# Patient Record
Sex: FEMALE | Race: WHITE | URBAN - METROPOLITAN AREA
[De-identification: names, ages, dates, MRNs, and addresses within clinical notes are randomized per-mention and may not be internally consistent; named-entity substitution may affect disease eponyms.]

---

## 2018-06-26 ENCOUNTER — EMERGENCY (EMERGENCY)
Facility: HOSPITAL | Age: 58
LOS: 0 days | Discharge: HOME | End: 2018-06-27
Attending: EMERGENCY MEDICINE | Admitting: EMERGENCY MEDICINE

## 2018-06-26 VITALS
DIASTOLIC BLOOD PRESSURE: 84 MMHG | OXYGEN SATURATION: 98 % | HEART RATE: 124 BPM | RESPIRATION RATE: 18 BRPM | SYSTOLIC BLOOD PRESSURE: 124 MMHG | TEMPERATURE: 97 F

## 2018-06-26 VITALS — HEART RATE: 133 BPM

## 2018-06-26 DIAGNOSIS — Z88.8 ALLERGY STATUS TO OTHER DRUGS, MEDICAMENTS AND BIOLOGICAL SUBSTANCES: ICD-10-CM

## 2018-06-26 DIAGNOSIS — R00.2 PALPITATIONS: ICD-10-CM

## 2018-06-26 DIAGNOSIS — I48.91 UNSPECIFIED ATRIAL FIBRILLATION: ICD-10-CM

## 2018-06-26 LAB
BASE EXCESS BLDV CALC-SCNC: -1.1 MMOL/L — SIGNIFICANT CHANGE UP (ref -2–2)
BASOPHILS # BLD AUTO: 0.03 K/UL — SIGNIFICANT CHANGE UP (ref 0–0.2)
BASOPHILS NFR BLD AUTO: 0.3 % — SIGNIFICANT CHANGE UP (ref 0–1)
CA-I SERPL-SCNC: 1.16 MMOL/L — SIGNIFICANT CHANGE UP (ref 1.12–1.3)
CK MB CFR SERPL CALC: 3.2 NG/ML — SIGNIFICANT CHANGE UP (ref 0.6–6.3)
EOSINOPHIL # BLD AUTO: 0.38 K/UL — SIGNIFICANT CHANGE UP (ref 0–0.7)
EOSINOPHIL NFR BLD AUTO: 4.1 % — SIGNIFICANT CHANGE UP (ref 0–8)
GAS PNL BLDV: 139 MMOL/L — SIGNIFICANT CHANGE UP (ref 136–145)
GAS PNL BLDV: SIGNIFICANT CHANGE UP
HCO3 BLDV-SCNC: 25 MMOL/L — SIGNIFICANT CHANGE UP (ref 22–29)
HCT VFR BLD CALC: 37 % — SIGNIFICANT CHANGE UP (ref 37–47)
HCT VFR BLDA CALC: 39.3 % — SIGNIFICANT CHANGE UP (ref 34–44)
HGB BLD CALC-MCNC: 12.8 G/DL — LOW (ref 14–18)
HGB BLD-MCNC: 11.4 G/DL — LOW (ref 12–16)
IMM GRANULOCYTES NFR BLD AUTO: 0.3 % — SIGNIFICANT CHANGE UP (ref 0.1–0.3)
LACTATE BLDV-MCNC: 0.7 MMOL/L — SIGNIFICANT CHANGE UP (ref 0.5–1.6)
LYMPHOCYTES # BLD AUTO: 3.86 K/UL — HIGH (ref 1.2–3.4)
LYMPHOCYTES # BLD AUTO: 41.2 % — SIGNIFICANT CHANGE UP (ref 20.5–51.1)
MCHC RBC-ENTMCNC: 24.4 PG — LOW (ref 27–31)
MCHC RBC-ENTMCNC: 30.8 G/DL — LOW (ref 32–37)
MCV RBC AUTO: 79.2 FL — LOW (ref 81–99)
MONOCYTES # BLD AUTO: 0.92 K/UL — HIGH (ref 0.1–0.6)
MONOCYTES NFR BLD AUTO: 9.8 % — HIGH (ref 1.7–9.3)
NEUTROPHILS # BLD AUTO: 4.15 K/UL — SIGNIFICANT CHANGE UP (ref 1.4–6.5)
NEUTROPHILS NFR BLD AUTO: 44.3 % — SIGNIFICANT CHANGE UP (ref 42.2–75.2)
NRBC # BLD: 0 /100 WBCS — SIGNIFICANT CHANGE UP (ref 0–0)
PCO2 BLDV: 49 MMHG — SIGNIFICANT CHANGE UP (ref 41–51)
PH BLDV: 7.33 — SIGNIFICANT CHANGE UP (ref 7.26–7.43)
PLATELET # BLD AUTO: 310 K/UL — SIGNIFICANT CHANGE UP (ref 130–400)
PO2 BLDV: 17 MMHG — LOW (ref 20–40)
POTASSIUM BLDV-SCNC: 3.7 MMOL/L — SIGNIFICANT CHANGE UP (ref 3.3–5.6)
RBC # BLD: 4.67 M/UL — SIGNIFICANT CHANGE UP (ref 4.2–5.4)
RBC # FLD: 16.4 % — HIGH (ref 11.5–14.5)
SAO2 % BLDV: 17 % — SIGNIFICANT CHANGE UP
TROPONIN T SERPL-MCNC: <0.01 NG/ML — SIGNIFICANT CHANGE UP
WBC # BLD: 9.37 K/UL — SIGNIFICANT CHANGE UP (ref 4.8–10.8)
WBC # FLD AUTO: 9.37 K/UL — SIGNIFICANT CHANGE UP (ref 4.8–10.8)

## 2018-06-26 RX ORDER — PROCAINAMIDE HCL 500 MG
1000 TABLET, EXTENDED RELEASE ORAL ONCE
Qty: 0 | Refills: 0 | Status: COMPLETED | OUTPATIENT
Start: 2018-06-26 | End: 2018-06-26

## 2018-06-26 RX ADMIN — Medication 260 MILLIGRAM(S): at 22:20

## 2018-06-26 RX ADMIN — Medication 1 MILLIGRAM(S): at 22:17

## 2018-06-27 LAB
ALBUMIN SERPL ELPH-MCNC: 4.6 G/DL — SIGNIFICANT CHANGE UP (ref 3.5–5.2)
ALP SERPL-CCNC: 132 U/L — HIGH (ref 30–115)
ALT FLD-CCNC: 26 U/L — SIGNIFICANT CHANGE UP (ref 0–41)
ANION GAP SERPL CALC-SCNC: 18 MMOL/L — HIGH (ref 7–14)
AST SERPL-CCNC: 21 U/L — SIGNIFICANT CHANGE UP (ref 0–41)
BILIRUB SERPL-MCNC: 0.3 MG/DL — SIGNIFICANT CHANGE UP (ref 0.2–1.2)
BUN SERPL-MCNC: 17 MG/DL — SIGNIFICANT CHANGE UP (ref 10–20)
CALCIUM SERPL-MCNC: 8.9 MG/DL — SIGNIFICANT CHANGE UP (ref 8.5–10.1)
CHLORIDE SERPL-SCNC: 105 MMOL/L — SIGNIFICANT CHANGE UP (ref 98–110)
CK SERPL-CCNC: 178 U/L — SIGNIFICANT CHANGE UP (ref 0–225)
CO2 SERPL-SCNC: 18 MMOL/L — SIGNIFICANT CHANGE UP (ref 17–32)
CREAT SERPL-MCNC: 0.9 MG/DL — SIGNIFICANT CHANGE UP (ref 0.7–1.5)
GLUCOSE SERPL-MCNC: 111 MG/DL — HIGH (ref 70–99)
MAGNESIUM SERPL-MCNC: 2.1 MG/DL — SIGNIFICANT CHANGE UP (ref 1.8–2.4)
POTASSIUM SERPL-MCNC: 4.1 MMOL/L — SIGNIFICANT CHANGE UP (ref 3.5–5)
POTASSIUM SERPL-SCNC: 4.1 MMOL/L — SIGNIFICANT CHANGE UP (ref 3.5–5)
PROT SERPL-MCNC: 7 G/DL — SIGNIFICANT CHANGE UP (ref 6–8)
SODIUM SERPL-SCNC: 141 MMOL/L — SIGNIFICANT CHANGE UP (ref 135–146)

## 2018-06-27 NOTE — ED PROVIDER NOTE - OBJECTIVE STATEMENT
57 yo F with pmhx of Chemo was on metoprolol and cardizem for it but stopped taking it on her own a few months ago presenting with palpitations since yesterday morning. States palpitations returned around 8pm tonight. No cp, sob, fever, chills, abdominal pain, nausea, vomiting, diarrhea, back pain, urinary symptoms, headache, dizziness, paresthesias, or weakness.

## 2018-06-27 NOTE — ED PROVIDER NOTE - ATTENDING CONTRIBUTION TO CARE
58 F past medical history afib presents with acute onset of afib.  started at 8pm tonight.  No other symptoms.  [place] for [place] aggressive protocol, no conversion with procainamide but was converted electrically with 1 200J synchronized shock.  stable for discharge to home.  follow up with cardiologist.

## 2018-06-27 NOTE — ED PROCEDURE NOTE - NS_POSTPROCCAREGUIDE_ED_ALL_ED
Patient is now fully awake, with vital signs and temperature stable, hydration is adequate, patients Mandeep’s  score is at baseline (or greater than 8), patient and escort has received  discharge education.

## 2018-06-27 NOTE — ED PROVIDER NOTE - PROGRESS NOTE DETAILS
Patient successfully cardioverted feeling much better discussed strict return precautions advised to follow up with her cardiologist Dr. Olsen this week.

## 2018-06-27 NOTE — ED PROVIDER NOTE - NS ED ROS FT
Review of Systems:  	•	CONSTITUTIONAL - no fever, no diaphoresis, no chills  	•	SKIN - no rash  	•	HEMATOLOGIC - no bleeding, no bruising  	•	EYES - no eye pain, no blurry vision  	•	ENT - no congestion  	•	RESPIRATORY - no shortness of breath, no cough  	•	CARDIAC - no chest pain, + palpitations  	•	GI - no abd pain, no nausea, no vomiting  	•	MUSCULOSKELETAL - no joint paint, no swelling, no redness  	•	NEUROLOGIC - no weakness, no headache, no paresthesias, no LOC

## 2018-06-27 NOTE — ED PROVIDER NOTE - PHYSICAL EXAMINATION
VITAL SIGNS: I have reviewed nursing notes and confirm.  CONSTITUTIONAL: Well-developed; well-nourished; in no acute distress.  SKIN: Skin exam is warm and dry, no acute rash.  HEAD: Normocephalic; atraumatic.  EYES: PERRL, EOM intact; conjunctiva and sclera clear.  ENT: No nasal discharge; airway clear.   NECK: Supple; non tender.  CARD: S1, S2 normal; no murmurs, gallops, or rubs. +Irregularly irregular rate and rhythm  RESP: Clear to auscultation bilaterally. No wheezes, rales or rhonchi.  ABD: Normal bowel sounds; soft; non-distended; non-tender.   EXT: Normal ROM. No edema.  LYMPH: No acute cervical adenopathy.  NEURO: Alert, oriented. Grossly unremarkable. No focal deficits.  PSYCH: Cooperative, appropriate.

## 2019-05-06 ENCOUNTER — INPATIENT (INPATIENT)
Facility: HOSPITAL | Age: 59
LOS: 0 days | Discharge: HOME | End: 2019-05-07
Attending: INTERNAL MEDICINE | Admitting: INTERNAL MEDICINE
Payer: COMMERCIAL

## 2019-05-06 VITALS
SYSTOLIC BLOOD PRESSURE: 133 MMHG | HEART RATE: 121 BPM | DIASTOLIC BLOOD PRESSURE: 88 MMHG | OXYGEN SATURATION: 98 % | RESPIRATION RATE: 18 BRPM | TEMPERATURE: 97 F

## 2019-05-06 DIAGNOSIS — Z98.890 OTHER SPECIFIED POSTPROCEDURAL STATES: Chronic | ICD-10-CM

## 2019-05-06 LAB
ALBUMIN SERPL ELPH-MCNC: 4.1 G/DL — SIGNIFICANT CHANGE UP (ref 3.5–5.2)
ALP SERPL-CCNC: 106 U/L — SIGNIFICANT CHANGE UP (ref 30–115)
ALT FLD-CCNC: 37 U/L — SIGNIFICANT CHANGE UP (ref 0–41)
ANION GAP SERPL CALC-SCNC: 14 MMOL/L — SIGNIFICANT CHANGE UP (ref 7–14)
APTT BLD: 36.6 SEC — SIGNIFICANT CHANGE UP (ref 27–39.2)
AST SERPL-CCNC: 36 U/L — SIGNIFICANT CHANGE UP (ref 0–41)
BASOPHILS # BLD AUTO: 0.04 K/UL — SIGNIFICANT CHANGE UP (ref 0–0.2)
BASOPHILS NFR BLD AUTO: 0.5 % — SIGNIFICANT CHANGE UP (ref 0–1)
BILIRUB SERPL-MCNC: <0.2 MG/DL — SIGNIFICANT CHANGE UP (ref 0.2–1.2)
BUN SERPL-MCNC: 22 MG/DL — HIGH (ref 10–20)
CALCIUM SERPL-MCNC: 8.9 MG/DL — SIGNIFICANT CHANGE UP (ref 8.5–10.1)
CHLORIDE SERPL-SCNC: 109 MMOL/L — SIGNIFICANT CHANGE UP (ref 98–110)
CO2 SERPL-SCNC: 20 MMOL/L — SIGNIFICANT CHANGE UP (ref 17–32)
CREAT SERPL-MCNC: 1 MG/DL — SIGNIFICANT CHANGE UP (ref 0.7–1.5)
EOSINOPHIL # BLD AUTO: 0.19 K/UL — SIGNIFICANT CHANGE UP (ref 0–0.7)
EOSINOPHIL NFR BLD AUTO: 2.2 % — SIGNIFICANT CHANGE UP (ref 0–8)
GLUCOSE SERPL-MCNC: 112 MG/DL — HIGH (ref 70–99)
HCT VFR BLD CALC: 36.2 % — LOW (ref 37–47)
HGB BLD-MCNC: 11.1 G/DL — LOW (ref 12–16)
IMM GRANULOCYTES NFR BLD AUTO: 0.2 % — SIGNIFICANT CHANGE UP (ref 0.1–0.3)
INR BLD: 1.04 RATIO — SIGNIFICANT CHANGE UP (ref 0.65–1.3)
LYMPHOCYTES # BLD AUTO: 2.27 K/UL — SIGNIFICANT CHANGE UP (ref 1.2–3.4)
LYMPHOCYTES # BLD AUTO: 25.9 % — SIGNIFICANT CHANGE UP (ref 20.5–51.1)
MCHC RBC-ENTMCNC: 24 PG — LOW (ref 27–31)
MCHC RBC-ENTMCNC: 30.7 G/DL — LOW (ref 32–37)
MCV RBC AUTO: 78.4 FL — LOW (ref 81–99)
MONOCYTES # BLD AUTO: 0.88 K/UL — HIGH (ref 0.1–0.6)
MONOCYTES NFR BLD AUTO: 10 % — HIGH (ref 1.7–9.3)
NEUTROPHILS # BLD AUTO: 5.36 K/UL — SIGNIFICANT CHANGE UP (ref 1.4–6.5)
NEUTROPHILS NFR BLD AUTO: 61.2 % — SIGNIFICANT CHANGE UP (ref 42.2–75.2)
NRBC # BLD: 0 /100 WBCS — SIGNIFICANT CHANGE UP (ref 0–0)
PLATELET # BLD AUTO: 292 K/UL — SIGNIFICANT CHANGE UP (ref 130–400)
POTASSIUM SERPL-MCNC: 4.2 MMOL/L — SIGNIFICANT CHANGE UP (ref 3.5–5)
POTASSIUM SERPL-SCNC: 4.2 MMOL/L — SIGNIFICANT CHANGE UP (ref 3.5–5)
PROT SERPL-MCNC: 6.3 G/DL — SIGNIFICANT CHANGE UP (ref 6–8)
PROTHROM AB SERPL-ACNC: 12 SEC — SIGNIFICANT CHANGE UP (ref 9.95–12.87)
RBC # BLD: 4.62 M/UL — SIGNIFICANT CHANGE UP (ref 4.2–5.4)
RBC # FLD: 15.9 % — HIGH (ref 11.5–14.5)
SODIUM SERPL-SCNC: 143 MMOL/L — SIGNIFICANT CHANGE UP (ref 135–146)
TROPONIN T SERPL-MCNC: <0.01 NG/ML — SIGNIFICANT CHANGE UP
WBC # BLD: 8.76 K/UL — SIGNIFICANT CHANGE UP (ref 4.8–10.8)
WBC # FLD AUTO: 8.76 K/UL — SIGNIFICANT CHANGE UP (ref 4.8–10.8)

## 2019-05-06 PROCEDURE — 71046 X-RAY EXAM CHEST 2 VIEWS: CPT | Mod: 26

## 2019-05-06 PROCEDURE — 93010 ELECTROCARDIOGRAM REPORT: CPT

## 2019-05-06 PROCEDURE — 99285 EMERGENCY DEPT VISIT HI MDM: CPT | Mod: 25

## 2019-05-06 RX ORDER — DILTIAZEM HCL 120 MG
60 CAPSULE, EXT RELEASE 24 HR ORAL ONCE
Qty: 0 | Refills: 0 | Status: COMPLETED | OUTPATIENT
Start: 2019-05-06 | End: 2019-05-06

## 2019-05-06 RX ORDER — ASPIRIN/CALCIUM CARB/MAGNESIUM 324 MG
325 TABLET ORAL ONCE
Qty: 0 | Refills: 0 | Status: COMPLETED | OUTPATIENT
Start: 2019-05-06 | End: 2019-05-06

## 2019-05-06 RX ORDER — SODIUM CHLORIDE 9 MG/ML
3 INJECTION INTRAMUSCULAR; INTRAVENOUS; SUBCUTANEOUS EVERY 8 HOURS
Qty: 0 | Refills: 0 | Status: DISCONTINUED | OUTPATIENT
Start: 2019-05-06 | End: 2019-05-07

## 2019-05-06 RX ADMIN — Medication 325 MILLIGRAM(S): at 23:19

## 2019-05-06 RX ADMIN — SODIUM CHLORIDE 3 MILLILITER(S): 9 INJECTION INTRAMUSCULAR; INTRAVENOUS; SUBCUTANEOUS at 23:37

## 2019-05-06 NOTE — ED ADULT NURSE NOTE - NSIMPLEMENTINTERV_GEN_ALL_ED
Implemented All Universal Safety Interventions:  [place] to call system. Call bell, personal items and telephone within reach. Instruct patient to call for assistance. Room bathroom lighting operational. Non-slip footwear when patient is off stretcher. Physically safe environment: no spills, clutter or unnecessary equipment. Stretcher in lowest position, wheels locked, appropriate side rails in place.

## 2019-05-06 NOTE — ED ADULT NURSE NOTE - OBJECTIVE STATEMENT
pt presents to ED c/o palpitations on and off since Sunday, increased anxiety. Pt denies CP/SOB, Denies fevers or chills. pt has has hx of paroxsymal afib.

## 2019-05-06 NOTE — ED PROVIDER NOTE - PROGRESS NOTE DETAILS
patient not a candidate for OBS spoke to Dr Aquino (covering for Ernie Dhaliwal). Admit to Tele. Consult patients cardiologists

## 2019-05-06 NOTE — ED PROVIDER NOTE - CARE PLAN
Principal Discharge DX:	Atrial fibrillation, unspecified type  Secondary Diagnosis:	Abnormal laboratory test
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
## 2019-05-06 NOTE — ED PROVIDER NOTE - PHYSICAL EXAMINATION
VITAL SIGNS: I have reviewed nursing notes and confirm.  CONSTITUTIONAL: Well-developed; well-nourished; in no acute distress.  SKIN: Skin exam is warm and dry, no acute rash.  HEAD: Normocephalic; atraumatic.  EYES: PERRL, EOM intact; conjunctiva and sclera clear.  ENT: No nasal discharge; airway clear.   NECK: Supple; non tender.  CARD: S1, S2 normal; no murmurs, gallops, or rubs. Irregular rate and rhythm.  RESP: No wheezes, rales or rhonchi.  ABD: Normal bowel sounds; soft; non-distended; non-tender; no hepatosplenomegaly.  EXT: Normal ROM. No clubbing, cyanosis or edema.  LYMPH: No acute cervical adenopathy.  NEURO: Alert, oriented. Grossly unremarkable. No focal deficits.  PSYCH: Cooperative, appropriate.

## 2019-05-06 NOTE — ED PROVIDER NOTE - CLINICAL SUMMARY MEDICAL DECISION MAKING FREE TEXT BOX
58 yo female with remote history of Afib felt palpitations on and off since Sunday. Now in afib with RVR. Pt denies any other complaints. Labs, ekg, xray reviewed and pt also with elevated BNP (cxr does not show effusion or increased vascular markings). Admitted to [facility] for cardio workup, anticoagulation and discussion about cardioversion with cardiac team.

## 2019-05-07 ENCOUNTER — TRANSCRIPTION ENCOUNTER (OUTPATIENT)
Age: 59
End: 2019-05-07

## 2019-05-07 VITALS — DIASTOLIC BLOOD PRESSURE: 64 MMHG | SYSTOLIC BLOOD PRESSURE: 89 MMHG | HEART RATE: 83 BPM

## 2019-05-07 LAB
ANION GAP SERPL CALC-SCNC: 13 MMOL/L — SIGNIFICANT CHANGE UP (ref 7–14)
BUN SERPL-MCNC: 17 MG/DL — SIGNIFICANT CHANGE UP (ref 10–20)
CALCIUM SERPL-MCNC: 8.6 MG/DL — SIGNIFICANT CHANGE UP (ref 8.5–10.1)
CHLORIDE SERPL-SCNC: 110 MMOL/L — SIGNIFICANT CHANGE UP (ref 98–110)
CHOLEST SERPL-MCNC: 178 MG/DL — SIGNIFICANT CHANGE UP (ref 100–200)
CO2 SERPL-SCNC: 20 MMOL/L — SIGNIFICANT CHANGE UP (ref 17–32)
CREAT SERPL-MCNC: 0.8 MG/DL — SIGNIFICANT CHANGE UP (ref 0.7–1.5)
ESTIMATED AVERAGE GLUCOSE: 128 MG/DL — HIGH (ref 68–114)
GLUCOSE SERPL-MCNC: 95 MG/DL — SIGNIFICANT CHANGE UP (ref 70–99)
HBA1C BLD-MCNC: 6.1 % — HIGH (ref 4–5.6)
HCT VFR BLD CALC: 34.8 % — LOW (ref 37–47)
HDLC SERPL-MCNC: 43 MG/DL — LOW
HGB BLD-MCNC: 10.7 G/DL — LOW (ref 12–16)
LIPID PNL WITH DIRECT LDL SERPL: 129 MG/DL — SIGNIFICANT CHANGE UP (ref 4–129)
MCHC RBC-ENTMCNC: 23.8 PG — LOW (ref 27–31)
MCHC RBC-ENTMCNC: 30.7 G/DL — LOW (ref 32–37)
MCV RBC AUTO: 77.5 FL — LOW (ref 81–99)
NRBC # BLD: 0 /100 WBCS — SIGNIFICANT CHANGE UP (ref 0–0)
NT-PROBNP SERPL-SCNC: 2777 PG/ML — HIGH (ref 0–300)
PLATELET # BLD AUTO: 273 K/UL — SIGNIFICANT CHANGE UP (ref 130–400)
POTASSIUM SERPL-MCNC: 4.3 MMOL/L — SIGNIFICANT CHANGE UP (ref 3.5–5)
POTASSIUM SERPL-SCNC: 4.3 MMOL/L — SIGNIFICANT CHANGE UP (ref 3.5–5)
RBC # BLD: 4.49 M/UL — SIGNIFICANT CHANGE UP (ref 4.2–5.4)
RBC # FLD: 15.8 % — HIGH (ref 11.5–14.5)
SODIUM SERPL-SCNC: 143 MMOL/L — SIGNIFICANT CHANGE UP (ref 135–146)
TOTAL CHOLESTEROL/HDL RATIO MEASUREMENT: 4.1 RATIO — SIGNIFICANT CHANGE UP (ref 4–5.5)
TRIGL SERPL-MCNC: 77 MG/DL — SIGNIFICANT CHANGE UP (ref 10–149)
TROPONIN T SERPL-MCNC: <0.01 NG/ML — SIGNIFICANT CHANGE UP
TSH SERPL-MCNC: 3.79 UIU/ML — SIGNIFICANT CHANGE UP (ref 0.27–4.2)
WBC # BLD: 8.29 K/UL — SIGNIFICANT CHANGE UP (ref 4.8–10.8)
WBC # FLD AUTO: 8.29 K/UL — SIGNIFICANT CHANGE UP (ref 4.8–10.8)

## 2019-05-07 PROCEDURE — 93010 ELECTROCARDIOGRAM REPORT: CPT

## 2019-05-07 PROCEDURE — 93306 TTE W/DOPPLER COMPLETE: CPT | Mod: 26

## 2019-05-07 RX ORDER — DILTIAZEM HCL 120 MG
1 CAPSULE, EXT RELEASE 24 HR ORAL
Qty: 90 | Refills: 0
Start: 2019-05-07 | End: 2019-06-05

## 2019-05-07 RX ORDER — APIXABAN 2.5 MG/1
5 TABLET, FILM COATED ORAL EVERY 12 HOURS
Qty: 0 | Refills: 0 | Status: DISCONTINUED | OUTPATIENT
Start: 2019-05-07 | End: 2019-05-07

## 2019-05-07 RX ORDER — DILTIAZEM HCL 120 MG
60 CAPSULE, EXT RELEASE 24 HR ORAL EVERY 8 HOURS
Qty: 0 | Refills: 0 | Status: DISCONTINUED | OUTPATIENT
Start: 2019-05-07 | End: 2019-05-07

## 2019-05-07 RX ORDER — DILTIAZEM HCL 120 MG
60 CAPSULE, EXT RELEASE 24 HR ORAL ONCE
Qty: 0 | Refills: 0 | Status: COMPLETED | OUTPATIENT
Start: 2019-05-07 | End: 2019-05-07

## 2019-05-07 RX ORDER — APIXABAN 2.5 MG/1
1 TABLET, FILM COATED ORAL
Qty: 60 | Refills: 0
Start: 2019-05-07 | End: 2019-06-05

## 2019-05-07 RX ORDER — PANTOPRAZOLE SODIUM 20 MG/1
40 TABLET, DELAYED RELEASE ORAL
Qty: 0 | Refills: 0 | Status: DISCONTINUED | OUTPATIENT
Start: 2019-05-07 | End: 2019-05-07

## 2019-05-07 RX ORDER — DILTIAZEM HCL 120 MG
1 CAPSULE, EXT RELEASE 24 HR ORAL
Qty: 30 | Refills: 0
Start: 2019-05-07 | End: 2019-06-05

## 2019-05-07 RX ORDER — CHLORHEXIDINE GLUCONATE 213 G/1000ML
1 SOLUTION TOPICAL
Qty: 0 | Refills: 0 | Status: DISCONTINUED | OUTPATIENT
Start: 2019-05-07 | End: 2019-05-07

## 2019-05-07 RX ORDER — ENOXAPARIN SODIUM 100 MG/ML
68 INJECTION SUBCUTANEOUS ONCE
Qty: 0 | Refills: 0 | Status: COMPLETED | OUTPATIENT
Start: 2019-05-07 | End: 2019-05-07

## 2019-05-07 RX ADMIN — PANTOPRAZOLE SODIUM 40 MILLIGRAM(S): 20 TABLET, DELAYED RELEASE ORAL at 05:55

## 2019-05-07 RX ADMIN — APIXABAN 5 MILLIGRAM(S): 2.5 TABLET, FILM COATED ORAL at 05:55

## 2019-05-07 RX ADMIN — SODIUM CHLORIDE 3 MILLILITER(S): 9 INJECTION INTRAMUSCULAR; INTRAVENOUS; SUBCUTANEOUS at 12:43

## 2019-05-07 RX ADMIN — ENOXAPARIN SODIUM 68 MILLIGRAM(S): 100 INJECTION SUBCUTANEOUS at 00:41

## 2019-05-07 RX ADMIN — Medication 60 MILLIGRAM(S): at 11:54

## 2019-05-07 RX ADMIN — SODIUM CHLORIDE 3 MILLILITER(S): 9 INJECTION INTRAMUSCULAR; INTRAVENOUS; SUBCUTANEOUS at 05:27

## 2019-05-07 RX ADMIN — Medication 60 MILLIGRAM(S): at 00:41

## 2019-05-07 RX ADMIN — Medication 0.5 MILLIGRAM(S): at 00:59

## 2019-05-07 NOTE — DISCHARGE NOTE NURSING/CASE MANAGEMENT/SOCIAL WORK - NSDCDPATPORTLINK_GEN_ALL_CORE
You can access the [facility], offered by [facility], by registering with the following website: [email or web address]

## 2019-05-07 NOTE — DISCHARGE NOTE PROVIDER - HOSPITAL COURSE
60 yo female patient with PMHx of paroxysmal AF s/p cardioversion around 1 year ago, not maintained on any medication, presenting because of acute onset palpitations over the last 2 days.    Patient says that the palpitations initially started at night, woke her up from sleep. They were not associated with chest pain or dyspnea. She then started feeling them intermittently during the day, for couple of minutes. Patient denies chest pain on exertion or dyspnea on exertion. She has no orthopnea, paroxysmal nocturnal dyspnea, or lower extremities edema. 60 yo female patient with PMHx of paroxysmal AF s/p cardioversion around 1 year ago, not maintained on any medication, presenting because of acute onset palpitations over the last 2 days.    Patient says that the palpitations initially started at night, woke her up from sleep. They were not associated with chest pain or dyspnea. She then started feeling them intermittently during the day, for couple of minutes. In ED found to be in rapid a fib, controlled with 1 IVP cardizem. Subsequently started on cardizem short-acting with good rate control. Eliquis started per cardiology in case of cardioversion in future. Discharged to follow up with cardiology outpatient. 58 yo female patient with PMHx of paroxysmal AF s/p cardioversion around 1 year ago, not maintained on any medication, presenting because of acute onset palpitations over the last 2 days.    Patient says that the palpitations initially started at night, woke her up from sleep. They were not associated with chest pain or dyspnea. She then started feeling them intermittently during the day, for couple of minutes. In ED found to be in rapid a fib, controlled with 1 IVP cardizem. Subsequently started on cardizem short-acting with good rate control. Eliquis started per cardiology in case of cardioversion in future. TTE showing aortic root dilation but otherwise unremarkable; no valvular disease. Discharged to follow up with cardiology outpatient.
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
## 2019-05-07 NOTE — H&P ADULT - ATTENDING COMMENTS
60 yo female patient with PMHx of paroxysmal AF, presenting because of new onset palpitations     Pt seen and examined- very comfortable    chart reviewed- agree with above    tele    cardio eval    echo    plan as per cardiology

## 2019-05-07 NOTE — H&P ADULT - ASSESSMENT
60 yo female patient with PMHx of paroxysmal AF, presenting because of new onset palpitations over the last 2 days.    1) Paroxysmal Afib  Initially with rapid ventricular rate, currently controlled after cardizem in ED  [id number] = 1  Patient may not require long-term anticoagulation for AF, but will anticoagulate acutely in case cardioversion is planned (will need 3 weeks of AC)  Will check TSH, lipid panel, Hba1c  Check 2d echo (assess LV function and valvular heart disease)  Consult cardiology, consider MINNA with cardioversion in AM  Patient would benefit from chronic rhythm control - to be discussed with cardiology    2) Microcytic Anemia  Likely iron deficiency (gynecological losses vs GI)  Will need anemia work-up especially now with anticoagulation planned  Iron studies to be checked  Will need colonoscopy outpatient    3) Miscellaneous  DVT and GI ppx  DASH diet  Activity as tolerated  Full code  Dispo home

## 2019-05-07 NOTE — H&P ADULT - NSHPLABSRESULTS_GEN_ALL_CORE
11.1   8.76  )-----------( 292      ( 06 May 2019 23:21 )             36.2     143  |  109  |  22<H>  ----------------------------<  112<H>  4.2   |  20  |  1.0    Ca    8.9      06 May 2019 23:21    TPro  6.3  /  Alb  4.1  /  TBili  <0.2  /  DBili  x   /  AST  36  /  ALT  37  /  AlkPhos  106  05-06        PT/INR - ( 06 May 2019 23:21 )   PT: 12.00 sec;   INR: 1.04 ratio       PTT - ( 06 May 2019 23:21 )  PTT:36.6 sec    CARDIAC MARKERS ( 06 May 2019 23:21 )  x     / <0.01 ng/mL / x     / x     / x        ECG: Atrial fibrillation with rapid ventricular rate around 116 bpm    CXR: No cardiopulmonary disease

## 2019-05-07 NOTE — H&P ADULT - HISTORY OF PRESENT ILLNESS
60 yo female patient with PMHx of paroxysmal AF s/p cardioversion around 1 year ago, not maintained on any medication, presenting because of acute onset palpitations over the last 2 days.  Patient says that the palpitations initially started at night, woke her up from sleep. They were not associated with chest pain or dyspnea.  She then started feeling them intermittently during the day, for couple of minutes.  Patient denies chest pain on exertion or dyspnea on exertion. She has no orthopnea, paroxysmal nocturnal dyspnea, or lower extremities edema.

## 2019-05-07 NOTE — DISCHARGE NOTE PROVIDER - CARE PROVIDER_API CALL
Froylan Thomas)  Cardiology; Interventional Cardiology  [address], [address]  [place] [address]  Phone: [phone]  Fax: [phone]  Follow Up Time: 1 week

## 2019-05-07 NOTE — DISCHARGE NOTE PROVIDER - NSDCCPCAREPLAN_GEN_ALL_CORE_FT
PRINCIPAL DISCHARGE DIAGNOSIS  Diagnosis: Atrial fibrillation, unspecified type  Assessment and Plan of Treatment: Continue taking your cardizem and Eliquis as prescribed and follow up with your cardiologist outpatient in 1 week. PRINCIPAL DISCHARGE DIAGNOSIS  Diagnosis: Atrial fibrillation, unspecified type  Assessment and Plan of Treatment: Continue taking your cardizem and Eliquis as prescribed and follow up with your cardiologist outpatient in 1 week. Discuss cardioversion if your heart rhythm remains abnormal. PRINCIPAL DISCHARGE DIAGNOSIS  Diagnosis: Atrial fibrillation, unspecified type  Assessment and Plan of Treatment: Continue taking your cardizem and Eliquis as prescribed and follow up with your cardiologist outpatient in 1 week. Discuss cardioversion if your heart rhythm remains abnormal.      SECONDARY DISCHARGE DIAGNOSES  Diagnosis: Enlargement of aortic root  Assessment and Plan of Treatment: Your echo incidentally demonstrated some dilation of the aortic root. There were no issues with your heart valves. Follow up with your cardiologist regarding repeat imaging to follow this finding.

## 2019-05-07 NOTE — H&P ADULT - NSHPSOCIALHISTORY_GEN_ALL_CORE
Social History:    Substance Use (street drugs): ( x ) never used  (  ) other:  Tobacco Usage:  ( x ) never smoked   (   ) former smoker   (   ) current smoker  (     ) pack year  (        ) last cigarette date  Alcohol Usage: Occasional

## 2019-05-10 DIAGNOSIS — Z79.01 LONG TERM (CURRENT) USE OF ANTICOAGULANTS: ICD-10-CM

## 2019-05-10 DIAGNOSIS — I77.819 AORTIC ECTASIA, UNSPECIFIED SITE: ICD-10-CM

## 2019-05-10 DIAGNOSIS — D50.9 IRON DEFICIENCY ANEMIA, UNSPECIFIED: ICD-10-CM

## 2019-05-10 DIAGNOSIS — I48.91 UNSPECIFIED ATRIAL FIBRILLATION: ICD-10-CM

## 2019-05-10 DIAGNOSIS — F41.9 ANXIETY DISORDER, UNSPECIFIED: ICD-10-CM

## 2019-05-10 DIAGNOSIS — R00.2 PALPITATIONS: ICD-10-CM

## 2019-05-10 DIAGNOSIS — R79.9 ABNORMAL FINDING OF BLOOD CHEMISTRY, UNSPECIFIED: ICD-10-CM

## 2019-05-10 DIAGNOSIS — R00.0 TACHYCARDIA, UNSPECIFIED: ICD-10-CM

## 2019-09-29 ENCOUNTER — EMERGENCY (EMERGENCY)
Facility: HOSPITAL | Age: 59
LOS: 0 days | Discharge: HOME | End: 2019-09-29
Admitting: EMERGENCY MEDICINE
Payer: COMMERCIAL

## 2019-09-29 VITALS
OXYGEN SATURATION: 99 % | TEMPERATURE: 97 F | SYSTOLIC BLOOD PRESSURE: 147 MMHG | RESPIRATION RATE: 16 BRPM | HEART RATE: 72 BPM | DIASTOLIC BLOOD PRESSURE: 74 MMHG

## 2019-09-29 DIAGNOSIS — M54.9 DORSALGIA, UNSPECIFIED: ICD-10-CM

## 2019-09-29 DIAGNOSIS — Z88.8 ALLERGY STATUS TO OTHER DRUGS, MEDICAMENTS AND BIOLOGICAL SUBSTANCES STATUS: ICD-10-CM

## 2019-09-29 DIAGNOSIS — M79.605 PAIN IN LEFT LEG: ICD-10-CM

## 2019-09-29 DIAGNOSIS — M54.32 SCIATICA, LEFT SIDE: ICD-10-CM

## 2019-09-29 DIAGNOSIS — Z98.890 OTHER SPECIFIED POSTPROCEDURAL STATES: Chronic | ICD-10-CM

## 2019-09-29 PROBLEM — F41.9 ANXIETY DISORDER, UNSPECIFIED: Chronic | Status: ACTIVE | Noted: 2019-05-06

## 2019-09-29 PROCEDURE — 99283 EMERGENCY DEPT VISIT LOW MDM: CPT

## 2019-09-29 RX ORDER — DEXAMETHASONE 0.5 MG/5ML
10 ELIXIR ORAL ONCE
Refills: 0 | Status: COMPLETED | OUTPATIENT
Start: 2019-09-29 | End: 2019-09-29

## 2019-09-29 RX ORDER — IBUPROFEN 200 MG
600 TABLET ORAL ONCE
Refills: 0 | Status: COMPLETED | OUTPATIENT
Start: 2019-09-29 | End: 2019-09-29

## 2019-09-29 RX ORDER — METHOCARBAMOL 500 MG/1
2 TABLET, FILM COATED ORAL
Qty: 30 | Refills: 0
Start: 2019-09-29 | End: 2019-10-03

## 2019-09-29 RX ADMIN — Medication 10 MILLIGRAM(S): at 10:50

## 2019-09-29 RX ADMIN — Medication 600 MILLIGRAM(S): at 10:50

## 2019-09-29 NOTE — ED ADULT NURSE NOTE - OBJECTIVE STATEMENT
pt comes in for lower back that goes down her left leg. pt ambulated from triage to room with steady gait.

## 2019-09-29 NOTE — ED PROVIDER NOTE - OBJECTIVE STATEMENT
58 yo female with h/o A Fib (states she had one episode only - no longer on any medication) presents to the ED c/o left sided lower back pain/buttock pain with radiation down her left leg x 1.5 weeks. Pain described as constant, sharp/burning pain. Pain after sitting or standing for extended periods of time and worse with movement. Patient admits to some improvement with motrin, but then pain returns. Denies any specific trauma or injury but admits to frequently lifting heavy objects. Denies fever, chills, chest pain, sob, abd pain, N/V, urinary sxs. no UE/LE weakness or paresthesias, no saddle anesthesia, no bowel or bladder incontinence/retention, no prior back surgery. Patient states shes had similar back pain in past but never lasted this long.

## 2019-09-29 NOTE — ED PROVIDER NOTE - NS ED ROS FT
Constitutional: no fever, chills   Cardiovascular: no chest pain, no sob,   Respiratory: no cough, no shortness of breath  Gastrointestinal: no nausea, vomiting or diarrhea. no abdominal pain. no melena or BRBPR. no prior abdominal surgeries.   : no urinary sxs, no flank pain.  Musculoskeletal: see HPI.   Integumentary: no rash or skin changes. no edema  Neurological: no headache, no dizziness, no visual changes, no UE/LE weakness or paresthesias. no change in mental status.

## 2019-09-29 NOTE — ED PROVIDER NOTE - CLINICAL SUMMARY MEDICAL DECISION MAKING FREE TEXT BOX
Patient here for left sided back/buttock pain with radiation down left leg. no fall or trauma. no saddle anesthesia. no bowel or bladder incontinence/retention. neuro exam unremarkable. Symptoms consistent with sciatica. Recommend nsaids, muscle relaxant, follow up with rehab/neurosx. Patient understands return precautions.

## 2019-09-29 NOTE — ED PROVIDER NOTE - PHYSICAL EXAMINATION
GENERAL:  well appearing, non-toxic female in no acute distress  SKIN: skin warm, pink and dry. MMM. no rash or skin color changes to back. cap refill < 2 sec   PULM: CTAB. Normal respiratory effort. No respiratory distress. No wheezes, stridor, rales or rhonchi. No retractions  CV: RRR, no M/R/G.   ABD: Soft, non-tender, non-distended. no CVA tenderness.   MSK: + TTP left buttock and left sciatic notch. no midline vertebral tenderness. + left sided straight leg raise. No edema, erythema, cyanosis. radial pulses equal and intact bilaterally.   NEURO: A+Ox3, no sensory/motor deficits. LE sensation equal and intact bilaterally. LE strength 5/5 bilaterally. normal gait.   PSYCH: appropriate behavior, cooperative.

## 2019-09-29 NOTE — ED PROVIDER NOTE - PATIENT PORTAL LINK FT
You can access the FollowMyHealth Patient Portal offered by [facility] by registering at the following website: [email or web address]. By joining [facility]’s FollowMyHealth portal, you will also be able to view your health information using other applications (apps) compatible with our system.

## 2019-12-20 NOTE — ED ADULT TRIAGE NOTE - ARRIVAL FROM
YAG CAP OS: (Consent signed and scanned into attachments) 1 gtt Prolensa applied. The purpose and nature of the procedure possible alternative methods of treatment the risks involved and the possibility of complications were discussed with patient. The Patient wishes to proceed and the consent was signed. The laser was then performed under topical anesthesia with no complications. Post op instructions were given to patient as well as a follow-up appointment. Patient was advised to call our office if any questions or concerns. Return for an appointment in 1-4 weeks po/yag with Dr. Mimi Antony. Home

## 2020-04-26 ENCOUNTER — MESSAGE (OUTPATIENT)
Age: 60
End: 2020-04-26

## 2020-05-03 ENCOUNTER — APPOINTMENT (OUTPATIENT)
Dept: DISASTER EMERGENCY | Facility: HOSPITAL | Age: 60
End: 2020-05-03

## 2020-05-04 LAB
SARS-COV-2 IGG SERPL IA-ACNC: 6.4 INDEX
SARS-COV-2 IGG SERPL QL IA: POSITIVE

## 2020-10-09 ENCOUNTER — EMERGENCY (EMERGENCY)
Facility: HOSPITAL | Age: 60
LOS: 0 days | Discharge: HOME | End: 2020-10-09
Attending: EMERGENCY MEDICINE | Admitting: EMERGENCY MEDICINE
Payer: OTHER MISCELLANEOUS

## 2020-10-09 VITALS
TEMPERATURE: 98 F | HEIGHT: 64 IN | HEART RATE: 84 BPM | OXYGEN SATURATION: 98 % | DIASTOLIC BLOOD PRESSURE: 89 MMHG | WEIGHT: 149.03 LBS | SYSTOLIC BLOOD PRESSURE: 176 MMHG | RESPIRATION RATE: 16 BRPM
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
## 2020-10-09 DIAGNOSIS — S09.90XA UNSPECIFIED INJURY OF HEAD, INITIAL ENCOUNTER: ICD-10-CM

## 2020-10-09 DIAGNOSIS — Z98.890 OTHER SPECIFIED POSTPROCEDURAL STATES: Chronic | ICD-10-CM
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
## 2020-10-09 DIAGNOSIS — Z88.8 ALLERGY STATUS TO OTHER DRUGS, MEDICAMENTS AND BIOLOGICAL SUBSTANCES: ICD-10-CM

## 2020-10-09 DIAGNOSIS — M79.642 PAIN IN LEFT HAND: ICD-10-CM

## 2020-10-09 DIAGNOSIS — Z98.890 OTHER SPECIFIED POSTPROCEDURAL STATES: ICD-10-CM
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
## 2020-10-09 DIAGNOSIS — S60.222A CONTUSION OF LEFT HAND, INITIAL ENCOUNTER: ICD-10-CM

## 2020-10-09 PROCEDURE — 99283 EMERGENCY DEPT VISIT LOW MDM: CPT

## 2020-10-09 PROCEDURE — 73120 X-RAY EXAM OF HAND: CPT | Mod: 26,LT

## 2020-10-09 RX ORDER — IBUPROFEN 200 MG
400 TABLET ORAL ONCE
Refills: 0 | Status: COMPLETED | OUTPATIENT
Start: 2020-10-09 | End: 2020-10-09

## 2020-10-09 RX ADMIN — Medication 400 MILLIGRAM(S): at 16:12

## 2020-10-09 NOTE — ED ADULT NURSE NOTE - NSIMPLEMENTINTERV_GEN_ALL_ED
Implemented All Universal Safety Interventions:  [place] to call system. Call bell, personal items and telephone within reach. Instruct patient to call for assistance. Room bathroom lighting operational. Non-slip footwear when patient is off stretcher. Physically safe environment: no spills, clutter or unnecessary equipment. Stretcher in lowest position, wheels locked, appropriate side rails in place.
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
## 2020-10-09 NOTE — ED PROVIDER NOTE - ENMT, MLM
Airway patent, Nasal mucosa clear. Mouth with normal mucosa. Throat has no vesicles, no oropharyngeal exudates and uvula is midline. slight redness right ear, TM normal , no swelling,
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
## 2020-10-09 NOTE — ED PROVIDER NOTE - CARE PROVIDER_API CALL
Primo Laboy  ORTHOPAEDIC SURGERY  [address] [place] [address]  Phone: [phone]  Fax: [phone]  Follow Up Time: 1-3 Days

## 2020-10-09 NOTE — ED PROVIDER NOTE - CLINICAL SUMMARY MEDICAL DECISION MAKING FREE TEXT BOX
x ray reviewed and results d/w patient. Rec RICE. Motrin or Tylenol  as needed for pain. Head injury instructions discussed.  Pt instructed to return if any worsening symptoms or concerns.  They verbalize understanding.

## 2020-10-09 NOTE — ED PROVIDER NOTE - PATIENT PORTAL LINK FT
You can access the FollowMyHealth Patient Portal offered by [facility] by registering at the following website: [email or web address]. By joining [facility]s FollowMyHealth portal, you will also be able to view your health information using other applications (apps) compatible with our system.

## 2020-11-02 NOTE — DISCHARGE NOTE NURSING/CASE MANAGEMENT/SOCIAL WORK - NURSING SECTION COMPLETE
Detail Level: Zone Samples Given: CeraVe SA\\nAmlactin rapid relief Plan: She will sign records release and we will get her recent labs Patient/Caregiver provided printed discharge information.

## 2021-03-25 ENCOUNTER — OUTPATIENT (OUTPATIENT)
Dept: OUTPATIENT SERVICES | Facility: HOSPITAL | Age: 61
LOS: 1 days | Discharge: HOME | End: 2021-03-25

## 2021-03-25 DIAGNOSIS — Z98.890 OTHER SPECIFIED POSTPROCEDURAL STATES: Chronic | ICD-10-CM

## 2021-03-25 DIAGNOSIS — Z00.8 ENCOUNTER FOR OTHER GENERAL EXAMINATION: ICD-10-CM

## 2021-03-31 ENCOUNTER — OUTPATIENT (OUTPATIENT)
Dept: OUTPATIENT SERVICES | Facility: HOSPITAL | Age: 61
LOS: 1 days | Discharge: HOME | End: 2021-03-31

## 2021-03-31 DIAGNOSIS — Z98.890 OTHER SPECIFIED POSTPROCEDURAL STATES: Chronic | ICD-10-CM

## 2021-04-12 DIAGNOSIS — Z00.8 ENCOUNTER FOR OTHER GENERAL EXAMINATION: ICD-10-CM

## 2021-04-18 NOTE — ED ADULT NURSE NOTE - TEMPLATE LIST FOR HEAD TO TOE ASSESSMENT
Chart reviewed, patient examined. Pertinent results reviewed.  Case discussed with HO; specialist f/u reviewed  HD#3  SUBJECTIVE:    Patient is a 89 y/o Female who presented after being found unresponsive on floor.  Had Pan- scans in the ED.      Currently admitted to medicine with the primary diagnosis of GI bleed and rhabdomyolysis, and possible perforated viscous;      Patient was seen and examined at bedside. This morning she is resting comfortable in bed, TYRESE improving, & she is < confused  s/p 2 units PRBCs. She  is more awake and conversing. RN reports that she gags on any PO intake; SLP eval is pending.    PAST MEDICAL & SURGICAL HISTORY  PAST MEDICAL & SURGICAL HISTORY:  Atrial fibrillation    GERD (gastroesophageal reflux disease)    HTN (hypertension)  Dyslipidemia on statin    No significant past surgical history      SOCIAL HISTORY:  Lives alone  NO smoking   No etoh    ALLERGIES:  No Known Allergies    MEDICATIONS:  MEDICATIONS  (STANDING):  cefTRIAXone   IVPB 2000 milliGRAM(s) IV Intermittent every 24 hours  chlorhexidine 4% Liquid 1 Application(s) Topical <User Schedule>  metoprolol tartrate Injectable 5 milliGRAM(s) IV Push every 6 hours  metroNIDAZOLE  IVPB 500 milliGRAM(s) IV Intermittent every 8 hours  pantoprazole Infusion 8 mG/Hr (10 mL/Hr) IV Continuous <Continuous>  sodium chloride 0.45%. 1000 milliLiter(s) (75 mL/Hr) IV Continuous <Continuous>    MEDICATIONS  (PRN):  ALPRAZolam 0.25 milliGRAM(s) Oral daily PRN anxiety    VITALS:   Vital Signs Last 24 Hrs  T(C): 36.3 (2021 07:05), Max: 36.6 (2021 19:00)  T(F): 97.3 (2021 07:05), Max: 97.9 (2021 19:00)  HR: 90 (2021 07:00) (83 - 110)  BP: 144/116 (2021 07:00) (93/54 - 188/102)  BP(mean): 125 (2021 07:00) (68 - 146)  RR: 25 (2021 07:05) (12 - 30)  SpO2: 95% (2021 07:00) (92% - 98%)  LABS:                               9.0    11.00 )-----------( 162      ( 2021 05:31 )             28.3                    7.5    11.95 )-----------( 174      ( 2021 05:32 )             23.2                       8.0    16.87 )-----------( 227      ( 2021 11:17 )             24.9     04-18    149<H>  |  113<H>  |  49<H>  ----------------------------<  89  3.5   |  26  |  1.9<H>    Ca    7.9<L>      2021 05:31  Mg     2.2     04-18    TPro  4.8<L>  /  Alb  2.8<L>  /  TBili  0.7  /  DBili  x   /  AST  212<H>  /  ALT  62<H>  /  AlkPhos  72  04-18      04-17    149<H>  |  111<H>  |  63<HH>  ----------------------------<  148<H>  3.3<L>   |  26  |  2.3<H>    Ca    7.4<L>      2021 05:32  Mg     2.5     04-17    TPro  4.5<L>  /  Alb  2.6<L>  /  TBili  0.6  /  DBili  x   /  AST  156<H>  /  ALT  48<H>  /  AlkPhos  62  04-17      04-16    142  |  112<H>  |  73<HH>  ----------------------------<  79  4.3   |  15<L>  |  2.4<H>    Ca    7.9<L>      2021 05:36  Mg     2.7     04-15    TPro  5.2<L>  /  Alb  3.0<L>  /  TBili  1.7<H>  /  DBili  x   /  AST  151<H>  /  ALT  46<H>  /  AlkPhos  67  04-15    PT/INR - ( 15 Apr 2021 12:53 )   PT: 29.50 sec;   INR: 2.57 ratio         PTT - ( 15 Apr 2021 12:53 )  PTT:27.2 sec  Urinalysis Basic - ( 2021 11:00 )    Color: Yellow / Appearance: Clear / S.020 / pH: x  Gluc: x / Ketone: 15  / Bili: Small / Urobili: 1.0 mg/dL   Blood: x / Protein: >=300 mg/dL / Nitrite: Negative   Leuk Esterase: Negative / RBC: 3-5 /HPF / WBC 1-2 /HPF   Sq Epi: x / Non Sq Epi: Occasional /HPF / Bacteria: Moderate        Troponin T, Serum: 0.06 ng/mL *HH* (21 @ 11:17)  Creatine Kinase, Serum: 7402 U/L *H* (21 @ 05:36)  Lactate, Blood: 1.3 mmol/L (21 @ 05:36)  Troponin T, Serum: 0.07 ng/mL *HH* (21 @ 05:36)  Lactate, Blood: 3.1 mmol/L *H* (04-15-21 @ 22:02)  Creatine Kinase, Serum: 7127 U/L *H* (04-15-21 @ 22:02)  Troponin T, Serum: 0.09 ng/mL *HH* (04-15-21 @ 12:53)  Creatine Kinase, Serum: 6376 U/L *H* (04-15-21 @ 12:53)      CARDIAC MARKERS ( 2021 11:17 )  x     / 0.06 ng/mL / x     / x     / x      CARDIAC MARKERS ( 2021 05:36 )  x     / 0.07 ng/mL / 7402 U/L / x     / x      CARDIAC MARKERS ( 15 Apr 2021 22:02 )  x     / x     / 7127 U/L / x     / x      CARDIAC MARKERS ( 15 Apr 2021 12:53 )  x     / 0.09 ng/mL / 6376 U/L / x     / x          RADIOLOGY:  < from: Xray Chest 1 View- PORTABLE-Urgent (Xray Chest 1 View- PORTABLE-Urgent .) (04.15.21 @ 19:27) >    Impression:    No radiographic evidence of acute cardiopulmonary disease.        < end of copied text >  < from: Xray Femur 2 Views, Right (04.15.21 @ 17:24) >    Findings/  impression:    No acute displaced fracture or dislocation.    Degenerative change.    Vascular calcifications.        < end of copied text >  < from: CT Chest No Cont (04.15.21 @ 14:43) >    IMPRESSION:    Free intraperitoneal air in the upper abdomen suggestive of bowel perforation of unclear origin.    Age indeterminate compression deformity of L1 as well as age-indeterminate cortical irregularity to the coccyx. Findings are consistent with fracture of indeterminate age. Clinical correlation for point tenderness is recommended.    Distended gallbladder    Indeterminate fat-containing lesion within the right hip measuring 10 cm          < end of copied text >  < from: CT Cervical Spine No Cont (04.15.21 @ 13:51) >    IMPRESSION:    CTHEAD:  No evidence of acute intracranial pathology. No evidence of calvarial fracture, intracranial hemorrhage mass effect or midline shift.    Chronic appearing cortical infarct involving the right posterior parietal region    CT CERVICAL SPINE:  Noacute fracture or subluxation.          < end of copied text >  < from: CT Head No Cont (04.15.21 @ 13:51) >    IMPRESSION:    CTHEAD:  No evidence of acute intracranial pathology. No evidence of calvarial fracture, intracranial hemorrhage mass effect or midline shift.    Chronic appearing cortical infarct involving the right posterior parietal region    CT CERVICAL SPINE:  Noacute fracture or subluxation.        < end of copied text >      PHYSICAL EXAM:  GENERAL: NARD; awake/ conversing; confused; Ox2-3 ( where- boy  camp, then chooses hospital from a group; year , then )  HEAD: Atraumatic  NECK: Supple  CHEST/LUNG: Clear  bilaterally;  no R/W/Rh  HEART: IRR IRR; No murmurs, rubs, or gallops  ABDOMEN: BS+; Soft, Non-tender, Non-distended  EXTREMITIES:  2+ Peripheral Pulses, No clubbing, cyanosis, or edema; + ECS  PSYCH:  calm now  NEUROLOGY: non-focal; mild-mod gen weakness;  moves all ext  SKIN: No rashes or lesions     General

## 2023-01-24 PROBLEM — Z00.00 ENCOUNTER FOR PREVENTIVE HEALTH EXAMINATION: Status: ACTIVE | Noted: 2023-01-24

## 2023-01-27 ENCOUNTER — APPOINTMENT (OUTPATIENT)
Dept: PLASTIC SURGERY | Facility: CLINIC | Age: 63
End: 2023-01-27
Payer: COMMERCIAL

## 2023-01-27 VITALS — HEIGHT: 64 IN | WEIGHT: 145 LBS | BODY MASS INDEX: 24.75 KG/M2

## 2023-01-27 DIAGNOSIS — Z86.79 PERSONAL HISTORY OF OTHER DISEASES OF THE CIRCULATORY SYSTEM: ICD-10-CM

## 2023-01-27 DIAGNOSIS — Z87.891 PERSONAL HISTORY OF NICOTINE DEPENDENCE: ICD-10-CM

## 2023-01-27 PROCEDURE — 99203 OFFICE O/P NEW LOW 30 MIN: CPT

## 2023-01-27 RX ORDER — MULTIVITAMIN
TABLET ORAL
Refills: 0 | Status: ACTIVE | COMMUNITY

## 2023-01-30 PROBLEM — Z86.79 HISTORY OF ATRIAL FIBRILLATION: Status: RESOLVED | Noted: 2023-01-30 | Resolved: 2023-01-30
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
## 2023-01-30 NOTE — PHYSICAL EXAM
[de-identified] : well-appearing [de-identified] : No nasal skeletal deformity/asymmetry, nasal septal cartilage without deviation, no perforations. Well healed scars on columella, bilateral alar bases. No alar collapse on deep inspiration b/l. + Yolanda maneuver on both sides, narrow internal triangle

## 2023-01-30 NOTE — ASSESSMENT
[FreeTextEntry1] : 63F with pmh afib (resolved on its own, no longer on medications) presents for difficulty breathing s/p reconstructive rhino and multiple revisions. \par \par - Recommend ENT-Dr. Platt for revision rhinoplasty\par - All questions were answered \par - Followup PRN

## 2023-02-26 ENCOUNTER — INPATIENT (INPATIENT)
Facility: HOSPITAL | Age: 63
LOS: 8 days | Discharge: HOME | DRG: 310 | End: 2023-03-07
Attending: INTERNAL MEDICINE | Admitting: INTERNAL MEDICINE
Payer: COMMERCIAL

## 2023-02-26 VITALS
HEIGHT: 64 IN | SYSTOLIC BLOOD PRESSURE: 125 MMHG | WEIGHT: 149.03 LBS | RESPIRATION RATE: 16 BRPM | TEMPERATURE: 98 F | HEART RATE: 107 BPM | DIASTOLIC BLOOD PRESSURE: 77 MMHG | OXYGEN SATURATION: 98 %

## 2023-02-26 DIAGNOSIS — Z98.890 OTHER SPECIFIED POSTPROCEDURAL STATES: Chronic | ICD-10-CM

## 2023-02-26 DIAGNOSIS — I48.0 PAROXYSMAL ATRIAL FIBRILLATION: ICD-10-CM

## 2023-02-26 LAB
ALBUMIN SERPL ELPH-MCNC: 4 G/DL — SIGNIFICANT CHANGE UP (ref 3.5–5.2)
ALP SERPL-CCNC: 135 U/L — HIGH (ref 30–115)
ALT FLD-CCNC: 35 U/L — SIGNIFICANT CHANGE UP (ref 0–41)
ANION GAP SERPL CALC-SCNC: 11 MMOL/L — SIGNIFICANT CHANGE UP (ref 7–14)
AST SERPL-CCNC: 23 U/L — SIGNIFICANT CHANGE UP (ref 0–41)
BASOPHILS # BLD AUTO: 0.04 K/UL — SIGNIFICANT CHANGE UP (ref 0–0.2)
BASOPHILS NFR BLD AUTO: 0.5 % — SIGNIFICANT CHANGE UP (ref 0–1)
BILIRUB SERPL-MCNC: 0.3 MG/DL — SIGNIFICANT CHANGE UP (ref 0.2–1.2)
BUN SERPL-MCNC: 15 MG/DL — SIGNIFICANT CHANGE UP (ref 10–20)
CALCIUM SERPL-MCNC: 8.9 MG/DL — SIGNIFICANT CHANGE UP (ref 8.4–10.5)
CHLORIDE SERPL-SCNC: 108 MMOL/L — SIGNIFICANT CHANGE UP (ref 98–110)
CO2 SERPL-SCNC: 20 MMOL/L — SIGNIFICANT CHANGE UP (ref 17–32)
CREAT SERPL-MCNC: 0.8 MG/DL — SIGNIFICANT CHANGE UP (ref 0.7–1.5)
EGFR: 83 ML/MIN/1.73M2 — SIGNIFICANT CHANGE UP
EOSINOPHIL # BLD AUTO: 0.17 K/UL — SIGNIFICANT CHANGE UP (ref 0–0.7)
EOSINOPHIL NFR BLD AUTO: 2.1 % — SIGNIFICANT CHANGE UP (ref 0–8)
GLUCOSE SERPL-MCNC: 107 MG/DL — HIGH (ref 70–99)
HCT VFR BLD CALC: 35.2 % — LOW (ref 37–47)
HGB BLD-MCNC: 10.9 G/DL — LOW (ref 12–16)
IMM GRANULOCYTES NFR BLD AUTO: 0.1 % — SIGNIFICANT CHANGE UP (ref 0.1–0.3)
LIDOCAIN IGE QN: 30 U/L — SIGNIFICANT CHANGE UP (ref 7–60)
LYMPHOCYTES # BLD AUTO: 2.48 K/UL — SIGNIFICANT CHANGE UP (ref 1.2–3.4)
LYMPHOCYTES # BLD AUTO: 31.3 % — SIGNIFICANT CHANGE UP (ref 20.5–51.1)
MAGNESIUM SERPL-MCNC: 1.7 MG/DL — LOW (ref 1.8–2.4)
MCHC RBC-ENTMCNC: 23.9 PG — LOW (ref 27–31)
MCHC RBC-ENTMCNC: 31 G/DL — LOW (ref 32–37)
MCV RBC AUTO: 77.2 FL — LOW (ref 81–99)
MONOCYTES # BLD AUTO: 0.78 K/UL — HIGH (ref 0.1–0.6)
MONOCYTES NFR BLD AUTO: 9.8 % — HIGH (ref 1.7–9.3)
NEUTROPHILS # BLD AUTO: 4.45 K/UL — SIGNIFICANT CHANGE UP (ref 1.4–6.5)
NEUTROPHILS NFR BLD AUTO: 56.2 % — SIGNIFICANT CHANGE UP (ref 42.2–75.2)
NRBC # BLD: 0 /100 WBCS — SIGNIFICANT CHANGE UP (ref 0–0)
NT-PROBNP SERPL-SCNC: 2288 PG/ML — HIGH (ref 0–300)
PLATELET # BLD AUTO: 349 K/UL — SIGNIFICANT CHANGE UP (ref 130–400)
POTASSIUM SERPL-MCNC: 4.7 MMOL/L — SIGNIFICANT CHANGE UP (ref 3.5–5)
POTASSIUM SERPL-SCNC: 4.7 MMOL/L — SIGNIFICANT CHANGE UP (ref 3.5–5)
PROT SERPL-MCNC: 6.5 G/DL — SIGNIFICANT CHANGE UP (ref 6–8)
RBC # BLD: 4.56 M/UL — SIGNIFICANT CHANGE UP (ref 4.2–5.4)
RBC # FLD: 16.2 % — HIGH (ref 11.5–14.5)
SODIUM SERPL-SCNC: 139 MMOL/L — SIGNIFICANT CHANGE UP (ref 135–146)
TROPONIN T SERPL-MCNC: <0.01 NG/ML — SIGNIFICANT CHANGE UP
WBC # BLD: 7.93 K/UL — SIGNIFICANT CHANGE UP (ref 4.8–10.8)
WBC # FLD AUTO: 7.93 K/UL — SIGNIFICANT CHANGE UP (ref 4.8–10.8)

## 2023-02-26 PROCEDURE — 86803 HEPATITIS C AB TEST: CPT

## 2023-02-26 PROCEDURE — 87635 SARS-COV-2 COVID-19 AMP PRB: CPT

## 2023-02-26 PROCEDURE — 84443 ASSAY THYROID STIM HORMONE: CPT

## 2023-02-26 PROCEDURE — 93325 DOPPLER ECHO COLOR FLOW MAPG: CPT

## 2023-02-26 PROCEDURE — 83735 ASSAY OF MAGNESIUM: CPT

## 2023-02-26 PROCEDURE — 36415 COLL VENOUS BLD VENIPUNCTURE: CPT

## 2023-02-26 PROCEDURE — 80053 COMPREHEN METABOLIC PANEL: CPT

## 2023-02-26 PROCEDURE — 84100 ASSAY OF PHOSPHORUS: CPT

## 2023-02-26 PROCEDURE — 99285 EMERGENCY DEPT VISIT HI MDM: CPT

## 2023-02-26 PROCEDURE — 83036 HEMOGLOBIN GLYCOSYLATED A1C: CPT

## 2023-02-26 PROCEDURE — 71045 X-RAY EXAM CHEST 1 VIEW: CPT | Mod: 26

## 2023-02-26 PROCEDURE — 84439 ASSAY OF FREE THYROXINE: CPT

## 2023-02-26 PROCEDURE — 93010 ELECTROCARDIOGRAM REPORT: CPT | Mod: 77

## 2023-02-26 PROCEDURE — 93005 ELECTROCARDIOGRAM TRACING: CPT

## 2023-02-26 PROCEDURE — 80061 LIPID PANEL: CPT

## 2023-02-26 PROCEDURE — 93320 DOPPLER ECHO COMPLETE: CPT

## 2023-02-26 PROCEDURE — 84484 ASSAY OF TROPONIN QUANT: CPT

## 2023-02-26 PROCEDURE — 85025 COMPLETE CBC W/AUTO DIFF WBC: CPT

## 2023-02-26 PROCEDURE — 93312 ECHO TRANSESOPHAGEAL: CPT

## 2023-02-26 PROCEDURE — 93306 TTE W/DOPPLER COMPLETE: CPT

## 2023-02-26 PROCEDURE — 92960 CARDIOVERSION ELECTRIC EXT: CPT

## 2023-02-26 PROCEDURE — 99222 1ST HOSP IP/OBS MODERATE 55: CPT | Mod: GC

## 2023-02-26 RX ORDER — ENOXAPARIN SODIUM 100 MG/ML
65 INJECTION SUBCUTANEOUS EVERY 12 HOURS
Refills: 0 | Status: DISCONTINUED | OUTPATIENT
Start: 2023-02-26 | End: 2023-03-05

## 2023-02-26 RX ORDER — DILTIAZEM HCL 120 MG
20 CAPSULE, EXT RELEASE 24 HR ORAL ONCE
Refills: 0 | Status: COMPLETED | OUTPATIENT
Start: 2023-02-26 | End: 2023-02-26

## 2023-02-26 RX ORDER — DILTIAZEM HCL 120 MG
10 CAPSULE, EXT RELEASE 24 HR ORAL
Qty: 125 | Refills: 0 | Status: DISCONTINUED | OUTPATIENT
Start: 2023-02-26 | End: 2023-02-27

## 2023-02-26 RX ORDER — VITAMIN E 100 UNIT
1 CAPSULE ORAL
Qty: 0 | Refills: 0 | DISCHARGE

## 2023-02-26 RX ORDER — MAGNESIUM SULFATE 500 MG/ML
2 VIAL (ML) INJECTION ONCE
Refills: 0 | Status: COMPLETED | OUTPATIENT
Start: 2023-02-26 | End: 2023-02-26

## 2023-02-26 RX ORDER — ENOXAPARIN SODIUM 100 MG/ML
65 INJECTION SUBCUTANEOUS EVERY 24 HOURS
Refills: 0 | Status: DISCONTINUED | OUTPATIENT
Start: 2023-02-26 | End: 2023-02-26

## 2023-02-26 RX ORDER — DILTIAZEM HCL 120 MG
15 CAPSULE, EXT RELEASE 24 HR ORAL ONCE
Refills: 0 | Status: COMPLETED | OUTPATIENT
Start: 2023-02-26 | End: 2023-02-26

## 2023-02-26 RX ADMIN — Medication 0.5 MILLIGRAM(S): at 22:23

## 2023-02-26 RX ADMIN — Medication 25 GRAM(S): at 20:21

## 2023-02-26 RX ADMIN — Medication 10 MG/HR: at 17:09

## 2023-02-26 RX ADMIN — Medication 15 MILLIGRAM(S): at 15:49

## 2023-02-26 RX ADMIN — Medication 20 MILLIGRAM(S): at 15:55

## 2023-02-26 NOTE — ED PROVIDER NOTE - CLINICAL SUMMARY MEDICAL DECISION MAKING FREE TEXT BOX
63 y.o. F with PMH of afib on cardizem with palpitations for 2 days pt denies any CP SOB no urinary symptoms fever chills. Pt felt better but the palpitations returned. Well appearing, NAD, non toxic. NCAT PERRLA EOMI neck supple non tender normal wob cta bl irregularly irregular abdomen s nt nd no rebound no guarding WWPx4 neuro non focal.    labs cardiac enzymes ekg cardizem -> drip

## 2023-02-26 NOTE — ED ADULT NURSE NOTE - NSSEPSISCRITERIAMET_ED_A_ED
PT here for acute visit with APN. Pt has been vomiting for a week at least 3-4 times daily.  Patient is not able to eat or drink per her daughter Moiz Terrazas Abnormal VS & WBC

## 2023-02-26 NOTE — H&P ADULT - HISTORY OF PRESENT ILLNESS
Patient is a 63 year old female with PMH of Atrial Fibrillation (non-compliant with Cardizem) presenting for palpitations. Patient endorses a sudden onset of intermittent chest palpitations x3 days. Patient states she took her cardizem medication the past two days with temporary relief but her symptoms returned this morning. Patient states she has not been taking her cardizem or prescribed blood thinner (unknown medication) since she has not had symptoms the past year. Patient has not seen her cardiologist or had any additional cardiac work-up since her last atrial fibrillation episode one year ago. Patient denies any shortness of breath, nausea, vomiting, fevers, abdominal pain, or additional complaints.      ED Course  Vital Signs  T(F): 98.3   HR: 107-160  BP: 125/77  RR: 16   SpO2: 98% on room air     Trop negative x1  BNP 2288  EKG - afib RVR          Patient is a 63 year old female with PMH of Atrial Fibrillation (non-compliant with Cardizem) presenting for palpitations. Patient endorses a sudden onset of intermittent chest palpitations x3 days. Patient states she took her cardizem medication the past two days with temporary relief but her symptoms returned this morning.  Before that, she had not taken a Cardizem for over a year. Not currently on blood thinners.  Patient has not seen her cardiologist or had any additional cardiac work-up since her last atrial fibrillation episode one year ago. Patient denies any shortness of breath, nausea, vomiting, fevers, abdominal pain, or additional complaints.      ED Course  Vital Signs  T(F): 98.3   HR: 107-160  BP: 125/77  RR: 16   SpO2: 98% on room air     Trop negative x1  BNP 2288  EKG - afib RVR

## 2023-02-26 NOTE — ED PROVIDER NOTE - PHYSICAL EXAMINATION
VITAL SIGNS: I have reviewed nursing notes and confirm.  CONSTITUTIONAL: Well-appearing, non-toxic, in NAD  SKIN: Warm dry, normal skin turgor  HEAD: NCAT  EYES: No conjunctival injection, scleral anicteric  ENT: Moist mucous membranes, normal pharynx with no erythema or exudates  NECK: Supple; full ROM. Nontender. No cervical LAD  CARD: irregular and tachy  RESP: Clear to ausculation bilaterally.  No rales, rhonchi, or wheezing.  ABD: Soft, non-distended, non-tender, no rebound or guarding. No CVA tenderness  EXT: Full ROM, no bony tenderness, no pedal edema, no calf tenderness  NEURO: Normal motor, normal sensory. CN II-XII grossly intact. Cerebellar testing normal. Normal gait.  PSYCH: Cooperative, appropriate.

## 2023-02-26 NOTE — ED PROVIDER NOTE - OBJECTIVE STATEMENT
Patient is a 63 year old female with PMH of Atrial Fibrillation (non-compliant with Cardizem) presenting for palpitations. Patient endorses a sudden onset of intermittent chest palpitations x3 days. Patient states she took her cardizem medication the past two days with temporary relief but her symptoms returned this morning. Patient states she has not been taking her cardizem or prescribed blood thinner (unknown medication) since she has not had symptoms the past year. Patient has not seen her cardiologist or had any additional cardiac work-up since her last atrial fibrillation episode one year ago. Patient denies any shortness of breath, nausea, vomiting, fevers, abdominal pain, or additional complaints.

## 2023-02-26 NOTE — H&P ADULT - ATTENDING COMMENTS
HPI:  Patient is a 63 year old female with PMH of Atrial Fibrillation (non-compliant with Cardizem) presenting for palpitations. Patient endorses a sudden onset of intermittent chest palpitations x3 days. Patient states she took her cardizem medication the past two days with temporary relief but her symptoms returned this morning.  Before that, she had not taken a Cardizem for over a year. Not currently on blood thinners.  Patient has not seen her cardiologist or had any additional cardiac work-up since her last atrial fibrillation episode one year ago. Patient denies any shortness of breath, nausea, vomiting, fevers, abdominal pain, or additional complaints.      ED Course  Vital Signs  T(F): 98.3   HR: 107-160  BP: 125/77  RR: 16   SpO2: 98% on room air     Trop negative x1  BNP 2288  EKG - afib RVR          (26 Feb 2023 20:13)    REVIEW OF SYSTEMS:  CONSTITUTIONAL: No weakness, fevers or chills  EYES/ENT: No visual changes;  No vertigo or throat pain   NECK: No pain or stiffness  RESPIRATORY: No cough, wheezing, hemoptysis; No shortness of breath  CARDIOVASCULAR: No chest pain (+) palpitations  GASTROINTESTINAL: No abdominal or epigastric pain. No nausea, vomiting, or hematemesis; No diarrhea or constipation. No melena or hematochezia.  GENITOURINARY: No dysuria, frequency or hematuria  NEUROLOGICAL: No numbness or weakness  SKIN: No itching, rashes    Physical Exam:  General: WN/WD NAD  Neurology: A&Ox3, nonfocal, follows commands  Eyes: PERRLA/ EOMI  ENT/Neck: Neck supple, trachea midline, No JVD  Respiratory: CTA B/L, No wheezing, rales, rhonchi  CV: Irregular rate, irregular rhythm, S1S2, no murmurs, rubs or gallops  Abdominal: Soft, NT, ND +BS,   Extremities: No edema, + peripheral pulses  Skin: No Rashes, Hematoma, Ecchymosis  Incisions:   Tubes:    A/p  Chronic P. A. Fib / RVR on presentation   -Admit to tele   -serial CE and EKG   -restart A/C for now (R/B/I discussed w/ patient )  -IV Cardizem for goal HR < 100   -start Cardizem po in AM 30mg Q6 for now   -2D echo   -agree w/ TSH   -Cardiology eval - Dr. Thomas    PATIENT SEEN by ATTENDING 2/26/23 ( note revised 2/27)

## 2023-02-26 NOTE — ED ADULT TRIAGE NOTE - CHIEF COMPLAINT QUOTE
pt c/o intermittent palpitations with shortness of breath since Friday. reports she "used to take Cardizem for A fib, but stopped because she has been fine"

## 2023-02-26 NOTE — H&P ADULT - NSHPPHYSICALEXAM_GEN_ALL_CORE
LOS:     VITALS:   T(C): 36.8 (02-26-23 @ 14:55), Max: 36.8 (02-26-23 @ 14:55)  HR: 107 (02-26-23 @ 14:55) (107 - 107)  BP: 125/77 (02-26-23 @ 14:55) (125/77 - 125/77)  RR: 16 (02-26-23 @ 14:55) (16 - 16)  SpO2: 98% (02-26-23 @ 14:55) (98% - 98%)    GENERAL: NAD, lying in bed comfortably  HEAD:  Atraumatic, Normocephalic  EYES: EOMI, PERRLA, conjunctiva and sclera clear  ENT: Moist mucous membranes  NECK: Supple, No JVD  CHEST/LUNG: Clear to auscultation bilaterally; No rales, rhonchi, wheezing, or rubs. Unlabored respirations  HEART: Regular rate and rhythm; No murmurs, rubs, or gallops  ABDOMEN: BSx4; Soft, nontender, nondistended  EXTREMITIES:  2+ Peripheral Pulses, brisk capillary refill. No clubbing, cyanosis, or edema  NERVOUS SYSTEM:  A&Ox3, no focal deficits   SKIN: No rashes or lesions LOS:     VITALS:   T(C): 36.8 (02-26-23 @ 14:55), Max: 36.8 (02-26-23 @ 14:55)  HR: 107 (02-26-23 @ 14:55) (107 - 107)  BP: 125/77 (02-26-23 @ 14:55) (125/77 - 125/77)  RR: 16 (02-26-23 @ 14:55) (16 - 16)  SpO2: 98% (02-26-23 @ 14:55) (98% - 98%)    GENERAL: NAD, lying in bed comfortably  HEAD:  Atraumatic, Normocephalic  EYES: EOMI, PERRLA, conjunctiva and sclera clear  ENT: Moist mucous membranes  NECK: Supple, No JVD  CHEST/LUNG: Clear to auscultation bilaterally; No rales, rhonchi, wheezing, or rubs. Unlabored respirations  HEART: irregular   ABDOMEN: BSx4; Soft, nontender, nondistended  EXTREMITIES:  2+ Peripheral Pulses, brisk capillary refill. No clubbing, cyanosis, or edema  NERVOUS SYSTEM:  A&Ox3, no focal deficits   SKIN: No rashes or lesions

## 2023-02-26 NOTE — H&P ADULT - ASSESSMENT
#Paroxysmal Afib   #Afib RVR  - rate controlled now on Cardizem gtt   - [id number] score: 1, Therapeutic Lovenox in case cardio plans for cardioversion   -Tele monitoring   - once rate controlled is maintained transition to PO Cardizem   -Trop negative x1   - Echo in 2019- normal systolic and diastolic function  -Repeat echo   -Check thyroid panel   -Monitor lytes and replete as needed. Patient is a 63 year old female with PMH of Atrial Fibrillation (non-compliant with Cardizem) presenting for palpitations. Non-compliant with medications and cardiology follow up. Found to be in AFIB RVR       #Paroxysmal Afib   #Afib RVR  - rate controlled now on Cardizem gtt   - [id number] score: 1, Therapeutic Lovenox for now , follow up cardio for need for long term AC   -Tele monitoring   - once rate controlled is maintained transition to PO Cardizem   -Trop negative x1   - Echo in 2019- normal systolic and diastolic function  -Repeat echo   -Check thyroid panel , lipid profile, a1c   -Monitor lytes and replete as needed  -cardiology brennan      DVT ppx: on thepeutic lovenox  Diet: DASH  Dispo: Tele

## 2023-02-27 ENCOUNTER — TRANSCRIPTION ENCOUNTER (OUTPATIENT)
Age: 63
End: 2023-02-27

## 2023-02-27 LAB
A1C WITH ESTIMATED AVERAGE GLUCOSE RESULT: 5.9 % — HIGH (ref 4–5.6)
ALBUMIN SERPL ELPH-MCNC: 3.8 G/DL — SIGNIFICANT CHANGE UP (ref 3.5–5.2)
ALP SERPL-CCNC: 131 U/L — HIGH (ref 30–115)
ALT FLD-CCNC: 32 U/L — SIGNIFICANT CHANGE UP (ref 0–41)
ANION GAP SERPL CALC-SCNC: 10 MMOL/L — SIGNIFICANT CHANGE UP (ref 7–14)
AST SERPL-CCNC: 21 U/L — SIGNIFICANT CHANGE UP (ref 0–41)
BASOPHILS # BLD AUTO: 0.05 K/UL — SIGNIFICANT CHANGE UP (ref 0–0.2)
BASOPHILS NFR BLD AUTO: 0.7 % — SIGNIFICANT CHANGE UP (ref 0–1)
BILIRUB SERPL-MCNC: 0.4 MG/DL — SIGNIFICANT CHANGE UP (ref 0.2–1.2)
BUN SERPL-MCNC: 10 MG/DL — SIGNIFICANT CHANGE UP (ref 10–20)
CALCIUM SERPL-MCNC: 9 MG/DL — SIGNIFICANT CHANGE UP (ref 8.4–10.5)
CHLORIDE SERPL-SCNC: 107 MMOL/L — SIGNIFICANT CHANGE UP (ref 98–110)
CHOLEST SERPL-MCNC: 209 MG/DL — HIGH
CO2 SERPL-SCNC: 23 MMOL/L — SIGNIFICANT CHANGE UP (ref 17–32)
CREAT SERPL-MCNC: 0.9 MG/DL — SIGNIFICANT CHANGE UP (ref 0.7–1.5)
EGFR: 72 ML/MIN/1.73M2 — SIGNIFICANT CHANGE UP
EOSINOPHIL # BLD AUTO: 0.21 K/UL — SIGNIFICANT CHANGE UP (ref 0–0.7)
EOSINOPHIL NFR BLD AUTO: 3 % — SIGNIFICANT CHANGE UP (ref 0–8)
ESTIMATED AVERAGE GLUCOSE: 123 MG/DL — HIGH (ref 68–114)
GLUCOSE SERPL-MCNC: 111 MG/DL — HIGH (ref 70–99)
HCT VFR BLD CALC: 35.6 % — LOW (ref 37–47)
HCV AB S/CO SERPL IA: 0.03 COI — SIGNIFICANT CHANGE UP
HCV AB SERPL-IMP: SIGNIFICANT CHANGE UP
HDLC SERPL-MCNC: 37 MG/DL — LOW
HGB BLD-MCNC: 11.1 G/DL — LOW (ref 12–16)
IMM GRANULOCYTES NFR BLD AUTO: 0.3 % — SIGNIFICANT CHANGE UP (ref 0.1–0.3)
LIPID PNL WITH DIRECT LDL SERPL: 152 MG/DL — HIGH
LYMPHOCYTES # BLD AUTO: 2.21 K/UL — SIGNIFICANT CHANGE UP (ref 1.2–3.4)
LYMPHOCYTES # BLD AUTO: 32 % — SIGNIFICANT CHANGE UP (ref 20.5–51.1)
MAGNESIUM SERPL-MCNC: 2.2 MG/DL — SIGNIFICANT CHANGE UP (ref 1.8–2.4)
MCHC RBC-ENTMCNC: 23.9 PG — LOW (ref 27–31)
MCHC RBC-ENTMCNC: 31.2 G/DL — LOW (ref 32–37)
MCV RBC AUTO: 76.6 FL — LOW (ref 81–99)
MONOCYTES # BLD AUTO: 0.65 K/UL — HIGH (ref 0.1–0.6)
MONOCYTES NFR BLD AUTO: 9.4 % — HIGH (ref 1.7–9.3)
NEUTROPHILS # BLD AUTO: 3.76 K/UL — SIGNIFICANT CHANGE UP (ref 1.4–6.5)
NEUTROPHILS NFR BLD AUTO: 54.6 % — SIGNIFICANT CHANGE UP (ref 42.2–75.2)
NON HDL CHOLESTEROL: 172 MG/DL — HIGH
NRBC # BLD: 0 /100 WBCS — SIGNIFICANT CHANGE UP (ref 0–0)
PLATELET # BLD AUTO: 339 K/UL — SIGNIFICANT CHANGE UP (ref 130–400)
POTASSIUM SERPL-MCNC: 4.3 MMOL/L — SIGNIFICANT CHANGE UP (ref 3.5–5)
POTASSIUM SERPL-SCNC: 4.3 MMOL/L — SIGNIFICANT CHANGE UP (ref 3.5–5)
PROT SERPL-MCNC: 6.4 G/DL — SIGNIFICANT CHANGE UP (ref 6–8)
RBC # BLD: 4.65 M/UL — SIGNIFICANT CHANGE UP (ref 4.2–5.4)
RBC # FLD: 16.5 % — HIGH (ref 11.5–14.5)
SARS-COV-2 RNA SPEC QL NAA+PROBE: SIGNIFICANT CHANGE UP
SODIUM SERPL-SCNC: 140 MMOL/L — SIGNIFICANT CHANGE UP (ref 135–146)
T4 FREE SERPL-MCNC: 1.3 NG/DL — SIGNIFICANT CHANGE UP (ref 0.9–1.8)
T4 FREE+ TSH PNL SERPL: 6.19 UIU/ML — HIGH (ref 0.27–4.2)
TRIGL SERPL-MCNC: 100 MG/DL — SIGNIFICANT CHANGE UP
TROPONIN T SERPL-MCNC: <0.01 NG/ML — SIGNIFICANT CHANGE UP
WBC # BLD: 6.9 K/UL — SIGNIFICANT CHANGE UP (ref 4.8–10.8)
WBC # FLD AUTO: 6.9 K/UL — SIGNIFICANT CHANGE UP (ref 4.8–10.8)

## 2023-02-27 PROCEDURE — 99232 SBSQ HOSP IP/OBS MODERATE 35: CPT

## 2023-02-27 RX ORDER — DILTIAZEM HCL 120 MG
30 CAPSULE, EXT RELEASE 24 HR ORAL EVERY 6 HOURS
Refills: 0 | Status: DISCONTINUED | OUTPATIENT
Start: 2023-02-27 | End: 2023-02-28

## 2023-02-27 RX ORDER — DILTIAZEM HCL 120 MG
1 CAPSULE, EXT RELEASE 24 HR ORAL
Qty: 30 | Refills: 0
Start: 2023-02-27 | End: 2023-03-28

## 2023-02-27 RX ORDER — ACETAMINOPHEN 500 MG
650 TABLET ORAL EVERY 6 HOURS
Refills: 0 | Status: DISCONTINUED | OUTPATIENT
Start: 2023-02-27 | End: 2023-03-07

## 2023-02-27 RX ORDER — ASPIRIN/CALCIUM CARB/MAGNESIUM 324 MG
1 TABLET ORAL
Qty: 30 | Refills: 0
Start: 2023-02-27 | End: 2023-03-28

## 2023-02-27 RX ORDER — DIPHENHYDRAMINE HCL 50 MG
12.5 CAPSULE ORAL EVERY 4 HOURS
Refills: 0 | Status: DISCONTINUED | OUTPATIENT
Start: 2023-02-27 | End: 2023-02-28

## 2023-02-27 RX ADMIN — Medication 0.5 MILLIGRAM(S): at 17:04

## 2023-02-27 RX ADMIN — Medication 30 MILLIGRAM(S): at 20:37

## 2023-02-27 RX ADMIN — Medication 650 MILLIGRAM(S): at 01:02

## 2023-02-27 RX ADMIN — Medication 0.5 MILLIGRAM(S): at 20:17

## 2023-02-27 RX ADMIN — ENOXAPARIN SODIUM 65 MILLIGRAM(S): 100 INJECTION SUBCUTANEOUS at 05:30

## 2023-02-27 RX ADMIN — Medication 30 MILLIGRAM(S): at 08:05

## 2023-02-27 RX ADMIN — Medication 30 MILLIGRAM(S): at 13:43

## 2023-02-27 NOTE — DISCHARGE NOTE PROVIDER - PROVIDER TOKENS
PROVIDER:[TOKEN:[[id number]],FOLLOWUP:[1 week],ESTABLISHEDPATIENT:[T]] PROVIDER:[TOKEN:[[id number]],FOLLOWUP:[1 week],ESTABLISHEDPATIENT:[T]]

## 2023-02-27 NOTE — ED ADULT NURSE REASSESSMENT NOTE - NS ED NURSE REASSESS COMMENT FT1
Patient A&OX4. Patient denies any pain or discomfort. Patient receiving IV cardizem drip at this time. No signs of distress noted. Tele monitoring continued.

## 2023-02-27 NOTE — CONSULT NOTE ADULT - ASSESSMENT
1] PAF with [id number] Score 1  - Indications for OAC discussed.  She prefers to continue Aspirin 81 mg tablet daily  - Follow on echo ordered  - Rate control with Diltiazem if LVEF is normal on echo; if LVEF<45%, consider beta-blocker for rate control and stop CCB.    2] Dilatation of Aorta  - Noted on prior echo    Follow up with primary cardiologist, Dr. Froylan Thomas as outpatient.  Discharge planning per primary team    Plan discussed with House Staff  Recommendations discussed with patient and son at bedside this morning    Hernandez Enriquez MD (covering for Dr. Froylan Thomas)  [phone] Office

## 2023-02-27 NOTE — DISCHARGE NOTE PROVIDER - CARE PROVIDERS DIRECT ADDRESSES
,DirectAddress_Unknown ,[email or web address]

## 2023-02-27 NOTE — DISCHARGE NOTE PROVIDER - NSDCCPCAREPLAN_GEN_ALL_CORE_FT
PRINCIPAL DISCHARGE DIAGNOSIS  Diagnosis: Paroxysmal atrial fibrillation  Assessment and Plan of Treatment: (A-fib) is an irregular and often very rapid heart rhythm (arrhythmia) that can lead to blood clots in the heart. A-fib increases the risk of stroke, heart failure and other heart-related complications.  During atrial fibrillation, the heart's upper chambers (the atria) beat chaotically and irregularly — out of sync with the lower chambers (the ventricles) of the heart. For many people, A-fib may have no symptoms. However, A-fib may cause a fast, pounding heartbeat (palpitations), shortness of breath or weakness.  Episodes of atrial fibrillation may come and go, or they may be persistent  Blood clots are a dangerous complication of atrial fibrillation that can lead to stroke.  In atrial fibrillation, the chaotic heart rhythm can cause blood to collect in the heart's upper chambers (atria) and form clots. If a blood clot in the left upper chamber (left atrium) breaks free from the heart area, it can travel to the brain and cause a stroke.  The risk of stroke from atrial fibrillation increases as you grow older. Other health conditions also may increase your risk of a stroke due to A-fib, including:  High blood pressure  Diabetes  Heart failure  Some valvular heart disease  Blood thinners are commonly prescribed to prevent blood clots and strokes in people with atrial fibrillation.       PRINCIPAL DISCHARGE DIAGNOSIS  Diagnosis: Paroxysmal atrial fibrillation  Assessment and Plan of Treatment: **Please take your medications as described in the discharge packet  **Follow up with Dr. Thomas for further management     (A-fib) is an irregular and often very rapid heart rhythm (arrhythmia) that can lead to blood clots in the heart. A-fib increases the risk of stroke, heart failure and other heart-related complications. During atrial fibrillation, the heart's upper chambers (the atria) beat chaotically and irregularly — out of sync with the lower chambers (the ventricles) of the heart. For many people, A-fib may have no symptoms. However, A-fib may cause a fast, pounding heartbeat (palpitations), shortness of breath or weakness. Episodes of atrial fibrillation may come and go, or they may be persistent Blood clots are a dangerous complication of atrial fibrillation that can lead to stroke. In atrial fibrillation, the chaotic heart rhythm can cause blood to collect in the heart's upper chambers (atria) and form clots. If a blood clot in the left upper chamber (left atrium) breaks free from the heart area, it can travel to the brain and cause a stroke. Thus it is essential to take Eliquis as directed as this will help prevent any clots from forming.     Please contact a healthcare provider to return to the hospital if you re-experience palpitations with a racing heart beat as this could be a recurrence of the atrial fibrillation. Because Eliquis is an anticoagulant, you may notice that while on the medication, it may take longer for cuts to stop bleeding. Please again contact a healthcare provider or return to the hospital if you experience bleeding that does not stop or any signs of bleeding in your gastrointestinal tract such as vomiting blood, visible blood in the stool, or very dark brown/black stools.

## 2023-02-27 NOTE — DISCHARGE NOTE PROVIDER - NSDCFUSCHEDAPPT_GEN_ALL_CORE_FT
Josephine Platt  [facility]  [address]  Scheduled Appointment: 03/15/2023

## 2023-02-27 NOTE — DISCHARGE NOTE PROVIDER - HOSPITAL COURSE
Patient is a 63 year old female with PMH of Atrial Fibrillation (non-compliant with Cardizem) presenting for palpitations. Patient endorses a sudden onset of intermittent chest palpitations x3 days. Patient states she took her cardizem medication the past two days with temporary relief but her symptoms returned this morning.  Before that, she had not taken a Cardizem for over a year. Not currently on blood thinners.  Patient has not seen her cardiologist or had any additional cardiac work-up since her last atrial fibrillation episode one year ago. Patient denies any shortness of breath, nausea, vomiting, fevers, abdominal pain, or additional complaints.  ED Course  Vital Signs  T(F): 98.3   HR: 107-160  BP: 125/77  RR: 16   SpO2: 98% on room air   Trop negative x1  BNP 2288  EKG - afib RVR   Pt admitted for Afib with the following course   Afib with RVR   - Telemetry            - ECG: Afib with RVR                      - CXR:  WNL           - TSH:pending   - [id number] score: 1, Therapeutic Lovenox for now (in case cardioversion is warranted)  , follow up cardio for need for long term AC   - s/p cardizem drip, on PO cardizem  30mg q 6   - Echo in 2019- normal systolic and diastolic function  - Pending rpt echo   - Cardio consult:pending        Patient is a 63 year old female with PMH of Atrial Fibrillation (non-compliant with Cardizem) presenting for palpitations. Patient endorses a sudden onset of intermittent chest palpitations x3 days. Patient states she took her cardizem medication the past two days with temporary relief but her symptoms returned this morning.  Before that, she had not taken a Cardizem for over a year. Not currently on blood thinners.  Patient has not seen her cardiologist or had any additional cardiac work-up since her last atrial fibrillation episode one year ago. Patient denies any shortness of breath, nausea, vomiting, fevers, abdominal pain, or additional complaints.    1. Afib with RVR resolved   - Telemetry            - ECG: Afib with RVR                      - CXR:  WNL           - TSH:pending   - [id number] score: 1. DC on aspirin daily. Check with cardio if anticoaog is indicated in her case as outpatient (if low risk of bleeding, anticoag might be preferred even with score of 1)   - s/p cardizem drip, on PO cardizem  30mg q 6 . DC on cardizem 120mg po daily   - Echo in 2019- normal systolic and diastolic function       Patient is a 63 year old female with PMH of Atrial Fibrillation (non-compliant with Cardizem) presenting for palpitations. Patient endorses a sudden onset of intermittent chest palpitations x3 days. Patient states she took her cardizem medication the past two days with temporary relief but her symptoms returned this morning.  Before that, she had not taken a Cardizem for over a year. Not currently on blood thinners.  Patient has not seen her cardiologist or had any additional cardiac work-up since her last atrial fibrillation episode one year ago. Patient denies any shortness of breath, nausea, vomiting, fevers, abdominal pain, or additional complaints.    1. Afib with RVR resolved   - Telemetry            - ECG: Afib with RVR                      - CXR:  WNL           - TSH:wnl  - [id number] score: 1. DC on aspirin daily. Check with cardio if anticoaog is indicated in her case as outpatient (if low risk of bleeding, anticoag might be preferred even with score of 1)   - s/p cardizem drip, on PO cardizem  30mg q 6 . DC on cardizem 120mg po daily   - Echo in 2019- normal systolic and diastolic function    patient is medically ready for discharge     Patient is a 63 year old female with PMH of Atrial Fibrillation (non-compliant with Cardizem) presenting for palpitations. Patient endorses a sudden onset of intermittent chest palpitations x3 days. Patient states she took her cardizem medication the past two days with temporary relief but her symptoms returned this morning.  Before that, she had not taken a Cardizem for over a year. Not currently on blood thinners.  Patient has not seen her cardiologist or had any additional cardiac work-up since her last atrial fibrillation episode one year ago. Patient denies any shortness of breath, nausea, vomiting, fevers, abdominal pain, or additional complaints.    1. Afib with RVR resolved   - Telemetry            - ECG: Afib with RVR                      - CXR:  WNL            - [id number] score: 1. DC on aspirin daily. Check with cardio if anticoaog is indicated in her case as outpatient (if low risk of bleeding, anticoag might be preferred even with score of 1)   - s/p cardizem drip, on PO cardizem  30mg q 6 . DC on cardizem 120mg po daily   - Echo in 2019- normal systolic and diastolic function    2. Possible hypothyroidism  - TSH slightly elevated. follow up with PMD     patient is medically ready for discharge     Patient is a 63 year old female with PMH of Atrial Fibrillation (non-compliant with Cardizem) presenting for palpitations. Patient endorses a sudden onset of intermittent chest palpitations x3 days. Patient states she took her cardizem medication the past two days with temporary relief but her symptoms returned this morning.  Before that, she had not taken a Cardizem for over a year. Not currently on blood thinners.  Patient has not seen her cardiologist or had any additional cardiac work-up since her last atrial fibrillation episode one year ago. Patient denies any shortness of breath, nausea, vomiting, fevers, abdominal pain, or additional complaints.    # Afib with RVR   - Telemetry              - ECG: Afib with RVR                        - CXR: WNL             - TSH: High normal   - [id number] score: 1  - Echo in 2019- normal systolic and diastolic function  - Per Cardio: Discontinue diltiazem, c/w lopressor 25mg BID, start amiodarone 200mg BID, start Eliquis 5mg BID  - S/p MINNA w/ cardioversion. No BRAN thrombus. Now in NSR.    # Dilatation of Aorta  - Noted on prior echo    #DVT ppx: Eliquis  #GI ppx: none  #Activity: AAT  #Diet. DASH    Patient is medically stable and ready for discharge     Patient is a 63 year old female with PMH of Atrial Fibrillation (non-compliant with Cardizem) presenting for palpitations. Patient endorses a sudden onset of intermittent chest palpitations x3 days. Patient states she took her cardizem medication the past two days with temporary relief but her symptoms returned this morning.  Before that, she had not taken a Cardizem for over a year. Not currently on blood thinners.  Patient has not seen her cardiologist or had any additional cardiac work-up since her last atrial fibrillation episode one year ago. Patient denies any shortness of breath, nausea, vomiting, fevers, abdominal pain, or additional complaints.    Paroxysmal Atrial Fibrillation with RVR  - Telemetry              - ECG: Afib with RVR                        - CXR: WNL             - TSH: High normal   - [id number] score: 1  - Echo in 2019- normal systolic and diastolic function  - Per Cardio: Discontinue diltiazem, c/w lopressor 25mg BID, start amiodarone 200mg BID, start Eliquis 5mg BID  - S/p MINNA w/ cardioversion. No BRAN thrombus. Now in NSR.    # Dilatation of Aorta  - Noted on prior echo    #DVT ppx: Eliquis  #GI ppx: none  #Activity: AAT  #Diet. DASH    Patient is medically stable and ready for discharge

## 2023-02-27 NOTE — DISCHARGE NOTE PROVIDER - CARE PROVIDER_API CALL
Afua Holguin)  [address] [place] [address]  Phone: [phone]  Fax: [phone]  Established Patient  Follow Up Time: 1 week   Froylan Thomas)  Cardiology; Interventional Cardiology  [address] [facility], [address]  [place] [address]  Phone: [phone]  Fax: [phone]  Established Patient  Follow Up Time: 1 week

## 2023-02-27 NOTE — PROGRESS NOTE ADULT - SUBJECTIVE AND OBJECTIVE BOX
STEPHANE CHAN  63y  [facility] ED Hold 0[address] A      Patient is a 63y old  Female who presents with a chief complaint of Afib RVR (26 Feb 2023 20:13)      INTERVAL HPI/OVERNIGHT EVENTS:        REVIEW OF SYSTEMS:        FAMILY HISTORY:    T(C): 36.6 (02-27-23 @ 07:33), Max: 36.8 (02-26-23 @ 14:55)  HR: 75 (02-27-23 @ 07:33) (71 - 107)  BP: 90/55 (02-27-23 @ 07:33) (90/55 - 125/77)  RR: 18 (02-27-23 @ 07:33) (16 - 18)  SpO2: 99% (02-27-23 @ 07:33) (98% - 99%)  Wt(kg): --Vital Signs Last 24 Hrs  T(C): 36.6 (27 Feb 2023 07:33), Max: 36.8 (26 Feb 2023 14:55)  T(F): 97.8 (27 Feb 2023 07:33), Max: 98.3 (26 Feb 2023 14:55)  HR: 75 (27 Feb 2023 07:33) (71 - 107)  BP: 90/55 (27 Feb 2023 07:33) (90/55 - 125/77)  BP(mean): --  RR: 18 (27 Feb 2023 07:33) (16 - 18)  SpO2: 99% (27 Feb 2023 07:33) (98% - 99%)    Parameters below as of 27 Feb 2023 07:33  Patient On (Oxygen Delivery Method): room air        PHYSICAL EXAM:  GENERAL: NAD, well-groomed, well-developed  HEAD:  Atraumatic, Normocephalic  EYES: EOMI, PERRLA, conjunctiva and sclera clear  ENMT: No tonsillar erythema, exudates, or enlargement; Moist mucous membranes, Good dentition, No lesions  NECK: Supple, No JVD, Normal thyroid  NERVOUS SYSTEM:  Alert & Oriented X3, Good concentration; Motor Strength 5/5 B/L upper and lower extremities; DTRs 2+ intact and symmetric  PULM: Clear to auscultation bilaterally  CARDIAC: Regular rate and rhythm; No murmurs, rubs, or gallops  GI: Soft, Nontender, Nondistended; Bowel sounds present  EXTREMITIES:  2+ Peripheral Pulses, No clubbing, cyanosis, or edema  LYMPH: No lymphadenopathy noted  SKIN: No rashes or lesions    Consultant(s) Notes Reviewed:  [x ] YES  [ ] NO  Care Discussed with Consultants/Other Providers [ x] YES  [ ] NO    LABS:                            11.1   6.90  )-----------( 339      ( 27 Feb 2023 06:56 )             35.6   02-27    140  |  107  |  10  ----------------------------<  111<H>  4.3   |  23  |  0.9    Ca    9.0      27 Feb 2023 06:56  Mg     2.2     02-27    TPro  6.4  /  Alb  3.8  /  TBili  0.4  /  DBili  x   /  AST  21  /  ALT  32  /  AlkPhos  131<H>  02-27            acetaminophen     Tablet .. 650 milliGRAM(s) Oral every 6 hours PRN  diltiazem    Tablet 30 milliGRAM(s) Oral every 6 hours  diltiazem Infusion 10 mG/Hr IV Continuous <Continuous>  enoxaparin Injectable 65 milliGRAM(s) SubCutaneous every 12 hours  LORazepam     Tablet 0.5 milliGRAM(s) Oral at bedtime PRN      Patient is a 63 year old female with PMH of Atrial Fibrillation (non-compliant with Cardizem) presenting for palpitations. Non-compliant with medications and cardiology follow up. Found to be in AFIB RVR       1. Afib with RVR   - Telemetry            - ECG: Afib with RVR                      - CXR:             - TSH:pending   - [id number] score: 1, Therapeutic Lovenox for now , follow up cardio for need for long term AC   - Cont Cardizem drip   - STart cardizem po and titrate until cardizem drip if off    - Echo in 2019- normal systolic and diastolic function  - Cardio consult:pending         DVT ppx: on thepeutic lovenox  Diet: DASH  Dispo: Tele             Case Discussed with House Staff   39  minutes spent on total encounter; more than 50% of the visit was spent counseling and/or coordinating care by the attending physician.   Spectra [phone]     STEPHANE CHAN  63y  [facility] ED Hold 041 A      Patient is a 63y old  Female who presents with a chief complaint of Afib RVR (26 Feb 2023 20:13)      INTERVAL HPI/OVERNIGHT EVENTS:  Patient feels well. She has no complains. no overnight events  had similar episode 18 months ago where she was prescribed cardizem but was not taking it             FAMILY HISTORY:    T(C): 36.6 (02-27-23 @ 07:33), Max: 36.8 (02-26-23 @ 14:55)  HR: 75 (02-27-23 @ 07:33) (71 - 107)  BP: 90/55 (02-27-23 @ 07:33) (90/55 - 125/77)  RR: 18 (02-27-23 @ 07:33) (16 - 18)  SpO2: 99% (02-27-23 @ 07:33) (98% - 99%)  Wt(kg): --Vital Signs Last 24 Hrs  T(C): 36.6 (27 Feb 2023 07:33), Max: 36.8 (26 Feb 2023 14:55)  T(F): 97.8 (27 Feb 2023 07:33), Max: 98.3 (26 Feb 2023 14:55)  HR: 75 (27 Feb 2023 07:33) (71 - 107)  BP: 90/55 (27 Feb 2023 07:33) (90/55 - 125/77)  BP(mean): --  RR: 18 (27 Feb 2023 07:33) (16 - 18)  SpO2: 99% (27 Feb 2023 07:33) (98% - 99%)    Parameters below as of 27 Feb 2023 07:33  Patient On (Oxygen Delivery Method): room air        PHYSICAL EXAM:  GENERAL: NAD, well-groomed, well-developed  NERVOUS SYSTEM:  Alert & Oriented X3  PULM: Clear to auscultation bilaterally  CARDIAC: Regular rate and rhythm;  GI: Soft, Nontender, Nondistended;  EXTREMITIES:  2+ Peripheral Pulse    Consultant(s) Notes Reviewed:  [x ] YES  [ ] NO  Care Discussed with Consultants/Other Providers [ x] YES  [ ] NO    LABS:                            11.1   6.90  )-----------( 339      ( 27 Feb 2023 06:56 )             35.6   02-27    140  |  107  |  10  ----------------------------<  111<H>  4.3   |  23  |  0.9    Ca    9.0      27 Feb 2023 06:56  Mg     2.2     02-27    TPro  6.4  /  Alb  3.8  /  TBili  0.4  /  DBili  x   /  AST  21  /  ALT  32  /  AlkPhos  131<H>  02-27            acetaminophen     Tablet .. 650 milliGRAM(s) Oral every 6 hours PRN  diltiazem    Tablet 30 milliGRAM(s) Oral every 6 hours  diltiazem Infusion 10 mG/Hr IV Continuous <Continuous>  enoxaparin Injectable 65 milliGRAM(s) SubCutaneous every 12 hours  LORazepam     Tablet 0.5 milliGRAM(s) Oral at bedtime PRN      Patient is a 63 year old female with PMH of Atrial Fibrillation (non-compliant with Cardizem) presenting for palpitations. Non-compliant with medications and cardiology follow up. Found to be in AFIB RVR       1. Afib with RVR   - Telemetry            - ECG: Afib with RVR                      - CXR:  WNL           - TSH:pending   - [id number] score: 1, Therapeutic Lovenox for now (in case cardioversion is warranted)  , follow up cardio for need for long term AC   - Cont Cardizem drip   - STart cardizem po and titrate until cardizem drip if off    - Echo in 2019- normal systolic and diastolic function  - Cardio consult:pending         DVT ppx: on thepeutic lovenox  Diet: DASH  Dispo: Tele

## 2023-02-27 NOTE — CONSULT NOTE ADULT - SUBJECTIVE AND OBJECTIVE BOX
This is a late entry  Patient was seen and examined by me earlier this morning in Main ER Room 17B.  Son in the room at bedside.    Patient is a 63y old  Female who presents with a chief complaint of Afib RVR (27 Feb 2023 15:00)      REASON FOR CONSULT     HPI:  Patient is a 63 year old female with PMH of Atrial Fibrillation (non-compliant with Cardizem) presenting for palpitations. Patient endorses a sudden onset of intermittent chest palpitations x3 days. Patient states she took her cardizem medication the past two days with temporary relief but her symptoms returned this morning.  Before that, she had not taken a Cardizem for over a year. Not currently on blood thinners.  Patient has not seen her cardiologist or had any additional cardiac work-up since her last atrial fibrillation episode one year ago. Patient denies any shortness of breath, nausea, vomiting, fevers, abdominal pain, or additional complaints.      ED Course  Vital Signs  T(F): 98.3   HR: 107-160  BP: 125/77  RR: 16   SpO2: 98% on room air     Trop negative x1  BNP 2288  EKG - afib RVR          (26 Feb 2023 20:13)      PAST MEDICAL & SURGICAL HISTORY:  Anxiety      Afib      H/O neck surgery              SOCIAL HISTORY:     FAMILY HISTORY:    Toradol (Rash)      MEDICATIONS  (STANDING):  diltiazem    Tablet 30 milliGRAM(s) Oral every 6 hours  enoxaparin Injectable 65 milliGRAM(s) SubCutaneous every 12 hours    MEDICATIONS  (PRN):  acetaminophen     Tablet .. 650 milliGRAM(s) Oral every 6 hours PRN Temp greater or equal to 38C (100.4F), Mild Pain (1 - 3)  LORazepam     Tablet 0.5 milliGRAM(s) Oral at bedtime PRN Anxiety      Vital Signs Last 24 Hrs  T(C): 36.6 (27 Feb 2023 07:33), Max: 36.8 (27 Feb 2023 00:35)  T(F): 97.8 (27 Feb 2023 07:33), Max: 98.2 (27 Feb 2023 00:35)  HR: 90 (27 Feb 2023 13:55) (71 - 90)  BP: 125/79 (27 Feb 2023 13:55) (90/55 - 125/79)  BP(mean): --  RR: 18 (27 Feb 2023 07:33) (18 - 18)  SpO2: 99% (27 Feb 2023 07:33) (99% - 99%)    Parameters below as of 27 Feb 2023 07:33  Patient On (Oxygen Delivery Method): room air     I&O's Detail            LABS:                        11.1   6.90  )-----------( 339      ( 27 Feb 2023 06:56 )             35.6     02-27    140  |  107  |  10  ----------------------------<  111<H>  4.3   |  23  |  0.9    Ca    9.0      27 Feb 2023 06:56  Mg     2.2     02-27    TPro  6.4  /  Alb  3.8  /  TBili  0.4  /  DBili  x   /  AST  21  /  ALT  32  /  AlkPhos  131<H>  02-27    CARDIAC MARKERS ( 26 Feb 2023 15:58 )  x     / <0.01 ng/mL / x     / x     / x            I&O's Summary     This is a late entry  Patient was seen and examined by me earlier this morning in Main ER Room 17B.  Son in the room at bedside.    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    She presented at [facility] because of palpitations.  In ER, her ECG showed atrial fib with RVR.  She was last seen in the office in June 2019.      Physical Exam:  Not in apparent distress  Alert , oriented x 3  No JVD; irregular rhythm; nl S1S2  Bilateral breath sounds  Abdomen soft  No edema  Moving all extremities    ROS: As stated in chart  Labs: noted    < from: Transthoracic Echocardiogram (05.07.19 @ 09:46) >  Summary:   1. Dilatation of the ascending aorta.    PHYSICIAN INTERPRETATION:  Left Ventricle: Normal left ventricular size and wall thicknesses, with   normal systolic and diastolic function.  Right Ventricle: Normal right ventricular size and function.  Left Atrium: Normal left atrial size.  Right Atrium: Normal right atrial size.  Pericardium: There is no evidence of pericardial effusion.  Mitral Valve: Structurally normal mitral valve, with normal leaflet   excursion. Trace mitral valve regurgitation is seen.  Tricuspid Valve: The tricuspid valve is normal in structure. Mild   tricuspid regurgitation is visualized.  Aortic Valve: The aortic valve is trileaflet. No evidence of aortic   stenosis. Aortic valve thickening with normal leaflet opening. No   evidence of aortic valve regurgitation is seen.  Aorta: The aortic root is normal in size and structure. There is   dilatation of the ascending aorta.       < end of copied text >      < from: Xray Chest 1 View- PORTABLE-Urgent (02.26.23 @ 16:03) >  Findings:    Support devices: None.    Cardiac/mediastinum/hilum: Unchanged.    Lung parenchyma/Pleura: Within normal limits.    Skeleton/soft tissues: Unchanged.    Impression:    No radiographic evidence of acute cardiopulmonary disease.        --- End of Report ---      < end of copied text >      Patient is a 63y old  Female who presents with a chief complaint of Afib RVR (27 Feb 2023 15:00)      REASON FOR CONSULT     HPI:  Patient is a 63 year old female with PMH of Atrial Fibrillation (non-compliant with Cardizem) presenting for palpitations. Patient endorses a sudden onset of intermittent chest palpitations x3 days. Patient states she took her cardizem medication the past two days with temporary relief but her symptoms returned this morning.  Before that, she had not taken a Cardizem for over a year. Not currently on blood thinners.  Patient has not seen her cardiologist or had any additional cardiac work-up since her last atrial fibrillation episode one year ago. Patient denies any shortness of breath, nausea, vomiting, fevers, abdominal pain, or additional complaints.      ED Course  Vital Signs  T(F): 98.3   HR: 107-160  BP: 125/77  RR: 16   SpO2: 98% on room air     Trop negative x1  BNP 2288  EKG - afib RVR          (26 Feb 2023 20:13)      PAST MEDICAL & SURGICAL HISTORY:  Anxiety  Afib  H/O neck surgery              SOCIAL HISTORY:     FAMILY HISTORY:    Toradol (Rash)      MEDICATIONS  (STANDING):  diltiazem    Tablet 30 milliGRAM(s) Oral every 6 hours  enoxaparin Injectable 65 milliGRAM(s) SubCutaneous every 12 hours    MEDICATIONS  (PRN):  acetaminophen     Tablet .. 650 milliGRAM(s) Oral every 6 hours PRN Temp greater or equal to 38C (100.4F), Mild Pain (1 - 3)  LORazepam     Tablet 0.5 milliGRAM(s) Oral at bedtime PRN Anxiety      Vital Signs Last 24 Hrs  T(C): 36.6 (27 Feb 2023 07:33), Max: 36.8 (27 Feb 2023 00:35)  T(F): 97.8 (27 Feb 2023 07:33), Max: 98.2 (27 Feb 2023 00:35)  HR: 90 (27 Feb 2023 13:55) (71 - 90)  BP: 125/79 (27 Feb 2023 13:55) (90/55 - 125/79)  BP(mean): --  RR: 18 (27 Feb 2023 07:33) (18 - 18)  SpO2: 99% (27 Feb 2023 07:33) (99% - 99%)    Parameters below as of 27 Feb 2023 07:33  Patient On (Oxygen Delivery Method): room air     I&O's Detail        LABS:                        11.1   6.90  )-----------( 339      ( 27 Feb 2023 06:56 )             35.6     02-27    140  |  107  |  10  ----------------------------<  111<H>  4.3   |  23  |  0.9    Ca    9.0      27 Feb 2023 06:56  Mg     2.2     02-27    TPro  6.4  /  Alb  3.8  /  TBili  0.4  /  DBili  x   /  AST  21  /  ALT  32  /  AlkPhos  131<H>  02-27    CARDIAC MARKERS ( 26 Feb 2023 15:58 )  x     / <0.01 ng/mL / x     / x     / x            I&O's Summary

## 2023-02-27 NOTE — DISCHARGE NOTE PROVIDER - ATTENDING DISCHARGE PHYSICAL EXAMINATION:
VITALS:   T(C): 36.8 (02-28-23 @ 01:15), Max: 36.8 (02-28-23 @ 01:15)  HR: 109 (02-28-23 @ 01:15) (90 - 130)  BP: 107/71 (02-28-23 @ 01:15) (107/71 - 144/86)  RR: 18 (02-27-23 @ 20:39) (18 - 18)  SpO2: 99% (02-28-23 @ 01:15) (99% - 99%)    GENERAL: NAD, lying in bed comfortably  HEART: Regular rate and rhythm,  LUNGS: Unlabored respirations.    ABDOMEN: Soft, nontender, nondistended, +BS  EXTREMITIES: 2+ peripheral pulses bilaterally.  NERVOUS SYSTEM:  A&Ox3,       GENERAL: NAD, lying in bed comfortably  HEART: Regular rate and rhythm,  LUNGS: Unlabored respirations.    ABDOMEN: Soft, nontender, nondistended, +BS  EXTREMITIES: 2+ peripheral pulses bilaterally.  NERVOUS SYSTEM:  A&Ox3,

## 2023-02-27 NOTE — DISCHARGE NOTE PROVIDER - NSDCMRMEDTOKEN_GEN_ALL_CORE_FT
Ativan 0.5 mg oral tablet: 1 tab(s) orally once a day (at bedtime), As Needed   aspirin 81 mg oral capsule: 1 cap(s) orally once a day   Ativan 0.5 mg oral tablet: 1 tab(s) orally once a day (at bedtime), As Needed  Cardizem  mg/24 hours oral capsule, extended release: 1 cap(s) orally once a day    amiodarone 200 mg oral tablet: 1 tab(s) orally 2 times a day  apixaban 5 mg oral tablet: 1 tab(s) orally every 12 hours  Ativan 0.5 mg oral tablet: 1 tab(s) orally once a day (at bedtime), As Needed  metoprolol tartrate 25 mg oral tablet: 1 tab(s) orally 2 times a day

## 2023-02-28 LAB
ALBUMIN SERPL ELPH-MCNC: 4 G/DL — SIGNIFICANT CHANGE UP (ref 3.5–5.2)
ALP SERPL-CCNC: 145 U/L — HIGH (ref 30–115)
ALT FLD-CCNC: 29 U/L — SIGNIFICANT CHANGE UP (ref 0–41)
ANION GAP SERPL CALC-SCNC: 10 MMOL/L — SIGNIFICANT CHANGE UP (ref 7–14)
AST SERPL-CCNC: 21 U/L — SIGNIFICANT CHANGE UP (ref 0–41)
BASOPHILS # BLD AUTO: 0.06 K/UL — SIGNIFICANT CHANGE UP (ref 0–0.2)
BASOPHILS NFR BLD AUTO: 0.9 % — SIGNIFICANT CHANGE UP (ref 0–1)
BILIRUB SERPL-MCNC: 0.4 MG/DL — SIGNIFICANT CHANGE UP (ref 0.2–1.2)
BUN SERPL-MCNC: 10 MG/DL — SIGNIFICANT CHANGE UP (ref 10–20)
CALCIUM SERPL-MCNC: 9.8 MG/DL — SIGNIFICANT CHANGE UP (ref 8.4–10.5)
CHLORIDE SERPL-SCNC: 107 MMOL/L — SIGNIFICANT CHANGE UP (ref 98–110)
CO2 SERPL-SCNC: 24 MMOL/L — SIGNIFICANT CHANGE UP (ref 17–32)
CREAT SERPL-MCNC: 0.9 MG/DL — SIGNIFICANT CHANGE UP (ref 0.7–1.5)
EGFR: 72 ML/MIN/1.73M2 — SIGNIFICANT CHANGE UP
EOSINOPHIL # BLD AUTO: 0.22 K/UL — SIGNIFICANT CHANGE UP (ref 0–0.7)
EOSINOPHIL NFR BLD AUTO: 3.4 % — SIGNIFICANT CHANGE UP (ref 0–8)
GLUCOSE SERPL-MCNC: 96 MG/DL — SIGNIFICANT CHANGE UP (ref 70–99)
HCT VFR BLD CALC: 38.8 % — SIGNIFICANT CHANGE UP (ref 37–47)
HGB BLD-MCNC: 12.2 G/DL — SIGNIFICANT CHANGE UP (ref 12–16)
IMM GRANULOCYTES NFR BLD AUTO: 0.5 % — HIGH (ref 0.1–0.3)
LYMPHOCYTES # BLD AUTO: 2.39 K/UL — SIGNIFICANT CHANGE UP (ref 1.2–3.4)
LYMPHOCYTES # BLD AUTO: 36.7 % — SIGNIFICANT CHANGE UP (ref 20.5–51.1)
MAGNESIUM SERPL-MCNC: 2 MG/DL — SIGNIFICANT CHANGE UP (ref 1.8–2.4)
MCHC RBC-ENTMCNC: 24.1 PG — LOW (ref 27–31)
MCHC RBC-ENTMCNC: 31.4 G/DL — LOW (ref 32–37)
MCV RBC AUTO: 76.7 FL — LOW (ref 81–99)
MONOCYTES # BLD AUTO: 0.75 K/UL — HIGH (ref 0.1–0.6)
MONOCYTES NFR BLD AUTO: 11.5 % — HIGH (ref 1.7–9.3)
NEUTROPHILS # BLD AUTO: 3.06 K/UL — SIGNIFICANT CHANGE UP (ref 1.4–6.5)
NEUTROPHILS NFR BLD AUTO: 47 % — SIGNIFICANT CHANGE UP (ref 42.2–75.2)
NRBC # BLD: 0 /100 WBCS — SIGNIFICANT CHANGE UP (ref 0–0)
PHOSPHATE SERPL-MCNC: 5 MG/DL — HIGH (ref 2.1–4.9)
PLATELET # BLD AUTO: 350 K/UL — SIGNIFICANT CHANGE UP (ref 130–400)
POTASSIUM SERPL-MCNC: 4.7 MMOL/L — SIGNIFICANT CHANGE UP (ref 3.5–5)
POTASSIUM SERPL-SCNC: 4.7 MMOL/L — SIGNIFICANT CHANGE UP (ref 3.5–5)
PROT SERPL-MCNC: 6.5 G/DL — SIGNIFICANT CHANGE UP (ref 6–8)
RBC # BLD: 5.06 M/UL — SIGNIFICANT CHANGE UP (ref 4.2–5.4)
RBC # FLD: 16.4 % — HIGH (ref 11.5–14.5)
SODIUM SERPL-SCNC: 141 MMOL/L — SIGNIFICANT CHANGE UP (ref 135–146)
WBC # BLD: 6.51 K/UL — SIGNIFICANT CHANGE UP (ref 4.8–10.8)
WBC # FLD AUTO: 6.51 K/UL — SIGNIFICANT CHANGE UP (ref 4.8–10.8)

## 2023-02-28 PROCEDURE — 93306 TTE W/DOPPLER COMPLETE: CPT | Mod: 26

## 2023-02-28 PROCEDURE — 93010 ELECTROCARDIOGRAM REPORT: CPT

## 2023-02-28 PROCEDURE — 99232 SBSQ HOSP IP/OBS MODERATE 35: CPT

## 2023-02-28 RX ORDER — DIPHENHYDRAMINE HCL 50 MG
25 CAPSULE ORAL ONCE
Refills: 0 | Status: COMPLETED | OUTPATIENT
Start: 2023-02-28 | End: 2023-02-28

## 2023-02-28 RX ORDER — DILTIAZEM HCL 120 MG
10 CAPSULE, EXT RELEASE 24 HR ORAL ONCE
Refills: 0 | Status: COMPLETED | OUTPATIENT
Start: 2023-02-28 | End: 2023-02-28

## 2023-02-28 RX ORDER — DILTIAZEM HCL 120 MG
45 CAPSULE, EXT RELEASE 24 HR ORAL EVERY 6 HOURS
Refills: 0 | Status: DISCONTINUED | OUTPATIENT
Start: 2023-02-28 | End: 2023-03-01

## 2023-02-28 RX ORDER — DILTIAZEM HCL 120 MG
5 CAPSULE, EXT RELEASE 24 HR ORAL
Qty: 125 | Refills: 0 | Status: DISCONTINUED | OUTPATIENT
Start: 2023-02-28 | End: 2023-03-01

## 2023-02-28 RX ORDER — DILTIAZEM HCL 120 MG
60 CAPSULE, EXT RELEASE 24 HR ORAL EVERY 6 HOURS
Refills: 0 | Status: DISCONTINUED | OUTPATIENT
Start: 2023-02-28 | End: 2023-02-28

## 2023-02-28 RX ORDER — DILTIAZEM HCL 120 MG
30 CAPSULE, EXT RELEASE 24 HR ORAL ONCE
Refills: 0 | Status: COMPLETED | OUTPATIENT
Start: 2023-02-28 | End: 2023-02-28

## 2023-02-28 RX ADMIN — Medication 45 MILLIGRAM(S): at 17:19

## 2023-02-28 RX ADMIN — Medication 5 MG/HR: at 10:50

## 2023-02-28 RX ADMIN — Medication 30 MILLIGRAM(S): at 10:03

## 2023-02-28 RX ADMIN — Medication 10 MILLIGRAM(S): at 10:50

## 2023-02-28 RX ADMIN — ENOXAPARIN SODIUM 65 MILLIGRAM(S): 100 INJECTION SUBCUTANEOUS at 17:19

## 2023-02-28 RX ADMIN — Medication 30 MILLIGRAM(S): at 01:18

## 2023-02-28 RX ADMIN — Medication 25 MILLIGRAM(S): at 23:11

## 2023-02-28 RX ADMIN — Medication 0.5 MILLIGRAM(S): at 21:23

## 2023-02-28 RX ADMIN — Medication 0.5 MILLIGRAM(S): at 11:58

## 2023-02-28 RX ADMIN — ENOXAPARIN SODIUM 65 MILLIGRAM(S): 100 INJECTION SUBCUTANEOUS at 06:19

## 2023-02-28 RX ADMIN — Medication 30 MILLIGRAM(S): at 08:22

## 2023-02-28 RX ADMIN — Medication 45 MILLIGRAM(S): at 23:12

## 2023-02-28 NOTE — PATIENT PROFILE ADULT - FALL HARM RISK - HARM RISK INTERVENTIONS

## 2023-02-28 NOTE — PROGRESS NOTE ADULT - SUBJECTIVE AND OBJECTIVE BOX
STEPHANE CHAN  63y  [facility] ED Hold 041 A      Patient is a 63y old  Female who presents with a chief complaint of Afib RVR (26 Feb 2023 20:13)      INTERVAL HPI/OVERNIGHT EVENTS:  Patient feels well. felt some palpitations and nurses informed her that her hr is fast again  she denies chest pain, sob. no other complains noted  Son at the bedside updated about the plan             FAMILY HISTORY:    T(C): 36.6 (02-27-23 @ 07:33), Max: 36.8 (02-26-23 @ 14:55)  HR: 75 (02-27-23 @ 07:33) (71 - 107)  BP: 90/55 (02-27-23 @ 07:33) (90/55 - 125/77)  RR: 18 (02-27-23 @ 07:33) (16 - 18)  SpO2: 99% (02-27-23 @ 07:33) (98% - 99%)  Wt(kg): --Vital Signs Last 24 Hrs  T(C): 36.6 (27 Feb 2023 07:33), Max: 36.8 (26 Feb 2023 14:55)  T(F): 97.8 (27 Feb 2023 07:33), Max: 98.3 (26 Feb 2023 14:55)  HR: 75 (27 Feb 2023 07:33) (71 - 107)  BP: 90/55 (27 Feb 2023 07:33) (90/55 - 125/77)  BP(mean): --  RR: 18 (27 Feb 2023 07:33) (16 - 18)  SpO2: 99% (27 Feb 2023 07:33) (98% - 99%)    Parameters below as of 27 Feb 2023 07:33  Patient On (Oxygen Delivery Method): room air        PHYSICAL EXAM:  GENERAL: NAD, well-groomed, well-developed  NERVOUS SYSTEM:  Alert & Oriented X3  PULM: Clear to auscultation bilaterally  CARDIAC: Regular rate and rhythm;  GI: Soft, Nontender, Nondistended;  EXTREMITIES:  2+ Peripheral Pulse    Consultant(s) Notes Reviewed:  [x ] YES  [ ] NO  Care Discussed with Consultants/Other Providers [ x] YES  [ ] NO    LABS:                            11.1   6.90  )-----------( 339      ( 27 Feb 2023 06:56 )             35.6   02-27    140  |  107  |  10  ----------------------------<  111<H>  4.3   |  23  |  0.9    Ca    9.0      27 Feb 2023 06:56  Mg     2.2     02-27    TPro  6.4  /  Alb  3.8  /  TBili  0.4  /  DBili  x   /  AST  21  /  ALT  32  /  AlkPhos  131<H>  02-27            acetaminophen     Tablet .. 650 milliGRAM(s) Oral every 6 hours PRN  diltiazem    Tablet 30 milliGRAM(s) Oral every 6 hours  diltiazem Infusion 10 mG/Hr IV Continuous <Continuous>  enoxaparin Injectable 65 milliGRAM(s) SubCutaneous every 12 hours  LORazepam     Tablet 0.5 milliGRAM(s) Oral at bedtime PRN      Patient is a 63 year old female with PMH of Atrial Fibrillation (non-compliant with Cardizem) presenting for palpitations. Non-compliant with medications and cardiology follow up. Found to be in AFIB RVR       1. Afib with RVR persistent  - Telemetry            - ECG: Afib with RVR                      - CXR:  WNL           - TSH:High normal   - [id number] score: 1, Therapeutic Lovenox for now (in case cardioversion is warranted)  , follow up cardio for need for long term AC   - Re-start  Cardizem drip   - Cont  cardizem po and titrate until cardizem drip if off    - Echo in 2019- normal systolic and diastolic function  - Cardio consult appreciated          DVT ppx: on thepeutic lovenox  Diet: DASH  Dispo: Tele

## 2023-02-28 NOTE — PROGRESS NOTE ADULT - SUBJECTIVE AND OBJECTIVE BOX
Patient was seen and examined earlier this morning by me in ED4.  EMR reviewed.    She is comfortable in bed.  She does not have a clear awareness of her Atrial Fib.  Telemetry disclosure shows her heart rate going at 146 per minute at time of my visit.    Vitals:  T(C): 36.7 (02-28-23 @ 08:05), Max: 36.8 (02-26-23 @ 14:55)  HR: 156 (02-28-23 @ 10:41) (71 - 156)  BP: 114/63 (02-28-23 @ 08:05) (90/55 - 144/86)  RR: 18 (02-28-23 @ 08:05) (16 - 18)  SpO2: 96% (02-28-23 @ 08:05) (96% - 99%)    Telemetry: Atrial Fibrillation.    LABS:                        12.2   6.51  )-----------( 350      ( 28 Feb 2023 07:15 )             38.8     02-28    141  |  107  |  10  ----------------------------<  96  4.7   |  24  |  0.9    Ca    9.8      28 Feb 2023 07:15  Phos  5.0     02-28  Mg     2.0     02-28    TPro  6.5  /  Alb  4.0  /  TBili  0.4  /  DBili  x   /  AST  21  /  ALT  29  /  AlkPhos  145<H>  02-28      MEDICATIONS  (STANDING):  diltiazem Infusion 5 mG/Hr (5 mL/Hr) IV Continuous <Continuous>  enoxaparin Injectable 65 milliGRAM(s) SubCutaneous every 12 hours    MEDICATIONS  (PRN):  acetaminophen     Tablet .. 650 milliGRAM(s) Oral every 6 hours PRN Temp greater or equal to 38C (100.4F), Mild Pain (1 - 3)  LORazepam     Tablet 0.5 milliGRAM(s) Oral at bedtime PRN Anxiety      PHYSICAL EXAM:  Constitutional: appears stated age, well developed/nourished, no acute distress  Eyes: EOM's intact.  PERRLA  ENMT: Normocephalic, atraumatic.    Neck: Jugular veins non-distended; no carotid bruits bilaterally.  Respiratory: respiratory pattern unlabored;  lungs clear to auscultation bilaterally.  Cardiovascular: Irregular rhythm.  Tachycardic.   S1 and S2 normal.  No murmur nor rub appreciated.  Abdomen: Soft, non-tender.  Normal bowel sounds.  Extremities: extremities warm; no  edema.  Skin: No gross abnormalities noted.  Musculoskeletal: No gross deformities  Neurological: Alert, oriented x 3.  No focal neurologic deficits noted.    < from: TTE Echo Complete w/o Contrast w/ Doppler (02.28.23 @ 08:48) >  Summary:   1. Ubwd-bz-ywkc variation and tachycardia preclude quantification of the   left ventricular ejection fraction. LV systolic function appears   preservied.   2. Left atrial enlargement.   3. Normal right atrial size.   4. No evidence of mitral valve regurgitation.   5. Patient is in atrial fibrillation with rapid ventricular rate.   6. Compared to the prior TTE dated 5/07/2019, there is left atrial   enlargement on the present study.    PHYSICIAN INTERPRETATION:  Left Ventricle: The left ventricular internal cavity size is normal. Left   ventricular wall thickness is normal. Kixw-mx-bzcu variation and   tachycardia preclude quantification of the left ventricular ejection   fraction. LV systolic function appears preservied.  Right Ventricle: The right ventricular size is normal. Cannot assess   right ventricular systolic function in the setting of rapid ventricular   rate. TV S' 0.1 m/s.  Left Atrium: Left atrial enlargement.  Right Atrium: Normal right atrial size. RA Area is 12.55 cm² cm2.  Pericardium: There is no evidence of pericardial effusion.  Mitral Valve: Structurally normal mitral valve, with normal leaflet   excursion. No evidence of mitral stenosis. No evidence of mitral valve   regurgitation is seen.  Tricuspid Valve: Structurally normal tricuspid valve, with normal leaflet   excursion. The tricuspid valve is normal in structure. Trivial tricuspid   regurgitation is visualized.  Aortic Valve: Normal trileaflet aortic valve with normal opening. No   evidence of aortic stenosis. No evidence of aortic valve regurgitation is   seen.  Pulmonic Valve: The pulmonic valve was not well visualized. No indication   of pulmonic valve regurgitation.  Aorta: The aortic root is normal in size and structure. The ascending   aorta was not well visualized.  Venous: The inferior vena cava was normal sized, with respiratory size   variation less than 50%.  In comparison to the previous echocardiogram(s): Compared to the prior   TTE dated 5/07/2019, there is left atrial enlargement on the present   study.    < end of copied text >

## 2023-03-01 LAB
ALBUMIN SERPL ELPH-MCNC: 3.9 G/DL — SIGNIFICANT CHANGE UP (ref 3.5–5.2)
ALP SERPL-CCNC: 147 U/L — HIGH (ref 30–115)
ALT FLD-CCNC: 29 U/L — SIGNIFICANT CHANGE UP (ref 0–41)
ANION GAP SERPL CALC-SCNC: 12 MMOL/L — SIGNIFICANT CHANGE UP (ref 7–14)
AST SERPL-CCNC: 23 U/L — SIGNIFICANT CHANGE UP (ref 0–41)
BASOPHILS # BLD AUTO: 0.06 K/UL — SIGNIFICANT CHANGE UP (ref 0–0.2)
BASOPHILS NFR BLD AUTO: 0.7 % — SIGNIFICANT CHANGE UP (ref 0–1)
BILIRUB SERPL-MCNC: 0.4 MG/DL — SIGNIFICANT CHANGE UP (ref 0.2–1.2)
BUN SERPL-MCNC: 10 MG/DL — SIGNIFICANT CHANGE UP (ref 10–20)
CALCIUM SERPL-MCNC: 9.7 MG/DL — SIGNIFICANT CHANGE UP (ref 8.4–10.4)
CHLORIDE SERPL-SCNC: 107 MMOL/L — SIGNIFICANT CHANGE UP (ref 98–110)
CO2 SERPL-SCNC: 21 MMOL/L — SIGNIFICANT CHANGE UP (ref 17–32)
CREAT SERPL-MCNC: 1 MG/DL — SIGNIFICANT CHANGE UP (ref 0.7–1.5)
EGFR: 63 ML/MIN/1.73M2 — SIGNIFICANT CHANGE UP
EOSINOPHIL # BLD AUTO: 0.14 K/UL — SIGNIFICANT CHANGE UP (ref 0–0.7)
EOSINOPHIL NFR BLD AUTO: 1.6 % — SIGNIFICANT CHANGE UP (ref 0–8)
GLUCOSE SERPL-MCNC: 100 MG/DL — HIGH (ref 70–99)
HCT VFR BLD CALC: 37.9 % — SIGNIFICANT CHANGE UP (ref 37–47)
HGB BLD-MCNC: 12 G/DL — SIGNIFICANT CHANGE UP (ref 12–16)
IMM GRANULOCYTES NFR BLD AUTO: 0.2 % — SIGNIFICANT CHANGE UP (ref 0.1–0.3)
LYMPHOCYTES # BLD AUTO: 2.37 K/UL — SIGNIFICANT CHANGE UP (ref 1.2–3.4)
LYMPHOCYTES # BLD AUTO: 27.4 % — SIGNIFICANT CHANGE UP (ref 20.5–51.1)
MAGNESIUM SERPL-MCNC: 1.8 MG/DL — SIGNIFICANT CHANGE UP (ref 1.8–2.4)
MCHC RBC-ENTMCNC: 24 PG — LOW (ref 27–31)
MCHC RBC-ENTMCNC: 31.7 G/DL — LOW (ref 32–37)
MCV RBC AUTO: 75.6 FL — LOW (ref 81–99)
MONOCYTES # BLD AUTO: 0.9 K/UL — HIGH (ref 0.1–0.6)
MONOCYTES NFR BLD AUTO: 10.4 % — HIGH (ref 1.7–9.3)
NEUTROPHILS # BLD AUTO: 5.17 K/UL — SIGNIFICANT CHANGE UP (ref 1.4–6.5)
NEUTROPHILS NFR BLD AUTO: 59.7 % — SIGNIFICANT CHANGE UP (ref 42.2–75.2)
NRBC # BLD: 0 /100 WBCS — SIGNIFICANT CHANGE UP (ref 0–0)
PHOSPHATE SERPL-MCNC: 5.1 MG/DL — HIGH (ref 2.1–4.9)
PLATELET # BLD AUTO: 344 K/UL — SIGNIFICANT CHANGE UP (ref 130–400)
POTASSIUM SERPL-MCNC: 4.2 MMOL/L — SIGNIFICANT CHANGE UP (ref 3.5–5)
POTASSIUM SERPL-SCNC: 4.2 MMOL/L — SIGNIFICANT CHANGE UP (ref 3.5–5)
PROT SERPL-MCNC: 6.7 G/DL — SIGNIFICANT CHANGE UP (ref 6–8)
RBC # BLD: 5.01 M/UL — SIGNIFICANT CHANGE UP (ref 4.2–5.4)
RBC # FLD: 16.3 % — HIGH (ref 11.5–14.5)
SODIUM SERPL-SCNC: 140 MMOL/L — SIGNIFICANT CHANGE UP (ref 135–146)
WBC # BLD: 8.66 K/UL — SIGNIFICANT CHANGE UP (ref 4.8–10.8)
WBC # FLD AUTO: 8.66 K/UL — SIGNIFICANT CHANGE UP (ref 4.8–10.8)

## 2023-03-01 PROCEDURE — 99231 SBSQ HOSP IP/OBS SF/LOW 25: CPT

## 2023-03-01 RX ORDER — DILTIAZEM HCL 120 MG
60 CAPSULE, EXT RELEASE 24 HR ORAL EVERY 8 HOURS
Refills: 0 | Status: DISCONTINUED | OUTPATIENT
Start: 2023-03-01 | End: 2023-03-01

## 2023-03-01 RX ORDER — DILTIAZEM HCL 120 MG
60 CAPSULE, EXT RELEASE 24 HR ORAL EVERY 6 HOURS
Refills: 0 | Status: DISCONTINUED | OUTPATIENT
Start: 2023-03-01 | End: 2023-03-05

## 2023-03-01 RX ORDER — DILTIAZEM HCL 120 MG
15 CAPSULE, EXT RELEASE 24 HR ORAL ONCE
Refills: 0 | Status: DISCONTINUED | OUTPATIENT
Start: 2023-03-01 | End: 2023-03-01

## 2023-03-01 RX ORDER — DILTIAZEM HCL 120 MG
10 CAPSULE, EXT RELEASE 24 HR ORAL ONCE
Refills: 0 | Status: COMPLETED | OUTPATIENT
Start: 2023-03-01 | End: 2023-03-01

## 2023-03-01 RX ADMIN — Medication 60 MILLIGRAM(S): at 13:02

## 2023-03-01 RX ADMIN — Medication 0.5 MILLIGRAM(S): at 12:30

## 2023-03-01 RX ADMIN — Medication 45 MILLIGRAM(S): at 05:43

## 2023-03-01 RX ADMIN — Medication 10 MILLIGRAM(S): at 20:10

## 2023-03-01 RX ADMIN — Medication 1 MILLIGRAM(S): at 20:10

## 2023-03-01 RX ADMIN — Medication 60 MILLIGRAM(S): at 18:57

## 2023-03-01 RX ADMIN — ENOXAPARIN SODIUM 65 MILLIGRAM(S): 100 INJECTION SUBCUTANEOUS at 05:43

## 2023-03-01 NOTE — PROGRESS NOTE ADULT - ATTENDING COMMENTS
Patient seen at bedside. Changes made within note as well. Discussed with medical team that includes resident(s), medical student(s), nursing staff, case management.    Patient is a 63 year old female with PMH of Atrial Fibrillation (non-compliant with Cardizem) presenting for palpitations. Admitted for Afib with RVR. Currently HR controlled.     1. Afib with RVR persistent  - Telemetry            - ECG: Afib with RVR                      - CXR:  WNL           - TSH:High normal   - [id number] score: 1, Therapeutic Lovenox for now (in case cardioversion is warranted)   - off cardizem drip. per cardio-> cardizem 60mg q8H (unable to start metoprolol due to poor TTE study). after patient spoke with cardiologist, will d/c on aspirin. at this point patient does not want to be on anticoagulation but she is ok with aspirin   - Echo in 2019- normal systolic and diastolic function    #DVT ppx: lovenox 65mg q8H  #GI ppx: none  #Activity: AAT  #Diet. DASH  #Dispo: acute  #follow up. : better HR control after switching to PO cardizem. possible discharge in AM if cleared by cardiology and hr stable. Patient seen at bedside. Changes made within note as well. Discussed with medical team that includes resident(s), medical student(s), nursing staff, case management.    Patient is a 63 year old female with PMH of Atrial Fibrillation (non-compliant with Cardizem) presenting for palpitations. Admitted for Afib with RVR. Currently HR controlled.     1. Afib with RVR persistent  - Telemetry            - ECG: Afib with RVR                      - CXR:  WNL           - TSH:High normal   - [id number] score: 1, Therapeutic Lovenox for now (in case cardioversion is warranted)   - off cardizem drip. per cardio-> cardizem 60mg q8H (unable to start metoprolol due to poor TTE study). after patient spoke with cardiologist, will d/c on aspirin. at this point patient does not want to be on anticoagulation but she is ok with aspirin   - Echo in 2019- normal systolic and diastolic function    #DVT ppx: lovenox 65mg q8H  #GI ppx: none  #Activity: AAT  #Diet. DASH  #Dispo: acute  #follow up. : better HR control after switching to PO cardizem. possible discharge in AM if cleared by cardiology and hr stable. meds adjusted today. patient does not want anticoagulation. she just want to be on aspirin. outpatient cardiology follow up. monitor for now on tele.

## 2023-03-01 NOTE — PROGRESS NOTE ADULT - SUBJECTIVE AND OBJECTIVE BOX
Patient was seen and examined earlier this morning on 3C.  EMR reviewed    She is ambulating in room with chest pain or shortness of breath.  No new complaints.      Vitals:  T(C): 36.1 (03-01-23 @ 05:14), Max: 36.8 (02-28-23 @ 01:15)  HR: 86 (03-01-23 @ 09:00) (78 - 156)  BP: 107/64 (03-01-23 @ 09:00) (105/66 - 144/86)  RR: 18 (03-01-23 @ 09:00) (16 - 18)  SpO2: 99% (02-28-23 @ 20:00) (96% - 99%)    Telemetry: Atrial Fib    LABS:                        12.0   8.66  )-----------( 344      ( 01 Mar 2023 06:49 )             37.9     03-01    140  |  107  |  10  ----------------------------<  100<H>  4.2   |  21  |  1.0    Ca    9.7      01 Mar 2023 06:49  Phos  5.1     03-01  Mg     1.8     03-01    TPro  6.7  /  Alb  3.9  /  TBili  0.4  /  DBili  x   /  AST  23  /  ALT  29  /  AlkPhos  147<H>  03-01      MEDICATIONS  (STANDING):  diltiazem    Tablet 60 milliGRAM(s) Oral every 8 hours  enoxaparin Injectable 65 milliGRAM(s) SubCutaneous every 12 hours    MEDICATIONS  (PRN):  acetaminophen     Tablet .. 650 milliGRAM(s) Oral every 6 hours PRN Temp greater or equal to 38C (100.4F), Mild Pain (1 - 3)  LORazepam     Tablet 0.5 milliGRAM(s) Oral daily PRN Anxiety      PHYSICAL EXAM:  Constitutional: appears stated age, well developed/nourished, no acute distress  Eyes: EOM's intact.  PERRLA  ENMT: Normocephalic, atraumatic.    Neck: Jugular veins non-distended; no carotid bruits bilaterally.  Respiratory:  lungs clear to auscultation bilaterally.  Cardiovascular: Irregular rhythm.  S1 and S2 normal.  No murmur nor rub appreciated.  Abdomen: Soft, non-tender.  Normal bowel sounds.  Extremities: extremities warm; no edema.  Skin: No gross abnormalities noted.  Musculoskeletal: No gross deformities  Neurological: Alert, oriented x 3.  No focal neurologic deficits noted.

## 2023-03-01 NOTE — PROGRESS NOTE ADULT - SUBJECTIVE AND OBJECTIVE BOX
STEPHANE CHAN 63y Female  MRN#: [id number]   CODE STATUS: full    Hospital Day: 3d    Pt is currently admitted with the primary diagnosis of atrial fibrillation with RVR    SUBJECTIVE  Hospital Course    Overnight events: Acute events noted. Started on cardizem gtt due to atrial fibrillation with RVR. Dose @5 mg/hr most recently. No other events on telemetry.     Subjective complaints: No subjective complaints. Resting comfortably.                                             ----------------------------------------------------------  OBJECTIVE  PAST MEDICAL & SURGICAL HISTORY  Anxiety    Afib    H/O neck surgery                                              -----------------------------------------------------------  ALLERGIES:  Toradol (Rash)                                            ------------------------------------------------------------    HOME MEDICATIONS  Home Medications:  Ativan 0.5 mg oral tablet: 1 tab(s) orally once a day (at bedtime), As Needed (26 Feb 2023 20:47)                           MEDICATIONS:  STANDING MEDICATIONS  diltiazem    Tablet 60 milliGRAM(s) Oral every 8 hours  enoxaparin Injectable 65 milliGRAM(s) SubCutaneous every 12 hours    PRN MEDICATIONS  acetaminophen     Tablet .. 650 milliGRAM(s) Oral every 6 hours PRN  LORazepam     Tablet 0.5 milliGRAM(s) Oral daily PRN                                            ------------------------------------------------------------  VITAL SIGNS: Last 24 Hours  T(C): 36.1 (01 Mar 2023 05:14), Max: 36.8 (28 Feb 2023 15:33)  T(F): 96.9 (01 Mar 2023 05:14), Max: 98.2 (28 Feb 2023 15:33)  HR: 86 (01 Mar 2023 09:00) (83 - 156)  BP: 107/64 (01 Mar 2023 09:00) (105/66 - 115/58)  BP(mean): --  RR: 18 (01 Mar 2023 09:00) (16 - 18)  SpO2: 99% (28 Feb 2023 20:00) (97% - 99%)      02-28-23 @ 07:01  -  03-01-23 @ 07:00  --------------------------------------------------------  IN: 5 mL / OUT: 0 mL / NET: 5 mL    03-01-23 @ 07:01  -  03-01-23 @ 10:14  --------------------------------------------------------  IN: 15 mL / OUT: 0 mL / NET: 15 mL                                             --------------------------------------------------------------  LABS:                        12.0   8.66  )-----------( 344      ( 01 Mar 2023 06:49 )             37.9     03-01    140  |  107  |  10  ----------------------------<  100<H>  4.2   |  21  |  1.0    Ca    9.7      01 Mar 2023 06:49  Phos  5.1     03-01  Mg     1.8     03-01    TPro  6.7  /  Alb  3.9  /  TBili  0.4  /  DBili  x   /  AST  23  /  ALT  29  /  AlkPhos  147<H>  03-01                  CARDIAC MARKERS ( 27 Feb 2023 16:24 )  x     / <0.01 ng/mL / x     / x     / x                                                  -------------------------------------------------------------  RADIOLOGY:                                            --------------------------------------------------------------    PHYSICAL EXAM:  General: Well appearing, not in acute distress  HEENT: No cervical LAD, or JVD  LUNGS: CTAB, no rales, on RA  HEART: RRR, no murmur  ABDOMEN: NBS, soft, nontender to palpation  EXT: no peripheral pitting edema b/l  NEURO: AAOx3  SKIN: No skin rash, open wounds                                           --------------------------------------------------------------

## 2023-03-02 LAB
ALBUMIN SERPL ELPH-MCNC: 4 G/DL — SIGNIFICANT CHANGE UP (ref 3.5–5.2)
ALP SERPL-CCNC: 148 U/L — HIGH (ref 30–115)
ALT FLD-CCNC: 30 U/L — SIGNIFICANT CHANGE UP (ref 0–41)
ANION GAP SERPL CALC-SCNC: 13 MMOL/L — SIGNIFICANT CHANGE UP (ref 7–14)
AST SERPL-CCNC: 23 U/L — SIGNIFICANT CHANGE UP (ref 0–41)
BASOPHILS # BLD AUTO: 0.05 K/UL — SIGNIFICANT CHANGE UP (ref 0–0.2)
BASOPHILS NFR BLD AUTO: 0.6 % — SIGNIFICANT CHANGE UP (ref 0–1)
BILIRUB SERPL-MCNC: 0.4 MG/DL — SIGNIFICANT CHANGE UP (ref 0.2–1.2)
BUN SERPL-MCNC: 13 MG/DL — SIGNIFICANT CHANGE UP (ref 10–20)
CALCIUM SERPL-MCNC: 9.6 MG/DL — SIGNIFICANT CHANGE UP (ref 8.4–10.4)
CHLORIDE SERPL-SCNC: 105 MMOL/L — SIGNIFICANT CHANGE UP (ref 98–110)
CO2 SERPL-SCNC: 22 MMOL/L — SIGNIFICANT CHANGE UP (ref 17–32)
CREAT SERPL-MCNC: 1 MG/DL — SIGNIFICANT CHANGE UP (ref 0.7–1.5)
EGFR: 63 ML/MIN/1.73M2 — SIGNIFICANT CHANGE UP
EOSINOPHIL # BLD AUTO: 0.14 K/UL — SIGNIFICANT CHANGE UP (ref 0–0.7)
EOSINOPHIL NFR BLD AUTO: 1.6 % — SIGNIFICANT CHANGE UP (ref 0–8)
GLUCOSE SERPL-MCNC: 87 MG/DL — SIGNIFICANT CHANGE UP (ref 70–99)
HCT VFR BLD CALC: 37.9 % — SIGNIFICANT CHANGE UP (ref 37–47)
HGB BLD-MCNC: 12.1 G/DL — SIGNIFICANT CHANGE UP (ref 12–16)
IMM GRANULOCYTES NFR BLD AUTO: 0.2 % — SIGNIFICANT CHANGE UP (ref 0.1–0.3)
LYMPHOCYTES # BLD AUTO: 3.45 K/UL — HIGH (ref 1.2–3.4)
LYMPHOCYTES # BLD AUTO: 38.5 % — SIGNIFICANT CHANGE UP (ref 20.5–51.1)
MAGNESIUM SERPL-MCNC: 2 MG/DL — SIGNIFICANT CHANGE UP (ref 1.8–2.4)
MCHC RBC-ENTMCNC: 24.4 PG — LOW (ref 27–31)
MCHC RBC-ENTMCNC: 31.9 G/DL — LOW (ref 32–37)
MCV RBC AUTO: 76.6 FL — LOW (ref 81–99)
MONOCYTES # BLD AUTO: 0.9 K/UL — HIGH (ref 0.1–0.6)
MONOCYTES NFR BLD AUTO: 10 % — HIGH (ref 1.7–9.3)
NEUTROPHILS # BLD AUTO: 4.41 K/UL — SIGNIFICANT CHANGE UP (ref 1.4–6.5)
NEUTROPHILS NFR BLD AUTO: 49.1 % — SIGNIFICANT CHANGE UP (ref 42.2–75.2)
NRBC # BLD: 0 /100 WBCS — SIGNIFICANT CHANGE UP (ref 0–0)
PHOSPHATE SERPL-MCNC: 5.1 MG/DL — HIGH (ref 2.1–4.9)
PLATELET # BLD AUTO: 350 K/UL — SIGNIFICANT CHANGE UP (ref 130–400)
POTASSIUM SERPL-MCNC: 4.7 MMOL/L — SIGNIFICANT CHANGE UP (ref 3.5–5)
POTASSIUM SERPL-SCNC: 4.7 MMOL/L — SIGNIFICANT CHANGE UP (ref 3.5–5)
PROT SERPL-MCNC: 6.7 G/DL — SIGNIFICANT CHANGE UP (ref 6–8)
RBC # BLD: 4.95 M/UL — SIGNIFICANT CHANGE UP (ref 4.2–5.4)
RBC # FLD: 16.4 % — HIGH (ref 11.5–14.5)
SODIUM SERPL-SCNC: 140 MMOL/L — SIGNIFICANT CHANGE UP (ref 135–146)
WBC # BLD: 8.97 K/UL — SIGNIFICANT CHANGE UP (ref 4.8–10.8)
WBC # FLD AUTO: 8.97 K/UL — SIGNIFICANT CHANGE UP (ref 4.8–10.8)

## 2023-03-02 PROCEDURE — 99232 SBSQ HOSP IP/OBS MODERATE 35: CPT

## 2023-03-02 RX ORDER — DILTIAZEM HCL 120 MG
10 CAPSULE, EXT RELEASE 24 HR ORAL ONCE
Refills: 0 | Status: COMPLETED | OUTPATIENT
Start: 2023-03-02 | End: 2023-03-02

## 2023-03-02 RX ORDER — METOPROLOL TARTRATE 50 MG
25 TABLET ORAL
Refills: 0 | Status: DISCONTINUED | OUTPATIENT
Start: 2023-03-02 | End: 2023-03-02

## 2023-03-02 RX ORDER — METOPROLOL TARTRATE 50 MG
25 TABLET ORAL THREE TIMES A DAY
Refills: 0 | Status: DISCONTINUED | OUTPATIENT
Start: 2023-03-02 | End: 2023-03-07

## 2023-03-02 RX ADMIN — ENOXAPARIN SODIUM 65 MILLIGRAM(S): 100 INJECTION SUBCUTANEOUS at 05:45

## 2023-03-02 RX ADMIN — Medication 10 MILLIGRAM(S): at 12:29

## 2023-03-02 RX ADMIN — Medication 60 MILLIGRAM(S): at 05:45

## 2023-03-02 RX ADMIN — ENOXAPARIN SODIUM 65 MILLIGRAM(S): 100 INJECTION SUBCUTANEOUS at 17:31

## 2023-03-02 RX ADMIN — Medication 1 MILLIGRAM(S): at 16:21

## 2023-03-02 RX ADMIN — Medication 10 MILLIGRAM(S): at 17:07

## 2023-03-02 RX ADMIN — Medication 60 MILLIGRAM(S): at 00:06

## 2023-03-02 RX ADMIN — Medication 0.5 MILLIGRAM(S): at 12:28

## 2023-03-02 RX ADMIN — Medication 25 MILLIGRAM(S): at 21:50

## 2023-03-02 RX ADMIN — Medication 25 MILLIGRAM(S): at 17:07

## 2023-03-02 NOTE — PROGRESS NOTE ADULT - SUBJECTIVE AND OBJECTIVE BOX
Vitals:  T(C): 36.6 (03-02-23 @ 20:04), Max: 36.9 (03-01-23 @ 20:16)  HR: 77 (03-02-23 @ 20:04) (77 - 163)  BP: 106/68 (03-02-23 @ 20:04) (106/68 - 130/96)  RR: 18 (03-02-23 @ 20:04) (18 - 18)  SpO2: 97% (03-02-23 @ 12:50) (97% - 98%)  ECG:    LABS:                        12.1   8.97  )-----------( 350      ( 02 Mar 2023 05:53 )             37.9     03-02    140  |  105  |  13  ----------------------------<  87  4.7   |  22  |  1.0    Ca    9.6      02 Mar 2023 05:53  Phos  5.1     03-02  Mg     2.0     03-02    TPro  6.7  /  Alb  4.0  /  TBili  0.4  /  DBili  x   /  AST  23  /  ALT  30  /  AlkPhos  148<H>  03-02      MEDICATIONS  (STANDING):  diltiazem    Tablet 60 milliGRAM(s) Oral every 6 hours  enoxaparin Injectable 65 milliGRAM(s) SubCutaneous every 12 hours  metoprolol tartrate 25 milliGRAM(s) Oral three times a day    MEDICATIONS  (PRN):  acetaminophen     Tablet .. 650 milliGRAM(s) Oral every 6 hours PRN Temp greater or equal to 38C (100.4F), Mild Pain (1 - 3)  LORazepam     Tablet 0.5 milliGRAM(s) Oral daily PRN Anxiety      PHYSICAL EXAM:               Patient was seen and examined earlier this evening on 3C.  Daughter is at bedside.  EMR reviewed.    Patient continues to have episodes of Atrial Fib with RVR.  2 Dose of po Diltiazem were held after IV diltiazem was administered.      Vitals:  T(C): 36.6 (03-02-23 @ 20:04), Max: 36.9 (03-01-23 @ 20:16)  HR: 77 (03-02-23 @ 20:04) (77 - 163)  BP: 106/68 (03-02-23 @ 20:04) (106/68 - 130/96)  RR: 18 (03-02-23 @ 20:04) (18 - 18)  SpO2: 97% (03-02-23 @ 12:50) (97% - 98%)    Telemetry: Atrial Fib.  Episodes of RVR.    LABS:                        12.1   8.97  )-----------( 350      ( 02 Mar 2023 05:53 )             37.9     03-02    140  |  105  |  13  ----------------------------<  87  4.7   |  22  |  1.0    Ca    9.6      02 Mar 2023 05:53  Phos  5.1     03-02  Mg     2.0     03-02    TPro  6.7  /  Alb  4.0  /  TBili  0.4  /  DBili  x   /  AST  23  /  ALT  30  /  AlkPhos  148<H>  03-02      MEDICATIONS  (STANDING):  diltiazem    Tablet 60 milliGRAM(s) Oral every 6 hours  enoxaparin Injectable 65 milliGRAM(s) SubCutaneous every 12 hours  metoprolol tartrate 25 milliGRAM(s) Oral three times a day    MEDICATIONS  (PRN):  acetaminophen     Tablet .. 650 milliGRAM(s) Oral every 6 hours PRN Temp greater or equal to 38C (100.4F), Mild Pain (1 - 3)  LORazepam     Tablet 0.5 milliGRAM(s) Oral daily PRN Anxiety      PHYSICAL EXAM:  Not in distress  Alert, oriented x 3  No JVD: irregular rhythm; nl S1S2  Bilateral breath sounds  Abdomen soft  No edema  Moving all extremities

## 2023-03-02 NOTE — PROGRESS NOTE ADULT - SUBJECTIVE AND OBJECTIVE BOX
STEPHANE CHAN 63y Female  MRN#: [id number]   CODE STATUS: full    Hospital Day: 4d    Pt is currently admitted with the primary diagnosis of afib with RVR    SUBJECTIVE  Hospital Course    Overnight events: No acute events overnight. Yesterday, plan was to do 60mg cardizem TID. Patient's HR increased to >120s. patient was anxious. gave 1x extra 60mg PO cardizem. did not improve HR. Gave 1x 10mg IVP cardizem and 1x 1mg IV ativan. HR improved overnight. Brief episode of tachycardia to 150s overnight but resolved without medical therapy. Remained in the 70s-100s.     Subjective complaints: No symptomatic complaints     Present Today:   - Diaz:  No [  ], Yes [   ] : Indication:     - Type of IV Access:       .. CVC/Piccline:  No [  ], Yes [   ] : Indication:       .. Midline: No [  ], Yes [   ] : Indication:                                             ----------------------------------------------------------  OBJECTIVE  PAST MEDICAL & SURGICAL HISTORY  Anxiety    Afib    H/O neck surgery                                              -----------------------------------------------------------  ALLERGIES:  Toradol (Rash)                                            ------------------------------------------------------------    HOME MEDICATIONS  Home Medications:  Ativan 0.5 mg oral tablet: 1 tab(s) orally once a day (at bedtime), As Needed (26 Feb 2023 20:47)                           MEDICATIONS:  STANDING MEDICATIONS  diltiazem    Tablet 60 milliGRAM(s) Oral every 6 hours  enoxaparin Injectable 65 milliGRAM(s) SubCutaneous every 12 hours    PRN MEDICATIONS  acetaminophen     Tablet .. 650 milliGRAM(s) Oral every 6 hours PRN  LORazepam     Tablet 0.5 milliGRAM(s) Oral daily PRN                                            ------------------------------------------------------------  VITAL SIGNS: Last 24 Hours  T(C): 36.4 (02 Mar 2023 05:36), Max: 36.9 (01 Mar 2023 20:16)  T(F): 97.5 (02 Mar 2023 05:36), Max: 98.5 (01 Mar 2023 20:16)  HR: 93 (02 Mar 2023 05:36) (86 - 155)  BP: 110/76 (02 Mar 2023 05:36) (107/64 - 130/96)  BP(mean): --  RR: 18 (02 Mar 2023 05:36) (18 - 18)  SpO2: 97% (01 Mar 2023 20:16) (97% - 98%)      03-01-23 @ 07:01  -  03-02-23 @ 07:00  --------------------------------------------------------  IN: 795 mL / OUT: 0 mL / NET: 795 mL                                             --------------------------------------------------------------  LABS:                        12.1   8.97  )-----------( 350      ( 02 Mar 2023 05:53 )             37.9     03-02    140  |  105  |  13  ----------------------------<  87  4.7   |  22  |  1.0    Ca    9.6      02 Mar 2023 05:53  Phos  5.1     03-02  Mg     2.0     03-02    TPro  6.7  /  Alb  4.0  /  TBili  0.4  /  DBili  x   /  AST  23  /  ALT  30  /  AlkPhos  148<H>  03-02                                                              -------------------------------------------------------------  RADIOLOGY:                                            --------------------------------------------------------------    PHYSICAL EXAM:  General: Well appearing, not in acute distress  HEENT: No cervical LAD, or JVD  LUNGS: CTAB, no rales, on RA  HEART: regular rate, irregular rhythm, no murmur  ABDOMEN: NBS, soft, nontender to palpation  EXT: no peripheral pitting edema b/l  NEURO: AAOx3  SKIN: No skin rash, open wounds                                           --------------------------------------------------------------

## 2023-03-02 NOTE — PROGRESS NOTE ADULT - SUBJECTIVE AND OBJECTIVE BOX
STEPHANE CHAN  63y  Female      Patient is a 63y old  Female who presents with a chief complaint of Afib RVR (02 Mar 2023 07:44)      INTERVAL HPI/OVERNIGHT EVENTS:  HR was not well controlled last night.   Vital Signs Last 24 Hrs  T(C): 35.6 (02 Mar 2023 12:50), Max: 36.9 (01 Mar 2023 20:16)  T(F): 96.1 (02 Mar 2023 12:50), Max: 98.5 (01 Mar 2023 20:16)  HR: 163 (02 Mar 2023 16:58) (93 - 163)  BP: 125/71 (02 Mar 2023 16:58) (110/76 - 130/96)  BP(mean): --  RR: 18 (02 Mar 2023 12:50) (18 - 18)  SpO2: 97% (02 Mar 2023 12:50) (97% - 97%)    Parameters below as of 02 Mar 2023 12:50  Patient On (Oxygen Delivery Method): room air          03-01-23 @ 07:01  -  03-02-23 @ 07:00  --------------------------------------------------------  IN: 795 mL / OUT: 0 mL / NET: 795 mL    03-02-23 @ 07:01  -  03-02-23 @ 19:32  --------------------------------------------------------  IN: 570 mL / OUT: 0 mL / NET: 570 mL            Consultant(s) Notes Reviewed:  [x ] YES  [ ] NO          MEDICATIONS  (STANDING):  diltiazem    Tablet 60 milliGRAM(s) Oral every 6 hours  enoxaparin Injectable 65 milliGRAM(s) SubCutaneous every 12 hours  metoprolol tartrate 25 milliGRAM(s) Oral three times a day    MEDICATIONS  (PRN):  acetaminophen     Tablet .. 650 milliGRAM(s) Oral every 6 hours PRN Temp greater or equal to 38C (100.4F), Mild Pain (1 - 3)  LORazepam     Tablet 0.5 milliGRAM(s) Oral daily PRN Anxiety      LABS                          12.1   8.97  )-----------( 350      ( 02 Mar 2023 05:53 )             37.9     03-02    140  |  105  |  13  ----------------------------<  87  4.7   |  22  |  1.0    Ca    9.6      02 Mar 2023 05:53  Phos  5.1     03-02  Mg     2.0     03-02    TPro  6.7  /  Alb  4.0  /  TBili  0.4  /  DBili  x   /  AST  23  /  ALT  30  /  AlkPhos  148<H>  03-02          Lactate Trend        CAPILLARY BLOOD GLUCOSE            RADIOLOGY & ADDITIONAL TESTS:    Imaging Personally Reviewed:  [ ] YES  [ ] NO    HEALTH ISSUES - PROBLEM Dx:          PHYSICAL EXAM:  GENERAL: NAD, well-developed.  HEAD:  Atraumatic, Normocephalic.  EYES: EOMI, PERRLA, conjunctiva and sclera clear.  NECK: Supple, No JVD.  CHEST/LUNG: Clear to auscultation bilaterally; No wheeze.  HEART: Irregular rate and rhythm; S1 S2.   ABDOMEN: Soft, Nontender, Nondistended; Bowel sounds present.  EXTREMITIES:  2+ Peripheral Pulses, No clubbing, cyanosis, or edema.  PSYCH: AAOx3.  NEUROLOGY: non-focal.  SKIN: No rashes or lesions.

## 2023-03-03 LAB
ALBUMIN SERPL ELPH-MCNC: 4.2 G/DL — SIGNIFICANT CHANGE UP (ref 3.5–5.2)
ALP SERPL-CCNC: 150 U/L — HIGH (ref 30–115)
ALT FLD-CCNC: 37 U/L — SIGNIFICANT CHANGE UP (ref 0–41)
ANION GAP SERPL CALC-SCNC: 10 MMOL/L — SIGNIFICANT CHANGE UP (ref 7–14)
AST SERPL-CCNC: 31 U/L — SIGNIFICANT CHANGE UP (ref 0–41)
BASOPHILS # BLD AUTO: 0.06 K/UL — SIGNIFICANT CHANGE UP (ref 0–0.2)
BASOPHILS NFR BLD AUTO: 1 % — SIGNIFICANT CHANGE UP (ref 0–1)
BILIRUB SERPL-MCNC: 0.4 MG/DL — SIGNIFICANT CHANGE UP (ref 0.2–1.2)
BUN SERPL-MCNC: 12 MG/DL — SIGNIFICANT CHANGE UP (ref 10–20)
CALCIUM SERPL-MCNC: 9.2 MG/DL — SIGNIFICANT CHANGE UP (ref 8.4–10.5)
CHLORIDE SERPL-SCNC: 104 MMOL/L — SIGNIFICANT CHANGE UP (ref 98–110)
CO2 SERPL-SCNC: 23 MMOL/L — SIGNIFICANT CHANGE UP (ref 17–32)
CREAT SERPL-MCNC: 1 MG/DL — SIGNIFICANT CHANGE UP (ref 0.7–1.5)
EGFR: 63 ML/MIN/1.73M2 — SIGNIFICANT CHANGE UP
EOSINOPHIL # BLD AUTO: 0.09 K/UL — SIGNIFICANT CHANGE UP (ref 0–0.7)
EOSINOPHIL NFR BLD AUTO: 1.5 % — SIGNIFICANT CHANGE UP (ref 0–8)
GLUCOSE SERPL-MCNC: 99 MG/DL — SIGNIFICANT CHANGE UP (ref 70–99)
HCT VFR BLD CALC: 39.4 % — SIGNIFICANT CHANGE UP (ref 37–47)
HGB BLD-MCNC: 12.3 G/DL — SIGNIFICANT CHANGE UP (ref 12–16)
IMM GRANULOCYTES NFR BLD AUTO: 0.3 % — SIGNIFICANT CHANGE UP (ref 0.1–0.3)
LYMPHOCYTES # BLD AUTO: 2.29 K/UL — SIGNIFICANT CHANGE UP (ref 1.2–3.4)
LYMPHOCYTES # BLD AUTO: 37.5 % — SIGNIFICANT CHANGE UP (ref 20.5–51.1)
MAGNESIUM SERPL-MCNC: 1.9 MG/DL — SIGNIFICANT CHANGE UP (ref 1.8–2.4)
MCHC RBC-ENTMCNC: 23.9 PG — LOW (ref 27–31)
MCHC RBC-ENTMCNC: 31.2 G/DL — LOW (ref 32–37)
MCV RBC AUTO: 76.7 FL — LOW (ref 81–99)
MONOCYTES # BLD AUTO: 0.55 K/UL — SIGNIFICANT CHANGE UP (ref 0.1–0.6)
MONOCYTES NFR BLD AUTO: 9 % — SIGNIFICANT CHANGE UP (ref 1.7–9.3)
NEUTROPHILS # BLD AUTO: 3.1 K/UL — SIGNIFICANT CHANGE UP (ref 1.4–6.5)
NEUTROPHILS NFR BLD AUTO: 50.7 % — SIGNIFICANT CHANGE UP (ref 42.2–75.2)
NRBC # BLD: 0 /100 WBCS — SIGNIFICANT CHANGE UP (ref 0–0)
PHOSPHATE SERPL-MCNC: 4.5 MG/DL — SIGNIFICANT CHANGE UP (ref 2.1–4.9)
PLATELET # BLD AUTO: 335 K/UL — SIGNIFICANT CHANGE UP (ref 130–400)
POTASSIUM SERPL-MCNC: 4.4 MMOL/L — SIGNIFICANT CHANGE UP (ref 3.5–5)
POTASSIUM SERPL-SCNC: 4.4 MMOL/L — SIGNIFICANT CHANGE UP (ref 3.5–5)
PROT SERPL-MCNC: 6.8 G/DL — SIGNIFICANT CHANGE UP (ref 6–8)
RBC # BLD: 5.14 M/UL — SIGNIFICANT CHANGE UP (ref 4.2–5.4)
RBC # FLD: 16.6 % — HIGH (ref 11.5–14.5)
SODIUM SERPL-SCNC: 137 MMOL/L — SIGNIFICANT CHANGE UP (ref 135–146)
WBC # BLD: 6.11 K/UL — SIGNIFICANT CHANGE UP (ref 4.8–10.8)
WBC # FLD AUTO: 6.11 K/UL — SIGNIFICANT CHANGE UP (ref 4.8–10.8)

## 2023-03-03 PROCEDURE — 99232 SBSQ HOSP IP/OBS MODERATE 35: CPT

## 2023-03-03 RX ADMIN — Medication 25 MILLIGRAM(S): at 14:49

## 2023-03-03 RX ADMIN — Medication 60 MILLIGRAM(S): at 00:02

## 2023-03-03 RX ADMIN — ENOXAPARIN SODIUM 65 MILLIGRAM(S): 100 INJECTION SUBCUTANEOUS at 17:25

## 2023-03-03 RX ADMIN — Medication 0.5 MILLIGRAM(S): at 16:06

## 2023-03-03 RX ADMIN — Medication 60 MILLIGRAM(S): at 05:51

## 2023-03-03 RX ADMIN — Medication 60 MILLIGRAM(S): at 12:14

## 2023-03-03 RX ADMIN — Medication 25 MILLIGRAM(S): at 21:13

## 2023-03-03 RX ADMIN — Medication 60 MILLIGRAM(S): at 17:25

## 2023-03-03 RX ADMIN — ENOXAPARIN SODIUM 65 MILLIGRAM(S): 100 INJECTION SUBCUTANEOUS at 05:51

## 2023-03-03 RX ADMIN — Medication 25 MILLIGRAM(S): at 05:51

## 2023-03-03 NOTE — PROGRESS NOTE ADULT - ATTENDING COMMENTS
63 year old female with PMH of Atrial Fibrillation (non-compliant with Cardizem) presenting for palpitations. Admitted for Afib with RVR. Currently HR controlled.     A/P:   Chronic Atrial Fibrillation with Rapid Ventricular Response:   s/p Cardizem drip   Echo showed preserved LVEF.   Continue Cardizem 60mg q 6hrs, added Metoprolol 25mg TID 3/2.  3/2 afternoon HR 130s; overnight 110s. c/w tele. If HR remains stable, d/c by 3/4.   On therapeutic Lovenox while here, but upon d/c patient refusing OAC. understands risks. Only willing to take ASA.       Possible discharge in 24 hrs if HR is well controlled.

## 2023-03-03 NOTE — PROGRESS NOTE ADULT - SUBJECTIVE AND OBJECTIVE BOX
SUBJECTIVE:    Patient is a 63y old Female who presents with a chief complaint of Afib RVR (02 Mar 2023 20:09)    Currently admitted to medicine with the primary diagnosis of:    Today is hospital day 5d.     Overnight Events:     heart rate 130s overnight, will need to be monitored one more day.    PAST MEDICAL & SURGICAL HISTORY  Anxiety    Afib    H/O neck surgery    ALLERGIES:  Toradol (Rash)    MEDICATIONS:  STANDING MEDICATIONS  diltiazem    Tablet 60 milliGRAM(s) Oral every 6 hours  enoxaparin Injectable 65 milliGRAM(s) SubCutaneous every 12 hours  metoprolol tartrate 25 milliGRAM(s) Oral three times a day    PRN MEDICATIONS  acetaminophen     Tablet .. 650 milliGRAM(s) Oral every 6 hours PRN  LORazepam     Tablet 0.5 milliGRAM(s) Oral daily PRN    VITALS:   ICU Vital Signs Last 24 Hrs  T(C): 36.2 (03 Mar 2023 12:25), Max: 36.6 (02 Mar 2023 20:04)  T(F): 97.2 (03 Mar 2023 12:25), Max: 97.9 (02 Mar 2023 20:04)  HR: 110 (03 Mar 2023 12:25) (77 - 163)  BP: 99/67 (03 Mar 2023 12:25) (99/67 - 125/71)  BP(mean): --  ABP: --  ABP(mean): --  RR: 18 (03 Mar 2023 12:25) (18 - 18)  SpO2: --        LABS:                        12.3   6.11  )-----------( 335      ( 03 Mar 2023 07:56 )             39.4     03-03    137  |  104  |  12  ----------------------------<  99  4.4   |  23  |  1.0    Ca    9.2      03 Mar 2023 07:56  Phos  4.5     03-03  Mg     1.9     03-03    TPro  6.8  /  Alb  4.2  /  TBili  0.4  /  DBili  x   /  AST  31  /  ALT  37  /  AlkPhos  150<H>  03-03                    RADIOLOGY:  < from: Xray Chest 1 View- PORTABLE-Urgent (02.26.23 @ 16:03) >  No radiographic evidence of acute cardiopulmonary disease.    < end of copied text >    PHYSICAL EXAM:  GEN: laying in bed, son at bedside  LUNGS: clear  HEART: s1 s2  ABD: nontender  EXT: no lower extremity edema  NEURO: ao3

## 2023-03-04 LAB
ALBUMIN SERPL ELPH-MCNC: 4.1 G/DL — SIGNIFICANT CHANGE UP (ref 3.5–5.2)
ALP SERPL-CCNC: 143 U/L — HIGH (ref 30–115)
ALT FLD-CCNC: 31 U/L — SIGNIFICANT CHANGE UP (ref 0–41)
ANION GAP SERPL CALC-SCNC: 11 MMOL/L — SIGNIFICANT CHANGE UP (ref 7–14)
AST SERPL-CCNC: 22 U/L — SIGNIFICANT CHANGE UP (ref 0–41)
BASOPHILS # BLD AUTO: 0.06 K/UL — SIGNIFICANT CHANGE UP (ref 0–0.2)
BASOPHILS NFR BLD AUTO: 1 % — SIGNIFICANT CHANGE UP (ref 0–1)
BILIRUB SERPL-MCNC: 0.3 MG/DL — SIGNIFICANT CHANGE UP (ref 0.2–1.2)
BUN SERPL-MCNC: 11 MG/DL — SIGNIFICANT CHANGE UP (ref 10–20)
CALCIUM SERPL-MCNC: 9.4 MG/DL — SIGNIFICANT CHANGE UP (ref 8.4–10.5)
CHLORIDE SERPL-SCNC: 106 MMOL/L — SIGNIFICANT CHANGE UP (ref 98–110)
CO2 SERPL-SCNC: 23 MMOL/L — SIGNIFICANT CHANGE UP (ref 17–32)
CREAT SERPL-MCNC: 0.9 MG/DL — SIGNIFICANT CHANGE UP (ref 0.7–1.5)
EGFR: 72 ML/MIN/1.73M2 — SIGNIFICANT CHANGE UP
EOSINOPHIL # BLD AUTO: 0.13 K/UL — SIGNIFICANT CHANGE UP (ref 0–0.7)
EOSINOPHIL NFR BLD AUTO: 2.2 % — SIGNIFICANT CHANGE UP (ref 0–8)
GLUCOSE SERPL-MCNC: 92 MG/DL — SIGNIFICANT CHANGE UP (ref 70–99)
HCT VFR BLD CALC: 36.3 % — LOW (ref 37–47)
HGB BLD-MCNC: 11.6 G/DL — LOW (ref 12–16)
IMM GRANULOCYTES NFR BLD AUTO: 0.2 % — SIGNIFICANT CHANGE UP (ref 0.1–0.3)
LYMPHOCYTES # BLD AUTO: 2.65 K/UL — SIGNIFICANT CHANGE UP (ref 1.2–3.4)
LYMPHOCYTES # BLD AUTO: 45.1 % — SIGNIFICANT CHANGE UP (ref 20.5–51.1)
MAGNESIUM SERPL-MCNC: 2 MG/DL — SIGNIFICANT CHANGE UP (ref 1.8–2.4)
MCHC RBC-ENTMCNC: 24.3 PG — LOW (ref 27–31)
MCHC RBC-ENTMCNC: 32 G/DL — SIGNIFICANT CHANGE UP (ref 32–37)
MCV RBC AUTO: 76.1 FL — LOW (ref 81–99)
MONOCYTES # BLD AUTO: 0.54 K/UL — SIGNIFICANT CHANGE UP (ref 0.1–0.6)
MONOCYTES NFR BLD AUTO: 9.2 % — SIGNIFICANT CHANGE UP (ref 1.7–9.3)
NEUTROPHILS # BLD AUTO: 2.49 K/UL — SIGNIFICANT CHANGE UP (ref 1.4–6.5)
NEUTROPHILS NFR BLD AUTO: 42.3 % — SIGNIFICANT CHANGE UP (ref 42.2–75.2)
NRBC # BLD: 0 /100 WBCS — SIGNIFICANT CHANGE UP (ref 0–0)
PHOSPHATE SERPL-MCNC: 4.7 MG/DL — SIGNIFICANT CHANGE UP (ref 2.1–4.9)
PLATELET # BLD AUTO: 308 K/UL — SIGNIFICANT CHANGE UP (ref 130–400)
POTASSIUM SERPL-MCNC: 4.3 MMOL/L — SIGNIFICANT CHANGE UP (ref 3.5–5)
POTASSIUM SERPL-SCNC: 4.3 MMOL/L — SIGNIFICANT CHANGE UP (ref 3.5–5)
PROT SERPL-MCNC: 6.4 G/DL — SIGNIFICANT CHANGE UP (ref 6–8)
RBC # BLD: 4.77 M/UL — SIGNIFICANT CHANGE UP (ref 4.2–5.4)
RBC # FLD: 16.5 % — HIGH (ref 11.5–14.5)
SODIUM SERPL-SCNC: 140 MMOL/L — SIGNIFICANT CHANGE UP (ref 135–146)
WBC # BLD: 5.88 K/UL — SIGNIFICANT CHANGE UP (ref 4.8–10.8)
WBC # FLD AUTO: 5.88 K/UL — SIGNIFICANT CHANGE UP (ref 4.8–10.8)

## 2023-03-04 PROCEDURE — 99232 SBSQ HOSP IP/OBS MODERATE 35: CPT

## 2023-03-04 RX ADMIN — Medication 650 MILLIGRAM(S): at 02:06

## 2023-03-04 RX ADMIN — ENOXAPARIN SODIUM 65 MILLIGRAM(S): 100 INJECTION SUBCUTANEOUS at 06:27

## 2023-03-04 RX ADMIN — ENOXAPARIN SODIUM 65 MILLIGRAM(S): 100 INJECTION SUBCUTANEOUS at 17:55

## 2023-03-04 RX ADMIN — Medication 650 MILLIGRAM(S): at 01:33

## 2023-03-04 RX ADMIN — Medication 0.5 MILLIGRAM(S): at 21:44

## 2023-03-04 RX ADMIN — Medication 25 MILLIGRAM(S): at 14:05

## 2023-03-04 RX ADMIN — Medication 60 MILLIGRAM(S): at 17:55

## 2023-03-04 RX ADMIN — Medication 25 MILLIGRAM(S): at 21:10

## 2023-03-04 RX ADMIN — Medication 60 MILLIGRAM(S): at 00:11

## 2023-03-04 RX ADMIN — Medication 25 MILLIGRAM(S): at 09:10

## 2023-03-04 RX ADMIN — Medication 60 MILLIGRAM(S): at 11:31

## 2023-03-04 NOTE — PROGRESS NOTE ADULT - SUBJECTIVE AND OBJECTIVE BOX
ROCIOSTEPHANE  63y, Female  Allergy: Toradol (Rash)      OVERNIGHT EVENTS:    Pt continues to be in Afib with RVR in the 120's range  Denies CP/ SOB or sensation of palpitations  Anxious to go home    PAST MEDICAL & SURGICAL HISTORY:  Anxiety  Afib  H/O neck surgery    T(C): 36.2 (03-04-23 @ 05:27), Max: 36.7 (03-03-23 @ 20:19)  HR: 89 (03-04-23 @ 11:32) (72 - 110)  BP: 98/67 (03-04-23 @ 11:32) (85/54 - 104/72)  RR: 18 (03-04-23 @ 05:27) (18 - 18)  SpO2: --    CONSTITUTIONAL: not in distress  EYES:  No conjunctival or scleral injection, non-icteric  ENMT: Oral mucosa with moist membranes.   RESP: No respiratory distress, no use of accessory muscles; CTA b/l, no WRR  CV: Irreg S1S2,  no JVD; no peripheral edema  GI: Soft, NT, ND, no rebound, no guarding; no palpable masses;  NEURO: Non-focal     VITALS:  T(F): 97.2 (03-04-23 @ 05:27), Max: 98.1 (03-03-23 @ 20:19)  HR: 89 (03-04-23 @ 11:32)  BP: 98/67 (03-04-23 @ 11:32) (85/54 - 104/72)  RR: 18 (03-04-23 @ 05:27)    TESTS & MEASUREMENTS:      03-02-23 @ 07:01  -  03-03-23 @ 07:00  --------------------------------------------------------  IN: 790 mL / OUT: 300 mL / NET: 490 mL    03-03-23 @ 07:01  -  03-04-23 @ 07:00  --------------------------------------------------------  IN: 450 mL / OUT: 0 mL / NET: 450 mL                            11.6   5.88  )-----------( 308      ( 04 Mar 2023 06:35 )             36.3       03-04    140  |  106  |  11  ----------------------------<  92  4.3   |  23  |  0.9    Ca    9.4      04 Mar 2023 06:35  Phos  4.7     03-04  Mg     2.0     03-04    TPro  6.4  /  Alb  4.1  /  TBili  0.3  /  DBili  x   /  AST  22  /  ALT  31  /  AlkPhos  143<H>  03-04    LIVER FUNCTIONS - ( 04 Mar 2023 06:35 )  Alb: 4.1 g/dL / Pro: 6.4 g/dL / ALK PHOS: 143 U/L / ALT: 31 U/L / AST: 22 U/L / GGT: x        RADIOLOGY & ADDITIONAL TESTS:    MEDICATIONS:  MEDICATIONS  (STANDING):  diltiazem    Tablet 60 milliGRAM(s) Oral every 6 hours  enoxaparin Injectable 65 milliGRAM(s) SubCutaneous every 12 hours  metoprolol tartrate 25 milliGRAM(s) Oral three times a day    MEDICATIONS  (PRN):  acetaminophen     Tablet .. 650 milliGRAM(s) Oral every 6 hours PRN Temp greater or equal to 38C (100.4F), Mild Pain (1 - 3)  LORazepam     Tablet 0.5 milliGRAM(s) Oral daily PRN Anxiety

## 2023-03-05 LAB
ALBUMIN SERPL ELPH-MCNC: 4 G/DL — SIGNIFICANT CHANGE UP (ref 3.5–5.2)
ALP SERPL-CCNC: 139 U/L — HIGH (ref 30–115)
ALT FLD-CCNC: 33 U/L — SIGNIFICANT CHANGE UP (ref 0–41)
ANION GAP SERPL CALC-SCNC: 10 MMOL/L — SIGNIFICANT CHANGE UP (ref 7–14)
AST SERPL-CCNC: 25 U/L — SIGNIFICANT CHANGE UP (ref 0–41)
BASOPHILS # BLD AUTO: 0.05 K/UL — SIGNIFICANT CHANGE UP (ref 0–0.2)
BASOPHILS NFR BLD AUTO: 0.8 % — SIGNIFICANT CHANGE UP (ref 0–1)
BILIRUB SERPL-MCNC: 0.4 MG/DL — SIGNIFICANT CHANGE UP (ref 0.2–1.2)
BUN SERPL-MCNC: 13 MG/DL — SIGNIFICANT CHANGE UP (ref 10–20)
CALCIUM SERPL-MCNC: 9.5 MG/DL — SIGNIFICANT CHANGE UP (ref 8.4–10.5)
CHLORIDE SERPL-SCNC: 107 MMOL/L — SIGNIFICANT CHANGE UP (ref 98–110)
CO2 SERPL-SCNC: 22 MMOL/L — SIGNIFICANT CHANGE UP (ref 17–32)
CREAT SERPL-MCNC: 0.9 MG/DL — SIGNIFICANT CHANGE UP (ref 0.7–1.5)
EGFR: 72 ML/MIN/1.73M2 — SIGNIFICANT CHANGE UP
EOSINOPHIL # BLD AUTO: 0.12 K/UL — SIGNIFICANT CHANGE UP (ref 0–0.7)
EOSINOPHIL NFR BLD AUTO: 2 % — SIGNIFICANT CHANGE UP (ref 0–8)
GLUCOSE SERPL-MCNC: 92 MG/DL — SIGNIFICANT CHANGE UP (ref 70–99)
HCT VFR BLD CALC: 36.7 % — LOW (ref 37–47)
HGB BLD-MCNC: 11.5 G/DL — LOW (ref 12–16)
IMM GRANULOCYTES NFR BLD AUTO: 0.2 % — SIGNIFICANT CHANGE UP (ref 0.1–0.3)
LYMPHOCYTES # BLD AUTO: 2.73 K/UL — SIGNIFICANT CHANGE UP (ref 1.2–3.4)
LYMPHOCYTES # BLD AUTO: 46.3 % — SIGNIFICANT CHANGE UP (ref 20.5–51.1)
MAGNESIUM SERPL-MCNC: 2 MG/DL — SIGNIFICANT CHANGE UP (ref 1.8–2.4)
MCHC RBC-ENTMCNC: 24.2 PG — LOW (ref 27–31)
MCHC RBC-ENTMCNC: 31.3 G/DL — LOW (ref 32–37)
MCV RBC AUTO: 77.1 FL — LOW (ref 81–99)
MONOCYTES # BLD AUTO: 0.56 K/UL — SIGNIFICANT CHANGE UP (ref 0.1–0.6)
MONOCYTES NFR BLD AUTO: 9.5 % — HIGH (ref 1.7–9.3)
NEUTROPHILS # BLD AUTO: 2.42 K/UL — SIGNIFICANT CHANGE UP (ref 1.4–6.5)
NEUTROPHILS NFR BLD AUTO: 41.2 % — LOW (ref 42.2–75.2)
NRBC # BLD: 0 /100 WBCS — SIGNIFICANT CHANGE UP (ref 0–0)
PHOSPHATE SERPL-MCNC: 4.7 MG/DL — SIGNIFICANT CHANGE UP (ref 2.1–4.9)
PLATELET # BLD AUTO: 301 K/UL — SIGNIFICANT CHANGE UP (ref 130–400)
POTASSIUM SERPL-MCNC: 4.5 MMOL/L — SIGNIFICANT CHANGE UP (ref 3.5–5)
POTASSIUM SERPL-SCNC: 4.5 MMOL/L — SIGNIFICANT CHANGE UP (ref 3.5–5)
PROT SERPL-MCNC: 6.4 G/DL — SIGNIFICANT CHANGE UP (ref 6–8)
RBC # BLD: 4.76 M/UL — SIGNIFICANT CHANGE UP (ref 4.2–5.4)
RBC # FLD: 16.7 % — HIGH (ref 11.5–14.5)
SARS-COV-2 RNA SPEC QL NAA+PROBE: SIGNIFICANT CHANGE UP
SODIUM SERPL-SCNC: 139 MMOL/L — SIGNIFICANT CHANGE UP (ref 135–146)
WBC # BLD: 5.89 K/UL — SIGNIFICANT CHANGE UP (ref 4.8–10.8)
WBC # FLD AUTO: 5.89 K/UL — SIGNIFICANT CHANGE UP (ref 4.8–10.8)

## 2023-03-05 PROCEDURE — 99232 SBSQ HOSP IP/OBS MODERATE 35: CPT

## 2023-03-05 RX ORDER — AMIODARONE HYDROCHLORIDE 400 MG/1
200 TABLET ORAL
Refills: 0 | Status: DISCONTINUED | OUTPATIENT
Start: 2023-03-05 | End: 2023-03-07

## 2023-03-05 RX ORDER — APIXABAN 2.5 MG/1
5 TABLET, FILM COATED ORAL EVERY 12 HOURS
Refills: 0 | Status: DISCONTINUED | OUTPATIENT
Start: 2023-03-05 | End: 2023-03-07

## 2023-03-05 RX ADMIN — Medication 60 MILLIGRAM(S): at 09:09

## 2023-03-05 RX ADMIN — Medication 60 MILLIGRAM(S): at 00:14

## 2023-03-05 RX ADMIN — Medication 25 MILLIGRAM(S): at 13:06

## 2023-03-05 RX ADMIN — Medication 0.5 MILLIGRAM(S): at 22:50

## 2023-03-05 RX ADMIN — Medication 25 MILLIGRAM(S): at 21:46

## 2023-03-05 RX ADMIN — AMIODARONE HYDROCHLORIDE 200 MILLIGRAM(S): 400 TABLET ORAL at 17:02

## 2023-03-05 RX ADMIN — ENOXAPARIN SODIUM 65 MILLIGRAM(S): 100 INJECTION SUBCUTANEOUS at 05:50

## 2023-03-05 RX ADMIN — Medication 60 MILLIGRAM(S): at 13:06

## 2023-03-05 RX ADMIN — Medication 25 MILLIGRAM(S): at 09:09

## 2023-03-05 RX ADMIN — APIXABAN 5 MILLIGRAM(S): 2.5 TABLET, FILM COATED ORAL at 17:03

## 2023-03-05 NOTE — PROGRESS NOTE ADULT - SUBJECTIVE AND OBJECTIVE BOX
Vitals:  T(C): 36.9 (03-05-23 @ 13:16), Max: 36.9 (03-05-23 @ 13:16)  HR: 99 (03-05-23 @ 13:16) (61 - 122)  BP: 104/63 (03-05-23 @ 13:16) (82/57 - 115/76)  RR: 18 (03-05-23 @ 13:16) (16 - 18)  SpO2: 97% (03-05-23 @ 05:42) (97% - 97%)  ECG:    LABS:                        11.5   5.89  )-----------( 301      ( 05 Mar 2023 07:16 )             36.7     03-05    139  |  107  |  13  ----------------------------<  92  4.5   |  22  |  0.9    Ca    9.5      05 Mar 2023 07:16  Phos  4.7     03-05  Mg     2.0     03-05    TPro  6.4  /  Alb  4.0  /  TBili  0.4  /  DBili  x   /  AST  25  /  ALT  33  /  AlkPhos  139<H>  03-05      MEDICATIONS  (STANDING):  diltiazem    Tablet 60 milliGRAM(s) Oral every 6 hours  enoxaparin Injectable 65 milliGRAM(s) SubCutaneous every 12 hours  metoprolol tartrate 25 milliGRAM(s) Oral three times a day    MEDICATIONS  (PRN):  acetaminophen     Tablet .. 650 milliGRAM(s) Oral every 6 hours PRN Temp greater or equal to 38C (100.4F), Mild Pain (1 - 3)  LORazepam     Tablet 0.5 milliGRAM(s) Oral daily PRN Anxiety      PHYSICAL EXAM:    Constitutional: appears stated age, well developed/nourished, no acute distress  Eyes: EOM's intact.  PERRLA  ENMT: Normocephalic, atraumatic.  Clear oropharynx.  No ear discharge.  Neck: Jugular veins non-distended; no carotid bruits bilaterally.  Respiratory: respiratory pattern unlabored; no dullness to percussion; lungs clear to auscultation bilaterally.  Cardiovascular: Irregular rhythm.  S1 and S2 normal.  No murmur nor rub appreciated.  Abdomen: Soft, non-tender.  Normal bowel sounds.  Extremities: extremities warm; no cyanosis, clubbing or edema.  Pulses: Intact bilaterally  Skin: No gross abnormalities noted.  Musculoskeletal: No gross deformities  Neurological: Alert, oriented x 3.  No focal neurologic deficits noted.           Patient was seen and examine by me on 3C.  EMR reviewed.    She is not tolerating increased dose of Diltiazem to control heart rate.  Rhythm remains in Atrial Fib with episodes of RVR.  She is now agreeing for MINNA directed DCCV.    Vitals:  T(C): 36.9 (03-05-23 @ 13:16), Max: 36.9 (03-05-23 @ 13:16)  HR: 99 (03-05-23 @ 13:16) (61 - 122)  BP: 104/63 (03-05-23 @ 13:16) (82/57 - 115/76)  RR: 18 (03-05-23 @ 13:16) (16 - 18)  SpO2: 97% (03-05-23 @ 05:42) (97% - 97%)    Telemetry: Atrial Fib    LABS:                        11.5   5.89  )-----------( 301      ( 05 Mar 2023 07:16 )             36.7     03-05    139  |  107  |  13  ----------------------------<  92  4.5   |  22  |  0.9    Ca    9.5      05 Mar 2023 07:16  Phos  4.7     03-05  Mg     2.0     03-05    TPro  6.4  /  Alb  4.0  /  TBili  0.4  /  DBili  x   /  AST  25  /  ALT  33  /  AlkPhos  139<H>  03-05      MEDICATIONS  (STANDING):  diltiazem    Tablet 60 milliGRAM(s) Oral every 6 hours  enoxaparin Injectable 65 milliGRAM(s) SubCutaneous every 12 hours  metoprolol tartrate 25 milliGRAM(s) Oral three times a day    MEDICATIONS  (PRN):  acetaminophen     Tablet .. 650 milliGRAM(s) Oral every 6 hours PRN Temp greater or equal to 38C (100.4F), Mild Pain (1 - 3)  LORazepam     Tablet 0.5 milliGRAM(s) Oral daily PRN Anxiety      PHYSICAL EXAM:  Constitutional: appears stated age, well developed/nourished, no acute distress  Eyes: EOM's intact.  PERRLA  ENMT: Normocephalic, atraumatic.    Neck: Jugular veins non-distended; no carotid bruits bilaterally.  Respiratory:  lungs clear to auscultation bilaterally.  Cardiovascular: Irregular rhythm.  S1 and S2 normal.  No murmur nor rub appreciated.  Abdomen: Soft, non-tender.  Normal bowel sounds.  Extremities: extremities warm; no  edema.  Skin: No gross abnormalities noted.  Musculoskeletal: No gross deformities  Neurological: Alert, oriented x 3.  No focal neurologic deficits noted.

## 2023-03-05 NOTE — PROGRESS NOTE ADULT - SUBJECTIVE AND OBJECTIVE BOX
SUBJECTIVE:    Patient is a 63y old Female who presents with a chief complaint of Afib RVR (05 Mar 2023 01:50)    Currently admitted to medicine with the primary diagnosis of:    Today is hospital day 7d.     Overnight Events:     laying in bed    PAST MEDICAL & SURGICAL HISTORY  Anxiety    Afib    H/O neck surgery        ALLERGIES:  Toradol (Rash)    MEDICATIONS:  STANDING MEDICATIONS  diltiazem    Tablet 60 milliGRAM(s) Oral every 6 hours  enoxaparin Injectable 65 milliGRAM(s) SubCutaneous every 12 hours  metoprolol tartrate 25 milliGRAM(s) Oral three times a day    PRN MEDICATIONS  acetaminophen     Tablet .. 650 milliGRAM(s) Oral every 6 hours PRN  LORazepam     Tablet 0.5 milliGRAM(s) Oral daily PRN    VITALS:   ICU Vital Signs Last 24 Hrs  T(C): 36.8 (04 Mar 2023 20:13), Max: 36.8 (04 Mar 2023 20:13)  T(F): 98.2 (04 Mar 2023 20:13), Max: 98.2 (04 Mar 2023 20:13)  HR: 104 (04 Mar 2023 21:52) (64 - 104)  BP: 115/76 (04 Mar 2023 20:13) (85/54 - 115/76)  BP(mean): --  ABP: --  ABP(mean): --  RR: 18 (04 Mar 2023 20:13) (16 - 18)  SpO2: --        LABS:                        11.6   5.88  )-----------( 308      ( 04 Mar 2023 06:35 )             36.3     03-04    140  |  106  |  11  ----------------------------<  92  4.3   |  23  |  0.9    Ca    9.4      04 Mar 2023 06:35  Phos  4.7     03-04  Mg     2.0     03-04    TPro  6.4  /  Alb  4.1  /  TBili  0.3  /  DBili  x   /  AST  22  /  ALT  31  /  AlkPhos  143<H>  03-04                    RADIOLOGY:  < from: Xray Chest 1 View- PORTABLE-Urgent (02.26.23 @ 16:03) >  No radiographic evidence of acute cardiopulmonary disease.    < end of copied text >    PHYSICAL EXAM:  GEN: laying in bed  LUNGS: clear  HEART: s1 s2  ABD: nontender  EXT:no lower extremity edema   NEURO: sleeping

## 2023-03-05 NOTE — PROGRESS NOTE ADULT - ATTENDING COMMENTS
I have examined the patient independently from the medical resident but have reviewed the assessment and plan.    Pt seen this AM with her son by bedside.  Pt is anxiously wanting to go home.  She remains in atrial fibrillation with VR up to 130's but at times, dropped to 100  or even 90's.  Clinically without c/o palpitations.  No cp or sob reported.      CONSTITUTIONAL: not in distress but anxious.  EYES:  No conjunctival or scleral injection, non-icteric  ENMT: Oral mucosa with moist membranes.   RESP: No respiratory distress, no use of accessory muscles; CTA b/l, no WRR  CV: Irreg S1S2,  no JVD; no peripheral edema  GI: Soft, NT, ND, no rebound, no guarding; no palpable masses;  NEURO: Non-focal     Admitted and being txed for new onset A.fib with RVR.  No prior hx of CAD / HTN or MI.  Persistent atrial fibrillation with episodes of rapid HR up to 130's.  Clinically not in HF and no evidence of volume overloaded state.  Unable to titrate up Cardizem or Metoprolol for HR control d/t low bp.  Pt had refused trial of cardio-version or chemical version as it will require MINNA / AC.  Now pt is consenting for IMNNA and pt's cardiologist notified.  Per cardiologist, will place pt for MINNA as an add-on for tomorrow.  Will keep pt NPO after MN.  Continue cardiac monitoring.  Further rec per cardiac. I have examined the patient independently from the medical resident but have reviewed the assessment and plan.    Pt seen this AM with her son by bedside.  Pt is anxiously wanting to go home.  She remains in atrial fibrillation with VR up to 130's but at times, dropped to 100  or even 90's.  Clinically without c/o palpitations.  No cp or sob reported.      CONSTITUTIONAL: not in distress but anxious.  EYES:  No conjunctival or scleral injection, non-icteric  ENMT: Oral mucosa with moist membranes.   RESP: No respiratory distress, no use of accessory muscles; CTA b/l, no WRR  CV: Irreg S1S2,  no JVD; no peripheral edema  GI: Soft, NT, ND, no rebound, no guarding; no palpable masses;  NEURO: Non-focal     Admitted and being txed for new onset A.fib with RVR.  No prior hx of CAD / HTN or MI.  Persistent atrial fibrillation with episodes of rapid HR up to 130's.  Clinically not in HF and no evidence of volume overloaded state.  Unable to titrate up Cardizem or Metoprolol for HR control d/t low bp.  Pt had refused trial of cardio-version or chemical version as it will require MINNA / AC.  Now pt is consenting for MINNA and pt's cardiologist notified.  Per cardiologist, will place pt for MINNA as an add-on for tomorrow.  Amiodarone 200 mg po BID added by cardiology; will need to f/up TFTs, PFT and eye exam.  Will keep pt NPO after MN for MINNA.  Continue cardiac monitoring.  Further rec per cardiac.

## 2023-03-06 LAB
ALBUMIN SERPL ELPH-MCNC: 4.2 G/DL — SIGNIFICANT CHANGE UP (ref 3.5–5.2)
ALP SERPL-CCNC: 144 U/L — HIGH (ref 30–115)
ALT FLD-CCNC: 38 U/L — SIGNIFICANT CHANGE UP (ref 0–41)
ANION GAP SERPL CALC-SCNC: 11 MMOL/L — SIGNIFICANT CHANGE UP (ref 7–14)
AST SERPL-CCNC: 30 U/L — SIGNIFICANT CHANGE UP (ref 0–41)
BASOPHILS # BLD AUTO: 0.05 K/UL — SIGNIFICANT CHANGE UP (ref 0–0.2)
BASOPHILS NFR BLD AUTO: 0.7 % — SIGNIFICANT CHANGE UP (ref 0–1)
BILIRUB SERPL-MCNC: 0.4 MG/DL — SIGNIFICANT CHANGE UP (ref 0.2–1.2)
BUN SERPL-MCNC: 15 MG/DL — SIGNIFICANT CHANGE UP (ref 10–20)
CALCIUM SERPL-MCNC: 9.5 MG/DL — SIGNIFICANT CHANGE UP (ref 8.4–10.5)
CHLORIDE SERPL-SCNC: 104 MMOL/L — SIGNIFICANT CHANGE UP (ref 98–110)
CO2 SERPL-SCNC: 24 MMOL/L — SIGNIFICANT CHANGE UP (ref 17–32)
CREAT SERPL-MCNC: 1 MG/DL — SIGNIFICANT CHANGE UP (ref 0.7–1.5)
EGFR: 63 ML/MIN/1.73M2 — SIGNIFICANT CHANGE UP
EOSINOPHIL # BLD AUTO: 0.16 K/UL — SIGNIFICANT CHANGE UP (ref 0–0.7)
EOSINOPHIL NFR BLD AUTO: 2.3 % — SIGNIFICANT CHANGE UP (ref 0–8)
GLUCOSE SERPL-MCNC: 96 MG/DL — SIGNIFICANT CHANGE UP (ref 70–99)
HCT VFR BLD CALC: 37.8 % — SIGNIFICANT CHANGE UP (ref 37–47)
HGB BLD-MCNC: 12.1 G/DL — SIGNIFICANT CHANGE UP (ref 12–16)
IMM GRANULOCYTES NFR BLD AUTO: 0.3 % — SIGNIFICANT CHANGE UP (ref 0.1–0.3)
LYMPHOCYTES # BLD AUTO: 2.47 K/UL — SIGNIFICANT CHANGE UP (ref 1.2–3.4)
LYMPHOCYTES # BLD AUTO: 36 % — SIGNIFICANT CHANGE UP (ref 20.5–51.1)
MAGNESIUM SERPL-MCNC: 2.1 MG/DL — SIGNIFICANT CHANGE UP (ref 1.8–2.4)
MCHC RBC-ENTMCNC: 24.3 PG — LOW (ref 27–31)
MCHC RBC-ENTMCNC: 32 G/DL — SIGNIFICANT CHANGE UP (ref 32–37)
MCV RBC AUTO: 76.1 FL — LOW (ref 81–99)
MONOCYTES # BLD AUTO: 0.75 K/UL — HIGH (ref 0.1–0.6)
MONOCYTES NFR BLD AUTO: 10.9 % — HIGH (ref 1.7–9.3)
NEUTROPHILS # BLD AUTO: 3.42 K/UL — SIGNIFICANT CHANGE UP (ref 1.4–6.5)
NEUTROPHILS NFR BLD AUTO: 49.8 % — SIGNIFICANT CHANGE UP (ref 42.2–75.2)
NRBC # BLD: 0 /100 WBCS — SIGNIFICANT CHANGE UP (ref 0–0)
PHOSPHATE SERPL-MCNC: 4.6 MG/DL — SIGNIFICANT CHANGE UP (ref 2.1–4.9)
PLATELET # BLD AUTO: 291 K/UL — SIGNIFICANT CHANGE UP (ref 130–400)
POTASSIUM SERPL-MCNC: 4.5 MMOL/L — SIGNIFICANT CHANGE UP (ref 3.5–5)
POTASSIUM SERPL-SCNC: 4.5 MMOL/L — SIGNIFICANT CHANGE UP (ref 3.5–5)
PROT SERPL-MCNC: 6.7 G/DL — SIGNIFICANT CHANGE UP (ref 6–8)
RBC # BLD: 4.97 M/UL — SIGNIFICANT CHANGE UP (ref 4.2–5.4)
RBC # FLD: 17 % — HIGH (ref 11.5–14.5)
SODIUM SERPL-SCNC: 139 MMOL/L — SIGNIFICANT CHANGE UP (ref 135–146)
WBC # BLD: 6.87 K/UL — SIGNIFICANT CHANGE UP (ref 4.8–10.8)
WBC # FLD AUTO: 6.87 K/UL — SIGNIFICANT CHANGE UP (ref 4.8–10.8)

## 2023-03-06 PROCEDURE — 93325 DOPPLER ECHO COLOR FLOW MAPG: CPT | Mod: 26

## 2023-03-06 PROCEDURE — 99232 SBSQ HOSP IP/OBS MODERATE 35: CPT

## 2023-03-06 PROCEDURE — 93312 ECHO TRANSESOPHAGEAL: CPT | Mod: 26,XU

## 2023-03-06 PROCEDURE — 93320 DOPPLER ECHO COMPLETE: CPT | Mod: 26

## 2023-03-06 PROCEDURE — 92960 CARDIOVERSION ELECTRIC EXT: CPT | Mod: XU

## 2023-03-06 PROCEDURE — 93010 ELECTROCARDIOGRAM REPORT: CPT

## 2023-03-06 RX ADMIN — Medication 0.5 MILLIGRAM(S): at 22:41

## 2023-03-06 RX ADMIN — Medication 25 MILLIGRAM(S): at 13:32

## 2023-03-06 RX ADMIN — APIXABAN 5 MILLIGRAM(S): 2.5 TABLET, FILM COATED ORAL at 05:52

## 2023-03-06 RX ADMIN — Medication 25 MILLIGRAM(S): at 22:41

## 2023-03-06 RX ADMIN — APIXABAN 5 MILLIGRAM(S): 2.5 TABLET, FILM COATED ORAL at 17:55

## 2023-03-06 RX ADMIN — AMIODARONE HYDROCHLORIDE 200 MILLIGRAM(S): 400 TABLET ORAL at 17:55

## 2023-03-06 RX ADMIN — AMIODARONE HYDROCHLORIDE 200 MILLIGRAM(S): 400 TABLET ORAL at 05:52

## 2023-03-06 NOTE — PROGRESS NOTE ADULT - SUBJECTIVE AND OBJECTIVE BOX
STEPHANE CHAN 63y Female  MRN#: [id number]  Hospital Day: 8d    Pt is currently admitted with the primary diagnosis of atrial fibrillation with RVR    SUBJECTIVE  Overnight events: None  Subjective complaints: None    Patient is s/p MINNA cardioversion today.    Present Today:   - Diaz:  No [ x ], Yes [   ] : Indication:     - Type of IV Access:       .. CVC/Piccline:  No [ x ], Yes [   ] : Indication:       .. Midline: No [ x ], Yes [   ] : Indication:                                             ----------------------------------------------------------  OBJECTIVE    VITAL SIGNS: Last 24 Hours  T(C): 36.1 (06 Mar 2023 14:18), Max: 36.6 (05 Mar 2023 20:09)  T(F): 97 (06 Mar 2023 05:11), Max: 97.8 (05 Mar 2023 20:09)  HR: 130 (06 Mar 2023 14:18) (75 - 130)  BP: 106/59 (06 Mar 2023 14:18) (97/70 - 135/84)  BP(mean): 82 (06 Mar 2023 14:18) (82 - 82)  RR: 18 (06 Mar 2023 14:18) (18 - 18)  SpO2: 97% (06 Mar 2023 14:18) (97% - 97%)      03-05-23 @ 07:01  -  03-06-23 @ 07:00  --------------------------------------------------------  IN: 240 mL / OUT: 0 mL / NET: 240 mL        PHYSICAL EXAM:  General: well-appearing in NAD.  HEENT: Normocephalic, nontraumatic. PERRL.  LUNGS: Clear to auscultation b/l. No wheezes, rales, or rhonchi.  HEART: RRR. No murmurs, rubs, or gallops.  ABDOMEN: Soft, nontender, nondistended. + bowel sounds.  EXT: Pulses palpable x 4. No lower extremity edema.  NEURO: AAOx4.  SKIN: Warm, dry.    PAST MEDICAL & SURGICAL HISTORY  Anxiety  Afib  H/O neck surgery                                            -----------------------------------------------------------  ALLERGIES:  Toradol (Rash)                                            ------------------------------------------------------------    HOME MEDICATIONS  Home Medications:  Ativan 0.5 mg oral tablet: 1 tab(s) orally once a day (at bedtime), As Needed (26 Feb 2023 20:47)                           MEDICATIONS:  STANDING MEDICATIONS  aMIOdarone    Tablet 200 milliGRAM(s) Oral two times a day  apixaban 5 milliGRAM(s) Oral every 12 hours  metoprolol tartrate 25 milliGRAM(s) Oral three times a day    PRN MEDICATIONS  acetaminophen     Tablet .. 650 milliGRAM(s) Oral every 6 hours PRN  LORazepam     Tablet 0.5 milliGRAM(s) Oral daily PRN                                            ------------------------------------------------------------    LABS:                        12.1   6.87  )-----------( 291      ( 06 Mar 2023 06:34 )             37.8     03-06    139  |  104  |  15  ----------------------------<  96  4.5   |  24  |  1.0    Ca    9.5      06 Mar 2023 06:34  Phos  4.6     03-06  Mg     2.1     03-06    TPro  6.7  /  Alb  4.2  /  TBili  0.4  /  DBili  x   /  AST  30  /  ALT  38  /  AlkPhos  144<H>  03-06                                            -------------------------------------------------------------  RADIOLOGY:  < from: TTE Echo Complete w/o Contrast w/ Doppler (02.28.23 @ 08:48) >  Summary:   1. Mfzk-kt-stqt variation and tachycardia preclude quantification of the   left ventricular ejection fraction. LV systolic function appears   preservied.   2. Left atrial enlargement.   3. Normal right atrial size.   4. No evidence of mitral valve regurgitation.   5. Patient is in atrial fibrillation with rapid ventricular rate.   6. Compared to the prior TTE dated 5/07/2019, there is left atrial   enlargement on the present study.                                            --------------------------------------------------------------

## 2023-03-06 NOTE — PROGRESS NOTE ADULT - SUBJECTIVE AND OBJECTIVE BOX
KAREEM CHANANETTE  63y  Female      Patient is a 63y old  Female who presents with a chief complaint of Afib RVR (05 Mar 2023 14:54)      INTERVAL HPI/OVERNIGHT EVENTS:  Patient went for cardioversion today.   Vital Signs Last 24 Hrs  T(C): 36.1 (06 Mar 2023 14:18), Max: 36.6 (05 Mar 2023 20:09)  T(F): 97 (06 Mar 2023 05:11), Max: 97.8 (05 Mar 2023 20:09)  HR: 130 (06 Mar 2023 14:18) (75 - 130)  BP: 106/59 (06 Mar 2023 14:18) (97/70 - 135/84)  BP(mean): 82 (06 Mar 2023 14:18) (82 - 82)  RR: 18 (06 Mar 2023 14:18) (18 - 18)  SpO2: 97% (06 Mar 2023 14:18) (97% - 97%)          03-05-23 @ 07:01  -  03-06-23 @ 07:00  --------------------------------------------------------  IN: 240 mL / OUT: 0 mL / NET: 240 mL            Consultant(s) Notes Reviewed:  [x ] YES  [ ] NO          MEDICATIONS  (STANDING):  aMIOdarone    Tablet 200 milliGRAM(s) Oral two times a day  apixaban 5 milliGRAM(s) Oral every 12 hours  metoprolol tartrate 25 milliGRAM(s) Oral three times a day    MEDICATIONS  (PRN):  acetaminophen     Tablet .. 650 milliGRAM(s) Oral every 6 hours PRN Temp greater or equal to 38C (100.4F), Mild Pain (1 - 3)  LORazepam     Tablet 0.5 milliGRAM(s) Oral daily PRN Anxiety      LABS                          12.1   6.87  )-----------( 291      ( 06 Mar 2023 06:34 )             37.8     03-06    139  |  104  |  15  ----------------------------<  96  4.5   |  24  |  1.0    Ca    9.5      06 Mar 2023 06:34  Phos  4.6     03-06  Mg     2.1     03-06    TPro  6.7  /  Alb  4.2  /  TBili  0.4  /  DBili  x   /  AST  30  /  ALT  38  /  AlkPhos  144<H>  03-06          Lactate Trend        CAPILLARY BLOOD GLUCOSE            RADIOLOGY & ADDITIONAL TESTS:    Imaging Personally Reviewed:  [ ] YES  [ ] NO    HEALTH ISSUES - PROBLEM Dx:          PHYSICAL EXAM:  GENERAL: NAD, well-developed.  HEAD:  Atraumatic, Normocephalic.  EYES: EOMI, PERRLA, conjunctiva and sclera clear.  NECK: Supple, No JVD.  CHEST/LUNG: Clear to auscultation bilaterally; No wheeze.  HEART: Irregular rate and rhythm; S1 S2.   ABDOMEN: Soft, Nontender, Nondistended; Bowel sounds present.  EXTREMITIES:  2+ Peripheral Pulses, No clubbing, cyanosis, or edema.  PSYCH: AAOx3.  NEUROLOGY: non-focal.  SKIN: No rashes or lesions.

## 2023-03-06 NOTE — PRE-ANESTHESIA EVALUATION ADULT - NSRADCARDRESULTSFT_GEN_ALL_CORE
TTE 2/28/23    Summary:   1. Evkz-jh-qqsg variation and tachycardia preclude quantification of the   left ventricular ejection fraction. LV systolic function appears   preservied.   2. Left atrial enlargement.   3. Normal right atrial size.   4. No evidence of mitral valve regurgitation.   5. Patient is in atrial fibrillation with rapid ventricular rate.   6. Compared to the prior TTE dated 5/07/2019, there is left atrial   enlargement on the present study.

## 2023-03-06 NOTE — CHART NOTE - NSCHARTNOTEFT_GEN_A_CORE
POST OPERATIVE PROCEDURAL DOCUMENTATION  PRE-OP DIAGNOSIS: Atrial fibrillation    POST-OP DIAGNOSIS: LVEF moderately reduced; no thrombus; successful cardioversion to normal sinus rhythm    PROCEDURE: Transesophageal echocardiogram    Primary Physician: Dr. Beavers  Fellow: Dr. Mathur    ANESTHESIA TYPE  [  ] General Anesthesia  [ x ] Conscious Sedation  [  ] Local/Regional    CONDITION  [  ] Critical  [  ] Serious  [  ] Fair  [X] Good    SPECIMENS REMOVED (IF APPLICABLE): N/A    IMPLANTS (IF APPLICABLE): None    ESTIMATED BLOOD LOSS: None    COMPLICATIONS: None      FINDINGS:    After risks and benefits of procedures were explained, informed consent was obtained and placed in chart. Refer to Anesthesia note for sedation details.  The MINNA probe was passed into the esophagus without difficulty.  Transesophageal images were obtained.  The MINNA probe was removed without difficulty and examined.  There was no evidence for bleeding.  The patient tolerated the procedure well without any immediate MINNA-related complications.      Preliminary Findings:  LA: Enlarged  BRAN: Left atrial appendage was clear of clot and smoke.  LV: LVEF moderately reduced  MV: Trace MR  AV: No evidence of AI, no evidence of AS.   TV: Mild TR.   PV: Trace PI.   IAS: no PFO. No R-> L shunt by color doppler  There was mild, non-mobile atheroma seen in the thoracic aorta.     Patient successfully converted to sinus rhythm with synchronized  200J of direct current cardioversion.    DIAGNOSIS/IMPRESSION:  - LVEF moderately reduced  - No thrombus; successful cardioversion to normal sinus rhythm    PLAN OF CARE:  - Return to inpatient bed  - Continue anticoagulation for stroke prevention  - F/U with cardiology POST OPERATIVE PROCEDURAL DOCUMENTATION    PRE-OP DIAGNOSIS: Atrial fibrillation    POST-OP DIAGNOSIS: LVEF moderately reduced; no BRAN thrombus; successful cardioversion to normal sinus rhythm    PROCEDURE: Transesophageal echocardiogram and DC cardioversion    Primary Physician: Dr. Beavers  Fellow: Dr. Mathur    ANESTHESIA TYPE  [  ] General Anesthesia  [ x ] Conscious Sedation  [  ] Local/Regional    CONDITION  [  ] Critical  [  ] Serious  [  ] Fair  [X] Good    SPECIMENS REMOVED (IF APPLICABLE): N/A    IMPLANTS (IF APPLICABLE): None    ESTIMATED BLOOD LOSS: None    COMPLICATIONS: None      FINDINGS:    After risks and benefits of procedures were explained, informed consent was obtained and placed in chart. Refer to Anesthesia note for sedation details.  The MINNA probe was passed into the esophagus without difficulty.  Transesophageal images were obtained.  The MINNA probe was removed without difficulty and examined.  There was no evidence for bleeding.  The patient tolerated the procedure well without any immediate MINNA-related complications.      Preliminary Findings:  LVEF ~40%  No BRAN thrombus  Mild MR  Mild TR    Patient successfully converted to sinus rhythm with synchronized 200J x 1.    See full report for details.      PLAN OF CARE:  - Return to inpatient bed  - Continue anticoagulation for stroke prevention  - Follow up with Dr. Enriquez/William

## 2023-03-07 ENCOUNTER — TRANSCRIPTION ENCOUNTER (OUTPATIENT)
Age: 63
End: 2023-03-07

## 2023-03-07 VITALS
RESPIRATION RATE: 18 BRPM | TEMPERATURE: 97 F | SYSTOLIC BLOOD PRESSURE: 144 MMHG | DIASTOLIC BLOOD PRESSURE: 65 MMHG | HEART RATE: 83 BPM

## 2023-03-07 PROCEDURE — 99239 HOSP IP/OBS DSCHRG MGMT >30: CPT

## 2023-03-07 RX ORDER — METOPROLOL TARTRATE 50 MG
1 TABLET ORAL
Qty: 60 | Refills: 0
Start: 2023-03-07 | End: 2023-04-05

## 2023-03-07 RX ORDER — METOPROLOL TARTRATE 50 MG
25 TABLET ORAL
Refills: 0 | Status: DISCONTINUED | OUTPATIENT
Start: 2023-03-07 | End: 2023-03-07

## 2023-03-07 RX ORDER — APIXABAN 2.5 MG/1
1 TABLET, FILM COATED ORAL
Qty: 60 | Refills: 0
Start: 2023-03-07 | End: 2024-05-19

## 2023-03-07 RX ORDER — AMIODARONE HYDROCHLORIDE 400 MG/1
1 TABLET ORAL
Qty: 60 | Refills: 0
Start: 2023-03-07 | End: 2023-04-05

## 2023-03-07 RX ORDER — APIXABAN 2.5 MG/1
1 TABLET, FILM COATED ORAL
Qty: 60 | Refills: 0
Start: 2023-03-07 | End: 2023-04-05

## 2023-03-07 RX ADMIN — APIXABAN 5 MILLIGRAM(S): 2.5 TABLET, FILM COATED ORAL at 05:45

## 2023-03-07 NOTE — DISCHARGE NOTE NURSING/CASE MANAGEMENT/SOCIAL WORK - PATIENT PORTAL LINK FT
You can access the FollowMyHealth Patient Portal offered by [facility] by registering at the following website: [email or web address]. By joining [employer]’s FollowMyHealth portal, you will also be able to view your health information using other applications (apps) compatible with our system.

## 2023-03-07 NOTE — PROGRESS NOTE ADULT - PROVIDER SPECIALTY LIST ADULT
Cardiology
Cardiology
Hospitalist
Hospitalist
Cardiology
Hospitalist
Hospitalist
Internal Medicine
Hospitalist
Internal Medicine
Internal Medicine
Cardiology
Internal Medicine

## 2023-03-07 NOTE — PROGRESS NOTE ADULT - REASON FOR ADMISSION
Afib RVR

## 2023-03-07 NOTE — DISCHARGE NOTE NURSING/CASE MANAGEMENT/SOCIAL WORK - NSDCPEFALRISK_GEN_ALL_CORE
For information on Fall & Injury Prevention, visit: [email or web address] OR  [email or web address] OR  https://www.cdc.gov/steadi/patient.html

## 2023-03-07 NOTE — PROGRESS NOTE ADULT - ASSESSMENT
ASSESSMENT & PLAN    Patient is a 63 year old female with PMH of Atrial Fibrillation (non-compliant with Cardizem) presenting for palpitations. Admitted for Afib with RVR. Currently HR controlled.     1. Afib with RVR persistent  - Telemetry            - ECG: Afib with RVR                      - CXR:  WNL           - TSH:High normal   - [id number] score: 1, Therapeutic Lovenox for now (in case cardioversion is warranted)   - off cardizem drip. per cardio-> cardizem 60mg q8H (unable to start metoprolol due to poor TTE study). patient spoke with cardiologist, will d/c on aspirin. no AC  - increase cardizem to 60mg q6H  and titrate until cardizem drip if off    - Echo in 2019- normal systolic and diastolic function    #DVT ppx: lovenox 65mg q8H. on dc, no therapeutic AC after discussion with patient. only ASA  #GI ppx: none  #Activity: AAT  #Diet. DASH  #Dispo: acute  #Pending: better HR control after adjusting cardizem dose
ASSESSMENT & PLAN    Patient is a 63 year old female with PMH of Atrial Fibrillation (non-compliant with Cardizem) presenting for palpitations. Admitted for Afib with RVR. Currently HR controlled.     1. Afib with RVR persistent  - Telemetry            - ECG: Afib with RVR                      - CXR:  WNL           - TSH:High normal   - [id number] score: 1, Therapeutic Lovenox for now (in case cardioversion is warranted)   - off cardizem drip. per cardio-> cardizem 60mg q8H (unable to start metoprolol due to poor TTE study). after patient spoke with cardiologist, will d/c on aspirin.   and titrate until cardizem drip if off    - Echo in 2019- normal systolic and diastolic function    #DVT ppx: lovenox 65mg q8H  #GI ppx: none  #Activity: AAT  #Diet. DASH  #Dispo: acute  #Pending: better HR control after switchigng to PO cardizem. 
Atrial fibrillation with episodes of rapid HR up to 130's.  Clinically not in HF and no evidence of volume overloaded state.  Unable to titrate up Cardizem or Metoprolol for HR control d/t low bp.  Pt had refused trial of cardio-version or chemical version as it will require MINNA / AC.  Medical resident to contact cardiologist for f/up.  ?Consideration for digoxin / amiodarone  Continue cardiac monitoring.
Patient is a 63 year old female with PMH of Atrial Fibrillation (non-compliant with Cardizem) presenting for palpitations. Admitted for Afib with RVR. Currently HR controlled.     # Afib with RVR   - Telemetry              - ECG: Afib with RVR                        - CXR: WNL             - TSH: High normal   - [id number] score: 1, Therapeutic Lovenox for now (in case cardioversion is warranted)   - Echo in 2019- normal systolic and diastolic function  - Per Cardio: Discontinue diltiazem, c/w lopressor 25mg BID, start amiodarone 200mg BID, start Eliquis 5mg BID  - S/p MINNA w/ cardioversion. No BRAN thrombus. Now in NSR.    # Dilatation of Aorta  - Noted on prior echo    #DVT ppx: Eliquis  #GI ppx: none  #Activity: AAT  #Diet. DASH  #Dispo: likely d/c tomorrow
Patient is a 63 year old female with PMH of Atrial Fibrillation (non-compliant with Cardizem) presenting for palpitations. Admitted for Afib with RVR. Currently HR controlled.     1. Afib with RVR persistent  - Telemetry            - ECG: Afib with RVR                      - CXR:  WNL           - TSH:High normal   - [id number] score: 1, Therapeutic Lovenox for now (in case cardioversion is warranted)   - off cardizem drip. per cardio-> cardizem 60mg q8H > increased cardizem to 60mg q6H per cardio (unable to start metoprolol due to poor TTE study). patient spoke with cardiologist, will d/c on aspirin. no AC   - Echo in 2019- normal systolic and diastolic function    # Dilatation of Aorta  - Noted on prior echo    #DVT ppx: lovenox 65mg q12H. on dc, no therapeutic AC after discussion with patient. only ASA  #GI ppx: none  #Activity: AAT  #Diet. DASH  #Dispo: acute  #Pending: better HR control after adjusting cardizem dose  
1] PAF with [id number] Score 1  - Patient has no clear awareness of her Atrial Fibrillation.  - Indications for OAC discussed.  She prefers to continue Aspirin 81 mg tablet daily  - Findings on echo noted.  - Start po Diltiazem 60 mg po q 8 Hrs; give first dose, then discontinue IV diltiazem in 1 hr.      2] Dilatation of Aorta  - Noted on prior echo    Plan discussed with House Staff during morning rounds  Discharge planning per primary team    Hernanedz Enriquez MD (covering for Dr. Froylan Thomas)  [phone] Office
Patient is a 63 year old female with PMH of Atrial Fibrillation (non-compliant with Cardizem) presenting for palpitations. Admitted for Afib with RVR. Currently HR controlled.     # Afib with RVR   - Telemetry              - ECG: Afib with RVR                        - CXR: WNL             - TSH: High normal   - [id number] score: 1, Therapeutic Lovenox for now (in case cardioversion is warranted)   - Echo in 2019- normal systolic and diastolic function  - Per Cardio: Discontinue diltiazem, c/w lopressor 25mg BID, start amiodarone 200mg BID, start Eliquis 5mg BID  - S/p MINNA w/ cardioversion. No BRAN thrombus. Now in NSR.    # Dilatation of Aorta  - Noted on prior echo    #DVT ppx: Eliquis  #GI ppx: none  #Activity: AAT  #Diet. DASH  #Dispo: d/c today  
Patient is a 63 year old female with PMH of Atrial Fibrillation (non-compliant with Cardizem) presenting for palpitations. Admitted for Afib with RVR. Currently HR controlled.     1. Afib with RVR persistent  - Telemetry            - ECG: Afib with RVR                      - CXR:  WNL           - TSH:High normal   - [id number] score: 1, Therapeutic Lovenox for now (in case cardioversion is warranted)   - off cardizem drip. per cardio-> cardizem 60mg q8H > increased cardizem to 60mg q6H per cardio (unable to start metoprolol due to poor TTE study). patient spoke with cardiologist, will d/c on aspirin. no AC   - Echo in 2019- normal systolic and diastolic function    # Dilatation of Aorta  - Noted on prior echo    #DVT ppx: lovenox 65mg q12H. on dc, no therapeutic AC after discussion with patient. only ASA  #GI ppx: none  #Activity: AAT  #Diet. DASH  #Dispo: acute  #Pending: better HR control after adjusting cardizem dose  
63 year old female with PMH of Atrial Fibrillation (non-compliant with Cardizem) presenting for palpitations. Admitted for Afib with RVR. Currently HR controlled.     A/P:   Chronic Atrial Fibrillation with Rapid Ventricular Response:   s/p Cardizem drip   Echo showed preserved LVEF.   Continue Cardizem 60mg q 6hrs, add Metoprolol 25mg BID.   Continue Lovenox.       Possible discharge in 24 hrs if HR is well controlled. 
1] PAF with [id number] Score 1  - Patient has no clear awareness of her Atrial Fibrillation.  She is not tolerating increased dose of Diltiazem.  - Indications for OAC discussed.    - Patient is now agreeing to have MINNA directed DCCV.  Will add to MINNA schedule for Monday, March 6, 2023  - Discontinue all po Diltiazem.  Continue Metoprolol Tartrate 25 mg tablet twice daily.  - Start Amiodarone 200 mg po tablet twice daily.  - Discontinue Lovenox and start Eliquis 5 mg tablet twice daily.  First dose of Eliquis tonight.    2] Dilatation of Aorta  - Noted on prior echo    Send for Covid PCR test today.    NPO except po medications after midnight.  For MINNA directed DCCV tomorrow Monday, March 6, 2023.    Plan discussed with House Staff and Staff Nurse on Floor    Hernandez Enriquez MD (covering for Dr. Froylan Thomas)  [phone] Office
1] PAF with [id number] Score 1  - Patient has no clear awareness of her Atrial Fibrillation.  - Indications for OAC discussed.  She prefers to continue Aspirin upon discharge.  She is currently on therapeutic dosing of Lovenox  - Findings on echo noted.  - Option of MINNA directed DCCV offered and discussed with the patient.  If this option is taken, patient will need to be on OAC for minimum of 4 weeks.  She declines this option since she prefers not to be on OAC.  Chemical cardioversion will not also be an option if she is not on OAC upon discharge.  I advised her that diltiazem is being utilized for rate control of her Atrial Fibrillation.  Diltiazem is an AV node blocking agent and not an anti-arrhythmic.  - I spoke with RN staff not to hold po diltiazem when IV push diltiazem is given for immediate effect on controlling rate of Atrial Fib.    - Dose of Diltiazem increased to 60 mg po q 6 hrs.    2] Dilatation of Aorta  - Noted on prior echo    Discharge planning per primary team    Hernandez Enriquez MD (covering for Dr. Froylan Thomas)  [phone] Office
1] PAF with [id number] Score 1  - Patient has no clear awareness of her Atrial Fibrillation.  - Indications for OAC discussed.  She prefers to continue Aspirin 81 mg tablet daily  - Findings on echo noted.  - Increase Diltiazem to 60 mg tablet q 8 for rate control of atrial fibrillation.    2] Dilatation of Aorta  - Noted on prior echo    Follow up with primary cardiologist, Dr. Froylan Thomas as outpatient.  Discharge planning per primary team    Hernandez Enriquez MD (covering for Dr. Froylan Thomas)  [phone] Office
63 year old female with PMH of Atrial Fibrillation (non-compliant with Cardizem) presenting for palpitations. Admitted for Afib with RVR. Currently HR controlled.     A/P:   Chronic Atrial Fibrillation with Rapid Ventricular Response:   s/p Cardizem drip and Cardizem PO  Echo showed preserved LVEF. TSH 3.79  Patient went for MINNA and cardioversion.   Continue Metoprolol 25mg BID.   Start on Eliquis 5mg BID.       Possible discharge in 24 hrs if HR is well controlled.

## 2023-03-07 NOTE — PROGRESS NOTE ADULT - SUBJECTIVE AND OBJECTIVE BOX
STEPHANE CHAN 63y Female  MRN#: [id number]  Hospital Day: 9d    Pt is currently admitted with the primary diagnosis of atrial fibrillation with RVR    SUBJECTIVE  Overnight events: None  Subjective complaints: None, feels much better after cardioversion. Denies fevers, chills, shortness of breath, chest pain, palpitations, n/v/d.    no events on tele    Present Today:   - Diaz:  No [ x ], Yes [   ] : Indication:     - Type of IV Access:       .. CVC/Piccline:  No [ x ], Yes [   ] : Indication:       .. Midline: No [ x ], Yes [   ] : Indication:                                             ----------------------------------------------------------  OBJECTIVE    VITAL SIGNS: Last 24 Hours  T(C): 36.3 (07 Mar 2023 05:40), Max: 36.7 (06 Mar 2023 22:39)  T(F): 97.3 (07 Mar 2023 05:40), Max: 98 (06 Mar 2023 22:39)  HR: 57 (07 Mar 2023 05:40) (57 - 130)  BP: 96/59 (07 Mar 2023 05:40) (96/59 - 119/74)  BP(mean): 82 (06 Mar 2023 14:18) (82 - 82)  RR: 18 (07 Mar 2023 05:40) (18 - 18)  SpO2: 100% (06 Mar 2023 22:39) (97% - 100%)        PHYSICAL EXAM:  General: well-appearing in NAD.  HEENT: Normocephalic, nontraumatic. PERRL.  LUNGS: Clear to auscultation b/l. No wheezes, rales, or rhonchi.  HEART: RRR. No murmurs, rubs, or gallops.  ABDOMEN: Soft, nontender, nondistended. + bowel sounds.  EXT: Pulses palpable x 4. No lower extremity edema.  NEURO: AAOx4.  SKIN: Warm, dry.    PAST MEDICAL & SURGICAL HISTORY  Anxiety  Afib  H/O neck surgery                                            -----------------------------------------------------------  ALLERGIES:  Toradol (Rash)                                            ------------------------------------------------------------    HOME MEDICATIONS  Home Medications:  Ativan 0.5 mg oral tablet: 1 tab(s) orally once a day (at bedtime), As Needed (26 Feb 2023 20:47)                           MEDICATIONS:  STANDING MEDICATIONS  aMIOdarone    Tablet 200 milliGRAM(s) Oral two times a day  apixaban 5 milliGRAM(s) Oral every 12 hours  metoprolol tartrate 25 milliGRAM(s) Oral two times a day    PRN MEDICATIONS  acetaminophen     Tablet .. 650 milliGRAM(s) Oral every 6 hours PRN  LORazepam     Tablet 0.5 milliGRAM(s) Oral daily PRN                                            ------------------------------------------------------------    LABS:                        12.1   6.87  )-----------( 291      ( 06 Mar 2023 06:34 )             37.8     03-06    139  |  104  |  15  ----------------------------<  96  4.5   |  24  |  1.0    Ca    9.5      06 Mar 2023 06:34  Phos  4.6     03-06  Mg     2.1     03-06    TPro  6.7  /  Alb  4.2  /  TBili  0.4  /  DBili  x   /  AST  30  /  ALT  38  /  AlkPhos  144<H>  03-06                                            -------------------------------------------------------------  RADIOLOGY:    < from: Xray Chest 1 View- PORTABLE-Urgent (02.26.23 @ 16:03) >  Impression: No radiographic evidence of acute cardiopulmonary disease.                                            --------------------------------------------------------------

## 2023-03-13 DIAGNOSIS — I77.819 AORTIC ECTASIA, UNSPECIFIED SITE: ICD-10-CM

## 2023-03-13 DIAGNOSIS — R00.2 PALPITATIONS: ICD-10-CM

## 2023-03-13 DIAGNOSIS — E83.42 HYPOMAGNESEMIA: ICD-10-CM

## 2023-03-13 DIAGNOSIS — I48.0 PAROXYSMAL ATRIAL FIBRILLATION: ICD-10-CM

## 2023-03-13 DIAGNOSIS — F41.9 ANXIETY DISORDER, UNSPECIFIED: ICD-10-CM

## 2023-03-13 DIAGNOSIS — Z91.14 PATIENT'S OTHER NONCOMPLIANCE WITH MEDICATION REGIMEN: ICD-10-CM

## 2023-03-15 ENCOUNTER — APPOINTMENT (OUTPATIENT)
Dept: OTOLARYNGOLOGY | Facility: CLINIC | Age: 63
End: 2023-03-15
Payer: COMMERCIAL

## 2023-03-15 VITALS — WEIGHT: 138 LBS | HEIGHT: 64 IN | BODY MASS INDEX: 23.56 KG/M2

## 2023-03-15 DIAGNOSIS — Z98.890 OTHER SPECIFIED POSTPROCEDURAL STATES: ICD-10-CM

## 2023-03-15 DIAGNOSIS — R06.9 UNSPECIFIED ABNORMALITIES OF BREATHING: ICD-10-CM

## 2023-03-15 DIAGNOSIS — J34.89 OTHER SPECIFIED DISORDERS OF NOSE AND NASAL SINUSES: ICD-10-CM

## 2023-03-15 PROCEDURE — 99204 OFFICE O/P NEW MOD 45 MIN: CPT | Mod: 25

## 2023-03-15 PROCEDURE — 31231 NASAL ENDOSCOPY DX: CPT

## 2023-03-15 NOTE — ASSESSMENT
[FreeTextEntry1] : I had a long discussion with the patient regarding her multiple surgeries and building scar tissue over time. I also explained the diagnosis of empty nose syndrome. I recommended against another surgery at this time given the chronicity of her symptoms. \par \par WIll evaluate after CT.\par \par Possible referral to a secondary rhino specialist.

## 2023-03-15 NOTE — HISTORY OF PRESENT ILLNESS
[de-identified] : Patient presents today c/o nasal obstruction  . She has  history of nasal  surgery multiple times, including cartilage graft ,  would like to breathe better . Last  nasal surgery  performed tens year ago . She has noticed when she  pinches bridge of nose is able to breathe better . Occasionally has nasal pain at tip of nose. \par Patient has afib.  not on blood thinners at this time.

## 2023-03-22 ENCOUNTER — RESULT REVIEW (OUTPATIENT)
Age: 63
End: 2023-03-22

## 2023-03-22 ENCOUNTER — OUTPATIENT (OUTPATIENT)
Dept: OUTPATIENT SERVICES | Facility: HOSPITAL | Age: 63
LOS: 1 days | End: 2023-03-22
Payer: COMMERCIAL

## 2023-03-22 DIAGNOSIS — J34.89 OTHER SPECIFIED DISORDERS OF NOSE AND NASAL SINUSES: ICD-10-CM

## 2023-03-22 DIAGNOSIS — Z98.890 OTHER SPECIFIED POSTPROCEDURAL STATES: Chronic | ICD-10-CM

## 2023-03-22 PROCEDURE — 70486 CT MAXILLOFACIAL W/O DYE: CPT

## 2023-03-22 PROCEDURE — 70486 CT MAXILLOFACIAL W/O DYE: CPT | Mod: 26

## 2023-03-23 DIAGNOSIS — J34.89 OTHER SPECIFIED DISORDERS OF NOSE AND NASAL SINUSES: ICD-10-CM

## 2023-07-24 NOTE — ED PROVIDER NOTE - NSFOLLOWUPCLINICS_GEN_ALL_ED_FT
Subjective:       Patient ID: Syl Matos is a 67 y.o. female.    Chief Complaint: Follow-up, Foot Pain, Skin Problem, and Diabetes Mellitus  Patient presents with family for follow-up chronic neuropathic pain feet, uncontrolled type 2 diabetes, foot cramps feet. Patient relates nerve pain in her feet is worse, foot cramping is worse especially at night. Inquires about increasing lyrica.  Does relate muscle relaxer at bedtime is helpful and inquires about a refill.  Relates diabetes has been slightly high, glucose around 150. Did establish care with NP endocrinology in May. Had blood work, does not know A1c  Pain level 5/10        Past Medical History:   Diagnosis Date    Atrial fibrillation     CKD (chronic kidney disease)     COPD (chronic obstructive pulmonary disease)     Diabetes mellitus     Hypercholesteremia     Hypertension      Past Surgical History:   Procedure Laterality Date    FRACTURE SURGERY      pelvis surgery    SHOULDER SURGERY Left     TUBAL LIGATION       Family History   Problem Relation Age of Onset    Heart disease Mother     Breast cancer Mother     Heart disease Father      Social History     Socioeconomic History    Marital status:    Tobacco Use    Smoking status: Never    Smokeless tobacco: Never   Substance and Sexual Activity    Alcohol use: No    Drug use: No    Sexual activity: Not Currently     Partners: Male       Current Outpatient Medications   Medication Sig Dispense Refill    ACCU-CHEK GUIDE TEST STRIPS Strp 3 (three) times daily.      albuterol (PROVENTIL/VENTOLIN HFA) 90 mcg/actuation inhaler Inhale 1-2 puffs into the lungs every 6 (six) hours as needed for Wheezing or Shortness of Breath. Rescue 18 g 2    albuterol-ipratropium (DUO-NEB) 2.5 mg-0.5 mg/3 mL nebulizer solution USE 1 VIAL IN NEBULIZER THREE TIMES DAILY AS NEEDED      amLODIPine (NORVASC) 10 MG tablet 10 mg.      aspirin 81 MG Chew Take 81 mg by mouth once daily.      busPIRone (BUSPAR) 15 MG tablet  Take 15 mg by mouth once daily.       cholecalciferol, vitamin D3, (VITAMIN D3) 50 mcg (2,000 unit) Tab Take 1 tablet (2,000 Units total) by mouth once daily.      EScitalopram oxalate (LEXAPRO) 20 MG tablet Take 20 mg by mouth every morning.      fluconazole (DIFLUCAN) 100 MG tablet Take by mouth.      glimepiride (AMARYL) 4 MG tablet Take 4 mg by mouth.      LANTUS U-100 INSULIN 100 unit/mL injection SMARTSI Unit(s) SUB-Q Daily      metFORMIN (GLUCOPHAGE) 1000 MG tablet Take 1,000 mg by mouth 2 (two) times daily.      metoprolol tartrate (LOPRESSOR) 50 MG tablet 50 mg.      MICROLET LANCET Misc USE TO TEST DAILY      ondansetron (ZOFRAN-ODT) 4 MG TbDL DISSOLVE ONE TABLET BY MOUTH THREE TIMES DAILY AS NEEDED FORNAUSEA AND VOMITING      pravastatin (PRAVACHOL) 20 MG tablet Take 20 mg by mouth once daily.      pregabalin (LYRICA) 75 MG capsule Take 75 mg by mouth 2 (two) times daily.      simvastatin (ZOCOR) 40 MG tablet TAKE 1 TABLET BY MOUTH EVERY MORNING. 90 tablet 2    SITagliptin (JANUVIA) 100 MG Tab Take 100 mg by mouth once daily.      traZODone (DESYREL) 100 MG tablet Take 100 mg by mouth every evening.      carvediloL (COREG) 25 MG tablet Take 1 tablet (25 mg total) by mouth 2 (two) times daily with meals. 60 tablet 11    cyclobenzaprine (FLEXERIL) 10 MG tablet Take 1 tablet (10 mg total) by mouth every evening. 30 tablet 2    insulin lispro 100 unit/mL injection Inject 14 Units into the skin 3 (three) times daily with meals. 12.6 mL 11    ketoconazole (NIZORAL) 2 % cream Apply topically once daily. 39 g 2    lisinopriL (PRINIVIL,ZESTRIL) 20 MG tablet 20 mg.      nitroGLYCERIN (NITROSTAT) 0.4 MG SL tablet Place under the tongue.      omega-3 fatty acids 1,000 mg Cap Take 2 capsules (2,000 mg total) by mouth 2 (two) times daily. Take as directed for triglycerides and to decrease risk of heart disease and stroke.  0    oxybutynin (DITROPAN-XL) 10 MG 24 hr tablet Take 1 tablet (10 mg total) by mouth once  "daily. 30 tablet 12    WIXELA INHUB 250-50 mcg/dose diskus inhaler Inhale into the lungs.       No current facility-administered medications for this visit.     Review of patient's allergies indicates:   Allergen Reactions    Iodine and iodide containing products Hives       Review of Systems   Cardiovascular:  Positive for leg swelling.        Mild lower leg edema    Endocrine:        DM 2 x 25 yrs   Musculoskeletal:  Negative for gait problem.   All other systems reviewed and are negative.    Objective:      Vitals:    07/18/23 1426   BP: 105/62   Pulse: 64   Resp: 16   Weight: 88.9 kg (196 lb)   Height: 5' 2" (1.575 m)     Physical Exam  Vitals and nursing note reviewed.   Constitutional:       General: She is not in acute distress.     Appearance: Normal appearance.   Cardiovascular:      Pulses:           Dorsalis pedis pulses are 2+ on the right side and 2+ on the left side.        Posterior tibial pulses are 1+ on the right side and 1+ on the left side.   Pulmonary:      Effort: Pulmonary effort is normal.   Musculoskeletal:         General: No deformity. Normal range of motion.      Right foot: No deformity.      Left foot: No deformity.   Feet:      Right foot:      Skin integrity: Callus and dry skin present.      Toenail Condition: Right toenails are long.      Left foot:      Skin integrity: Callus (mild, improved hyperkeratotic tissue medial b/l hallux) and dry skin (Increased dry skin especially heels with mild erythema consistent with tinea pedis, no skin breaks) present.      Toenail Condition: Left toenails are long.   Skin:     General: Skin is dry.      Capillary Refill: Capillary refill takes 2 to 3 seconds.      Comments: Feet, heels   Neurological:      General: No focal deficit present.      Mental Status: She is alert.      Comments: Painful paresthesias, neuropathic foot pain   Psychiatric:         Behavior: Behavior normal.         Thought Content: Thought content normal.         care   "   Assessment:       1. Neuropathic pain of both feet    2. Foot cramps    3. Type 2 diabetes mellitus with hyperglycemia, with long-term current use of insulin    4. Type 2 diabetes mellitus with peripheral neuropathy            Plan:           Reviewed neuropathic pain at length, flare-ups of cramping in feet, recommend continuing muscle relaxer at bedtime  Would not recommend increasing lyrica due to possible side effects  Reviewed several other conservative measures for neuropathy performed each night 20-40 minutes and explained why these are beneficial. Must be done every night for 2 weeks to assess effectiveness, then nighty long term for best results  Reviewed need for daily stretching.   Reviewed diabetic education  Advised patient better control of daily glucose/A1c/diabetes can significantly reduce neuropathic pain in feet  Reviewed wide appropriate tennis shoes especially indoors to protect feet    Discussed maintenance of skin and nails and potential complications.    Nails debrided in thickness reduced, no areas of complication at this time  Reviewed need for daily foot checks and instructed patient to contact the office with any area of redness or swelling which has not improved within 3 days  Patient was in understanding and agreement with treatment plan.  I counseled the patient on their conditions, implications and medical management.  Instructed patient/family to contact the office with any changes, questions, concerns, worsening of symptoms.   Total face to face time 30 minutes, exam, assessment, treatment, discussion, additional time for review of chart prior to and following appointment and visit documentation, consultation and coordination of care.   Follow up 3 months    This note was created using [email or web address] voice recognition software that occasionally misinterpreted phrases or words.       [facility]  Neurosurgery  [address], [address]  [place] [address]  Phone: [phone]  Fax:   Follow Up Time: 1-3 Days    [facility] ([facility])  Rehabilitation  [facility] [address] [address], [address] [place] [address]  Phone: [phone]  Fax:   Follow Up Time: 1-3 Days

## 2024-01-25 NOTE — PATIENT PROFILE ADULT - NSASFALLATTEMPTOOB_GEN_A_NUR
"Chief Complaint  ADD and Med Refill    Subjective        Luiz Aponte presents to [facility]  ADD        Patient is having ADD.  Patient is currently on Adderall 20 mg twice a day.  Patient denies any side effects of the medication.  Patient states that he seems to be doing well on the Adderall.  Denies any issue with the medicine.    Objective   Vital Signs:  BP 90/64 (BP Location: Left arm, Patient Position: Sitting, Cuff Size: Adult)   Pulse 71   Resp 12   Ht 170.2 cm (67.01\")   Wt 74.8 kg (165 lb)   SpO2 99%   BMI 25.83 kg/m²   Estimated body mass index is 25.83 kg/m² as calculated from the following:    Height as of this encounter: 170.2 cm (67.01\").    Weight as of this encounter: 74.8 kg (165 lb).             Physical Exam  Vitals and nursing note reviewed.   Constitutional:       Appearance: He is well-developed.   HENT:      Head: Normocephalic and atraumatic.   Musculoskeletal:      Cervical back: Normal range of motion and neck supple.   Neurological:      Mental Status: He is alert and oriented to person, place, and time.   Psychiatric:         Behavior: Behavior normal.        Result Review :                     Assessment and Plan     Diagnoses and all orders for this visit:    1. Attention deficit hyperactivity disorder (ADHD), unspecified ADHD type  -     amphetamine-dextroamphetamine (ADDERALL) 20 MG tablet; Take 1 tablet by mouth 2 (Two) Times a Day.  Dispense: 180 tablet; Refill: 0    Will continue patient on Adderall 20 mg twice daily.         Follow Up     No follow-ups on file.  Patient was given instructions and counseling regarding his condition or for health maintenance advice. Please see specific information pulled into the AVS if appropriate.         " no

## 2024-01-27 ENCOUNTER — INPATIENT (INPATIENT)
Facility: HOSPITAL | Age: 64
LOS: 3 days | Discharge: HOME | DRG: 309 | End: 2024-01-31
Attending: INTERNAL MEDICINE | Admitting: HOSPITALIST
Payer: COMMERCIAL

## 2024-01-27 VITALS
HEART RATE: 94 BPM | WEIGHT: 132.06 LBS | TEMPERATURE: 99 F | RESPIRATION RATE: 18 BRPM | OXYGEN SATURATION: 100 % | SYSTOLIC BLOOD PRESSURE: 109 MMHG | DIASTOLIC BLOOD PRESSURE: 77 MMHG

## 2024-01-27 DIAGNOSIS — Z98.890 OTHER SPECIFIED POSTPROCEDURAL STATES: Chronic | ICD-10-CM

## 2024-01-27 DIAGNOSIS — R00.2 PALPITATIONS: ICD-10-CM

## 2024-01-27 LAB
ALBUMIN SERPL ELPH-MCNC: 4.2 G/DL — SIGNIFICANT CHANGE UP (ref 3.5–5.2)
ALP SERPL-CCNC: 133 U/L — HIGH (ref 30–115)
ALT FLD-CCNC: 35 U/L — SIGNIFICANT CHANGE UP (ref 0–41)
ANION GAP SERPL CALC-SCNC: 11 MMOL/L — SIGNIFICANT CHANGE UP (ref 7–14)
AST SERPL-CCNC: 21 U/L — SIGNIFICANT CHANGE UP (ref 0–41)
BASOPHILS # BLD AUTO: 0.04 K/UL — SIGNIFICANT CHANGE UP (ref 0–0.2)
BASOPHILS NFR BLD AUTO: 0.7 % — SIGNIFICANT CHANGE UP (ref 0–1)
BILIRUB SERPL-MCNC: 0.4 MG/DL — SIGNIFICANT CHANGE UP (ref 0.2–1.2)
BUN SERPL-MCNC: 13 MG/DL — SIGNIFICANT CHANGE UP (ref 10–20)
CALCIUM SERPL-MCNC: 8.9 MG/DL — SIGNIFICANT CHANGE UP (ref 8.4–10.5)
CHLORIDE SERPL-SCNC: 107 MMOL/L — SIGNIFICANT CHANGE UP (ref 98–110)
CO2 SERPL-SCNC: 20 MMOL/L — SIGNIFICANT CHANGE UP (ref 17–32)
CREAT SERPL-MCNC: 0.9 MG/DL — SIGNIFICANT CHANGE UP (ref 0.7–1.5)
EGFR: 71 ML/MIN/1.73M2 — SIGNIFICANT CHANGE UP
EOSINOPHIL # BLD AUTO: 0.16 K/UL — SIGNIFICANT CHANGE UP (ref 0–0.7)
EOSINOPHIL NFR BLD AUTO: 2.7 % — SIGNIFICANT CHANGE UP (ref 0–8)
GLUCOSE SERPL-MCNC: 107 MG/DL — HIGH (ref 70–99)
HCT VFR BLD CALC: 33.9 % — LOW (ref 37–47)
HGB BLD-MCNC: 10.6 G/DL — LOW (ref 12–16)
IMM GRANULOCYTES NFR BLD AUTO: 0.2 % — SIGNIFICANT CHANGE UP (ref 0.1–0.3)
LYMPHOCYTES # BLD AUTO: 1.67 K/UL — SIGNIFICANT CHANGE UP (ref 1.2–3.4)
LYMPHOCYTES # BLD AUTO: 28.1 % — SIGNIFICANT CHANGE UP (ref 20.5–51.1)
MCHC RBC-ENTMCNC: 23.9 PG — LOW (ref 27–31)
MCHC RBC-ENTMCNC: 31.3 G/DL — LOW (ref 32–37)
MCV RBC AUTO: 76.5 FL — LOW (ref 81–99)
MONOCYTES # BLD AUTO: 0.54 K/UL — SIGNIFICANT CHANGE UP (ref 0.1–0.6)
MONOCYTES NFR BLD AUTO: 9.1 % — SIGNIFICANT CHANGE UP (ref 1.7–9.3)
NEUTROPHILS # BLD AUTO: 3.53 K/UL — SIGNIFICANT CHANGE UP (ref 1.4–6.5)
NEUTROPHILS NFR BLD AUTO: 59.2 % — SIGNIFICANT CHANGE UP (ref 42.2–75.2)
NRBC # BLD: 0 /100 WBCS — SIGNIFICANT CHANGE UP (ref 0–0)
PLATELET # BLD AUTO: 329 K/UL — SIGNIFICANT CHANGE UP (ref 130–400)
PMV BLD: 11.2 FL — HIGH (ref 7.4–10.4)
POTASSIUM SERPL-MCNC: 4 MMOL/L — SIGNIFICANT CHANGE UP (ref 3.5–5)
POTASSIUM SERPL-SCNC: 4 MMOL/L — SIGNIFICANT CHANGE UP (ref 3.5–5)
PROT SERPL-MCNC: 6.5 G/DL — SIGNIFICANT CHANGE UP (ref 6–8)
RBC # BLD: 4.43 M/UL — SIGNIFICANT CHANGE UP (ref 4.2–5.4)
RBC # FLD: 17.4 % — HIGH (ref 11.5–14.5)
SODIUM SERPL-SCNC: 138 MMOL/L — SIGNIFICANT CHANGE UP (ref 135–146)
TROPONIN T, HIGH SENSITIVITY RESULT: 9 NG/L — SIGNIFICANT CHANGE UP (ref 6–13)
WBC # BLD: 5.95 K/UL — SIGNIFICANT CHANGE UP (ref 4.8–10.8)
WBC # FLD AUTO: 5.95 K/UL — SIGNIFICANT CHANGE UP (ref 4.8–10.8)

## 2024-01-27 PROCEDURE — 85025 COMPLETE CBC W/AUTO DIFF WBC: CPT

## 2024-01-27 PROCEDURE — 99285 EMERGENCY DEPT VISIT HI MDM: CPT

## 2024-01-27 PROCEDURE — 84443 ASSAY THYROID STIM HORMONE: CPT

## 2024-01-27 PROCEDURE — 80053 COMPREHEN METABOLIC PANEL: CPT

## 2024-01-27 PROCEDURE — 83550 IRON BINDING TEST: CPT

## 2024-01-27 PROCEDURE — 93312 ECHO TRANSESOPHAGEAL: CPT

## 2024-01-27 PROCEDURE — 82728 ASSAY OF FERRITIN: CPT

## 2024-01-27 PROCEDURE — 93320 DOPPLER ECHO COMPLETE: CPT

## 2024-01-27 PROCEDURE — 93325 DOPPLER ECHO COLOR FLOW MAPG: CPT

## 2024-01-27 PROCEDURE — 83540 ASSAY OF IRON: CPT

## 2024-01-27 PROCEDURE — 80061 LIPID PANEL: CPT

## 2024-01-27 PROCEDURE — 83036 HEMOGLOBIN GLYCOSYLATED A1C: CPT

## 2024-01-27 PROCEDURE — 36415 COLL VENOUS BLD VENIPUNCTURE: CPT

## 2024-01-27 PROCEDURE — 93306 TTE W/DOPPLER COMPLETE: CPT

## 2024-01-27 PROCEDURE — 99223 1ST HOSP IP/OBS HIGH 75: CPT

## 2024-01-27 PROCEDURE — 93010 ELECTROCARDIOGRAM REPORT: CPT

## 2024-01-27 PROCEDURE — 71045 X-RAY EXAM CHEST 1 VIEW: CPT | Mod: 26

## 2024-01-27 RX ORDER — DILTIAZEM HCL 120 MG
15 CAPSULE, EXT RELEASE 24 HR ORAL ONCE
Refills: 0 | Status: COMPLETED | OUTPATIENT
Start: 2024-01-27 | End: 2024-01-27

## 2024-01-27 RX ORDER — METOPROLOL TARTRATE 50 MG
25 TABLET ORAL
Refills: 0 | Status: DISCONTINUED | OUTPATIENT
Start: 2024-01-27 | End: 2024-01-28

## 2024-01-27 RX ORDER — ENOXAPARIN SODIUM 100 MG/ML
60 INJECTION SUBCUTANEOUS EVERY 12 HOURS
Refills: 0 | Status: DISCONTINUED | OUTPATIENT
Start: 2024-01-27 | End: 2024-01-29

## 2024-01-27 RX ORDER — METOPROLOL TARTRATE 50 MG
25 TABLET ORAL
Refills: 0 | Status: DISCONTINUED | OUTPATIENT
Start: 2024-01-27 | End: 2024-01-27

## 2024-01-27 RX ORDER — DILTIAZEM HCL 120 MG
10 CAPSULE, EXT RELEASE 24 HR ORAL
Qty: 125 | Refills: 0 | Status: DISCONTINUED | OUTPATIENT
Start: 2024-01-27 | End: 2024-01-27

## 2024-01-27 RX ORDER — DILTIAZEM HCL 120 MG
10 CAPSULE, EXT RELEASE 24 HR ORAL
Qty: 125 | Refills: 0 | Status: DISCONTINUED | OUTPATIENT
Start: 2024-01-27 | End: 2024-01-28

## 2024-01-27 RX ADMIN — Medication 10 MG/HR: at 16:57

## 2024-01-27 RX ADMIN — Medication 15 MILLIGRAM(S): at 11:16

## 2024-01-27 RX ADMIN — Medication 10 MG/HR: at 18:19

## 2024-01-27 RX ADMIN — Medication 25 MILLIGRAM(S): at 18:19

## 2024-01-27 NOTE — ED ADULT NURSE REASSESSMENT NOTE - NS ED NURSE REASSESS COMMENT FT1
Received pt from Am RN at 1900, comfort measures, IV intact and patent. Patient on continuos Cardizem drip, HR 79 as per MD orders. V/S stable. Call bell within reach.

## 2024-01-27 NOTE — CONSULT NOTE ADULT - SUBJECTIVE AND OBJECTIVE BOX
Date of Admission:    Evaluated by Dr. Froylan Thomas MD contact no. [phone]    CHIEF COMPLAINT:  Palpitations.    HISTORY OF PRESENT ILLNESS:     64 F hx of HFrEF: 35-40%, pAfib (CHADS VASC score 2- LVEF <40% and gender) s/p MINNA/DCCV to SR supposed to be on Metoprolol, Amiodarone and Eliquis but she stopped taking her medications on her own. Last visit with me was on 3/08/23. Rabia, she is nurse in [employer].  She presents to the ED for eval of 4 days of palpitations. She woke with them this morning which caused her concern and prompted ED visit.   S/P hospital admission for similar symptoms. denies CP,SOB, fever, chills, N,V,D, weakness, HA    In the ED: BP: 109/77, HR: 94, RR: 18, Temp: 98.6, SpO2: 100%    Labs:   -> WBC: 5.9, Hg: 10.6, MCV 76.5  -> BMP unremarkable   -> ALP: 133, AST/ALT: 21/35  -> trop 9    s/p 15 mg IVP Cardizem and started on cardizem drip at 10     (27 Jan 2024 16:25)      PAST MEDICAL & SURGICAL HISTORY:  Anxiety      Afib      H/O neck surgery    FAMILY HISTORY:    Allergies    Toradol (Rash)  	  Home Medications:  Ativan 0.5 mg oral tablet: 1 tab(s) orally once a day (at bedtime), As Needed (26 Feb 2023 20:47)    MEDICATIONS  (STANDING):  diltiazem Infusion 10 mG/Hr (10 mL/Hr) IV Continuous <Continuous>  enoxaparin Injectable 60 milliGRAM(s) SubCutaneous every 12 hours  metoprolol tartrate 25 milliGRAM(s) Oral two times a day    MEDICATIONS  (PRN):  LORazepam     Tablet 0.5 milliGRAM(s) Oral at bedtime PRN Anxiety      SOCIAL HISTORY:    [ x] Non-smoker  [ ] Alcohol    REVIEW OF SYSTEMS:  CONSTITUTIONAL: No fever, chills, or fatigue  CARDIOLOGY:  palpitations, n0 dizziness or syncopal episodes.   RESPIRATORY: denies shortness of breath, wheezing.   NEUROLOGICAL: Generalized weakness, no focal deficits to report.  ENDOCRINOLOGICAL: no recent change in diabetic medications.   GI: no melena/hematochezia, no Nausea, Vomiting, diarrhea.    PSYCHIATRY: normal mood and affect  HEENT: no epistaxis, headaches.  SKIN: no ecchymosis, no lesions.  MUSCULOSKELETAL: Full range of motion, pain in shoulder, spine.      PHYSICAL EXAM:  T(C): 36.8 (01-27-24 @ 15:34), Max: 37 (01-27-24 @ 09:58)  HR: 79 (01-27-24 @ 20:00) (79 - 159)  BP: 125/80 (01-27-24 @ 20:00) (109/77 - 151/81)  RR: 19 (01-27-24 @ 20:00) (18 - 19)  SpO2: 98% (01-27-24 @ 20:00) (98% - 100%)  Wt(kg): --  I&O's Summary    General Appearance: Well developed, obese in no distress	  Cardiovascular:  S1 varying intensity, S2 normal intensity, No JVD, No murmurs or gallops.  No peripheral edema  Respiratory: Lungs clear to auscultation	  Psychiatry: A & O x 3, Mood & affect appropriate  Gastrointestinal:  Soft, Non-tender, no bruits.  Skin: No rashes, No ecchymoses, No cyanosis	  Neurologic: No focal motor deficits.  Extremities: No clubbing, cyanosis or edema  Vascular: Peripheral pulses palpable 2+ bilaterally    LABS:	 	                          10.6   5.95  )-----------( 329      ( 27 Jan 2024 10:50 )             33.9     01-27    138  |  107  |  13  ----------------------------<  107<H>  4.0   |  20  |  0.9    Ca    8.9      27 Jan 2024 10:50    TPro  6.5  /  Alb  4.2  /  TBili  0.4  /  DBili  x   /  AST  21  /  ALT  35  /  AlkPhos  133<H>  01-27      TELEMETRY EVENTS: 	  Afib   ECG:  	AFib with nsst/t changes.  RADIOLOGY: CXR normal.  OTHER: 	    PREVIOUS DIAGNOSTIC TESTING:    [ ] Echocardiogram: technically difficult while she was in AFib in March 2023.    MINNA 3/06/23 LVEF 35-40%, mild TR , Ascending Aorta 3.8 cm.	  	  ASSESSMENT/PLAN:

## 2024-01-27 NOTE — ED PROVIDER NOTE - OBJECTIVE STATEMENT
64 F hx of Afib on Cardizem but not compliant presenting to ED for eval of 4 days of palpitations. sts she woke with them this morning which caused her concern and prompted ED visit. last saw cardiologist 1 years ago S/P hospital admission for similar symptoms. denies CP,SOB, fever, chills, N,V,D, weakness, HA,

## 2024-01-27 NOTE — H&P ADULT - NSHPREVIEWOFSYSTEMS_GEN_ALL_CORE
REVIEW OF SYSTEMS:    CONSTITUTIONAL:  No weakness, fevers or chills  EYES/ENT:  No visual changes;  No vertigo or throat pain   NECK:  No pain or stiffness  RESPIRATORY:  No cough, wheezing, hemoptysis; No shortness of breath  CARDIOVASCULAR:  No chest pain. Only had palpitations  GASTROINTESTINAL:  No abdominal or epigastric pain. No nausea, vomiting, or hematemesis; No diarrhea or constipation. No melena or hematochezia.  GENITOURINARY:  No dysuria, frequency or hematuria  NEUROLOGICAL:  No numbness or weakness  SKIN:  No itching, rashes

## 2024-01-27 NOTE — H&P ADULT - ASSESSMENT
64 F hx of HFrEF: 35-40%, pAfib s/p MINNA/DCCV to SR supposed to be on Metoprolol, Amiodarone and Eliquis but she stopped taking her medications stating her cardiologist told her to  She presents to the ED for eval of 4 days of palpitations. She woke with them this morning which caused her concern and prompted ED visit.     #Paroxysmal Afib - non complaint   #Afib RVR  - s/p Cardizem 15, c/w Cardizem gtt   - [id number] score: 1, Therapeutic Lovenox for now , follow up cardio for need for long term AC   - Once rate controlled is maintained transition to PO Cardizem   -Trop negative x1   - MINNA/DCCV 3/2023: EF: 35-40%   - f/u repeat echo  - Check thyroid panel , lipid profile, a1c   - f/u cardio cs    #Mild dilatation of the ascending aorta (3.8 cm) on March 2023  - f/u repeat Echo    #Microcytic anemia  -1 y ago Hg 12  - On admission 10  - f/u Iron studies   - OP screening colono    #Misc:  - DVT ppx: LVX 60 BID  - GI PPX: Pantoprazole 40 QD  - Diet: DASH  - Activity/PT eval: AAT  - Code status: Full code  - Dispo: Tele 64 F hx of HFrEF: 35-40%, pAfib s/p MINNA/DCCV to SR supposed to be on Metoprolol, Amiodarone and Eliquis but she stopped taking her medications stating her cardiologist told her to  She presents to the ED for eval of 4 days of palpitations. She woke with them this morning which caused her concern and prompted ED visit.     #Paroxysmal Afib - non complaint   #Afib RVR  - s/p Cardizem 15, c/w Cardizem gtt   - [id number] score: 1, Therapeutic Lovenox for now , follow up cardio for need for long term AC   -Trop negative x1   - MINNA/DCCV 3/2023: EF: 35-40%   -   - f/u repeat echo  - Check thyroid panel , lipid profile, a1c   - f/u cardio cs    #Mild dilatation of the ascending aorta (3.8 cm) on March 2023  - f/u repeat Echo    #Microcytic anemia  -1 y ago Hg 12  - On admission 10  - f/u Iron studies   - OP screening colono    #Misc:  - DVT ppx: LVX 60 BID  - GI PPX: Pantoprazole 40 QD  - Diet: DASH  - Activity/PT eval: AAT  - Code status: Full code  - Dispo: Tele 64 F hx of HFrEF: 35-40%, pAfib s/p MINNA/DCCV to SR supposed to be on Metoprolol, Amiodarone and Eliquis but she stopped taking her medications stating her cardiologist told her to  She presents to the ED for eval of 4 days of palpitations. She woke with them this morning which caused her concern and prompted ED visit.     #Paroxysmal Afib - non complaint   #Afib RVR  - s/p Cardizem 15, then started Cardizem gtt at 10  - [id number] score: 1, Therapeutic Lovenox for now , follow up cardio for need for long term AC   -Trop negative x1   - MINNA/DCCV 3/2023: EF: 35-40%   - Start metoprolol tartrate 25 BID and wean off Cardizem in setting of hx HFrEF  - f/u repeat echo  - Check thyroid panel , lipid profile, a1c   - f/u cardio cs    #Mild dilatation of the ascending aorta (3.8 cm) on March 2023  - f/u repeat Echo    #Microcytic anemia  -1 y ago Hg 12  - On admission 10  - f/u Iron studies   - OP screening colono    #Misc:  - DVT ppx: LVX 60 BID  - GI PPX: Pantoprazole 40 QD  - Diet: DASH  - Activity/PT eval: AAT  - Code status: Full code  - Dispo: Tele

## 2024-01-27 NOTE — H&P ADULT - ATTENDING COMMENTS
HPI:  64 F hx of HFrEF: 35-40%, pAfib s/p MINNA/DCCV to SR supposed to be on Metoprolol, Amiodarone and Eliquis but she stopped taking her medications stating her cardiologist told her to.  She presents to the ED for eval of 4 days of palpitations. She woke with them this morning which caused her concern and prompted ED visit.   Last saw cardiologist 1 years ago S/P hospital admission for similar symptoms. denies CP,SOB, fever, chills, N,V,D, weakness, HA    In the ED: BP: 109/77, HR: 94, RR: 18, Temp: 98.6, SpO2: 100%    Labs:   -> WBC: 5.9, Hg: 10.6, MCV 76.5  -> BMP unremarkable   -> ALP: 133, AST/ALT: 21/35  -> trop 9    s/p 15 mg IVP Cardizem and started on Cardizem drip at 10     (27 Jan 2024 16:25)    REVIEW OF SYSTEMS: see cc/HPI  CONSTITUTIONAL: No weakness, fevers or chills  EYES/ENT: No visual changes;  No vertigo or throat pain   NECK: No pain or stiffness  RESPIRATORY: No cough, wheezing, hemoptysis; No shortness of breath  CARDIOVASCULAR: No chest pain (+) palpitations  GASTROINTESTINAL: No abdominal or epigastric pain. No nausea, vomiting, or hematemesis; No diarrhea or constipation. No melena or hematochezia.  GENITOURINARY: No dysuria, frequency or hematuria  NEUROLOGICAL: No numbness or weakness  SKIN: No itching, rashes    Physical Exam: see cc/HPI   General: WN/WD NAD  Neurology: A&Ox3, nonfocal, follows commands  Eyes: PERRLA/ EOMI  ENT/Neck: Neck supple, trachea midline, No JVD  Respiratory: CTA B/L, No wheezing, rales, rhonchi  CV: Irregular rate irregular rhythm, S1S2, no murmurs, rubs or gallops  Abdominal: Soft, NT, ND +BS,   Extremities: No edema, + peripheral pulses  Skin: No Rashes, Hematoma, Ecchymosis    A/p  P. A. Fib   A. fib w/ RVR     Microcytic anemia   -agree w/ iron studies and OP GI / colonoscopy    PATIENT SEEN by ATTENDING 1/27/24 HPI:  64 F hx of HFrEF: 35-40%, pAfib s/p MINNA/DCCV to SR supposed to be on Metoprolol, Amiodarone and Eliquis but she stopped taking her medications.  She presents to the ED for eval of 4 days of palpitations. She woke with them this morning which caused her concern and prompted ED visit.   Last saw cardiologist 1 years ago S/P hospital admission for similar symptoms. denies CP,SOB, fever, chills, N,V,D, weakness, HA  Patient non-adherent to Cardiology recommendations for follow up in the office. Stopped Rx and F/U, no recent echo as an OP.     In the ED: BP: 109/77, HR: 94, RR: 18, Temp: 98.6, SpO2: 100%    Labs:   -> WBC: 5.9, Hg: 10.6, MCV 76.5  -> BMP unremarkable   -> ALP: 133, AST/ALT: 21/35  -> trop 9    s/p 15 mg IVP Cardizem and started on Cardizem drip at 10     (27 Jan 2024 16:25)    REVIEW OF SYSTEMS: see cc/HPI  CONSTITUTIONAL: No weakness, fevers or chills  EYES/ENT: No visual changes;  No vertigo or throat pain   NECK: No pain or stiffness  RESPIRATORY: No cough, wheezing, hemoptysis; No shortness of breath  CARDIOVASCULAR: No chest pain (+) palpitations  GASTROINTESTINAL: No abdominal or epigastric pain. No nausea, vomiting, or hematemesis; No diarrhea or constipation. No melena or hematochezia.  GENITOURINARY: No dysuria, frequency or hematuria  NEUROLOGICAL: No numbness or weakness  SKIN: No itching, rashes    Physical Exam: see cc/HPI   General: WN/WD NAD  Neurology: A&Ox3, nonfocal, follows commands  Eyes: PERRLA/ EOMI  ENT/Neck: Neck supple, trachea midline, No JVD  Respiratory: CTA B/L, No wheezing, rales, rhonchi  CV: Irregular rate irregular rhythm, S1S2, no murmurs, rubs or gallops  Abdominal: Soft, NT, ND +BS,   Extremities: No edema, + peripheral pulses  Skin: No Rashes, Hematoma, Ecchymosis    A/p  P. A. Fib   A. fib w/ RVR   Cardiomyopathy/ HFrEF   - Admit to tele   -Cardiology eval appreciated   -2D echo and Entresto if EF < 40%  - start BBlocker and wean Cardizem drip  -c/w A/c    -fasting lipids / TSH / A1C    Dilated Ascending Aorta   -f/u 2d Echo      Microcytic anemia   -agree w/ iron studies and OP GI / colonoscopy    PATIENT SEEN by ATTENDING 1/27/24

## 2024-01-27 NOTE — ED PROVIDER NOTE - CLINICAL SUMMARY MEDICAL DECISION MAKING FREE TEXT BOX
64 y.o. F, PMH Of HFrEF: 35-40%, pAfib s/p MINNA/DCCV to SR supposed to be on Metoprolol, Amiodarone and Eliquis but she stopped taking her medications stating her cardiologist told her to. Pt presents to the ED for eval of 4 days of palpitations. She woke with them this morning which caused her concern and prompted ED visit. Last saw cardiologist 1 year ago S/P hospital admission for similar symptoms. Denies CP/SOB, fever/chills, N/V/D, weakness, HA. On exam, pt in NAD, AAOx3, head NC/AT, CN II-XII intact, PEERL, EOMi, neck (-) midline tenderness, lungs CTA B/L, CV S1S2 irregular/tachy, abdomen soft/NT/ND/(+)BS, ext (-) edema, motor 5/5x4, sensation intact, ambulating with steady gait. Pt found to be in a.fib with RVR. Cardizem given, drip started. Will admit for further care.

## 2024-01-27 NOTE — ED PROVIDER NOTE - CARE PLAN
1 Principal Discharge DX:	Palpitations   Principal Discharge DX:	Paroxysmal atrial fibrillation  Secondary Diagnosis:	AF (atrial fibrillation)

## 2024-01-27 NOTE — CONSULT NOTE ADULT - ASSESSMENT
Paroxysmal Afib - with RVR  - s/p Cardizem 15, then started Cardizem gtt at 10  - [id number] score: 2, Therapeutic Lovenox for now , will discharge on Eliquis 5 mg q12H. Again discussed importance of anticoagulation to prevent stroke/TIA  -Trop negative x1   - MINNA/DCCV 3/2023: EF: 35-40%   - Start metoprolol tartrate 25 BID and wean off Cardizem in setting of hx HFrEF  Get echo for evaluation of LV and valve function.  - Check thyroid panel , lipid profile, A1c     Cardiomyopathy, LVEF <40% in March 2023. she did not come for follow up Echo in the office.  Continue Metoprolol and if LVE still <40% and  BP tolerates will begin Entresto 24-26 q12H.    #Mild dilatation of the ascending aorta (3.8 cm) on March 2023  - f/u repeat Echo    #Microcytic anemia  -1 y ago Hg 12  - On admission 10  - f/u Iron studies   - OP screening colonoscopy.

## 2024-01-27 NOTE — H&P ADULT - HISTORY OF PRESENT ILLNESS
64 F hx of pAfib supposed to be on Cardizem and metoprolol but not copresenting to ED for eval of 4 days of palpitations. sts she woke with them this morning which caused her concern and prompted ED visit. last saw cardiologist 1 years ago S/P hospital admission for similar symptoms. denies CP,SOB, fever, chills, N,V,D, weakness, HA 64 F hx of HFrEF: 35-40%, pAfib s/p MINNA/DCCV to SR supposed to be on Metoprolol, Amiodarone and Eliquis but she stopped taking her medications stating her cardiologist told her to.  She presents to the ED for eval of 4 days of palpitations. She woke with them this morning which caused her concern and prompted ED visit.   Last saw cardiologist 1 years ago S/P hospital admission for similar symptoms. denies CP,SOB, fever, chills, N,V,D, weakness, HA    In the ED: BP: 109/77, HR: 94, RR: 18, Temp: 98.6, SpO2: 100%    Labs:   -> WBC: 5.9, Hg: 10.6, MCV 76.5  -> BMP unremarkable   -> ALP: 133, AST/ALT: 21/35  -> trop 9    s/p 15 mg IVP Cardizem and started on cardizem drip at 10

## 2024-01-27 NOTE — H&P ADULT - NSHPPHYSICALEXAM_GEN_ALL_CORE
T(C): 36.8 (01-27-24 @ 15:34), Max: 37 (01-27-24 @ 09:58)  HR: 128 (01-27-24 @ 15:34) (94 - 159)  BP: 151/81 (01-27-24 @ 15:34) (109/77 - 151/81)  RR: 18 (01-27-24 @ 15:34) (18 - 19)  SpO2: 99% (01-27-24 @ 15:34) (98% - 100%)    CONSTITUTIONAL: Well groomed, no apparent distress  EYES: PERRLA and symmetric, EOMI, No conjunctival or scleral injection, non-icteric  ENMT: Oral mucosa with moist membranes.  RESP: No respiratory distress, no use of accessory muscles; CTA b/l, no WRR  CV: RRR, +S1S2, no MRG; no JVD; no peripheral edema  GI: Soft, NT, ND, no rebound, no guarding; no palpable masses; no hepatosplenomegaly; no hernia palpated  MSK: Normal gait  SKIN: No rashes or ulcers noted

## 2024-01-27 NOTE — ED PROVIDER NOTE - PHYSICAL EXAMINATION
VITAL SIGNS: I have reviewed nursing notes and confirm.  CONSTITUTIONAL:  in no acute distress.  SKIN: Skin exam is warm and dry, no acute rash.  HEAD: Normocephalic; atraumatic.  EYES:  EOM intact; conjunctiva and sclera clear.  ENT: No nasal discharge; airway clear  NECK: Supple; non tender.  CARD: S1, S2 normal; no murmurs, gallops, or rubs. irregular rate and rhythm.  RESP: No wheezes, rales or rhonchi. Speaking in full sentences.   ABD: Normal bowel sounds; soft; non-distended; non-tender; No rebound or guarding. No CVA tenderness.  EXT: Normal ROM. No clubbing, cyanosis or edema.

## 2024-01-28 LAB
CHOLEST SERPL-MCNC: 167 MG/DL — SIGNIFICANT CHANGE UP
HDLC SERPL-MCNC: 36 MG/DL — LOW
IRON SATN MFR SERPL: 21 UG/DL — LOW (ref 35–150)
IRON SATN MFR SERPL: 7 % — LOW (ref 15–50)
LIPID PNL WITH DIRECT LDL SERPL: 115 MG/DL — HIGH
NON HDL CHOLESTEROL: 131 MG/DL — HIGH
TIBC SERPL-MCNC: 308 UG/DL — SIGNIFICANT CHANGE UP (ref 220–430)
TRIGL SERPL-MCNC: 84 MG/DL — SIGNIFICANT CHANGE UP
UIBC SERPL-MCNC: 287 UG/DL — SIGNIFICANT CHANGE UP (ref 110–370)

## 2024-01-28 PROCEDURE — 99233 SBSQ HOSP IP/OBS HIGH 50: CPT

## 2024-01-28 RX ORDER — ACETAMINOPHEN 500 MG
650 TABLET ORAL EVERY 6 HOURS
Refills: 0 | Status: DISCONTINUED | OUTPATIENT
Start: 2024-01-28 | End: 2024-01-31

## 2024-01-28 RX ORDER — METOPROLOL TARTRATE 50 MG
50 TABLET ORAL
Refills: 0 | Status: DISCONTINUED | OUTPATIENT
Start: 2024-01-28 | End: 2024-01-31

## 2024-01-28 RX ORDER — METOPROLOL TARTRATE 50 MG
5 TABLET ORAL ONCE
Refills: 0 | Status: COMPLETED | OUTPATIENT
Start: 2024-01-28 | End: 2024-01-28

## 2024-01-28 RX ORDER — DILTIAZEM HCL 120 MG
5 CAPSULE, EXT RELEASE 24 HR ORAL
Qty: 125 | Refills: 0 | Status: DISCONTINUED | OUTPATIENT
Start: 2024-01-28 | End: 2024-01-28

## 2024-01-28 RX ADMIN — Medication 0.5 MILLIGRAM(S): at 00:14

## 2024-01-28 RX ADMIN — Medication 10 MG/HR: at 03:24

## 2024-01-28 RX ADMIN — Medication 0.5 MILLIGRAM(S): at 15:59

## 2024-01-28 RX ADMIN — Medication 650 MILLIGRAM(S): at 22:54

## 2024-01-28 RX ADMIN — Medication 5 MILLIGRAM(S): at 15:07

## 2024-01-28 RX ADMIN — Medication 5 MG/HR: at 06:12

## 2024-01-28 RX ADMIN — Medication 50 MILLIGRAM(S): at 17:35

## 2024-01-28 NOTE — PROGRESS NOTE ADULT - ASSESSMENT
Paroxysmal Afib - with RVR  - Give additional  metoprolol tartrate 25.  Continue 25 mg BID   - [id number] score: 2, Therapeutic Lovenox for now , will discharge on Eliquis 5 mg q12H. Again discussed importance of anticoagulation to prevent stroke/TIA  -Keep NPO after midnight fro possible MINNA / Cardioversion.    - Check thyroid panel , lipid profile, A1c     Cardiomyopathy, LVEF <40% in March 2023. she did not come for follow up Echo in the office.  Continue Metoprolol and if LVE still <40% and if BP tolerates will begin Entresto 24-26 q12H.    #Mild dilatation of the ascending aorta (3.8 cm) on March 2023  - f/u repeat Echo    #Microcytic anemia  -1 y ago Hg 12  - On admission 10  - f/u Iron studies   - OP screening colonoscopy.

## 2024-01-28 NOTE — PROGRESS NOTE ADULT - ASSESSMENT
63 yo F PMHx  HFrEF: 35-40%, pAfib s/p MINNA/DCCV, currently off Metoprolol, Amiodarone and Eliquis (as per EMR her cardiologist told her stop) who presented to the ED for eval of 4 days of palpitations.     Paroxysmal Afib with RVR  - was placed on cardizem infusion while in ER, was weaned down to 5 mg, cardizem stopped this morning  - avoid Cardizem due to systolic failure  - c/w metoprolol  - pt states she does not have heart failure  - c/w Lovenox  - MINNA/DCCV 3/2023: EF: 35-40%   - f/u repeat echo  - Check thyroid panel , lipid profile, a1c   - f/u cardio appreciated    Chronic HFrEF  - seen on echo from 3/2023  - echo was done after RVR on prior admission  - possibly tachycardia induced?  - has not had ischemic work up or repeat echo  - check echo  - not on any HF meds    Mild dilatation of the ascending aorta (3.8 cm) on March 2023  - f/u repeat Echo    Microcytic anemia  -1 y ago Hg 12  - On admission 10  - f/u Iron studies   - OP screening colono    DVT px    #Progress Note Handoff:  Pending (specify):  Echo, monitor on telemetry  Family discussion: As above  Disposition: Home_x__/SNF___/Other________/Unknown at this time________

## 2024-01-28 NOTE — PATIENT PROFILE ADULT - FALL HARM RISK - HARM RISK INTERVENTIONS

## 2024-01-28 NOTE — PROGRESS NOTE ADULT - ASSESSMENT
· Assessment	  64 F hx of HFrEF: 35-40%, pAfib s/p MINNA/DCCV to SR supposed to be on Metoprolol, Amiodarone and Eliquis but she stopped taking her medications stating her cardiologist told her to  She presents to the ED for eval of 4 days of palpitations. She woke with them this morning which caused her concern and prompted ED visit.     #Paroxysmal Afib - non complaint   #Afib RVR  - s/p Cardizem 15, then started Cardizem gtt at 10  - [id number] score: 1, Therapeutic Lovenox for now , follow up cardio for need for long term AC   -Trop negative x1   - MINNA/DCCV 3/2023: EF: 35-40%   - Start metoprolol tartrate 25 BID and wean off Cardizem in setting of hx HFrEF  - f/u repeat echo  - Check thyroid panel , lipid profile, a1c   - f/u cardio cs    #Mild dilatation of the ascending aorta (3.8 cm) on March 2023  - f/u repeat Echo    #Microcytic anemia  -1 y ago Hg 12  - On admission 10  - f/u Iron studies   - OP screening colono    #Misc:  - DVT ppx: LVX 60 BID  - GI PPX: Pantoprazole 40 QD  - Diet: DASH  - Activity/PT eval: AAT  - Code status: Full code  - Dispo: Tele

## 2024-01-29 LAB
A1C WITH ESTIMATED AVERAGE GLUCOSE RESULT: 5.5 % — SIGNIFICANT CHANGE UP (ref 4–5.6)
ESTIMATED AVERAGE GLUCOSE: 111 MG/DL — SIGNIFICANT CHANGE UP (ref 68–114)
FERRITIN SERPL-MCNC: 5 NG/ML — LOW (ref 13–330)
TSH SERPL-MCNC: 2.64 UIU/ML — SIGNIFICANT CHANGE UP (ref 0.27–4.2)

## 2024-01-29 PROCEDURE — 93306 TTE W/DOPPLER COMPLETE: CPT | Mod: 26

## 2024-01-29 PROCEDURE — 99233 SBSQ HOSP IP/OBS HIGH 50: CPT

## 2024-01-29 RX ORDER — APIXABAN 2.5 MG/1
5 TABLET, FILM COATED ORAL
Refills: 0 | Status: DISCONTINUED | OUTPATIENT
Start: 2024-01-29 | End: 2024-01-31

## 2024-01-29 RX ORDER — METOPROLOL TARTRATE 50 MG
50 TABLET ORAL ONCE
Refills: 0 | Status: COMPLETED | OUTPATIENT
Start: 2024-01-29 | End: 2024-01-29

## 2024-01-29 RX ORDER — METOPROLOL TARTRATE 50 MG
5 TABLET ORAL ONCE
Refills: 0 | Status: DISCONTINUED | OUTPATIENT
Start: 2024-01-29 | End: 2024-01-29

## 2024-01-29 RX ORDER — METOPROLOL TARTRATE 50 MG
5 TABLET ORAL ONCE
Refills: 0 | Status: COMPLETED | OUTPATIENT
Start: 2024-01-29 | End: 2024-01-29

## 2024-01-29 RX ADMIN — Medication 50 MILLIGRAM(S): at 11:40

## 2024-01-29 RX ADMIN — APIXABAN 5 MILLIGRAM(S): 2.5 TABLET, FILM COATED ORAL at 17:46

## 2024-01-29 RX ADMIN — Medication 50 MILLIGRAM(S): at 05:23

## 2024-01-29 RX ADMIN — APIXABAN 5 MILLIGRAM(S): 2.5 TABLET, FILM COATED ORAL at 11:03

## 2024-01-29 RX ADMIN — Medication 650 MILLIGRAM(S): at 01:08

## 2024-01-29 RX ADMIN — Medication 50 MILLIGRAM(S): at 17:46

## 2024-01-29 RX ADMIN — Medication 0.5 MILLIGRAM(S): at 20:48

## 2024-01-29 NOTE — PROGRESS NOTE ADULT - ASSESSMENT
63 yo F PMHx  HFrEF: 35-40%, pAfib s/p MINNA/DCCV, currently off Metoprolol, Amiodarone and Eliquis (as per EMR her cardiologist told her stop) who presented to the ED for eval of 4 days of palpitations.     Paroxysmal Afib with RVR  - was placed on cardizem infusion while in ER, was weaned down to 5 mg, cardizem stopped this morning  - avoid Cardizem due to systolic failure  - c/w metoprolol  - pt states she does not have heart failure  - c/w Lovenox  - MINNA/DCCV 3/2023: EF: 35-40%   - f/u repeat echo--ef was normal 53%  - Check thyroid panel , lipid profile, a1c   - f/u cardio appreciated--planned for dccv tomorrow  - rvr this morning-metoprolol increased, monitor    Chronic HFrEF  - seen on echo from 3/2023  - echo was done after RVR on prior admission  - possibly tachycardia induced?  - has not had ischemic work up or repeat echo  - check echo -- ef now normal  - not on any HF meds    Mild dilatation of the ascending aorta (3.8 cm) on March 2023  - f/u outpt    Microcytic anemia  -1 y ago Hg 12  - On admission 10  - f/u Iron studies   - OP screening colono    DVT px    #Progress Note Handoff:  Pending (specify):  Echo, monitor on telemetry  Family discussion: As above  Disposition: Home_x__/SNF___/Other________/Unknown at this time________

## 2024-01-30 LAB
ALBUMIN SERPL ELPH-MCNC: 4.4 G/DL — SIGNIFICANT CHANGE UP (ref 3.5–5.2)
ALP SERPL-CCNC: 145 U/L — HIGH (ref 30–115)
ALT FLD-CCNC: 24 U/L — SIGNIFICANT CHANGE UP (ref 0–41)
ANION GAP SERPL CALC-SCNC: 14 MMOL/L — SIGNIFICANT CHANGE UP (ref 7–14)
AST SERPL-CCNC: 17 U/L — SIGNIFICANT CHANGE UP (ref 0–41)
BASOPHILS # BLD AUTO: 0.04 K/UL — SIGNIFICANT CHANGE UP (ref 0–0.2)
BASOPHILS NFR BLD AUTO: 0.6 % — SIGNIFICANT CHANGE UP (ref 0–1)
BILIRUB SERPL-MCNC: 0.4 MG/DL — SIGNIFICANT CHANGE UP (ref 0.2–1.2)
BUN SERPL-MCNC: 11 MG/DL — SIGNIFICANT CHANGE UP (ref 10–20)
CALCIUM SERPL-MCNC: 9.5 MG/DL — SIGNIFICANT CHANGE UP (ref 8.4–10.5)
CHLORIDE SERPL-SCNC: 106 MMOL/L — SIGNIFICANT CHANGE UP (ref 98–110)
CO2 SERPL-SCNC: 23 MMOL/L — SIGNIFICANT CHANGE UP (ref 17–32)
CREAT SERPL-MCNC: 1 MG/DL — SIGNIFICANT CHANGE UP (ref 0.7–1.5)
EGFR: 63 ML/MIN/1.73M2 — SIGNIFICANT CHANGE UP
EOSINOPHIL # BLD AUTO: 0.16 K/UL — SIGNIFICANT CHANGE UP (ref 0–0.7)
EOSINOPHIL NFR BLD AUTO: 2.4 % — SIGNIFICANT CHANGE UP (ref 0–8)
GLUCOSE SERPL-MCNC: 113 MG/DL — HIGH (ref 70–99)
HCT VFR BLD CALC: 39.2 % — SIGNIFICANT CHANGE UP (ref 37–47)
HGB BLD-MCNC: 12.1 G/DL — SIGNIFICANT CHANGE UP (ref 12–16)
IMM GRANULOCYTES NFR BLD AUTO: 0.1 % — SIGNIFICANT CHANGE UP (ref 0.1–0.3)
LYMPHOCYTES # BLD AUTO: 2.14 K/UL — SIGNIFICANT CHANGE UP (ref 1.2–3.4)
LYMPHOCYTES # BLD AUTO: 31.5 % — SIGNIFICANT CHANGE UP (ref 20.5–51.1)
MCHC RBC-ENTMCNC: 23.3 PG — LOW (ref 27–31)
MCHC RBC-ENTMCNC: 30.9 G/DL — LOW (ref 32–37)
MCV RBC AUTO: 75.5 FL — LOW (ref 81–99)
MONOCYTES # BLD AUTO: 0.64 K/UL — HIGH (ref 0.1–0.6)
MONOCYTES NFR BLD AUTO: 9.4 % — HIGH (ref 1.7–9.3)
NEUTROPHILS # BLD AUTO: 3.8 K/UL — SIGNIFICANT CHANGE UP (ref 1.4–6.5)
NEUTROPHILS NFR BLD AUTO: 56 % — SIGNIFICANT CHANGE UP (ref 42.2–75.2)
NRBC # BLD: 0 /100 WBCS — SIGNIFICANT CHANGE UP (ref 0–0)
PLATELET # BLD AUTO: 383 K/UL — SIGNIFICANT CHANGE UP (ref 130–400)
PMV BLD: 11.8 FL — HIGH (ref 7.4–10.4)
POTASSIUM SERPL-MCNC: 4.2 MMOL/L — SIGNIFICANT CHANGE UP (ref 3.5–5)
POTASSIUM SERPL-SCNC: 4.2 MMOL/L — SIGNIFICANT CHANGE UP (ref 3.5–5)
PROT SERPL-MCNC: 7.1 G/DL — SIGNIFICANT CHANGE UP (ref 6–8)
RBC # BLD: 5.19 M/UL — SIGNIFICANT CHANGE UP (ref 4.2–5.4)
RBC # FLD: 17.3 % — HIGH (ref 11.5–14.5)
SODIUM SERPL-SCNC: 143 MMOL/L — SIGNIFICANT CHANGE UP (ref 135–146)
WBC # BLD: 6.79 K/UL — SIGNIFICANT CHANGE UP (ref 4.8–10.8)
WBC # FLD AUTO: 6.79 K/UL — SIGNIFICANT CHANGE UP (ref 4.8–10.8)

## 2024-01-30 PROCEDURE — 99232 SBSQ HOSP IP/OBS MODERATE 35: CPT

## 2024-01-30 RX ORDER — METOPROLOL TARTRATE 50 MG
25 TABLET ORAL ONCE
Refills: 0 | Status: COMPLETED | OUTPATIENT
Start: 2024-01-30 | End: 2024-01-30

## 2024-01-30 RX ORDER — DILTIAZEM HCL 120 MG
30 CAPSULE, EXT RELEASE 24 HR ORAL
Refills: 0 | Status: DISCONTINUED | OUTPATIENT
Start: 2024-01-30 | End: 2024-01-31

## 2024-01-30 RX ADMIN — Medication 30 MILLIGRAM(S): at 23:10

## 2024-01-30 RX ADMIN — Medication 650 MILLIGRAM(S): at 17:22

## 2024-01-30 RX ADMIN — Medication 0.5 MILLIGRAM(S): at 21:03

## 2024-01-30 RX ADMIN — Medication 30 MILLIGRAM(S): at 17:24

## 2024-01-30 RX ADMIN — Medication 50 MILLIGRAM(S): at 17:24

## 2024-01-30 RX ADMIN — Medication 650 MILLIGRAM(S): at 18:23

## 2024-01-30 RX ADMIN — APIXABAN 5 MILLIGRAM(S): 2.5 TABLET, FILM COATED ORAL at 05:59

## 2024-01-30 RX ADMIN — Medication 25 MILLIGRAM(S): at 05:59

## 2024-01-30 RX ADMIN — APIXABAN 5 MILLIGRAM(S): 2.5 TABLET, FILM COATED ORAL at 17:24

## 2024-01-30 RX ADMIN — Medication 30 MILLIGRAM(S): at 12:08

## 2024-01-30 NOTE — PROGRESS NOTE ADULT - SUBJECTIVE AND OBJECTIVE BOX
24H events:    Patient is a 64y old Female who presents with a chief complaint of Afib with RVR (29 Jan 2024 15:44)  Primary diagnosis of Palpitations        Today is 3d of hospitalization. This morning patient was seen and examined at bedside, resting comfortably in bed.    No acute or major events overnight.    Patient was scheduled for MINNA with cardioversion this morning but patient states she does not want to do it today and wants to "try Cardizem first" and go for cardioversion tomorrow. We thoroughly discussed it with her (with Dr Morales) that Cardizem will only control the rate, but patient insists understanding her wishes.     Code Status:    Family communication:  Contact date:  Name of person contacted:  Relationship to patient:  Communication details:  What matters most:    PAST MEDICAL & SURGICAL HISTORY  Anxiety    Afib    H/O neck surgery      SOCIAL HISTORY:  Social History:      ALLERGIES:  Toradol (Rash)    MEDICATIONS:  STANDING MEDICATIONS  apixaban 5 milliGRAM(s) Oral two times a day  diltiazem    Tablet 30 milliGRAM(s) Oral four times a day  metoprolol tartrate 50 milliGRAM(s) Oral two times a day    PRN MEDICATIONS  acetaminophen     Tablet .. 650 milliGRAM(s) Oral every 6 hours PRN  LORazepam     Tablet 0.5 milliGRAM(s) Oral at bedtime PRN    VITALS:   T(F): 96.3  HR: 95  BP: 106/69  RR: 19  SpO2: --    PHYSICAL EXAM:  GENERAL:   ( x) NAD, lying in bed comfortably     (  ) obtunded     (  ) lethargic     (  ) somnolent    HEAD:   ( x) Atraumatic     (  ) hematoma     (  ) laceration (specify location:       )     HEART:  Rate -->     ( ) normal rate     (  ) bradycardic     ( x ) tachycardic  Rhythm -->     (x) regular     (  ) regularly irregular     (  ) irregularly irregular  Murmurs -->     (x) normal s1s2     (  ) systolic murmur     (  ) diastolic murmur     (  ) continuous murmur      (  ) S3 present     (  ) S4 present    LUNGS:   ( x)Unlabored respirations     (  ) tachypnea  ( x) B/L air entry     (  ) decreased breath sounds in:  (location     )    ( x) no adventitious sound     (  ) crackles     (  ) wheezing      (  ) rhonchi      (specify location:       )  (  ) chest wall tenderness (specify location:       )    ABDOMEN:   ( x) Soft     (  ) tense   |   (  ) nondistended     (  ) distended   |   (  ) +BS     (  ) hypoactive bowel sounds     (  ) hyperactive bowel sounds  ( x) nontender     (  ) RUQ tenderness     (  ) RLQ tenderness     (  ) LLQ tenderness     (  ) epigastric tenderness     (  ) diffuse tenderness  (  ) Splenomegaly      (  ) Hepatomegaly      (  ) Jaundice     (  ) ecchymosis     EXTREMITIES:  ( x) Normal     (  ) Rash     (  ) ecchymosis     (  ) varicose veins      (  ) pitting edema     (  ) non-pitting edema   (  ) ulceration     (  ) gangrene:     (location:     )    NERVOUS SYSTEM:    ( x) A&Ox3     (  ) confused     (  ) lethargic        LABS:                        12.1   6.79  )-----------( 383      ( 30 Jan 2024 07:57 )             39.2     01-30    143  |  106  |  11  ----------------------------<  113<H>  4.2   |  23  |  1.0    Ca    9.5      30 Jan 2024 07:57    TPro  7.1  /  Alb  4.4  /  TBili  0.4  /  DBili  x   /  AST  17  /  ALT  24  /  AlkPhos  145<H>  01-30      Urinalysis Basic - ( 30 Jan 2024 07:57 )    Color: x / Appearance: x / SG: x / pH: x  Gluc: 113 mg/dL / Ketone: x  / Bili: x / Urobili: x   Blood: x / Protein: x / Nitrite: x   Leuk Esterase: x / RBC: x / WBC x   Sq Epi: x / Non Sq Epi: x / Bacteria: x                RADIOLOGY:  No radiology in past 24h    ASSESSMENT AND PLAN  64 F hx of HFrEF: 35-40%, pAfib s/p MINNA/DCCV to SR supposed to be on Metoprolol, Amiodarone and Eliquis but she stopped taking her medications stating her cardiologist told her to. She presents to the ED for eval of 4 days of palpitations. She woke with them this morning which caused her concern and prompted ED visit.     #Paroxysmal Afib - non complaint   #Afib RVR  - s/p Cardizem 15, then started Cardizem gtt at 10  - [id number] score: 1, Therapeutic Lovenox started, follow up cardio for need for long term AC   - Trop 9   - MINNA/DCCV 3/2023: EF: 35-40%  - Started metoprolol tartrate 25 BID and wean off Cardizem in setting of hx HFrEF (1/27-1/28)  - On Metoprolol 50gm q6h  - TTE 1/29: EF >75%, hyperdynamic   - A1c 5.5, TSH 2.64  - Lipid profile: Chol 167, TG 84, , NonHDL 131, HDL 36  - 1/29: Patient refusing her lovenox => switched to Eliquis   - Cardio Dr Thomas following, MINNA with DCCV planned 1/30 AM, NPO after midnight  - 1/30: Patient was scheduled for MINNA with cardioversion this morning but patient states she does not want to do it today and wants to "try Cardizem first" and go for cardioversion tomorrow.  - Informed Dr Thomas, will schedule MINNA with DCCV for Wed 1/31  - 1/30: Started Cardizem 30mg four times a day - patient very insisting on trying Cardizem (hold if BP drops)    #Microcytic anemia  - 1 y ago Hg 12  - On admission 10  - f/u Iron studies   - OP screening colono          #Misc:  - DVT ppx: Eliquis 5mg BID  - GI PPX: Pantoprazole 40 QD  - Diet: DASH  - Activity/PT eval: AAT  - Code status: Full code  - Dispo: Tele    #Handoff  - MINNA with DCCV tomorrow 1/31  - NPO after midnight                    
Patient was seen and examined by me earlier this morning on 3C.    She is comfortable in bed.  She declining to have MINNA directed DCCV.    Vitals:  T(C): 35.8 (01-30-24 @ 12:44), Max: 37.2 (01-28-24 @ 20:24)  HR: 95 (01-30-24 @ 09:04) (56 - 150)  BP: 112/81 (01-30-24 @ 12:44) (98/70 - 132/99)  RR: 18 (01-30-24 @ 12:44) (18 - 19)  SpO2: --    Telemetry: Atrial Fi    LABS:                        12.1   6.79  )-----------( 383      ( 30 Jan 2024 07:57 )             39.2     01-30    143  |  106  |  11  ----------------------------<  113<H>  4.2   |  23  |  1.0    Ca    9.5      30 Jan 2024 07:57    TPro  7.1  /  Alb  4.4  /  TBili  0.4  /  DBili  x   /  AST  17  /  ALT  24  /  AlkPhos  145<H>  01-30      MEDICATIONS  (STANDING):  apixaban 5 milliGRAM(s) Oral two times a day  diltiazem    Tablet 30 milliGRAM(s) Oral four times a day  metoprolol tartrate 50 milliGRAM(s) Oral two times a day    MEDICATIONS  (PRN):  acetaminophen     Tablet .. 650 milliGRAM(s) Oral every 6 hours PRN Mild Pain (1 - 3)  LORazepam     Tablet 0.5 milliGRAM(s) Oral at bedtime PRN Anxiety      PHYSICAL EXAM:  Constitutional: appears stated age, well developed/nourished, no acute distress  Eyes: EOM's intact.  PERRLA  ENMT: Normocephalic, atraumatic.    Neck: Jugular veins non-distended; no carotid bruits bilaterally.  Respiratory:  lungs clear to auscultation bilaterally.  Cardiovascular: Irregular rhythm.  S1 and S2 normal.  No murmur nor rub appreciated.  Abdomen: Soft, non-tender.  Normal bowel sounds.  Extremities: extremities warm; noedema.  Pulses: Intact bilaterally  Skin: No gross abnormalities noted.  Musculoskeletal: No gross deformities  Neurological: Alert, oriented x 3.  No focal neurologic deficits noted.    < from: TTE Echo Complete w/o Contrast w/ Doppler (01.29.24 @ 09:30) >  Summary:   1. LV Ejection Fraction by Rangel's Method with a biplane EF of 53 %.   2. Normal global left ventricular systolic function.   3. The left ventricular diastolic function could not be assessed in this   study.   4. Moderately enlarged left atrium.   5. Mild tricuspid regurgitation.    PHYSICIAN INTERPRETATION:  Left Ventricle: The left ventricular internal cavity size is normal. Left   ventricular wall thickness is normal. Global LV systolic function was   normal. The left ventricular diastolic function could not be assessedin   this study.  Right Ventricle: Normal right ventricular size and function.  Left Atrium: Moderately enlarged left atrium.  Right Atrium: Normal right atrial size.  Pericardium: There is no evidence of pericardial effusion.  Mitral Valve: Structurally normal mitral valve, with normal leaflet   excursion. No evidence of mitral stenosis. Trivial mitral regurgitation.  Tricuspid Valve: Structurally normal tricuspid valve, with normal leaflet   excursion. Mild tricuspid regurgitation is visualized.  Aortic Valve: Normal trileaflet aortic valve with normal opening. No   evidence of aortic stenosis. No aortic regurgitation.  Pulmonic Valve: No pulmonic regurgitation.  Aorta: The aortic root and ascending aorta are normal in size.  Venous: The inferior vena cava was normal sized, with respiratory size   variation less than 50%.    < end of copied text >        
CHIEF COMPLAINT:    Patient is a 64y old  Female who presents with a chief complaint of Afib with RVR     INTERVAL HPI/OVERNIGHT EVENTS:    Patient seen and examined at bedside. No acute overnight events occurred.    ROS: Denies chest pain. All other systems are negative.    Medications:  Standing  apixaban 5 milliGRAM(s) Oral two times a day  diltiazem    Tablet 30 milliGRAM(s) Oral four times a day  metoprolol tartrate 50 milliGRAM(s) Oral two times a day    PRN Meds  acetaminophen     Tablet .. 650 milliGRAM(s) Oral every 6 hours PRN  LORazepam     Tablet 0.5 milliGRAM(s) Oral at bedtime PRN        Vital Signs:    T(F): 96.3 (24 @ 05:00), Max: 97.7 (24 @ 20:38)  HR: 95 (24 @ 09:04) (56 - 95)  BP: 106/69 (24 @ 09:04) (98/70 - 118/98)  RR: 19 (24 @ 05:00) (18 - 19)  SpO2: --  I&O's Summary    2024 07:01  -  2024 07:00  --------------------------------------------------------  IN: 820 mL / OUT: 0 mL / NET: 820 mL      Daily     Daily Weight in k.4 (2024 05:00)  CAPILLARY BLOOD GLUCOSE          PHYSICAL EXAM:  GENERAL:  NAD  SKIN: No rashes or lesions  HEENT: Atraumatic. Normocephalic. Anicteric  NECK:  No JVD.   PULMONARY: Clear to ausculation bilaterally. No wheezing. No rales  CVS: Normal S1, S2. iregular rate and rhythm. No murmurs.  ABDOMEN/GI: Soft, Nontender, Nondistended; Bowel sounds are present  EXTREMITIES:  No edema B/L LE.  NEUROLOGIC:  No motor deficit.  PSYCH: Alert & oriented x 3, normal affect      LABS:                        12.1   6.79  )-----------( 383      ( 2024 07:57 )             39.2         143  |  106  |  11  ----------------------------<  113<H>  4.2   |  23  |  1.0    Ca    9.5      2024 07:57    TPro  7.1  /  Alb  4.4  /  TBili  0.4  /  DBili  x   /  AST  17  /  ALT  24  /  AlkPhos  145<H>                RADIOLOGY & ADDITIONAL TESTS:  Imaging or report Personally Reviewed:  [ ] YES  [ ] NO -->no new images    Telemetry reviewed independently - afib rvr  EKG reviewed independently -->no new EKGs    Consultant(s) Notes Reviewed:  [ ] YES  [ ] NO  Care Discussed with Consultants/Other Providers [ ] YES  [ ] NO    Case discussed with resident  Care discussed with pt        
24H events:    Patient is a 64y old Female who presents with a chief complaint of Afib with RVR (27 Jan 2024 22:43)    Primary diagnosis of Palpitations    Today is hospital day 1d. This morning patient was seen and examined at bedside, resting comfortably in bed.    No acute or major events overnight. Hemodynamically stable, tolerating oral diet, voiding appropriately with appropriate bowel movements.     Code Status: full code      PAST MEDICAL & SURGICAL HISTORY  Anxiety    Afib    H/O neck surgery      SOCIAL HISTORY:  Social History:      ALLERGIES:  Toradol (Rash)    MEDICATIONS:  STANDING MEDICATIONS  enoxaparin Injectable 60 milliGRAM(s) SubCutaneous every 12 hours  metoprolol tartrate 25 milliGRAM(s) Oral two times a day    PRN MEDICATIONS  LORazepam     Tablet 0.5 milliGRAM(s) Oral at bedtime PRN    VITALS:   T(F): 97.2  HR: 72  BP: 91/61  RR: 18  SpO2: 98%    PHYSICAL EXAM:  CONSTITUTIONAL: Well groomed, no apparent distress  EYES: PERRLA and symmetric, EOMI, No conjunctival or scleral injection, non-icteric  ENMT: Oral mucosa with moist membranes.  RESP: No respiratory distress, no use of accessory muscles; CTA b/l, no WRR  CV: RRR, +S1S2, no MRG; no JVD; no peripheral edema  GI: Soft, NT, ND, no rebound, no guarding; no palpable masses; no hepatosplenomegaly; no hernia palpated  MSK: Normal gait  SKIN: No rashes or ulcers noted      LABS:                        10.6   5.95  )-----------( 329      ( 27 Jan 2024 10:50 )             33.9     01-27    138  |  107  |  13  ----------------------------<  107<H>  4.0   |  20  |  0.9    Ca    8.9      27 Jan 2024 10:50    TPro  6.5  /  Alb  4.2  /  TBili  0.4  /  DBili  x   /  AST  21  /  ALT  35  /  AlkPhos  133<H>  01-27      Urinalysis Basic - ( 27 Jan 2024 10:50 )    Color: x / Appearance: x / SG: x / pH: x  Gluc: 107 mg/dL / Ketone: x  / Bili: x / Urobili: x   Blood: x / Protein: x / Nitrite: x   Leuk Esterase: x / RBC: x / WBC x   Sq Epi: x / Non Sq Epi: x / Bacteria: x                RADIOLOGY:    RADIOLOGY    
24H events:    Patient is a 64y old Female who presents with a chief complaint of Afib with RVR (28 Jan 2024 22:38)    Primary diagnosis of Palpitations      Today is 2d of hospitalization. This morning patient was seen and examined at bedside, resting comfortably in bed.    No acute or major events overnight.    Code Status:    Family communication:  Contact date:  Name of person contacted:  Relationship to patient:  Communication details:  What matters most:    PAST MEDICAL & SURGICAL HISTORY  Anxiety    Afib    H/O neck surgery      SOCIAL HISTORY:  Social History:      ALLERGIES:  Toradol (Rash)    MEDICATIONS:  STANDING MEDICATIONS  apixaban 5 milliGRAM(s) Oral two times a day  metoprolol tartrate 50 milliGRAM(s) Oral two times a day    PRN MEDICATIONS  acetaminophen     Tablet .. 650 milliGRAM(s) Oral every 6 hours PRN  LORazepam     Tablet 0.5 milliGRAM(s) Oral at bedtime PRN    VITALS:   T(F): 96.9  HR: 56  BP: 118/98  RR: 18  SpO2: --    PHYSICAL EXAM:  GENERAL:   ( x) NAD, lying in bed comfortably     (  ) obtunded     (  ) lethargic     (  ) somnolent    HEAD:   ( x) Atraumatic     (  ) hematoma     (  ) laceration (specify location:       )     NECK:  (x) Supple     (  ) neck stiffness     (  ) nuchal rigidity     (  )  no JVD     (  ) JVD present ( -- cm)    HEART:  Rate -->     (x) normal rate     (  ) bradycardic     (  ) tachycardic  Rhythm -->     (x) regular     (  ) regularly irregular     (  ) irregularly irregular  Murmurs -->     (x) normal s1s2     (  ) systolic murmur     (  ) diastolic murmur     (  ) continuous murmur      (  ) S3 present     (  ) S4 present    LUNGS:   ( x)Unlabored respirations     (  ) tachypnea  ( x) B/L air entry     (  ) decreased breath sounds in:  (location     )    ( x) no adventitious sound     (  ) crackles     (  ) wheezing      (  ) rhonchi      (specify location:       )  (  ) chest wall tenderness (specify location:       )    ABDOMEN:   ( x) Soft     (  ) tense   |   (  ) nondistended     (  ) distended   |   (  ) +BS     (  ) hypoactive bowel sounds     (  ) hyperactive bowel sounds  ( x) nontender     (  ) RUQ tenderness     (  ) RLQ tenderness     (  ) LLQ tenderness     (  ) epigastric tenderness     (  ) diffuse tenderness  (  ) Splenomegaly      (  ) Hepatomegaly      (  ) Jaundice     (  ) ecchymosis     EXTREMITIES:  ( x) Normal     (  ) Rash     (  ) ecchymosis     (  ) varicose veins      (  ) pitting edema     (  ) non-pitting edema   (  ) ulceration     (  ) gangrene:     (location:     )    NERVOUS SYSTEM:    ( x) A&Ox3     (  ) confused     (  ) lethargic      LABS:                        RADIOLOGY:  No radiology in past 24h    ASSESSMENT AND PLAN  64 F hx of HFrEF: 35-40%, pAfib s/p MINNA/DCCV to SR supposed to be on Metoprolol, Amiodarone and Eliquis but she stopped taking her medications stating her cardiologist told her to. She presents to the ED for eval of 4 days of palpitations. She woke with them this morning which caused her concern and prompted ED visit.     #Paroxysmal Afib - non complaint   #Afib RVR  - s/p Cardizem 15, then started Cardizem gtt at 10  - [id number] score: 1, Therapeutic Lovenox started, follow up cardio for need for long term AC   - Trop 9   - MINNA/DCCV 3/2023: EF: 35-40%  - Started metoprolol tartrate 25 BID and wean off Cardizem in setting of hx HFrEF (1/27-1/28)  - On Metoprolol 50gm q6h  - TTE 1/29: EF >75%, hyperdynamic   - A1c 5.5, TSH 2.64  - Lipid profile: Chol 167, TG 84, , NonHDL 131, HDL 36  - 1/29: Patient refusing her lovenox => switched to Eliquis   - Cardio Dr Thomas following, MINNA with DCCV planned 1/30 AM, NPO after midnight    #Microcytic anemia  - 1 y ago Hg 12  - On admission 10  - f/u Iron studies   - OP screening colono          #Misc:  - DVT ppx: Eliquis 5mg BID  - GI PPX: Pantoprazole 40 QD  - Diet: DASH  - Activity/PT eval: AAT  - Code status: Full code  - Dispo: Tele    #Handoff  - MINNA with DCCV tomorrow 1/30  - NPO after midnight            
CHIEF COMPLAINT:    Patient is a 64y old  Female who presents with a chief complaint of Afib with RVR     INTERVAL HPI/OVERNIGHT EVENTS:    Patient seen and examined at bedside. No acute overnight events occurred.    ROS: Denies chest pain. All other systems are negative.    Medications:  Standing  apixaban 5 milliGRAM(s) Oral two times a day  metoprolol tartrate 50 milliGRAM(s) Oral two times a day    PRN Meds  acetaminophen     Tablet .. 650 milliGRAM(s) Oral every 6 hours PRN  LORazepam     Tablet 0.5 milliGRAM(s) Oral at bedtime PRN        Vital Signs:    T(F): 96.9 (24 @ 12:53), Max: 98.9 (24 @ 20:24)  HR: 56 (24 @ 12:53) (56 - 94)  BP: 118/98 (24 @ 12:53) (114/74 - 126/94)  RR: 18 (24 @ 04:00) (18 - 18)  SpO2: --  I&O's Summary    2024 07:01  -  2024 15:44  --------------------------------------------------------  IN: 660 mL / OUT: 0 mL / NET: 660 mL      Daily     Daily Weight in k.5 (2024 04:00)  CAPILLARY BLOOD GLUCOSE          PHYSICAL EXAM:  GENERAL:  NAD  SKIN: No rashes or lesions  HEENT: Atraumatic. Normocephalic. Anicteric  NECK:  No JVD.   PULMONARY: Clear to ausculation bilaterally. No wheezing. No rales  CVS: Normal S1, S2. Regular rate and rhythm. No murmurs.  ABDOMEN/GI: Soft, Nontender, Nondistended; Bowel sounds are present  EXTREMITIES:  No edema B/L LE.  NEUROLOGIC:  No motor deficit.  PSYCH: Alert & oriented x 3, normal affect      LABS:                    RADIOLOGY & ADDITIONAL TESTS:  Imaging or report Personally Reviewed:  [ ] YES  [ ] NO -->no new images    Telemetry reviewed independently - afib  EKG reviewed independently -->no new EKGs    Consultant(s) Notes Reviewed:  [ ] YES  [ ] NO  Care Discussed with Consultants/Other Providers [ ] YES  [ ] NO    Case discussed with resident  Care discussed with pt        
CHIEF COMPLAINT:    Patient is a 64y old  Female who presents with a chief complaint of Afib with RVR     INTERVAL HPI/OVERNIGHT EVENTS:    Patient seen and examined at bedside. No acute overnight events occurred.    ROS: Denies chest pain. SOB. All other systems are negative.    Medications:  Standing  enoxaparin Injectable 60 milliGRAM(s) SubCutaneous every 12 hours  metoprolol tartrate 25 milliGRAM(s) Oral two times a day    PRN Meds  LORazepam     Tablet 0.5 milliGRAM(s) Oral at bedtime PRN        Vital Signs:    T(F): 98 (24 @ 12:48), Max: 98.2 (24 @ 15:34)  HR: 104 (24 @ 12:48) (63 - 128)  BP: 125/76 (24 @ 12:48) (91/61 - 151/81)  RR: 18 (24 @ 12:48) (18 - 19)  SpO2: 98% (24 @ 00:53) (98% - 99%)  I&O's Summary    Daily     Daily Weight in k.5 (2024 04:45)  CAPILLARY BLOOD GLUCOSE          PHYSICAL EXAM:  GENERAL:  NAD  SKIN: No rashes or lesions  HEENT: Atraumatic. Normocephalic. Anicteric  NECK:  No JVD.   PULMONARY: Clear to ausculation bilaterally. No wheezing. No rales  CVS: Normal S1, S2. Regular rate and rhythm. No murmurs.  ABDOMEN/GI: Soft, Nontender, Nondistended; Bowel sounds are present  EXTREMITIES:  No edema B/L LE.  NEUROLOGIC:  No motor deficit.  PSYCH: Alert & oriented x 3, normal affect      LABS:                        10.6   5.95  )-----------( 329      ( 2024 10:50 )             33.9         138  |  107  |  13  ----------------------------<  107<H>  4.0   |  20  |  0.9    Ca    8.9      2024 10:50    TPro  6.5  /  Alb  4.2  /  TBili  0.4  /  DBili  x   /  AST  21  /  ALT  35  /  AlkPhos  133<H>                RADIOLOGY & ADDITIONAL TESTS:  Imaging or report Personally Reviewed:  [ ] YES  [ ] NO -->no new images    Telemetry reviewed independently - NSR, no acute events  EKG reviewed independently -->no new EKGs    Consultant(s) Notes Reviewed:  [ ] YES  [ ] NO  Care Discussed with Consultants/Other Providers [ ] YES  [ ] NO    Case discussed with resident  Care discussed with pt        
Patient's chart reviewed and patient examined by Froylan Thomas MD (contact:[phone])    SUBJ: denies palpitations or cp.    Events in last 24 hours: AFib persists    MEDICATIONS  (STANDING):  enoxaparin Injectable 60 milliGRAM(s) SubCutaneous every 12 hours  metoprolol tartrate 50 milliGRAM(s) Oral two times a day    MEDICATIONS  (PRN):  LORazepam     Tablet 0.5 milliGRAM(s) Oral at bedtime PRN Anxiety    Vital Signs Last 24 Hrs  T(C): 37.2 (28 Jan 2024 20:24), Max: 37.2 (28 Jan 2024 20:24)  T(F): 98.9 (28 Jan 2024 20:24), Max: 98.9 (28 Jan 2024 20:24)  HR: 94 (28 Jan 2024 20:24) (63 - 150)  BP: 126/94 (28 Jan 2024 20:24) (91/61 - 132/99)  BP(mean): --  RR: 18 (28 Jan 2024 20:24) (18 - 18)  SpO2: 98% (28 Jan 2024 00:53) (98% - 98%)    Parameters below as of 28 Jan 2024 00:53  Patient On (Oxygen Delivery Method): room air     REVIEW OF SYSTEMS:  CONSTITUTIONAL: No fever, chills.  CARDIOLOGY: Denies chest pain, shortness of breath or palpitations.   RESPIRATORY: denies cough, shortness of breath, wheezing.   NEUROLOGICAL: Generalized weakness, no focal deficits to report.  GI: no melena/hematochezia, denies nausea, Vomiting or diarrhea.    PSYCHIATRY: normal mood and affect    PHYSICAL EXAM:  · CONSTITUTIONAL:	Well-developed, well nourished F in no distress    ·RESPIRATORY:   breath sounds equal with good entry;  clear to auscultation bilaterally; no rales, rhonchi or wheeze  · CARDIOVASCULAR	S1 varying intensity and S2 normal intensity, no rub, gallop or murmur,    ABDOMEN: soft, non-tender, NABS  · EXTREMITIES: No cyanosis, clubbing or edema  NEURO: alert and oriented x 3, no focal deficits.  · VASCULAR: 	Equal and normal pulses   	  TELEMETRY:  Afib with /min      LABS:                        10.6   5.95  )-----------( 329      ( 27 Jan 2024 10:50 )             33.9     01-27    138  |  107  |  13  ----------------------------<  107<H>  4.0   |  20  |  0.9    Ca    8.9      27 Jan 2024 10:50    TPro  6.5  /  Alb  4.2  /  TBili  0.4  /  DBili  x   /  AST  21  /  ALT  35  /  AlkPhos  133<H>  01-27    I&O's Summary    BNP  RADIOLOGY & ADDITIONAL STUDIES:    IMPRESSION AND PLAN:

## 2024-01-30 NOTE — PROGRESS NOTE ADULT - REASON FOR ADMISSION
Afib with RVR

## 2024-01-30 NOTE — PROGRESS NOTE ADULT - ASSESSMENT
63 yo F PMHx  HFrEF: 35-40%, pAfib s/p MINNA/DCCV, currently off Metoprolol, Amiodarone and Eliquis (as per EMR her cardiologist told her stop) who presented to the ED for eval of 4 days of palpitations.     Paroxysmal Afib with RVR  - was placed on cardizem infusion while in ER, was weaned down to 5 mg, cardizem stopped this morning  - avoid Cardizem due to systolic failure  - c/w metoprolol  - pt states she does not have heart failure  - c/w Lovenox  - MINNA/DCCV 3/2023: EF: 35-40%   - f/u repeat echo--ef was normal 53%  - Check thyroid panel , lipid profile, a1c   - f/u cardio appreciated--planned for dccv tomorrow  - rvr this morning-metoprolol increased, monitor    Chronic HFrEF  - seen on echo from 3/2023  - echo was done after RVR on prior admission  - possibly tachycardia induced?  - has not had ischemic work up or repeat echo  - check echo -- ef now normal  - not on any HF meds    Mild dilatation of the ascending aorta (3.8 cm) on March 2023  - f/u outpt    Microcytic anemia  -1 y ago Hg 12  - On admission 10  - f/u Iron studies   - OP screening colono    DVT px    #Progress Note Handoff:  Pending (specify):  Echo, monitor on telemetry  Family discussion: As above  Disposition: Home_x__/SNF___/Other________/Unknown at this time________     63 yo F PMHx  HFrEF: 35-40%, pAfib s/p MINNA/DCCV, currently off Metoprolol, Amiodarone and Eliquis (as per EMR her cardiologist told her stop) who presented to the ED for eval of 4 days of palpitations.     Paroxysmal Afib with RVR  - was placed on cardizem infusion while in ER, was weaned down to 5 mg, cardizem stopped this morning  - avoid Cardizem due to systolic failure  - c/w metoprolol  - pt states she does not have heart failure  - c/w Lovenox  - MINNA/DCCV 3/2023: EF: 35-40%   - f/u repeat echo--ef was normal 53%  - Check thyroid panel , lipid profile, a1c   - pt remains intermittently RVR. Pt refusing cardioversion. Pt wants Cardizem infusion. She is refusing metoprolol, will only accept Cardizem infusion as she states this will convert her to sinus rhythm. I explained that Cardizem is not an antiarrythmic and that bp was too low for the infusion. Upon repeat bp, systolic was 106 so PO cardizem started. Cardiology is aware    Chronic HFrEF  - seen on echo from 3/2023  - echo was done after RVR on prior admission  - possibly tachycardia induced?  - has not had ischemic work up or repeat echo  - repeat EF now normal  - not on any HF meds    Mild dilatation of the ascending aorta (3.8 cm) on March 2023  - f/u outpt    Microcytic anemia  -1 y ago Hg 12  - On admission 10  - f/u Iron studies   - OP screening colono    DVT px    #Progress Note Handoff:  Pending (specify): cardioverison?  Family discussion: As above  Disposition: Home_x__/SNF___/Other________/Unknown at this time________

## 2024-01-30 NOTE — PROGRESS NOTE ADULT - ASSESSMENT
1] Atrial Fibrillation  - Patient declines to have MINNA directed DCCV today.  She wants trial of IV  ardizem for possible conversion of her atrial fibrillation  - CArdizem is not an anti-arrhythmic.  Cardizem will probably not convert rhythm.  - BP is borderline low.  Caution on using IV cardizem  - Continue OAC with Eliquis    Plan discussed with Medical Attending Dr. Morales and House Staff during morning rounds    Hernandez Enriquez MD(covering for Dr. Froylan Thomas)  [phone] Office  On TEAMS

## 2024-01-31 ENCOUNTER — TRANSCRIPTION ENCOUNTER (OUTPATIENT)
Age: 64
End: 2024-01-31

## 2024-01-31 VITALS
DIASTOLIC BLOOD PRESSURE: 74 MMHG | OXYGEN SATURATION: 100 % | HEART RATE: 83 BPM | SYSTOLIC BLOOD PRESSURE: 112 MMHG | RESPIRATION RATE: 18 BRPM | WEIGHT: 132.06 LBS | TEMPERATURE: 97 F

## 2024-01-31 LAB
ALBUMIN SERPL ELPH-MCNC: 4.1 G/DL — SIGNIFICANT CHANGE UP (ref 3.5–5.2)
ALP SERPL-CCNC: 129 U/L — HIGH (ref 30–115)
ALT FLD-CCNC: 22 U/L — SIGNIFICANT CHANGE UP (ref 0–41)
ANION GAP SERPL CALC-SCNC: 9 MMOL/L — SIGNIFICANT CHANGE UP (ref 7–14)
AST SERPL-CCNC: 15 U/L — SIGNIFICANT CHANGE UP (ref 0–41)
BASOPHILS # BLD AUTO: 0.05 K/UL — SIGNIFICANT CHANGE UP (ref 0–0.2)
BASOPHILS NFR BLD AUTO: 0.8 % — SIGNIFICANT CHANGE UP (ref 0–1)
BILIRUB SERPL-MCNC: 0.4 MG/DL — SIGNIFICANT CHANGE UP (ref 0.2–1.2)
BUN SERPL-MCNC: 12 MG/DL — SIGNIFICANT CHANGE UP (ref 10–20)
CALCIUM SERPL-MCNC: 8.9 MG/DL — SIGNIFICANT CHANGE UP (ref 8.4–10.5)
CHLORIDE SERPL-SCNC: 104 MMOL/L — SIGNIFICANT CHANGE UP (ref 98–110)
CO2 SERPL-SCNC: 25 MMOL/L — SIGNIFICANT CHANGE UP (ref 17–32)
CREAT SERPL-MCNC: 1 MG/DL — SIGNIFICANT CHANGE UP (ref 0.7–1.5)
EGFR: 63 ML/MIN/1.73M2 — SIGNIFICANT CHANGE UP
EOSINOPHIL # BLD AUTO: 0.2 K/UL — SIGNIFICANT CHANGE UP (ref 0–0.7)
EOSINOPHIL NFR BLD AUTO: 3.2 % — SIGNIFICANT CHANGE UP (ref 0–8)
GLUCOSE SERPL-MCNC: 97 MG/DL — SIGNIFICANT CHANGE UP (ref 70–99)
HCT VFR BLD CALC: 35.8 % — LOW (ref 37–47)
HGB BLD-MCNC: 11.2 G/DL — LOW (ref 12–16)
IMM GRANULOCYTES NFR BLD AUTO: 0.2 % — SIGNIFICANT CHANGE UP (ref 0.1–0.3)
LYMPHOCYTES # BLD AUTO: 2.5 K/UL — SIGNIFICANT CHANGE UP (ref 1.2–3.4)
LYMPHOCYTES # BLD AUTO: 40.3 % — SIGNIFICANT CHANGE UP (ref 20.5–51.1)
MCHC RBC-ENTMCNC: 23.4 PG — LOW (ref 27–31)
MCHC RBC-ENTMCNC: 31.3 G/DL — LOW (ref 32–37)
MCV RBC AUTO: 74.7 FL — LOW (ref 81–99)
MONOCYTES # BLD AUTO: 0.59 K/UL — SIGNIFICANT CHANGE UP (ref 0.1–0.6)
MONOCYTES NFR BLD AUTO: 9.5 % — HIGH (ref 1.7–9.3)
NEUTROPHILS # BLD AUTO: 2.86 K/UL — SIGNIFICANT CHANGE UP (ref 1.4–6.5)
NEUTROPHILS NFR BLD AUTO: 46 % — SIGNIFICANT CHANGE UP (ref 42.2–75.2)
NRBC # BLD: 0 /100 WBCS — SIGNIFICANT CHANGE UP (ref 0–0)
PLATELET # BLD AUTO: 349 K/UL — SIGNIFICANT CHANGE UP (ref 130–400)
PMV BLD: 11.4 FL — HIGH (ref 7.4–10.4)
POTASSIUM SERPL-MCNC: 4.1 MMOL/L — SIGNIFICANT CHANGE UP (ref 3.5–5)
POTASSIUM SERPL-SCNC: 4.1 MMOL/L — SIGNIFICANT CHANGE UP (ref 3.5–5)
PROT SERPL-MCNC: 6.2 G/DL — SIGNIFICANT CHANGE UP (ref 6–8)
RBC # BLD: 4.79 M/UL — SIGNIFICANT CHANGE UP (ref 4.2–5.4)
RBC # FLD: 17.2 % — HIGH (ref 11.5–14.5)
SODIUM SERPL-SCNC: 138 MMOL/L — SIGNIFICANT CHANGE UP (ref 135–146)
WBC # BLD: 6.21 K/UL — SIGNIFICANT CHANGE UP (ref 4.8–10.8)
WBC # FLD AUTO: 6.21 K/UL — SIGNIFICANT CHANGE UP (ref 4.8–10.8)

## 2024-01-31 PROCEDURE — 99239 HOSP IP/OBS DSCHRG MGMT >30: CPT | Mod: GC

## 2024-01-31 PROCEDURE — 93312 ECHO TRANSESOPHAGEAL: CPT | Mod: 26,XU

## 2024-01-31 PROCEDURE — 92960 CARDIOVERSION ELECTRIC EXT: CPT

## 2024-01-31 PROCEDURE — 93320 DOPPLER ECHO COMPLETE: CPT | Mod: 26

## 2024-01-31 PROCEDURE — 93325 DOPPLER ECHO COLOR FLOW MAPG: CPT | Mod: 26

## 2024-01-31 RX ORDER — DILTIAZEM HCL 120 MG
1 CAPSULE, EXT RELEASE 24 HR ORAL
Qty: 60 | Refills: 0
Start: 2024-01-31 | End: 2024-03-30

## 2024-01-31 RX ORDER — DILTIAZEM HCL 120 MG
1 CAPSULE, EXT RELEASE 24 HR ORAL
Qty: 60 | Refills: 0
Start: 2024-01-31 | End: 2024-05-28

## 2024-01-31 RX ORDER — APIXABAN 2.5 MG/1
1 TABLET, FILM COATED ORAL
Qty: 120 | Refills: 0
Start: 2024-01-31 | End: 2024-05-28

## 2024-01-31 RX ORDER — METOPROLOL TARTRATE 50 MG
1 TABLET ORAL
Qty: 120 | Refills: 0
Start: 2024-01-31 | End: 2024-03-30

## 2024-01-31 RX ORDER — METOPROLOL TARTRATE 50 MG
1 TABLET ORAL
Qty: 120 | Refills: 0
Start: 2024-01-31 | End: 2024-05-28

## 2024-01-31 RX ORDER — APIXABAN 2.5 MG/1
1 TABLET, FILM COATED ORAL
Qty: 120 | Refills: 0 | DISCHARGE
Start: 2024-01-31 | End: 2024-05-28

## 2024-01-31 RX ORDER — APIXABAN 2.5 MG/1
1 TABLET, FILM COATED ORAL
Qty: 120 | Refills: 0
Start: 2024-01-31 | End: 2024-03-30

## 2024-01-31 RX ADMIN — Medication 30 MILLIGRAM(S): at 08:03

## 2024-01-31 RX ADMIN — Medication 50 MILLIGRAM(S): at 08:03

## 2024-01-31 RX ADMIN — Medication 30 MILLIGRAM(S): at 11:46

## 2024-01-31 RX ADMIN — APIXABAN 5 MILLIGRAM(S): 2.5 TABLET, FILM COATED ORAL at 05:56

## 2024-01-31 NOTE — DISCHARGE NOTE PROVIDER - CARE PROVIDER_API CALL
Guido Holguineti  Internal Medicine  [address] [place] [address]  Phone: [phone]  Fax: [phone]  Follow Up Time: 1 week    Jessica Malhotra  Vascular Surgery  [address] [place] [address]  Phone: [phone]  Fax: [phone]  Follow Up Time: 1 month    Froylan Thomas  Cardiology  [address], [address]  [place] [address]  Phone: [phone]  Fax: [phone]  Follow Up Time: 1 week

## 2024-01-31 NOTE — CHART NOTE - NSCHARTNOTESELECT_GEN_ALL_CORE
Procedural Case Note    8/24/2021    (2:09 PM)    Anjali Ahn    1947   (68 y.o.)    [id number]    CC:  pain    ROS:   Complete ROS obtained, no CP, no SOB, no N or V    PMH:     Past Medical History:   Diagnosis Date    Adverse effect of anesthesia     combative with anesthesia    Arthritis     [facility]     tumor on nose, removed, pt not sure what type    Fibromyalgia     Hypertension     Ill-defined condition     CHF- per patient    Sinus bradycardia     [place]    Stage 3 chronic kidney disease ([facility]     seemarcela Peña       ALLERGIES:   No Known Allergies    MEDS:     Current Facility-Administered Medications   Medication Dose Route Frequency    bupivacaine (PF) (MARCAINE) 0.5 % (5 mg/mL) injection 25 mg  5 mL Epidural ONCE    lidocaine (PF) (XYLOCAINE) 20 mg/mL (2 %) injection 100 mg  5 mL Epidural ONCE    methylPREDNISolone acetate (DEPO-MEDROL) 40 mg/mL injection 80 mg  80 mg Epidural ONCE    iohexoL (OMNIPAQUE) 180 mg iodine/mL injection 10 mL  10 mL Intra artICUlar ONCE          Visit Vitals  BP (!) 153/64 (BP 1 Location: Right upper arm, BP Patient Position: At rest)   Pulse 71   Temp 98 °F (36.7 °C)   Resp 24   Ht 5' (1.524 m)   Wt 107 kg (236 lb)   SpO2 100%   BMI 46.09 kg/m²     PE:  Gen: NAD  Head: normocephalic  Heart: RRR  Lungs: CTA raymond  Abd: NT, ND, soft  Neuro: awake and alert  Skin: intact    IMPRESSION:   LS DDD    Note:  The clinical status was discussed in detail with the patient. The procedure was again discussed and described in detail. All understand and accept the planned procedure and risks; reject other forms of treatment. All questions are answered.     Jackie De La Garza MD MINNA DCCV/Event Note

## 2024-01-31 NOTE — DISCHARGE NOTE PROVIDER - NSDCMRMEDTOKEN_GEN_ALL_CORE_FT
Ativan 0.5 mg oral tablet: 1 tab(s) orally once a day (at bedtime), As Needed  Cardizem  mg/24 hours oral capsule, extended release: 1 cap(s) orally once a day  Eliquis 5 mg oral tablet: 1 tab(s) orally 2 times a day  metoprolol tartrate 25 mg oral tablet: 1 tab(s) orally 2 times a day

## 2024-01-31 NOTE — CHART NOTE - NSCHARTNOTEFT_GEN_A_CORE
PACU ANESTHESIA ADMISSION NOTE      Procedure: pascale/dccv  Post op diagnosis:  atrial fibrillation    ____  Intubated  TV:______       Rate: ______      FiO2: ______    __x__  Patent Airway    ___x_  Full return of protective reflexes    __x__  Full recovery from anesthesia / back to baseline     Vitals:   T: 97          R: 14                 BP:  90/58                Sat:   100                P: 66      Mental Status:  _x___ Awake   _____ Alert   _x____ Drowsy   _____ Sedated    Nausea/Vomiting:  ___x_ NO  ______Yes,   See Post - Op Orders          Pain Scale (0-10):  __0___    Treatment: ____ None    ___x_ See Post - Op/PCA Orders    Post - Operative Fluids:   ____ Oral   __x__ See Post - Op Orders    Plan: Discharge:   ____Home       __x___Floor     _____Critical Care    _____  Other:_________________    Comments:

## 2024-01-31 NOTE — DISCHARGE NOTE PROVIDER - CARE PROVIDERS DIRECT ADDRESSES
,DirectAddress_Unknown,[email or web address]

## 2024-01-31 NOTE — CHART NOTE - NSCHARTNOTEFT_GEN_A_CORE
POST OPERATIVE PROCEDURAL DOCUMENTATION    PRE-OP DIAGNOSIS: Afib    POST-OP DIAGNOSIS: Sinus rhythm    PROCEDURE: Transesophageal Echocardiogram    Primary Physician: Dr. Sol  Cardiology Fellow: Dr. Mobley    ANESTHESIA TYPE  [x] Refer to anesthesia note    CONDITION  [x] Good    Specimen removed: N/A    Implants: None    Estimated blood loss: None    Complications: None    After risks and benefits of procedures were explained, informed consent was obtained and placed in chart. Refer to Anesthesia note for sedation details. The MINNA probe was passed into the esophagus without difficulty. Transesophageal and transgastric images were obtained. The MINNA probe was removed without difficulty and examined. There was no evidence for bleeding. The patient tolerated the procedure well without any immediate MINNA-related complications.      Preliminary Findings:    LA: Mildly enlarged  BRAN: Left atrial appendage was clear of clot and smoke. Normal doppler velocities   LV: LVEF was estimated at 50-55%  MV: Trace MR  AV: No AI, no AS  RA: Mildly enlarged  RV: Normal size and function  TV: Mild TR  PV: No SC, no PS  IAS: No PFO or ASD. No R-> L shunt   Aorta: No atheroma visualized.    Patient successfully converted to sinus rhythm with 200 J of synchronized direct current cardioversion (DCCV) via AP pads.    DIAGNOSIS/IMPRESSION: Successful conversion to NSR    Full report to follow    PLAN OF CARE:  [x] Return to inpatient bed when stable and fully awake.  [x] Continue Eliquis.  [x] No eating or drinking for 1 hour  [x] No driving for 24 hours    Results of procedure/ plan of care discussed with patient/  in detail. POST OPERATIVE PROCEDURAL DOCUMENTATION    PRE-OP DIAGNOSIS: Afib    POST-OP DIAGNOSIS: Sinus rhythm    PROCEDURE: Transesophageal Echocardiogram    Primary Physician: Dr. Sol  Cardiology Fellow: Dr. Mobley    ANESTHESIA TYPE  [x] Refer to anesthesia note    CONDITION  [x] Good    Specimen removed: N/A    Implants: None    Estimated blood loss: None    Complications: None    After risks and benefits of procedures were explained, informed consent was obtained and placed in chart. Refer to Anesthesia note for sedation details. The MINNA probe was passed into the esophagus without difficulty. Transesophageal and transgastric images were obtained. The MINNA probe was removed without difficulty and examined. There was no evidence for bleeding. The patient tolerated the procedure well without any immediate MINNA-related complications.      Preliminary Findings:    LA: Mildly enlarged  BRAN: Left atrial appendage was clear of clot and smoke. Normal doppler velocities   LV: LVEF was estimated at 55-60%  MV: Trace MR  AV: No AI, no AS  RA: Mildly enlarged  RV: Normal size and function  TV: Mild TR  PV: No NC, no PS  IAS: No PFO or ASD. No R-> L shunt   Aorta: No atheroma visualized.    Patient successfully converted to sinus rhythm with 200 J of synchronized direct current cardioversion (DCCV) via AP pads.    DIAGNOSIS/IMPRESSION: Successful conversion to NSR    Full report to follow    PLAN OF CARE:  [x] Return to inpatient bed when stable and fully awake.  [x] Continue Eliquis.  [x] No eating or drinking for 1 hour  [x] No driving for 24 hours    Results of procedure/ plan of care discussed with patient/  in detail.

## 2024-01-31 NOTE — DISCHARGE NOTE NURSING/CASE MANAGEMENT/SOCIAL WORK - PATIENT PORTAL LINK FT
You can access the FollowMyHealth Patient Portal offered by [facility] by registering at the following website: [email or web address]. By joining [employer]’s FollowMyHealth portal, you will also be able to view your health information using other applications (apps) compatible with our system.

## 2024-01-31 NOTE — DISCHARGE NOTE NURSING/CASE MANAGEMENT/SOCIAL WORK - NSDCPEFALRISK_GEN_ALL_CORE
For information on Fall & Injury Prevention, visit: [email or web address] OR  [email or web address] OR  https://www.cdc.gov/steadi/patient.html

## 2024-01-31 NOTE — DISCHARGE NOTE PROVIDER - HOSPITAL COURSE
65 yo F PMHx  HFrEF: 35-40%, pAfib s/p MINNA/DCCV, currently off Metoprolol, Amiodarone and Eliquis (as per EMR her cardiologist told her stop) who presented to the ED for eval of 4 days of palpitations.     Paroxysmal Afib with RVR, patient was placed on cardizem infusion while in ER, was weaned down to 5 mg, cardizem stopped this morning, plan was to avoid Cardizem due to systolic failure but persistently requested for cardizem and refused other medications so discussed with cardiology and started po cardizem. Continued metoprolol. MINAN/DCCV 3/2023: EF: 35-40%. Repeat echo--ef was normal 53%. pt remained intermittently RVR refusing cardioversion but agreed later. Ps/p cardioversion with coversion to sinus. Planed to discharge home on po Cardizem and metoprolol.       Mild dilatation of the ascending aorta (3.8 cm) on March 2023. Will need f/u outpt with vascular sx    Microcytic anemia, will refer for OP colonoscopy     65 yo F PMHx  HFrEF: 35-40%, pAfib s/p MINNA/DCCV, currently off Metoprolol, Amiodarone and Eliquis (as per EMR her cardiologist told her stop) who presented to the ED for eval of 4 days of palpitations.     Paroxysmal Afib with RVR, patient was placed on cardizem infusion while in ER, was weaned down to 5 mg, cardiomelic stopped this morning, plan was to avoid Cardizem due to systolic failure but persistently requested for cardizem and refused other medications so discussed with cardiology and started po Cardizem. Continued metoprolol. MINNA/DCCV 3/2023: EF: 35-40%. Repeat echo--ef was normal 53%. pt remained intermittently RVR refusing cardioversion but agreed later. Ps/p cardioversion with coversion to sinus. Planed to discharge home on po Cardizem and metoprolol.       Mild dilatation of the ascending aorta (3.8 cm) on March 2023. Will need f/u outpt with vascular sx    Microcytic anemia, will refer for OP colonoscopy    Paroxysmal Atrial Fibrillation:   s/p Cardioversion, back to sinus rhythm  Continue Eliquis, Metoprolol and Cardizem.   She was on Amiodarone  Refer to EP for possible ablation.

## 2024-01-31 NOTE — DISCHARGE NOTE PROVIDER - PROVIDER TOKENS
PROVIDER:[TOKEN:[[id number]],FOLLOWUP:[1 week]],PROVIDER:[TOKEN:[[id number]],FOLLOWUP:[1 month]],PROVIDER:[TOKEN:[[id number]],FOLLOWUP:[1 week]]

## 2024-01-31 NOTE — DISCHARGE NOTE PROVIDER - NSDCCPCAREPLAN_GEN_ALL_CORE_FT
PRINCIPAL DISCHARGE DIAGNOSIS  Diagnosis: Paroxysmal atrial fibrillation  Assessment and Plan of Treatment: You presented to us with fast heart rate. Blood work, imaging studies were obtained and cardiology consult was obtained. You were started on medications to controll heart rate and later underwent cardioversion(shock) with succesful conversion to normal heart rythm. Please continue to use the medication as prescribed and follow-up with cardiology and primary care physician as outpatient.   Please follow-up with vascular surgery for further evaluation of dilated aorta(blood vessel)   Please schedule gastroenterology appointmnet for screening colonoscopy given anemia

## 2024-02-05 DIAGNOSIS — Z88.8 ALLERGY STATUS TO OTHER DRUGS, MEDICAMENTS AND BIOLOGICAL SUBSTANCES: ICD-10-CM

## 2024-02-05 DIAGNOSIS — I50.22 CHRONIC SYSTOLIC (CONGESTIVE) HEART FAILURE: ICD-10-CM

## 2024-02-05 DIAGNOSIS — D50.9 IRON DEFICIENCY ANEMIA, UNSPECIFIED: ICD-10-CM

## 2024-02-05 DIAGNOSIS — I42.9 CARDIOMYOPATHY, UNSPECIFIED: ICD-10-CM

## 2024-02-05 DIAGNOSIS — I48.0 PAROXYSMAL ATRIAL FIBRILLATION: ICD-10-CM

## 2024-02-14 ENCOUNTER — NON-APPOINTMENT (OUTPATIENT)
Age: 64
End: 2024-02-14

## 2024-03-18 ENCOUNTER — EMERGENCY (EMERGENCY)
Facility: HOSPITAL | Age: 64
LOS: 0 days | Discharge: HOME | End: 2024-03-19
Attending: EMERGENCY MEDICINE
Payer: COMMERCIAL

## 2024-03-18 VITALS
TEMPERATURE: 98 F | HEART RATE: 79 BPM | DIASTOLIC BLOOD PRESSURE: 70 MMHG | SYSTOLIC BLOOD PRESSURE: 114 MMHG | OXYGEN SATURATION: 100 % | RESPIRATION RATE: 18 BRPM
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
## 2024-03-18 DIAGNOSIS — Z98.890 OTHER SPECIFIED POSTPROCEDURAL STATES: Chronic | ICD-10-CM

## 2024-03-18 DIAGNOSIS — S90.111A CONTUSION OF RIGHT GREAT TOE WITHOUT DAMAGE TO NAIL, INITIAL ENCOUNTER: ICD-10-CM

## 2024-03-18 DIAGNOSIS — Z79.01 LONG TERM (CURRENT) USE OF ANTICOAGULANTS: ICD-10-CM
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
## 2024-03-18 DIAGNOSIS — I50.9 HEART FAILURE, UNSPECIFIED: ICD-10-CM

## 2024-03-18 DIAGNOSIS — M79.674 PAIN IN RIGHT TOE(S): ICD-10-CM

## 2024-03-18 DIAGNOSIS — I48.91 UNSPECIFIED ATRIAL FIBRILLATION: ICD-10-CM

## 2024-03-18 DIAGNOSIS — Z88.6 ALLERGY STATUS TO ANALGESIC AGENT: ICD-10-CM

## 2024-03-18 PROCEDURE — 99284 EMERGENCY DEPT VISIT MOD MDM: CPT

## 2024-03-18 PROCEDURE — 73660 X-RAY EXAM OF TOE(S): CPT | Mod: RT

## 2024-03-18 PROCEDURE — 99283 EMERGENCY DEPT VISIT LOW MDM: CPT | Mod: 25

## 2024-03-18 RX ORDER — IBUPROFEN 200 MG
600 TABLET ORAL ONCE
Refills: 0 | Status: COMPLETED | OUTPATIENT
Start: 2024-03-18 | End: 2024-03-18

## 2024-03-18 RX ADMIN — Medication 600 MILLIGRAM(S): at 22:54

## 2024-03-18 RX ADMIN — Medication 600 MILLIGRAM(S): at 22:11

## 2024-03-18 NOTE — ED PROVIDER NOTE - PHYSICAL EXAMINATION
Physical Exam    Vital Signs: I have reviewed the initial vital signs.  Constitutional: well-nourished, appears stated age, no acute distress  Eyes: Conjunctiva pink, Sclera clear  Cardiovascular: well-perfused extremities, pedal pulses equal and 2+ b/l.   Respiratory: unlabored respiratory effort, pt is speaking full sentences. no accessory muscle use.   Musculoskeletal: FROM of b/l upper and lower extremities. (+) tenderness over the right big toe without deformity. right big toe nail is completely intact without a subungal hemtoma.   Integumentary: warm, dry, no rash  Neurologic: awake, alert  Psychiatric: appropriate mood, appropriate affect

## 2024-03-18 NOTE — ED PROVIDER NOTE - NSFOLLOWUPCLINICS_GEN_ALL_ED_FT
[facility]  Podiatry  .  [place]   Phone: [phone]  Fax:   Follow Up Time: 1-3 Days

## 2024-03-18 NOTE — ED PROVIDER NOTE - CLINICAL SUMMARY MEDICAL DECISION MAKING FREE TEXT BOX
Labs, EKG and imaging were ordered, where indicated.  Independent interpretation of any labs, EKG & imaging that was ordered was performed by me, Dr. Lockhart. Appropriate medications for patient's presenting complaints were ordered and effects were reassessed, where indicated.  Patient's records (prior hospital, ED visit, and/or nursing home note) were reviewed, if available.  Additional history was obtained from EMS, family, and/or PCP (where available).  Escalation to admission/observation was considered.  However patient feels much better and patient/parent is comfortable with discharge.  Appropriate follow-up was arranged.     R great toe/mtp pain s/p trauma - analgesia, xr poss sesamoid fx? otherwise no definitive fx/dislocation - hard-soled shoe, all results d/w pt & copies given, strict return precautions discussed, rec outpt Pods f/u

## 2024-03-18 NOTE — ED PROVIDER NOTE - PATIENT PORTAL LINK FT
You can access the FollowMyHealth Patient Portal offered by [facility] by registering at the following website: [email or web address]. By joining [employer]’s FollowMyHealth portal, you will also be able to view your health information using other applications (apps) compatible with our system.

## 2024-03-18 NOTE — ED PROVIDER NOTE - OBJECTIVE STATEMENT
65 y/o female with a PMH of afib on eliquis presents to the ED for evaluation of right big toe pain after dropping a  on it this afternoon while trying to fix it. pt reports pain in the right big toe with weight bearing. pt denies weakness, numbness, or tingling.

## 2024-03-18 NOTE — ED PROVIDER NOTE - NSFOLLOWUPINSTRUCTIONS_ED_ALL_ED_FT
[employer]® CareNotes®     :  [facility]             FOOT CONTUSION - AfterCare(R) Instructions(ER/ED)     Foot Contusion    WHAT YOU NEED TO KNOW:    A foot contusion is a bruise to the foot.    DISCHARGE INSTRUCTIONS:    Medicines:     NSAIDs: These medicines decrease swelling and pain. NSAIDs are available without a doctor's order. Ask your healthcare provider which medicine is right for you. Ask how much to take and when to take it. Take as directed. NSAIDs can cause stomach bleeding and kidney problems if not taken correctly.      Take your medicine as directed. Contact your healthcare provider if you think your medicine is not helping or if you have side effects. Tell him of her if you are allergic to any medicine. Keep a list of the medicines, vitamins, and herbs you take. Include the amounts, and when and why you take them. Bring the list or the pill bottles to follow-up visits. Carry your medicine list with you in case of an emergency.    Follow up with your healthcare provider as directed: Write down your questions so you remember to ask them during your visits.     Care for your foot: Follow your treatment plan to help decrease your pain and improve your muscle movement.     Rest: You will need to rest your foot for 1 to 2 days after your injury. This will help decrease the risk of more damage.      Ice: Ice helps decrease swelling and pain. Ice may also help prevent tissue damage. Use an ice pack, or put crushed ice in a plastic bag. Cover it with a towel and place it on your foot for 15 to 20 minutes every hour or as directed.      Compression: Compression (tight hold) provides support and helps decrease swelling and movement so your foot can heal. You may be told to keep your foot wrapped with a tight elastic bandage. Follow instructions about how to apply your bandage. Do not massage your foot. You could cause more damage or pain.      Elevation: Keep your foot raised above the level of your heart while you are sitting or lying down. This will help decrease or limit swelling. Use pillows, blankets, or rolled towels to elevate your foot comfortably.    Exercise your foot: You may be given gentle exercises to improve your foot movement and help decrease stiffness. Ask when you can return to your normal activities or sports.    Prevent another injury:     Wear equipment to protect yourself when you play sports.      Make sure your shoes fit properly.      Always wear shoes on streets or sidewalks.      Clean spills off the floor right away to avoid slipping or hitting your foot.      Make sure your home is well lit when you get up during the night. This will help you avoid hurting your foot in the dark.    Contact your healthcare provider if:     You have increased swelling on your foot.      You have severe foot pain.      You are not able to move your foot.      You have questions or concerns about your injury or treatment.                 © Copyright [employer] 2019

## 2024-03-19 PROCEDURE — 73660 X-RAY EXAM OF TOE(S): CPT | Mod: 26,RT

## 2024-03-30 ENCOUNTER — EMERGENCY (EMERGENCY)
Facility: HOSPITAL | Age: 64
LOS: 0 days | Discharge: HOME | End: 2024-03-30
Attending: INTERNAL MEDICINE
Payer: COMMERCIAL

## 2024-03-30 VITALS
HEART RATE: 95 BPM | DIASTOLIC BLOOD PRESSURE: 78 MMHG | OXYGEN SATURATION: 98 % | TEMPERATURE: 99 F | RESPIRATION RATE: 18 BRPM | WEIGHT: 128.09 LBS | SYSTOLIC BLOOD PRESSURE: 116 MMHG

## 2024-03-30 VITALS — SYSTOLIC BLOOD PRESSURE: 130 MMHG | DIASTOLIC BLOOD PRESSURE: 82 MMHG | HEART RATE: 127 BPM | OXYGEN SATURATION: 99 %

## 2024-03-30 DIAGNOSIS — R00.0 TACHYCARDIA, UNSPECIFIED: ICD-10-CM

## 2024-03-30 DIAGNOSIS — Z98.890 OTHER SPECIFIED POSTPROCEDURAL STATES: Chronic | ICD-10-CM

## 2024-03-30 DIAGNOSIS — Z91.148 PATIENT'S OTHER NONCOMPLIANCE WITH MEDICATION REGIMEN FOR OTHER REASON: ICD-10-CM

## 2024-03-30 DIAGNOSIS — Z88.6 ALLERGY STATUS TO ANALGESIC AGENT: ICD-10-CM

## 2024-03-30 DIAGNOSIS — I50.9 HEART FAILURE, UNSPECIFIED: ICD-10-CM

## 2024-03-30 DIAGNOSIS — R00.2 PALPITATIONS: ICD-10-CM

## 2024-03-30 DIAGNOSIS — I48.20 CHRONIC ATRIAL FIBRILLATION, UNSPECIFIED: ICD-10-CM

## 2024-03-30 LAB
ALBUMIN SERPL ELPH-MCNC: 4.6 G/DL — SIGNIFICANT CHANGE UP (ref 3.5–5.2)
ALP SERPL-CCNC: 150 U/L — HIGH (ref 30–115)
ALT FLD-CCNC: 97 U/L — HIGH (ref 0–41)
ANION GAP SERPL CALC-SCNC: 9 MMOL/L — SIGNIFICANT CHANGE UP (ref 7–14)
ANISOCYTOSIS BLD QL: SLIGHT — SIGNIFICANT CHANGE UP
AST SERPL-CCNC: 52 U/L — HIGH (ref 0–41)
BASOPHILS # BLD AUTO: 0.07 K/UL — SIGNIFICANT CHANGE UP (ref 0–0.2)
BASOPHILS NFR BLD AUTO: 0.9 % — SIGNIFICANT CHANGE UP (ref 0–1)
BILIRUB SERPL-MCNC: 0.3 MG/DL — SIGNIFICANT CHANGE UP (ref 0.2–1.2)
BUN SERPL-MCNC: 16 MG/DL — SIGNIFICANT CHANGE UP (ref 10–20)
BURR CELLS BLD QL SMEAR: PRESENT — SIGNIFICANT CHANGE UP
CALCIUM SERPL-MCNC: 9.4 MG/DL — SIGNIFICANT CHANGE UP (ref 8.4–10.5)
CHLORIDE SERPL-SCNC: 106 MMOL/L — SIGNIFICANT CHANGE UP (ref 98–110)
CO2 SERPL-SCNC: 24 MMOL/L — SIGNIFICANT CHANGE UP (ref 17–32)
CREAT SERPL-MCNC: 1 MG/DL — SIGNIFICANT CHANGE UP (ref 0.7–1.5)
EGFR: 63 ML/MIN/1.73M2 — SIGNIFICANT CHANGE UP
ELLIPTOCYTES BLD QL SMEAR: SLIGHT — SIGNIFICANT CHANGE UP
EOSINOPHIL # BLD AUTO: 0.14 K/UL — SIGNIFICANT CHANGE UP (ref 0–0.7)
EOSINOPHIL NFR BLD AUTO: 1.8 % — SIGNIFICANT CHANGE UP (ref 0–8)
GIANT PLATELETS BLD QL SMEAR: PRESENT — SIGNIFICANT CHANGE UP
GLUCOSE SERPL-MCNC: 83 MG/DL — SIGNIFICANT CHANGE UP (ref 70–99)
HCT VFR BLD CALC: 34.5 % — LOW (ref 37–47)
HGB BLD-MCNC: 10.8 G/DL — LOW (ref 12–16)
HYPOCHROMIA BLD QL: SLIGHT — SIGNIFICANT CHANGE UP
HYPOSEGMENTATION: PRESENT — SIGNIFICANT CHANGE UP
LYMPHOCYTES # BLD AUTO: 2.88 K/UL — SIGNIFICANT CHANGE UP (ref 1.2–3.4)
LYMPHOCYTES # BLD AUTO: 37.6 % — SIGNIFICANT CHANGE UP (ref 20.5–51.1)
MACROCYTES BLD QL: SLIGHT — SIGNIFICANT CHANGE UP
MANUAL SMEAR VERIFICATION: SIGNIFICANT CHANGE UP
MCHC RBC-ENTMCNC: 23.2 PG — LOW (ref 27–31)
MCHC RBC-ENTMCNC: 31.3 G/DL — LOW (ref 32–37)
MCV RBC AUTO: 74.2 FL — LOW (ref 81–99)
MICROCYTES BLD QL: SLIGHT — SIGNIFICANT CHANGE UP
MONOCYTES # BLD AUTO: 0.77 K/UL — HIGH (ref 0.1–0.6)
MONOCYTES NFR BLD AUTO: 10.1 % — HIGH (ref 1.7–9.3)
NEUTROPHILS # BLD AUTO: 3.38 K/UL — SIGNIFICANT CHANGE UP (ref 1.4–6.5)
NEUTROPHILS NFR BLD AUTO: 44.1 % — SIGNIFICANT CHANGE UP (ref 42.2–75.2)
PLAT MORPH BLD: ABNORMAL
PLATELET # BLD AUTO: 332 K/UL — SIGNIFICANT CHANGE UP (ref 130–400)
PMV BLD: 11.5 FL — HIGH (ref 7.4–10.4)
POIKILOCYTOSIS BLD QL AUTO: SLIGHT — SIGNIFICANT CHANGE UP
POTASSIUM SERPL-MCNC: 4.7 MMOL/L — SIGNIFICANT CHANGE UP (ref 3.5–5)
POTASSIUM SERPL-SCNC: 4.7 MMOL/L — SIGNIFICANT CHANGE UP (ref 3.5–5)
PROT SERPL-MCNC: 6.9 G/DL — SIGNIFICANT CHANGE UP (ref 6–8)
RBC # BLD: 4.65 M/UL — SIGNIFICANT CHANGE UP (ref 4.2–5.4)
RBC # FLD: 18.3 % — HIGH (ref 11.5–14.5)
RBC BLD AUTO: ABNORMAL
SMUDGE CELLS # BLD: PRESENT — SIGNIFICANT CHANGE UP
SODIUM SERPL-SCNC: 139 MMOL/L — SIGNIFICANT CHANGE UP (ref 135–146)
TARGETS BLD QL SMEAR: SLIGHT — SIGNIFICANT CHANGE UP
TROPONIN T, HIGH SENSITIVITY RESULT: 8 NG/L — SIGNIFICANT CHANGE UP (ref 6–13)
VARIANT LYMPHS # BLD: 5.5 % — HIGH (ref 0–5)
WBC # BLD: 7.67 K/UL — SIGNIFICANT CHANGE UP (ref 4.8–10.8)
WBC # FLD AUTO: 7.67 K/UL — SIGNIFICANT CHANGE UP (ref 4.8–10.8)

## 2024-03-30 PROCEDURE — 93010 ELECTROCARDIOGRAM REPORT: CPT

## 2024-03-30 PROCEDURE — 93005 ELECTROCARDIOGRAM TRACING: CPT

## 2024-03-30 PROCEDURE — 36415 COLL VENOUS BLD VENIPUNCTURE: CPT

## 2024-03-30 PROCEDURE — 71045 X-RAY EXAM CHEST 1 VIEW: CPT

## 2024-03-30 PROCEDURE — 84484 ASSAY OF TROPONIN QUANT: CPT

## 2024-03-30 PROCEDURE — 85025 COMPLETE CBC W/AUTO DIFF WBC: CPT

## 2024-03-30 PROCEDURE — 80053 COMPREHEN METABOLIC PANEL: CPT

## 2024-03-30 PROCEDURE — 99285 EMERGENCY DEPT VISIT HI MDM: CPT

## 2024-03-30 PROCEDURE — 99285 EMERGENCY DEPT VISIT HI MDM: CPT | Mod: 25

## 2024-03-30 PROCEDURE — 71045 X-RAY EXAM CHEST 1 VIEW: CPT | Mod: 26

## 2024-03-30 PROCEDURE — 96375 TX/PRO/DX INJ NEW DRUG ADDON: CPT

## 2024-03-30 PROCEDURE — 96374 THER/PROPH/DIAG INJ IV PUSH: CPT

## 2024-03-30 RX ORDER — METOPROLOL TARTRATE 50 MG
5 TABLET ORAL ONCE
Refills: 0 | Status: COMPLETED | OUTPATIENT
Start: 2024-03-30 | End: 2024-03-30

## 2024-03-30 RX ORDER — APIXABAN 2.5 MG/1
5 TABLET, FILM COATED ORAL ONCE
Refills: 0 | Status: COMPLETED | OUTPATIENT
Start: 2024-03-30 | End: 2024-03-30

## 2024-03-30 RX ORDER — SODIUM CHLORIDE 9 MG/ML
1000 INJECTION, SOLUTION INTRAVENOUS ONCE
Refills: 0 | Status: COMPLETED | OUTPATIENT
Start: 2024-03-30 | End: 2024-03-30

## 2024-03-30 RX ORDER — DILTIAZEM HCL 120 MG
10 CAPSULE, EXT RELEASE 24 HR ORAL ONCE
Refills: 0 | Status: COMPLETED | OUTPATIENT
Start: 2024-03-30 | End: 2024-03-30

## 2024-03-30 RX ADMIN — Medication 10 MILLIGRAM(S): at 22:32

## 2024-03-30 RX ADMIN — Medication 5 MILLIGRAM(S): at 21:10

## 2024-03-30 RX ADMIN — APIXABAN 5 MILLIGRAM(S): 2.5 TABLET, FILM COATED ORAL at 22:57

## 2024-03-30 RX ADMIN — SODIUM CHLORIDE 1000 MILLILITER(S): 9 INJECTION, SOLUTION INTRAVENOUS at 21:43

## 2024-03-30 NOTE — ED PROVIDER NOTE - PATIENT PORTAL LINK FT
You can access the FollowMyHealth Patient Portal offered by [facility] by registering at the following website: [email or web address]. By joining [employer]’s FollowMyHealth portal, you will also be able to view your health information using other applications (apps) compatible with our system.

## 2024-03-30 NOTE — ED ADULT NURSE NOTE - NSFALLRISKFACTORS_ED_ALL_ED
Hypertension is better controlled, patient is more compliant with medications, continue the same  Labs today   No indicators present

## 2024-03-30 NOTE — ED PROVIDER NOTE - PHYSICAL EXAMINATION
CONSTITUTIONAL: Well-developed; well-nourished; in no acute distress.   SKIN: Warm, dry  HEAD: Normocephalic; atraumatic  EYES: EOMI, normal sclera and conjunctiva   ENT: No nasal discharge; airway clear.   CARD:  Regular rate and rhythm. Normal S1, S2. 2+ radial pulses.   RESP: No increased WOB. CTA b/l.   ABD: Soft, nontender, nondistended.  EXT: Normal ROM. Minimal nonpitting LE edema.  NEURO: Alert, oriented, grossly unremarkable  PSYCH: Cooperative, appropriate. CONSTITUTIONAL: Well-developed; well-nourished; in no acute distress.   SKIN: Warm, dry  HEAD: Normocephalic; atraumatic  EYES: EOMI, normal sclera and conjunctiva   ENT: No nasal discharge; airway clear.   CARD:  Tachycardic, irregular. Normal S1, S2. 2+ radial pulses.   RESP: No increased WOB. CTA b/l.   ABD: Soft, nontender, nondistended.  EXT: Normal ROM. Minimal nonpitting LE edema.  NEURO: Alert, oriented, grossly unremarkable  PSYCH: Cooperative, appropriate.

## 2024-03-30 NOTE — ED PROVIDER NOTE - ATTENDING CONTRIBUTION TO CARE
I have reviewed triage notes and vital signs available at this time.   I have reviewed lab results, if any, available at this time.   I have reviewed EKGs, if any, available at this time.   I have reviewed radiology results and radiographs/scans, if any, available at this time.       I have personally seen, evaluated and participated in this patient's care and find this patient's history and physical examination are consistent with the chart documentation, unless noted below.   ---  Patient in ED with rapid heart rate. EKG showed irregularly irregular tachycardia consistent with atrial fibrillation. IV diltiazem given which slowed heart rate down to goal heart rate. Labs reviewed. Patient has a history of atrial fibrillation. Is supposed to be on anticoagulation regimen but patient hasn't taken medication last 3 days. Patient is afebrile. No sign of infection. Unlikely pulmonary embolism as cause. Troponin at patient's baseline level Unlikely ACS as cause of rapid afib. No signs of thyroid disease.  Patient denies chest pain, dyspnea or any other symptoms of ACS      Patient not candidate for emergent cardioversion due to increased risk of thromboembolism.     I discussed with cardiology attending Dr. Thomas via phone - patient is known to be noncompliant with home medication. chronic afib. recommended restarting patient's home cardizem and oral anticoagulation and discharge with f/u with him in clinic.  Patient discharged with PMD and cardiologist f/u.    ---   All incidental findings were discussed with patient and patient needs to follow-up with primary care physician for further management and treatment.   All nursing notes were reviewed and appreciated.   Reviewed with patient the signs and symptoms that should prompt an immediate return to our ED or the closest ED. Patient was able to repeat these back in own words and verbalized understanding of the discharge plan. Patient told that follow up is absolutely necessary for full evaluation of complaint; patient told and understands that follow up is essential and not optional. If complaint does not improve, further testing and imaging may be necessary.   Informed the patient that all Emergency Department radiograph readings, if any, are preliminary and are only for the evaluation of the problem they are presenting with. Patient instructed to inform primary care physician of this visit and to obtain full medical records.   The risks/benefits, side effects, and interactions of the prescriptions/over-the-counter medications, if any, and the remainder of the treatment regimen were discussed.

## 2024-03-30 NOTE — ED PROVIDER NOTE - CLINICAL SUMMARY MEDICAL DECISION MAKING FREE TEXT BOX
Patient in ED with rapid heart rate. EKG showed irregularly irregular tachycardia consistent with atrial fibrillation. IV diltiazem given which slowed heart rate down to goal heart rate. Labs reviewed. Patient has a history of atrial fibrillation. Is supposed to be on anticoagulation regimen but patient hasn't taken medication last 3 days. Patient is afebrile. No sign of infection. Unlikely pulmonary embolism as cause. Troponin at patient's baseline level Unlikely ACS as cause of rapid afib. No signs of thyroid disease.  Patient denies chest pain, dyspnea or any other symptoms of ACS      Patient not candidate for emergent cardioversion due to increased risk of thromboembolism.     I discussed with cardiology attending Dr. Thomas via phone - patient is known to be noncompliant with home medication. chronic afib. recommended restarting patient's home cardizem and oral anticoagulation and discharge with f/u with him in clinic.  Patient discharged with PMD and cardiologist f/u.

## 2024-03-30 NOTE — ED ADULT NURSE NOTE - NSFALLUNIVINTERV_ED_ALL_ED
Bed/Stretcher in lowest position, wheels locked, appropriate side rails in place/Call bell, personal items and telephone in reach/Instruct patient to call for assistance before getting out of bed/chair/stretcher/Non-slip footwear applied when patient is off stretcher/[place] to call system/Physically safe environment - no spills, clutter or unnecessary equipment/Purposeful proactive rounding/Room/bathroom lighting operational, light cord in reach

## 2024-03-30 NOTE — ED ADULT NURSE NOTE - HISTORY OF COVID-19 VACCINATION
R3 Antepartum Note, HD#fmu    Patient seen and examined at bedside, no acute overnight events. No acute complaints. Pt reports +FM, denies LOF, VB, ctx, HA, epigastric pain, blurred vision, CP, SOB, N/V, fevers, and chills.    Vital Signs Last 24 Hours  T(C): 36.4 (08-22-23 @ 09:30), Max: 36.9 (08-21-23 @ 17:37)  HR: 79 (08-22-23 @ 09:30) (73 - 85)  BP: 112/70 (08-22-23 @ 09:30) (98/60 - 118/74)  RR: 16 (08-22-23 @ 09:30) (16 - 18)  SpO2: 97% (08-22-23 @ 05:49) (96% - 97%)    CAPILLARY BLOOD GLUCOSE          Physical Exam:  General: NAD  Abdomen: Soft, non-tender, gravid  Ext: No pain or swelling    NST reactive overnight    Labs:  08-22 @ 07:50    138  |  103  |  4   ----------------------------<  97  3.6   |  23  |  0.53    Ca    8.5      08-22 @ 07:50  Phos  3.2     08-22 @ 07:50  Mg     1.80     08-22 @ 07:50    TPro  6.0  /  Alb  2.9  /  TBili  0.3  /  DBili  x   /  AST  42  /  ALT  50  /  AlkPhos  129  08-22 @ 07:50            MEDICATIONS  (STANDING):  acetaminophen     Tablet .. 975 milliGRAM(s) Oral once  dextrose 5% + lactated ringers with potassium chloride 20 mEq/L 1000 milliLiter(s) (125 mL/Hr) IV Continuous <Continuous>  famotidine Injectable 20 milliGRAM(s) IV Push once  folic acid 1 milliGRAM(s) Oral daily  heparin   Injectable 5000 Unit(s) SubCutaneous every 12 hours  metoclopramide Injectable 10 milliGRAM(s) IV Push once  pantoprazole  Injectable 40 milliGRAM(s) IV Push every 12 hours  sodium chloride 0.9% 1000 milliLiter(s) (125 mL/Hr) IV Continuous <Continuous>    MEDICATIONS  (PRN):   R3 Antepartum Note, HD#12    Patient seen and examined at bedside, no acute overnight events. No acute complaints. Reports tolerate with NG tube, but reports abdominal fullness around 7 hour mai. Pt desires to walk and ambulate more and feels limited by the tube feeds. Reports small bites of puree food and sips of fluids. Pt reports +FM, denies LOF, VB, ctx, HA, epigastric pain, blurred vision, CP, SOB, N/V, fevers, and chills.    Vital Signs Last 24 Hours  T(C): 36.4 (08-22-23 @ 09:30), Max: 36.9 (08-21-23 @ 17:37)  HR: 79 (08-22-23 @ 09:30) (73 - 85)  BP: 112/70 (08-22-23 @ 09:30) (98/60 - 118/74)  RR: 16 (08-22-23 @ 09:30) (16 - 18)  SpO2: 97% (08-22-23 @ 05:49) (96% - 97%)    CAPILLARY BLOOD GLUCOSE    Physical Exam:  General: NAD. NG secured in place.   Abdomen: Soft, non-tender, gravid  Ext: No pain or swelling    NST reactive overnight    Labs:  08-22 @ 07:50    138  |  103  |  4   ----------------------------<  97  3.6   |  23  |  0.53    Ca    8.5      08-22 @ 07:50  Phos  3.2     08-22 @ 07:50  Mg     1.80     08-22 @ 07:50    TPro  6.0  /  Alb  2.9  /  TBili  0.3  /  DBili  x   /  AST  42  /  ALT  50  /  AlkPhos  129  08-22 @ 07:50        MEDICATIONS  (STANDING):  acetaminophen     Tablet .. 975 milliGRAM(s) Oral once  dextrose 5% + lactated ringers with potassium chloride 20 mEq/L 1000 milliLiter(s) (125 mL/Hr) IV Continuous <Continuous>  famotidine Injectable 20 milliGRAM(s) IV Push once  folic acid 1 milliGRAM(s) Oral daily  heparin   Injectable 5000 Unit(s) SubCutaneous every 12 hours  metoclopramide Injectable 10 milliGRAM(s) IV Push once  pantoprazole  Injectable 40 milliGRAM(s) IV Push every 12 hours  sodium chloride 0.9% 1000 milliLiter(s) (125 mL/Hr) IV Continuous <Continuous>    MEDICATIONS  (PRN):   Vaccine status unknown

## 2024-03-30 NOTE — ED ADULT TRIAGE NOTE - PATIENT ON (OXYGEN DELIVERY METHOD)
10/23/2020  Spoke to Dora Montgomery for CCM. Updates to patient care team/ comments: No changes per patient    Patient reported changes in medicatioPt awarens: No changes per patient/Pt voiced concerns about continuing to take the Metformin.   Med Adherence  C confident               - confidence: 4    Pt aware  Future Appointments   Date Time Provider Ofelia Ceravntes   11/11/2020  2:00 PM JOHN Hearn [address]   12/21/2020  1:30 PM MD DONALDO King SHAYLA   12/28/2020 room air

## 2024-03-30 NOTE — ED ADULT NURSE NOTE - ED CARDIAC RATE
2018       [facility] [place] [address]        To Whom It May Concern:    Massiel Jarquin, RAMESH 1985, is currently pregnant with an estimated due date of 2018.  This letter is to certify that she is cleared to travel by air during the follow ing time frame:  2018 - 2018.    Please call our office with any questions.        Sincerely,            Deborah Gibbons,         [place] [id number] [phone]     tachycardic

## 2024-03-30 NOTE — ED PROVIDER NOTE - NSCAREINITIATED _GEN_ER
I called and let patient know her Oncologist and Dr. Mendieta discussed her chemo and Hep C meds and Dr. Castle recommends holding the chemo until her Hep C treatment with the Mavyret is complete. Patient will call their office with any questions. I told her we will contact her as soon as we receive the medication and she will see Dr. Mendieta to begin treatment here. Patient verbalizes understanding and agrees to treatment plan. Order form for the Mavyret faxed to [facility] today. Mikala Leung RN   Patient: Lionel Chávez    Procedure: Procedure(s):  ESOPHAGOGASTRODUODENOSCOPY WITH BIOPSY AND DILATION       Anesthesia Type:  MAC    Note:  Disposition: Outpatient   Postop Pain Control: Uneventful            Sign Out: Well controlled pain   PONV: No   Neuro/Psych: Uneventful            Sign Out: Acceptable/Baseline neuro status   Airway/Respiratory: Uneventful            Sign Out: Acceptable/Baseline resp. status   CV/Hemodynamics: Uneventful            Sign Out: Acceptable CV status; No obvious hypovolemia; No obvious fluid overload   Other NRE: NONE   DID A NON-ROUTINE EVENT OCCUR? No           Last vitals:  Vitals Value Taken Time   /67 12/12/22 0752   Temp 36.6  C (97.9  F) 12/12/22 0745   Pulse 69 12/12/22 0757   Resp 16 12/12/22 0745   SpO2 96 % 12/12/22 0757   Vitals shown include unvalidated device data.    Electronically Signed By: Ludin Sandhu MD  December 12, 2022  7:58 AM   Leatha Singh(Resident)

## 2024-03-30 NOTE — ED PROVIDER NOTE - NSFOLLOWUPINSTRUCTIONS_ED_ALL_ED_FT
Follow up with Dr. Hubbard.    Resume all medications as prescribed.    Palpitations    A palpitation is the feeling that your heartbeat is irregular or is faster than normal. It may feel like your heart is fluttering or skipping a beat. They may be caused by many things, including smoking, caffeine, alcohol, stress, and certain medicines. Although most causes of palpitations are not serious, palpitations can be a sign of a serious medical problem. Avoid caffeine, alcohol, and tobacco products at home. Try to reduce stress and anxiety and make sure to get plenty of rest.     SEEK IMMEDIATE MEDICAL CARE IF YOU HAVE ANY OF THE FOLLOWING SYMPTOMS: chest pain, shortness of breath, severe headache, dizziness/lightheadedness, or fainting.

## 2024-03-30 NOTE — ED PROVIDER NOTE - OBJECTIVE STATEMENT
64-year-old female with PMH CHF (EF 34-35) A-fib sometimes compliant with Eliquis and metoprolol, recently admitted in January 2024 for A-fib RVR s/p cardioversion presenting for palpitations for few days.  Patient reports palpitations were intermittent, thought they were going away but today happened more frequently than the previous days.  Patient was discharged in January on metoprolol and Cardizem however has only been using metoprolol, last use 1 hour ago.  Last used Eliquis 3 days ago.  Denies chest pain, SOB, lightheadedness, syncope/near syncope.  No recent fevers, URI symptoms, /GI symptoms.  Cardiologist Haydee, does not see EP but states she was being referred for ablation.

## 2024-04-02 ENCOUNTER — EMERGENCY (EMERGENCY)
Facility: HOSPITAL | Age: 64
LOS: 0 days | Discharge: HOME | End: 2024-04-02
Attending: STUDENT IN AN ORGANIZED HEALTH CARE EDUCATION/TRAINING PROGRAM
Payer: COMMERCIAL

## 2024-04-02 VITALS
HEIGHT: 64 IN | OXYGEN SATURATION: 99 % | HEART RATE: 72 BPM | WEIGHT: 139.99 LBS | DIASTOLIC BLOOD PRESSURE: 80 MMHG | SYSTOLIC BLOOD PRESSURE: 144 MMHG | TEMPERATURE: 98 F | RESPIRATION RATE: 16 BRPM

## 2024-04-02 VITALS
DIASTOLIC BLOOD PRESSURE: 78 MMHG | TEMPERATURE: 98 F | RESPIRATION RATE: 18 BRPM | HEART RATE: 71 BPM | SYSTOLIC BLOOD PRESSURE: 138 MMHG | OXYGEN SATURATION: 98 %

## 2024-04-02 DIAGNOSIS — I48.0 PAROXYSMAL ATRIAL FIBRILLATION: ICD-10-CM

## 2024-04-02 DIAGNOSIS — Z79.01 LONG TERM (CURRENT) USE OF ANTICOAGULANTS: ICD-10-CM

## 2024-04-02 DIAGNOSIS — Z98.890 OTHER SPECIFIED POSTPROCEDURAL STATES: Chronic | ICD-10-CM

## 2024-04-02 DIAGNOSIS — R00.2 PALPITATIONS: ICD-10-CM

## 2024-04-02 DIAGNOSIS — R06.02 SHORTNESS OF BREATH: ICD-10-CM

## 2024-04-02 DIAGNOSIS — I50.9 HEART FAILURE, UNSPECIFIED: ICD-10-CM

## 2024-04-02 DIAGNOSIS — Z88.6 ALLERGY STATUS TO ANALGESIC AGENT: ICD-10-CM

## 2024-04-02 LAB
ALBUMIN SERPL ELPH-MCNC: 4.1 G/DL — SIGNIFICANT CHANGE UP (ref 3.5–5.2)
ALP SERPL-CCNC: 132 U/L — HIGH (ref 30–115)
ALT FLD-CCNC: 86 U/L — HIGH (ref 0–41)
ANION GAP SERPL CALC-SCNC: 7 MMOL/L — SIGNIFICANT CHANGE UP (ref 7–14)
AST SERPL-CCNC: 46 U/L — HIGH (ref 0–41)
BASOPHILS # BLD AUTO: 0.05 K/UL — SIGNIFICANT CHANGE UP (ref 0–0.2)
BASOPHILS NFR BLD AUTO: 0.8 % — SIGNIFICANT CHANGE UP (ref 0–1)
BILIRUB SERPL-MCNC: 0.3 MG/DL — SIGNIFICANT CHANGE UP (ref 0.2–1.2)
BUN SERPL-MCNC: 18 MG/DL — SIGNIFICANT CHANGE UP (ref 10–20)
CALCIUM SERPL-MCNC: 8.6 MG/DL — SIGNIFICANT CHANGE UP (ref 8.4–10.5)
CHLORIDE SERPL-SCNC: 110 MMOL/L — SIGNIFICANT CHANGE UP (ref 98–110)
CO2 SERPL-SCNC: 24 MMOL/L — SIGNIFICANT CHANGE UP (ref 17–32)
CREAT SERPL-MCNC: 0.9 MG/DL — SIGNIFICANT CHANGE UP (ref 0.7–1.5)
EGFR: 71 ML/MIN/1.73M2 — SIGNIFICANT CHANGE UP
EOSINOPHIL # BLD AUTO: 0.23 K/UL — SIGNIFICANT CHANGE UP (ref 0–0.7)
EOSINOPHIL NFR BLD AUTO: 3.6 % — SIGNIFICANT CHANGE UP (ref 0–8)
GLUCOSE SERPL-MCNC: 99 MG/DL — SIGNIFICANT CHANGE UP (ref 70–99)
HCT VFR BLD CALC: 32 % — LOW (ref 37–47)
HGB BLD-MCNC: 9.9 G/DL — LOW (ref 12–16)
IMM GRANULOCYTES NFR BLD AUTO: 0.2 % — SIGNIFICANT CHANGE UP (ref 0.1–0.3)
LYMPHOCYTES # BLD AUTO: 1.93 K/UL — SIGNIFICANT CHANGE UP (ref 1.2–3.4)
LYMPHOCYTES # BLD AUTO: 30 % — SIGNIFICANT CHANGE UP (ref 20.5–51.1)
MAGNESIUM SERPL-MCNC: 2 MG/DL — SIGNIFICANT CHANGE UP (ref 1.8–2.4)
MCHC RBC-ENTMCNC: 23.3 PG — LOW (ref 27–31)
MCHC RBC-ENTMCNC: 30.9 G/DL — LOW (ref 32–37)
MCV RBC AUTO: 75.3 FL — LOW (ref 81–99)
MONOCYTES # BLD AUTO: 0.54 K/UL — SIGNIFICANT CHANGE UP (ref 0.1–0.6)
MONOCYTES NFR BLD AUTO: 8.4 % — SIGNIFICANT CHANGE UP (ref 1.7–9.3)
NEUTROPHILS # BLD AUTO: 3.68 K/UL — SIGNIFICANT CHANGE UP (ref 1.4–6.5)
NEUTROPHILS NFR BLD AUTO: 57 % — SIGNIFICANT CHANGE UP (ref 42.2–75.2)
NRBC # BLD: 0 /100 WBCS — SIGNIFICANT CHANGE UP (ref 0–0)
NT-PROBNP SERPL-SCNC: 2620 PG/ML — HIGH (ref 0–300)
PLATELET # BLD AUTO: 294 K/UL — SIGNIFICANT CHANGE UP (ref 130–400)
PMV BLD: 10.8 FL — HIGH (ref 7.4–10.4)
POTASSIUM SERPL-MCNC: 4.6 MMOL/L — SIGNIFICANT CHANGE UP (ref 3.5–5)
POTASSIUM SERPL-SCNC: 4.6 MMOL/L — SIGNIFICANT CHANGE UP (ref 3.5–5)
PROT SERPL-MCNC: 6 G/DL — SIGNIFICANT CHANGE UP (ref 6–8)
RBC # BLD: 4.25 M/UL — SIGNIFICANT CHANGE UP (ref 4.2–5.4)
RBC # FLD: 18.3 % — HIGH (ref 11.5–14.5)
SODIUM SERPL-SCNC: 141 MMOL/L — SIGNIFICANT CHANGE UP (ref 135–146)
TROPONIN T, HIGH SENSITIVITY RESULT: 12 NG/L — SIGNIFICANT CHANGE UP (ref 6–13)
TROPONIN T, HIGH SENSITIVITY RESULT: 14 NG/L — HIGH (ref 6–13)
WBC # BLD: 6.44 K/UL — SIGNIFICANT CHANGE UP (ref 4.8–10.8)
WBC # FLD AUTO: 6.44 K/UL — SIGNIFICANT CHANGE UP (ref 4.8–10.8)

## 2024-04-02 PROCEDURE — 36415 COLL VENOUS BLD VENIPUNCTURE: CPT

## 2024-04-02 PROCEDURE — 93005 ELECTROCARDIOGRAM TRACING: CPT

## 2024-04-02 PROCEDURE — 99285 EMERGENCY DEPT VISIT HI MDM: CPT

## 2024-04-02 PROCEDURE — 80053 COMPREHEN METABOLIC PANEL: CPT

## 2024-04-02 PROCEDURE — 71045 X-RAY EXAM CHEST 1 VIEW: CPT

## 2024-04-02 PROCEDURE — 93010 ELECTROCARDIOGRAM REPORT: CPT

## 2024-04-02 PROCEDURE — 83735 ASSAY OF MAGNESIUM: CPT

## 2024-04-02 PROCEDURE — 99285 EMERGENCY DEPT VISIT HI MDM: CPT | Mod: 25

## 2024-04-02 PROCEDURE — 84484 ASSAY OF TROPONIN QUANT: CPT

## 2024-04-02 PROCEDURE — 85025 COMPLETE CBC W/AUTO DIFF WBC: CPT

## 2024-04-02 PROCEDURE — 71045 X-RAY EXAM CHEST 1 VIEW: CPT | Mod: 26

## 2024-04-02 PROCEDURE — 83880 ASSAY OF NATRIURETIC PEPTIDE: CPT

## 2024-04-02 RX ORDER — DILTIAZEM HCL 120 MG
120 CAPSULE, EXT RELEASE 24 HR ORAL ONCE
Refills: 0 | Status: COMPLETED | OUTPATIENT
Start: 2024-04-02 | End: 2024-04-02

## 2024-04-02 RX ADMIN — Medication 120 MILLIGRAM(S): at 09:00

## 2024-04-02 NOTE — ED ADULT TRIAGE NOTE - CHIEF COMPLAINT QUOTE
Pt c/o palpitations  x1 day. Denies chest pain Pt c/o palpitations  x1 day. Denies chest pain hx of afib

## 2024-04-02 NOTE — ED PROVIDER NOTE - OBJECTIVE STATEMENT
64-year-old female with a past medical history of A-fib on Eliquis and metoprolol, CHF, presents with intermittent palpitations associated with mild shortness of breath.  Patient was in the emergency department on March 30 for similar symptoms but at that time had A-fib with AVR and was dc. Patient has apt with Cardio Dr Thomas today. Denies cp, difficulty braething, nauea, vomiting, fevers, cough, congestion, dysuria/hematuria

## 2024-04-02 NOTE — ED ADULT NURSE NOTE - NSFALLUNIVINTERV_ED_ALL_ED
Bed/Stretcher in lowest position, wheels locked, appropriate side rails in place/Call bell, personal items and telephone in reach/Instruct patient to call for assistance before getting out of bed/chair/stretcher/Non-slip footwear applied when patient is off stretcher/[place] to call system/Physically safe environment - no spills, clutter or unnecessary equipment/Purposeful proactive rounding/Room/bathroom lighting operational, light cord in reach

## 2024-04-02 NOTE — ED PROVIDER NOTE - ATTENDING CONTRIBUTION TO CARE
64-year-old female with a past medical history significant for atrial fibrillation on Eliquis CHF who presents with palpitations.  Patient states that for last couple days she has been having  palpitations on and off.  Of note patient was in the ED on March 30 where she presents with A-fib and RVR.  Patient also states that she came in today requesting to be cardioverted back into sinus rhythm.  Patient denies any chest pain shortness of breath nausea vomiting fever chills or any other medical complaints.    VITAL SIGNS: I have reviewed nursing notes and confirm.  CONSTITUTIONAL: non-toxic, well appearing  SKIN: no rash, no petechiae.  EYES:  EOMI, pink conjunctiva, anicteric  ENT: tongue midline, no exudates, MMM  NECK: Supple; no meningismus, no JVD  CARD: irregularly irregular, no murmurs, equal radial pulses bilaterally 2+  RESP: CTAB, no respiratory distress  ABD: Soft, non-tender, non-distended, no peritoneal signs, no HSM, no CVA tenderness        64-year-old female that presents with palpitations.  Of note had a discussion with patient regarding cardioversion.  Spoke to Dr. Alejo regarding patient. labs, ekg, cxr. reassess. dispo pending. 64-year-old female with a past medical history significant for atrial fibrillation on Eliquis CHF who presents with palpitations.  Patient states that for last couple days she has been having  palpitations on and off.  Of note patient was in the ED on March 30 where she presents with A-fib and RVR.  Patient also states that she came in today requesting to be cardioverted back into sinus rhythm.  Patient denies any chest pain shortness of breath nausea vomiting fever chills or any other medical complaints.    VITAL SIGNS: I have reviewed nursing notes and confirm.  CONSTITUTIONAL: non-toxic, well appearing  SKIN: no rash, no petechiae.  EYES:  EOMI, pink conjunctiva, anicteric  ENT: tongue midline, no exudates, MMM  NECK: Supple; no meningismus, no JVD  CARD: irregularly irregular, no murmurs, equal radial pulses bilaterally 2+  RESP: CTAB, no respiratory distress  ABD: Soft, non-tender, non-distended, no peritoneal signs, no HSM, no CVA tenderness        64-year-old female that presents with palpitations.  Of note had a discussion with patient regarding elective cardioversion, however, pt wishes to follow outpatient.  Spoke to Dr. Alejo regarding patient. labs, ekg, cxr. reassess. dispo pending.

## 2024-04-02 NOTE — ED PROVIDER NOTE - CARE PROVIDER_API CALL
Froylan Thomas  Cardiology  [address] [facility] [facility], [address]  [place] [address]  Phone: [phone]  Fax: [phone]  Follow Up Time: 1-3 Days

## 2024-04-02 NOTE — ED ADULT NURSE NOTE - NS ED NURSE LEVEL OF CONSCIOUSNESS AFFECT
[General Appearance - Alert] : alert [General Appearance - In No Acute Distress] : in no acute distress [Extraocular Movements] : extraocular movements were intact [Sclera] : the sclera and conjunctiva were normal [PERRL With Normal Accommodation] : pupils were equal in size, round, and reactive to light [Auscultation Breath Sounds / Voice Sounds] : lungs were clear to auscultation bilaterally [Heart Rate And Rhythm] : heart rate was normal and rhythm regular [Murmurs] : no murmurs [Heart Sounds] : normal S1 and S2 [Heart Sounds Gallop] : no gallops [Heart Sounds Pericardial Friction Rub] : no pericardial rub [Bowel Sounds] : normal bowel sounds [Abdomen Tenderness] : non-tender [Abdomen Soft] : soft [] : no hepato-splenomegaly [Abdomen Mass (___ Cm)] : no abdominal mass palpated [Abnormal Walk] : normal gait [Nail Clubbing] : no clubbing  or cyanosis of the fingernails [Motor Tone] : muscle strength and tone were normal [Musculoskeletal - Swelling] : no joint swelling seen [Oriented To Time, Place, And Person] : oriented to person, place, and time [Affect] : the affect was normal [Impaired Insight] : insight and judgment were intact Calm

## 2024-04-02 NOTE — ED PROVIDER NOTE - PROGRESS NOTE DETAILS
ss: Spoke with pt's cardio Dr Castellanos - aware of elevated trop, will repeat and if delta wnl, can fu op at Dr's office SS Patient sitting comfortably no acute distress heart rate below 80.  Strict return precautions given with patient and son at bedside.  Patient will follow-up in Dr. Thomas office outpatient after discharge today

## 2024-04-02 NOTE — ED PROVIDER NOTE - CLINICAL SUMMARY MEDICAL DECISION MAKING FREE TEXT BOX
64-year-old female that presents with palpitations.  Of note had a discussion with patient regarding elective cardioversion, however, pt wishes to follow outpatient.  Spoke to Dr. Alejo regarding patient. labs, ekg, cxr. pt with no acute medical complaints. no in active chf. Labs and EKG were ordered and reviewed.  Imaging was ordered and reviewed by me.  Appropriate medications for patient's presenting complaints were ordered and effects were reassessed.  Patient's records (prior hospital, ED visit, and/or nursing home notes if available) were reviewed.  Additional history was obtained from EMS, family, and/or PCP (where available).  Escalation to admission/observation was considered.  However patient feels much better and is comfortable with discharge.  Appropriate follow-up was arranged.    I have discussed the discharge plan with the patient. The patient agrees with the plan, as discussed.  The patient understands Emergency Department diagnosis is a preliminary diagnosis often based on limited information and that the patient must adhere to the follow-up plan as discussed.  The patient understands that if the symptoms worsen or if prescribed medications do not have the desired/planned effect that the patient may return to the Emergency Department at any time for further evaluation and treatment.

## 2024-04-02 NOTE — ED PROVIDER NOTE - PATIENT PORTAL LINK FT
You can access the FollowMyHealth Patient Portal offered by [facility] by registering at the following website: [email or web address]. By joining [employer]’s FollowMyHealth portal, you will also be able to view your health information using other applications (apps) compatible with our system.

## 2024-04-20 ENCOUNTER — EMERGENCY (EMERGENCY)
Facility: HOSPITAL | Age: 64
LOS: 0 days | Discharge: HOME | End: 2024-04-20
Attending: EMERGENCY MEDICINE
Payer: COMMERCIAL

## 2024-04-20 VITALS
HEIGHT: 64 IN | SYSTOLIC BLOOD PRESSURE: 109 MMHG | WEIGHT: 132.06 LBS | RESPIRATION RATE: 18 BRPM | DIASTOLIC BLOOD PRESSURE: 80 MMHG | OXYGEN SATURATION: 97 % | TEMPERATURE: 98 F | HEART RATE: 145 BPM

## 2024-04-20 VITALS
DIASTOLIC BLOOD PRESSURE: 76 MMHG | OXYGEN SATURATION: 98 % | HEART RATE: 94 BPM | RESPIRATION RATE: 18 BRPM | SYSTOLIC BLOOD PRESSURE: 105 MMHG

## 2024-04-20 DIAGNOSIS — Z98.890 OTHER SPECIFIED POSTPROCEDURAL STATES: Chronic | ICD-10-CM

## 2024-04-20 DIAGNOSIS — R09.81 NASAL CONGESTION: ICD-10-CM

## 2024-04-20 DIAGNOSIS — I48.91 UNSPECIFIED ATRIAL FIBRILLATION: ICD-10-CM

## 2024-04-20 DIAGNOSIS — Z79.01 LONG TERM (CURRENT) USE OF ANTICOAGULANTS: ICD-10-CM

## 2024-04-20 DIAGNOSIS — R00.2 PALPITATIONS: ICD-10-CM

## 2024-04-20 DIAGNOSIS — Z88.5 ALLERGY STATUS TO NARCOTIC AGENT: ICD-10-CM

## 2024-04-20 DIAGNOSIS — F41.9 ANXIETY DISORDER, UNSPECIFIED: ICD-10-CM

## 2024-04-20 DIAGNOSIS — I50.9 HEART FAILURE, UNSPECIFIED: ICD-10-CM

## 2024-04-20 LAB
ALBUMIN SERPL ELPH-MCNC: 4.2 G/DL — SIGNIFICANT CHANGE UP (ref 3.5–5.2)
ALP SERPL-CCNC: 126 U/L — HIGH (ref 30–115)
ALT FLD-CCNC: 118 U/L — HIGH (ref 0–41)
ANION GAP SERPL CALC-SCNC: 10 MMOL/L — SIGNIFICANT CHANGE UP (ref 7–14)
AST SERPL-CCNC: 83 U/L — HIGH (ref 0–41)
BASOPHILS # BLD AUTO: 0.03 K/UL — SIGNIFICANT CHANGE UP (ref 0–0.2)
BASOPHILS NFR BLD AUTO: 0.3 % — SIGNIFICANT CHANGE UP (ref 0–1)
BILIRUB SERPL-MCNC: 0.4 MG/DL — SIGNIFICANT CHANGE UP (ref 0.2–1.2)
BUN SERPL-MCNC: 22 MG/DL — HIGH (ref 10–20)
CALCIUM SERPL-MCNC: 8.2 MG/DL — LOW (ref 8.4–10.5)
CHLORIDE SERPL-SCNC: 107 MMOL/L — SIGNIFICANT CHANGE UP (ref 98–110)
CO2 SERPL-SCNC: 23 MMOL/L — SIGNIFICANT CHANGE UP (ref 17–32)
CREAT SERPL-MCNC: 0.9 MG/DL — SIGNIFICANT CHANGE UP (ref 0.7–1.5)
EGFR: 71 ML/MIN/1.73M2 — SIGNIFICANT CHANGE UP
EOSINOPHIL # BLD AUTO: 0.04 K/UL — SIGNIFICANT CHANGE UP (ref 0–0.7)
EOSINOPHIL NFR BLD AUTO: 0.4 % — SIGNIFICANT CHANGE UP (ref 0–8)
GLUCOSE SERPL-MCNC: 101 MG/DL — HIGH (ref 70–99)
HCT VFR BLD CALC: 31.5 % — LOW (ref 37–47)
HGB BLD-MCNC: 10 G/DL — LOW (ref 12–16)
IMM GRANULOCYTES NFR BLD AUTO: 0.3 % — SIGNIFICANT CHANGE UP (ref 0.1–0.3)
LYMPHOCYTES # BLD AUTO: 2.43 K/UL — SIGNIFICANT CHANGE UP (ref 1.2–3.4)
LYMPHOCYTES # BLD AUTO: 23.1 % — SIGNIFICANT CHANGE UP (ref 20.5–51.1)
MCHC RBC-ENTMCNC: 23.3 PG — LOW (ref 27–31)
MCHC RBC-ENTMCNC: 31.7 G/DL — LOW (ref 32–37)
MCV RBC AUTO: 73.3 FL — LOW (ref 81–99)
MONOCYTES # BLD AUTO: 0.59 K/UL — SIGNIFICANT CHANGE UP (ref 0.1–0.6)
MONOCYTES NFR BLD AUTO: 5.6 % — SIGNIFICANT CHANGE UP (ref 1.7–9.3)
NEUTROPHILS # BLD AUTO: 7.38 K/UL — HIGH (ref 1.4–6.5)
NEUTROPHILS NFR BLD AUTO: 70.3 % — SIGNIFICANT CHANGE UP (ref 42.2–75.2)
NRBC # BLD: 0 /100 WBCS — SIGNIFICANT CHANGE UP (ref 0–0)
PLATELET # BLD AUTO: 308 K/UL — SIGNIFICANT CHANGE UP (ref 130–400)
PMV BLD: 11.1 FL — HIGH (ref 7.4–10.4)
POTASSIUM SERPL-MCNC: 4.4 MMOL/L — SIGNIFICANT CHANGE UP (ref 3.5–5)
POTASSIUM SERPL-SCNC: 4.4 MMOL/L — SIGNIFICANT CHANGE UP (ref 3.5–5)
PROT SERPL-MCNC: 6 G/DL — SIGNIFICANT CHANGE UP (ref 6–8)
RBC # BLD: 4.3 M/UL — SIGNIFICANT CHANGE UP (ref 4.2–5.4)
RBC # FLD: 17.7 % — HIGH (ref 11.5–14.5)
SODIUM SERPL-SCNC: 140 MMOL/L — SIGNIFICANT CHANGE UP (ref 135–146)
WBC # BLD: 10.5 K/UL — SIGNIFICANT CHANGE UP (ref 4.8–10.8)
WBC # FLD AUTO: 10.5 K/UL — SIGNIFICANT CHANGE UP (ref 4.8–10.8)

## 2024-04-20 PROCEDURE — 93005 ELECTROCARDIOGRAM TRACING: CPT

## 2024-04-20 PROCEDURE — 99285 EMERGENCY DEPT VISIT HI MDM: CPT | Mod: 25

## 2024-04-20 PROCEDURE — 71045 X-RAY EXAM CHEST 1 VIEW: CPT | Mod: 26

## 2024-04-20 PROCEDURE — 71045 X-RAY EXAM CHEST 1 VIEW: CPT

## 2024-04-20 PROCEDURE — 36415 COLL VENOUS BLD VENIPUNCTURE: CPT

## 2024-04-20 PROCEDURE — 93010 ELECTROCARDIOGRAM REPORT: CPT

## 2024-04-20 PROCEDURE — 99285 EMERGENCY DEPT VISIT HI MDM: CPT

## 2024-04-20 PROCEDURE — 85025 COMPLETE CBC W/AUTO DIFF WBC: CPT

## 2024-04-20 PROCEDURE — 80053 COMPREHEN METABOLIC PANEL: CPT

## 2024-04-20 RX ORDER — SODIUM CHLORIDE 9 MG/ML
1000 INJECTION, SOLUTION INTRAVENOUS ONCE
Refills: 0 | Status: COMPLETED | OUTPATIENT
Start: 2024-04-20 | End: 2024-04-20

## 2024-04-20 RX ADMIN — Medication 0.5 MILLIGRAM(S): at 05:58

## 2024-04-20 RX ADMIN — Medication 0.5 MILLIGRAM(S): at 06:50

## 2024-04-20 RX ADMIN — SODIUM CHLORIDE 1000 MILLILITER(S): 9 INJECTION, SOLUTION INTRAVENOUS at 05:00

## 2024-04-20 NOTE — ED ADULT NURSE NOTE - NSFALLUNIVINTERV_ED_ALL_ED
Bed/Stretcher in lowest position, wheels locked, appropriate side rails in place/Call bell, personal items and telephone in reach/Instruct patient to call for assistance before getting out of bed/chair/stretcher/Non-slip footwear applied when patient is off stretcher/[place] to call system/Physically safe environment - no spills, clutter or unnecessary equipment/Purposeful proactive rounding/Room/bathroom lighting operational, light cord in reach

## 2024-04-20 NOTE — ED ADULT TRIAGE NOTE - CHIEF COMPLAINT QUOTE
I was cardioverted today at [facility], and my A-fib came back, I feel dizzy and short of breath. I also had an ablation on Thursday   - patient   Patient still taking Eliquis, took Metoprolol 100 and Cardizem 240 before coming

## 2024-04-20 NOTE — ED PROVIDER NOTE - PHYSICAL EXAMINATION
VITAL SIGNS: I have reviewed nursing notes and confirm.  CONSTITUTIONAL: Well-appearing, non-toxic, in NAD  SKIN: Warm dry, normal skin turgor  HEAD: NCAT  EYES: No conjunctival injection, scleral anicteric  ENT: MMM  NECK: Supple; FROM.   CARD: HR in 120s, rhythm appears regular.   RESP: CTAB  ABD: Soft, NDNT  EXT: No pedal edema, no calf tenderness  NEURO: Grossly normal examination, CN grossly intact  PSYCH: Cooperative, appropriate

## 2024-04-20 NOTE — ED PROVIDER NOTE - NSFOLLOWUPINSTRUCTIONS_ED_ALL_ED_FT
Follow-up with Dr Mckinley and Dr Stark in 1-3 days regarding your visit to the ED.     Atrial Fibrillation    Atrial fibrillation is a type of irregular heartbeat (arrhythmia) where the heart quivers continuously in a chaotic pattern that makes the heart unable to pump blood normally. This can increase the risk for stroke, heart failure, and other heart-related conditions. Atrial fibrillation can be caused by a variety of conditions and may be temporary, intermittent, or permanent. Symptoms include feeling that your heart is beating rapidly or irregularly, chest discomfort, shortness of breath, or dizziness/lightheadedness that may be worse with exertion. Treatment is varied but may involve medication or electrical shock (cardioversion).    SEEK IMMEDIATE MEDICAL CARE IF YOU HAVE ANY OF THE FOLLOWING SYMPTOMS: chest pain, shortness of breath, abdominal pain, sweating, vomiting, blood in vomit/bowel movements/urine, dizziness/lightheadedness, weakness or numbness to face/arm/leg, trouble speaking or understanding, facial droop.

## 2024-04-20 NOTE — ED PROVIDER NOTE - ATTENDING CONTRIBUTION TO CARE
64-year-old female with past medical history of A-fib on Eliquis and CHFpresents emergency department for palpitations.  Patient was just at [facility] on Thursday had an ablation as well as cardioversion.  No chest pain or shortness of breath no nausea no vomiting.  Patient took 240mg of cardiazem and 100mg of metoprolol prior to coming to the ED with hopes to slow her HR.     Const: No apparent distress  Eyes: PERRL, no conjunctival injection  HENT:  Neck supple without meningismus   CV: tachycardic, Warm, well-perfused extremities  RESP: CTA B/L, no tachypnea   GI: soft, non-tender, non-distended  MSK: No gross deformities appreciated  Skin: Warm, dry. No rashes  Neuro: Alert, CNs II-XII grossly intact. Sensation and motor function of extremities grossly intact.  Psych: Appropriate mood and affect.

## 2024-04-20 NOTE — ED PROVIDER NOTE - WET READ LAUNCH FT
Patient has a dental apt tomorrow and is requesting a refill    There are no Wet Read(s) to document. There is 1 Wet Read(s) to document.

## 2024-04-20 NOTE — ED PROVIDER NOTE - PROGRESS NOTE DETAILS
TJY: Pt d/w cardiologist covering for Elías (Dr Aguilar); given rate control following fluid and home meds pta, and pt refusing cardioversion - pt can be discharged with instructions to increase metoprolol to 50mg BID; also requesting to instruct pt resume taking Eliquis 5mg BID. Will continue keeping pt off the diltiazem.     POCUS demonstrates no pce or acute abnormalities. CARLOSY: pt aware of elevated LFTs. TJY: pt aware of elevated LFTs.  Pt endorsing anxiety, requesting Ativan, which she normally takes (0.5-1) at home.

## 2024-04-20 NOTE — ED PROVIDER NOTE - PATIENT PORTAL LINK FT
You can access the FollowMyHealth Patient Portal offered by [facility] by registering at the following website: [email or web address]. By joining [employer]’s FollowMyHealth portal, you will also be able to view your health information using other applications (apps) compatible with our system.

## 2024-04-20 NOTE — ED PROVIDER NOTE - OBJECTIVE STATEMENT
63yo female PMHx a fib (on eliquis) presenting with rapid HR; pt says that she was admitted to [facility] and had an ablation done with Dr Jac Mckinley ([phone]) on 4/18, in addition to a cardioversion yesterday. Pt currently c/o congestion that she attributes to intubation during the procedure, no f/c. No CP or dyspnea, no recnet n/v/d, no other complaints. Pt was discharged on metoprolol 25mg; she took 100mg prior to coming, in addition to cardiazem 240mg, which she had been taking prior to the ablation.

## 2024-04-20 NOTE — ED PROVIDER NOTE - CLINICAL SUMMARY MEDICAL DECISION MAKING FREE TEXT BOX
64-year-old female presents emergency department for palpitations.  EKG concerning for a flutter.  While in the emergency department after fluids patient heart rate improved.  Discussed case with patient's cardiologist at [facility] who recommended discharge home or admission for additional cardioversion.  Patient states that her heart rate is slowed and she feels improved she would like to go home and follow-up as an outpatient.  DC home with strict return precautions.

## 2024-04-21 ENCOUNTER — EMERGENCY (EMERGENCY)
Facility: HOSPITAL | Age: 64
LOS: 0 days | Discharge: HOME | End: 2024-04-22
Attending: INTERNAL MEDICINE
Payer: COMMERCIAL

## 2024-04-21 VITALS
RESPIRATION RATE: 20 BRPM | OXYGEN SATURATION: 100 % | DIASTOLIC BLOOD PRESSURE: 94 MMHG | HEIGHT: 64 IN | HEART RATE: 151 BPM | SYSTOLIC BLOOD PRESSURE: 122 MMHG | TEMPERATURE: 98 F

## 2024-04-21 DIAGNOSIS — I48.91 UNSPECIFIED ATRIAL FIBRILLATION: ICD-10-CM

## 2024-04-21 DIAGNOSIS — Z98.890 OTHER SPECIFIED POSTPROCEDURAL STATES: Chronic | ICD-10-CM

## 2024-04-21 DIAGNOSIS — I50.9 HEART FAILURE, UNSPECIFIED: ICD-10-CM

## 2024-04-21 DIAGNOSIS — Z88.5 ALLERGY STATUS TO NARCOTIC AGENT: ICD-10-CM

## 2024-04-21 DIAGNOSIS — Z79.01 LONG TERM (CURRENT) USE OF ANTICOAGULANTS: ICD-10-CM

## 2024-04-21 LAB
ALBUMIN SERPL ELPH-MCNC: 4.2 G/DL — SIGNIFICANT CHANGE UP (ref 3.5–5.2)
ALP SERPL-CCNC: 140 U/L — HIGH (ref 30–115)
ALT FLD-CCNC: 96 U/L — HIGH (ref 0–41)
ANION GAP SERPL CALC-SCNC: 10 MMOL/L — SIGNIFICANT CHANGE UP (ref 7–14)
AST SERPL-CCNC: 59 U/L — HIGH (ref 0–41)
BASOPHILS # BLD AUTO: 0.05 K/UL — SIGNIFICANT CHANGE UP (ref 0–0.2)
BASOPHILS NFR BLD AUTO: 0.5 % — SIGNIFICANT CHANGE UP (ref 0–1)
BILIRUB SERPL-MCNC: 0.2 MG/DL — SIGNIFICANT CHANGE UP (ref 0.2–1.2)
BUN SERPL-MCNC: 14 MG/DL — SIGNIFICANT CHANGE UP (ref 10–20)
CALCIUM SERPL-MCNC: 8.9 MG/DL — SIGNIFICANT CHANGE UP (ref 8.4–10.5)
CHLORIDE SERPL-SCNC: 111 MMOL/L — HIGH (ref 98–110)
CO2 SERPL-SCNC: 22 MMOL/L — SIGNIFICANT CHANGE UP (ref 17–32)
CREAT SERPL-MCNC: 0.9 MG/DL — SIGNIFICANT CHANGE UP (ref 0.7–1.5)
EGFR: 71 ML/MIN/1.73M2 — SIGNIFICANT CHANGE UP
EOSINOPHIL # BLD AUTO: 0.22 K/UL — SIGNIFICANT CHANGE UP (ref 0–0.7)
EOSINOPHIL NFR BLD AUTO: 2.2 % — SIGNIFICANT CHANGE UP (ref 0–8)
GLUCOSE SERPL-MCNC: 103 MG/DL — HIGH (ref 70–99)
HCT VFR BLD CALC: 30.2 % — LOW (ref 37–47)
HGB BLD-MCNC: 9.5 G/DL — LOW (ref 12–16)
IMM GRANULOCYTES NFR BLD AUTO: 0.2 % — SIGNIFICANT CHANGE UP (ref 0.1–0.3)
LYMPHOCYTES # BLD AUTO: 2.52 K/UL — SIGNIFICANT CHANGE UP (ref 1.2–3.4)
LYMPHOCYTES # BLD AUTO: 25.7 % — SIGNIFICANT CHANGE UP (ref 20.5–51.1)
MCHC RBC-ENTMCNC: 23.6 PG — LOW (ref 27–31)
MCHC RBC-ENTMCNC: 31.5 G/DL — LOW (ref 32–37)
MCV RBC AUTO: 74.9 FL — LOW (ref 81–99)
MONOCYTES # BLD AUTO: 0.89 K/UL — HIGH (ref 0.1–0.6)
MONOCYTES NFR BLD AUTO: 9.1 % — SIGNIFICANT CHANGE UP (ref 1.7–9.3)
NEUTROPHILS # BLD AUTO: 6.09 K/UL — SIGNIFICANT CHANGE UP (ref 1.4–6.5)
NEUTROPHILS NFR BLD AUTO: 62.3 % — SIGNIFICANT CHANGE UP (ref 42.2–75.2)
NRBC # BLD: 0 /100 WBCS — SIGNIFICANT CHANGE UP (ref 0–0)
PLATELET # BLD AUTO: 289 K/UL — SIGNIFICANT CHANGE UP (ref 130–400)
PMV BLD: 10.7 FL — HIGH (ref 7.4–10.4)
POTASSIUM SERPL-MCNC: 4.6 MMOL/L — SIGNIFICANT CHANGE UP (ref 3.5–5)
POTASSIUM SERPL-SCNC: 4.6 MMOL/L — SIGNIFICANT CHANGE UP (ref 3.5–5)
PROT SERPL-MCNC: 6.6 G/DL — SIGNIFICANT CHANGE UP (ref 6–8)
RBC # BLD: 4.03 M/UL — LOW (ref 4.2–5.4)
RBC # FLD: 17.8 % — HIGH (ref 11.5–14.5)
SODIUM SERPL-SCNC: 143 MMOL/L — SIGNIFICANT CHANGE UP (ref 135–146)
TROPONIN T, HIGH SENSITIVITY RESULT: 495 NG/L — CRITICAL HIGH (ref 6–13)
WBC # BLD: 9.79 K/UL — SIGNIFICANT CHANGE UP (ref 4.8–10.8)
WBC # FLD AUTO: 9.79 K/UL — SIGNIFICANT CHANGE UP (ref 4.8–10.8)

## 2024-04-21 PROCEDURE — 84484 ASSAY OF TROPONIN QUANT: CPT

## 2024-04-21 PROCEDURE — 96375 TX/PRO/DX INJ NEW DRUG ADDON: CPT | Mod: XU

## 2024-04-21 PROCEDURE — 36415 COLL VENOUS BLD VENIPUNCTURE: CPT

## 2024-04-21 PROCEDURE — 71045 X-RAY EXAM CHEST 1 VIEW: CPT

## 2024-04-21 PROCEDURE — 93010 ELECTROCARDIOGRAM REPORT: CPT | Mod: 77

## 2024-04-21 PROCEDURE — 92960 CARDIOVERSION ELECTRIC EXT: CPT

## 2024-04-21 PROCEDURE — 80053 COMPREHEN METABOLIC PANEL: CPT

## 2024-04-21 PROCEDURE — 96374 THER/PROPH/DIAG INJ IV PUSH: CPT | Mod: XU

## 2024-04-21 PROCEDURE — 85025 COMPLETE CBC W/AUTO DIFF WBC: CPT

## 2024-04-21 PROCEDURE — 93010 ELECTROCARDIOGRAM REPORT: CPT

## 2024-04-21 PROCEDURE — 99285 EMERGENCY DEPT VISIT HI MDM: CPT | Mod: 25

## 2024-04-21 PROCEDURE — 93005 ELECTROCARDIOGRAM TRACING: CPT

## 2024-04-21 RX ORDER — ADENOSINE 3 MG/ML
6 INJECTION INTRAVENOUS ONCE
Refills: 0 | Status: COMPLETED | OUTPATIENT
Start: 2024-04-21 | End: 2024-04-21

## 2024-04-21 RX ORDER — PROPOFOL 10 MG/ML
50 INJECTION, EMULSION INTRAVENOUS ONCE
Refills: 0 | Status: COMPLETED | OUTPATIENT
Start: 2024-04-21 | End: 2024-04-21

## 2024-04-21 RX ORDER — DILTIAZEM HCL 120 MG
5 CAPSULE, EXT RELEASE 24 HR ORAL ONCE
Refills: 0 | Status: COMPLETED | OUTPATIENT
Start: 2024-04-21 | End: 2024-04-21

## 2024-04-21 RX ADMIN — ADENOSINE 6 MILLIGRAM(S): 3 INJECTION INTRAVENOUS at 22:24

## 2024-04-21 RX ADMIN — Medication 5 MILLIGRAM(S): at 22:24

## 2024-04-21 RX ADMIN — PROPOFOL 50 MILLIGRAM(S): 10 INJECTION, EMULSION INTRAVENOUS at 23:25

## 2024-04-21 NOTE — ED PROVIDER NOTE - NSFOLLOWUPINSTRUCTIONS_ED_ALL_ED_FT
FOLLOW UP WITH DR. HART FOR CARDIOLOGY TOMORROW.     Electrical Cardioversion, Care After  Electrical cardioversion is the delivery of a jolt of electricity to restore a normal rhythm to the heart. After an electrical cardioversion, it is common to have some redness and soreness on the skin on your chest or back. You may also feel sleepy for several hours after the procedure.    Follow these instructions at home:  Activity    If you were given a sedative during your procedure, do not drive or use machines until your health care provider says that it is safe. A sedative is a medicine that helps you relax.  Rest as told by your provider.  Return to your normal activities as told by your provider.  Medicines    Take over-the-counter and prescription medicines as told by your provider.  Your provider will tell you if you need to take medicine to maintain a normal rhythm.  If you are on blood thinners take them as told by your provider. Do not stop or miss doses unless instructed by your provider.  General instructions    A person lying on a bed to have an electrocardiogram. Wires go from a machine to electrodes attached to the person's body.   Your provider will tell you if the procedure worked or if you need more treatment.  Keep all follow-up visits. Your provider will need to check your heart rate and rhythm on a regular basis.  Your health care provider may give you more instructions. Make sure you know what you can and cannot do.    Contact a health care provider if:  You feel fast or irregular heartbeats (palpitations).  You have a serious muscle cramp that does not go away.  You are dizzy or you feel faint.  Get help right away if:  A sign showing the "BE FAST" categories for the warning signs of a stroke: balance, eyes, face, arms, speech, and time.  You have chest pain.  You are short of breath.  You have any symptoms of a stroke. "BE FAST" is an easy way to remember the main warning signs of a stroke:  B - Balance. Signs are dizziness, sudden trouble walking, or loss of balance.  E - Eyes. Signs are trouble seeing or a sudden change in vision.  F - Face. Signs are sudden weakness or numbness of the face, or the face or eyelid drooping on one side.  A - Arms. Signs are weakness or numbness in an arm. This happens suddenly and usually on one side of the body.  S - Speech. Signs are sudden trouble speaking, slurred speech, or trouble understanding what people say.  T -Time. Time to call emergency services. Write down what time symptoms started.  You have other signs of a stroke, such as:  A sudden, severe headache with no known cause.  Nausea or vomiting.  Seizure.  These symptoms may be an emergency. Get help right away. Call 911.  Do not wait to see if the symptoms will go away.  Do not drive yourself to the hospital.  This information is not intended to replace advice given to you by your health care provider. Make sure you discuss any questions you have with your health care provider.

## 2024-04-21 NOTE — ED ADULT TRIAGE NOTE - CHIEF COMPLAINT QUOTE
was cardioverted for a fib x 3 days ago and then ablation the following day. c/o palpitations since 1000 had ablation for a fib x 3 days ago and then cardioverted the following day. c/o palpitations since 1000

## 2024-04-21 NOTE — ED PROVIDER NOTE - PHYSICAL EXAMINATION
VITAL SIGNS: I have reviewed nursing notes and confirm.  CONSTITUTIONAL: well-appearing, non-toxic, NAD  SKIN: Warm dry, normal skin turgor, no acute rash, no bruising  HEAD: NCAT  EYES: EOMI, PERRLA, no scleral icterus, normal conjunctiva  ENT: Moist mucous membranes, OR clear. Normal pharynx with no erythema, exudates, or tonsillar hypertrophy.   NECK: Supple; non tender. Full ROM. No cervical LAD  CARD: tachycardic, HR in 150s, irregularly irregular, no murmurs, rubs or gallops  RESP: clear to ausculation b/l.  No rales, rhonchi, or wheezing.  ABD: soft, + BS, non-tender, non-distended, no rebound or guarding. No CVA tenderness  EXT: Full ROM, no bony tenderness, no pedal edema, no calf tenderness  NEURO: normal motor. normal sensory. CN II-XII intact. Cerebellar testing normal. Normal gait.  PSYCH: Cooperative, appropriate.

## 2024-04-21 NOTE — ED PROVIDER NOTE - PATIENT PORTAL LINK FT
-Pt received the flu shot today in clinic    You can access the FollowMyHealth Patient Portal offered by [facility] by registering at the following website: [email or web address]. By joining [employer]’s FollowMyHealth portal, you will also be able to view your health information using other applications (apps) compatible with our system.

## 2024-04-21 NOTE — ED PROVIDER NOTE - OBJECTIVE STATEMENT
64y female with PMHx of a fib on eliquis, s/p ablation on Thursday (follows with Dr. Thomas for cardiology),  CHF (EF 34-35) who presents for palpitations. Reports palpitations that began last night, but went away on their own. Started again at 10 AM today. Found to be in A fib with RVR in the ED. Denies recent illness, fevers, chills, CP, SOB, n/v, abdominal pain, urinary symptoms, weakness, numbness, falls. Denies alcohol use, substance use.

## 2024-04-21 NOTE — ED PROVIDER NOTE - CARE PROVIDER_API CALL
Froylan Thomas  Cardiology  [address], [address]  [place] [address]  Phone: [phone]  Fax: [phone]  Follow Up Time: Urgent

## 2024-04-21 NOTE — ED PROVIDER NOTE - ATTENDING CONTRIBUTION TO CARE
I have reviewed triage notes and vital signs available at this time.  I have reviewed lab results, if any, available at this time.  I have reviewed EKGs, if any, available at this time.    I have reviewed radiology results and radiographs/scans, if any, available at this time.      I have reviewed and acknowledged the Resident diagnosis.  I have personally seen, evaluated and participated in this patient's care and find this patient's history and physical examination are consistent with the resident's documentation, unless noted below.  ---  Patient presented with rapid heart rate. EKG, interpreted by me, showed narrow complex tachycardia. adenosine pushed which showed no flutter or p waves. likely atrial fibrillation. IV diltiazem administered which slowed heart rate down to goal heart rate.    Patient able to clearly define exactly when atrial fibrillation started.  Patient candidate for immediate cardioversion.  I discussed the risks and benefits of immediate cardioversion of atrial fibrillation with patient.  Patient understands there is a slight increased risk of thromboembolism due to atrial fibrillation.  However, due to the very short nature of when the initial fibrillation likely initiated, thromboembolic risk is very low, especially since patient is already on anticoagulation. However, the risk is not 0.  Patient aware of and accepts risks. patient and son discussed with patient's on call cardiologist.    I discussed the risks and benefits of pharmacologic vs. electrocardioversion with procedural sedation. Patient chose electrocardioversion with procedural sedation.    Patient electrically cardioverted after being procedurally sedated.  Cardioversion successful.  EKG performed after cardioversion, interpreted by me, showed normal sinus rhythm with no signs of acute ischemia or infarction.  Patient had no complications from procedure sedation or electrocardioversion.    Patient has a history of paroxysmal atrial fibrillation. Unlikely ACS as cause.    patient already on oral Anti-coagulation.    Patient discharged with PMD and cardiology follow-up.    ---  All incidental findings were discussed with patient and patient needs to follow-up with primary care physician for further management and treatment.  All nursing notes were reviewed and appreciated.  Reviewed with patient the signs and symptoms that should prompt an immediate return to our ED or the closest ED. Patient was able to repeat these back in own words and verbalized understanding of the discharge plan. Patient told that follow up is absolutely necessary for full evaluation of complaint; patient told and understands that follow up is essential and not optional. If complaint does not improve, further testing and imaging may be necessary.  Informed the patient that all Emergency Department radiograph readings, if any, are preliminary and are only for the evaluation of the problem they are presenting with. Patient instructed to inform primary care physician of this visit and to obtain full medical records.  The risks/benefits, side effects, and interactions of the prescriptions/over-the-counter medications, if any, and the remainder of the treatment regimen were discussed.

## 2024-04-21 NOTE — ED ADULT NURSE NOTE - CHIEF COMPLAINT QUOTE
had ablation for a fib x 3 days ago and then cardioverted the following day. c/o palpitations since 1000

## 2024-04-21 NOTE — ED PROVIDER NOTE - CLINICAL SUMMARY MEDICAL DECISION MAKING FREE TEXT BOX
Patient presented with rapid heart rate. EKG, interpreted by me, showed narrow complex tachycardia. adenosine pushed which showed no flutter or p waves. likely atrial fibrillation. IV diltiazem administered which slowed heart rate down to goal heart rate.    Patient able to clearly define exactly when atrial fibrillation started.  Patient candidate for immediate cardioversion.  I discussed the risks and benefits of immediate cardioversion of atrial fibrillation with patient.  Patient understands there is a slight increased risk of thromboembolism due to atrial fibrillation.  However, due to the very short nature of when the initial fibrillation likely initiated, thromboembolic risk is very low, especially since patient is already on anticoagulation. However, the risk is not 0.  Patient aware of and accepts risks. patient and son discussed with patient's on call cardiologist.    I discussed the risks and benefits of pharmacologic vs. electrocardioversion with procedural sedation. Patient chose electrocardioversion with procedural sedation.    Patient electrically cardioverted after being procedurally sedated.  Cardioversion successful.  EKG performed after cardioversion, interpreted by me, showed normal sinus rhythm with no signs of acute ischemia or infarction.  Patient had no complications from procedure sedation or electrocardioversion.    Patient has a history of paroxysmal atrial fibrillation. Unlikely ACS as cause.    patient already on oral Anti-coagulation.    Patient discharged with PMD and cardiology follow-up.

## 2024-04-22 VITALS
DIASTOLIC BLOOD PRESSURE: 80 MMHG | SYSTOLIC BLOOD PRESSURE: 118 MMHG | RESPIRATION RATE: 18 BRPM | OXYGEN SATURATION: 99 % | HEART RATE: 60 BPM

## 2024-04-22 PROCEDURE — 71045 X-RAY EXAM CHEST 1 VIEW: CPT | Mod: 26

## 2024-05-26 ENCOUNTER — INPATIENT (INPATIENT)
Facility: HOSPITAL | Age: 64
LOS: 1 days | Discharge: HOME | DRG: 310 | End: 2024-05-28
Attending: STUDENT IN AN ORGANIZED HEALTH CARE EDUCATION/TRAINING PROGRAM | Admitting: STUDENT IN AN ORGANIZED HEALTH CARE EDUCATION/TRAINING PROGRAM
Payer: COMMERCIAL

## 2024-05-26 VITALS
HEART RATE: 167 BPM | SYSTOLIC BLOOD PRESSURE: 113 MMHG | WEIGHT: 139.99 LBS | OXYGEN SATURATION: 97 % | RESPIRATION RATE: 18 BRPM | HEIGHT: 64 IN | DIASTOLIC BLOOD PRESSURE: 67 MMHG | TEMPERATURE: 98 F

## 2024-05-26 DIAGNOSIS — I48.91 UNSPECIFIED ATRIAL FIBRILLATION: ICD-10-CM

## 2024-05-26 DIAGNOSIS — Z98.890 OTHER SPECIFIED POSTPROCEDURAL STATES: Chronic | ICD-10-CM

## 2024-05-26 LAB
ALBUMIN SERPL ELPH-MCNC: 4.5 G/DL — SIGNIFICANT CHANGE UP (ref 3.5–5.2)
ALP SERPL-CCNC: 134 U/L — HIGH (ref 30–115)
ALT FLD-CCNC: 37 U/L — SIGNIFICANT CHANGE UP (ref 0–41)
ANION GAP SERPL CALC-SCNC: 10 MMOL/L — SIGNIFICANT CHANGE UP (ref 7–14)
AST SERPL-CCNC: 27 U/L — SIGNIFICANT CHANGE UP (ref 0–41)
BASOPHILS # BLD AUTO: 0.07 K/UL — SIGNIFICANT CHANGE UP (ref 0–0.2)
BASOPHILS NFR BLD AUTO: 0.5 % — SIGNIFICANT CHANGE UP (ref 0–1)
BILIRUB SERPL-MCNC: 0.3 MG/DL — SIGNIFICANT CHANGE UP (ref 0.2–1.2)
BUN SERPL-MCNC: 19 MG/DL — SIGNIFICANT CHANGE UP (ref 10–20)
CALCIUM SERPL-MCNC: 9.2 MG/DL — SIGNIFICANT CHANGE UP (ref 8.4–10.5)
CHLORIDE SERPL-SCNC: 105 MMOL/L — SIGNIFICANT CHANGE UP (ref 98–110)
CO2 SERPL-SCNC: 23 MMOL/L — SIGNIFICANT CHANGE UP (ref 17–32)
CREAT SERPL-MCNC: 0.8 MG/DL — SIGNIFICANT CHANGE UP (ref 0.7–1.5)
EGFR: 82 ML/MIN/1.73M2 — SIGNIFICANT CHANGE UP
EOSINOPHIL # BLD AUTO: 0.18 K/UL — SIGNIFICANT CHANGE UP (ref 0–0.7)
EOSINOPHIL NFR BLD AUTO: 1.4 % — SIGNIFICANT CHANGE UP (ref 0–8)
GLUCOSE SERPL-MCNC: 106 MG/DL — HIGH (ref 70–99)
HCT VFR BLD CALC: 35.8 % — LOW (ref 37–47)
HGB BLD-MCNC: 11 G/DL — LOW (ref 12–16)
IMM GRANULOCYTES NFR BLD AUTO: 0.2 % — SIGNIFICANT CHANGE UP (ref 0.1–0.3)
LACTATE SERPL-SCNC: 1.2 MMOL/L — SIGNIFICANT CHANGE UP (ref 0.7–2)
LYMPHOCYTES # BLD AUTO: 16.1 % — LOW (ref 20.5–51.1)
LYMPHOCYTES # BLD AUTO: 2.14 K/UL — SIGNIFICANT CHANGE UP (ref 1.2–3.4)
MCHC RBC-ENTMCNC: 23 PG — LOW (ref 27–31)
MCHC RBC-ENTMCNC: 30.7 G/DL — LOW (ref 32–37)
MCV RBC AUTO: 74.9 FL — LOW (ref 81–99)
MONOCYTES # BLD AUTO: 1.01 K/UL — HIGH (ref 0.1–0.6)
MONOCYTES NFR BLD AUTO: 7.6 % — SIGNIFICANT CHANGE UP (ref 1.7–9.3)
NEUTROPHILS # BLD AUTO: 9.89 K/UL — HIGH (ref 1.4–6.5)
NEUTROPHILS NFR BLD AUTO: 74.2 % — SIGNIFICANT CHANGE UP (ref 42.2–75.2)
NRBC # BLD: 0 /100 WBCS — SIGNIFICANT CHANGE UP (ref 0–0)
NT-PROBNP SERPL-SCNC: 425 PG/ML — HIGH (ref 0–300)
PLATELET # BLD AUTO: 337 K/UL — SIGNIFICANT CHANGE UP (ref 130–400)
PMV BLD: 11.7 FL — HIGH (ref 7.4–10.4)
POTASSIUM SERPL-MCNC: 4.3 MMOL/L — SIGNIFICANT CHANGE UP (ref 3.5–5)
POTASSIUM SERPL-SCNC: 4.3 MMOL/L — SIGNIFICANT CHANGE UP (ref 3.5–5)
PROT SERPL-MCNC: 7 G/DL — SIGNIFICANT CHANGE UP (ref 6–8)
RBC # BLD: 4.78 M/UL — SIGNIFICANT CHANGE UP (ref 4.2–5.4)
RBC # FLD: 17.7 % — HIGH (ref 11.5–14.5)
SODIUM SERPL-SCNC: 138 MMOL/L — SIGNIFICANT CHANGE UP (ref 135–146)
TROPONIN T, HIGH SENSITIVITY RESULT: 16 NG/L — HIGH (ref 6–13)
WBC # BLD: 13.32 K/UL — HIGH (ref 4.8–10.8)
WBC # FLD AUTO: 13.32 K/UL — HIGH (ref 4.8–10.8)

## 2024-05-26 PROCEDURE — 93325 DOPPLER ECHO COLOR FLOW MAPG: CPT

## 2024-05-26 PROCEDURE — 36415 COLL VENOUS BLD VENIPUNCTURE: CPT

## 2024-05-26 PROCEDURE — 84443 ASSAY THYROID STIM HORMONE: CPT

## 2024-05-26 PROCEDURE — 99285 EMERGENCY DEPT VISIT HI MDM: CPT

## 2024-05-26 PROCEDURE — 83735 ASSAY OF MAGNESIUM: CPT

## 2024-05-26 PROCEDURE — 87040 BLOOD CULTURE FOR BACTERIA: CPT

## 2024-05-26 PROCEDURE — 82962 GLUCOSE BLOOD TEST: CPT

## 2024-05-26 PROCEDURE — 93010 ELECTROCARDIOGRAM REPORT: CPT

## 2024-05-26 PROCEDURE — 99223 1ST HOSP IP/OBS HIGH 75: CPT

## 2024-05-26 PROCEDURE — 93312 ECHO TRANSESOPHAGEAL: CPT

## 2024-05-26 PROCEDURE — 80053 COMPREHEN METABOLIC PANEL: CPT

## 2024-05-26 PROCEDURE — 93320 DOPPLER ECHO COMPLETE: CPT

## 2024-05-26 PROCEDURE — 80048 BASIC METABOLIC PNL TOTAL CA: CPT

## 2024-05-26 PROCEDURE — 85027 COMPLETE CBC AUTOMATED: CPT

## 2024-05-26 PROCEDURE — 0225U NFCT DS DNA&RNA 21 SARSCOV2: CPT

## 2024-05-26 PROCEDURE — 93010 ELECTROCARDIOGRAM REPORT: CPT | Mod: 77

## 2024-05-26 PROCEDURE — 71045 X-RAY EXAM CHEST 1 VIEW: CPT | Mod: 26

## 2024-05-26 PROCEDURE — 93005 ELECTROCARDIOGRAM TRACING: CPT

## 2024-05-26 PROCEDURE — 83880 ASSAY OF NATRIURETIC PEPTIDE: CPT

## 2024-05-26 PROCEDURE — 83605 ASSAY OF LACTIC ACID: CPT

## 2024-05-26 PROCEDURE — 76705 ECHO EXAM OF ABDOMEN: CPT

## 2024-05-26 RX ORDER — ONDANSETRON 8 MG/1
4 TABLET, FILM COATED ORAL EVERY 8 HOURS
Refills: 0 | Status: DISCONTINUED | OUTPATIENT
Start: 2024-05-26 | End: 2024-05-28

## 2024-05-26 RX ORDER — DILTIAZEM HCL 120 MG
90 CAPSULE, EXT RELEASE 24 HR ORAL ONCE
Refills: 0 | Status: COMPLETED | OUTPATIENT
Start: 2024-05-26 | End: 2024-05-26

## 2024-05-26 RX ORDER — METOPROLOL TARTRATE 50 MG
25 TABLET ORAL
Refills: 0 | Status: DISCONTINUED | OUTPATIENT
Start: 2024-05-26 | End: 2024-05-27

## 2024-05-26 RX ORDER — LANOLIN ALCOHOL/MO/W.PET/CERES
3 CREAM (GRAM) TOPICAL AT BEDTIME
Refills: 0 | Status: DISCONTINUED | OUTPATIENT
Start: 2024-05-26 | End: 2024-05-28

## 2024-05-26 RX ORDER — SODIUM CHLORIDE 9 MG/ML
1000 INJECTION INTRAMUSCULAR; INTRAVENOUS; SUBCUTANEOUS ONCE
Refills: 0 | Status: COMPLETED | OUTPATIENT
Start: 2024-05-26 | End: 2024-05-26

## 2024-05-26 RX ORDER — DILTIAZEM HCL 120 MG
15 CAPSULE, EXT RELEASE 24 HR ORAL ONCE
Refills: 0 | Status: COMPLETED | OUTPATIENT
Start: 2024-05-26 | End: 2024-05-26

## 2024-05-26 RX ORDER — ACETAMINOPHEN 500 MG
650 TABLET ORAL EVERY 6 HOURS
Refills: 0 | Status: DISCONTINUED | OUTPATIENT
Start: 2024-05-26 | End: 2024-05-28

## 2024-05-26 RX ORDER — DIAZEPAM 5 MG
5 TABLET ORAL ONCE
Refills: 0 | Status: DISCONTINUED | OUTPATIENT
Start: 2024-05-26 | End: 2024-05-26

## 2024-05-26 RX ORDER — SODIUM CHLORIDE 9 MG/ML
1000 INJECTION, SOLUTION INTRAVENOUS ONCE
Refills: 0 | Status: COMPLETED | OUTPATIENT
Start: 2024-05-26 | End: 2024-05-26

## 2024-05-26 RX ADMIN — SODIUM CHLORIDE 1000 MILLILITER(S): 9 INJECTION, SOLUTION INTRAVENOUS at 04:23

## 2024-05-26 RX ADMIN — Medication 15 MILLIGRAM(S): at 02:05

## 2024-05-26 RX ADMIN — Medication 0.5 MILLIGRAM(S): at 04:23

## 2024-05-26 RX ADMIN — SODIUM CHLORIDE 1000 MILLILITER(S): 9 INJECTION INTRAMUSCULAR; INTRAVENOUS; SUBCUTANEOUS at 02:05

## 2024-05-26 RX ADMIN — Medication 90 MILLIGRAM(S): at 03:10

## 2024-05-26 RX ADMIN — Medication 0.5 MILLIGRAM(S): at 21:30

## 2024-05-26 NOTE — ED PROVIDER NOTE - PHYSICAL EXAMINATION
VITAL SIGNS: I have reviewed nursing notes and confirm.  CONSTITUTIONAL: non-toxic, well appearing  SKIN: no rash, no petechiae.  EYES: pink conjunctiva, anicteric  ENT: tongue midline, no exudates, MMM  NECK: Supple; no meningismus  CARD: + tachy, no murmurs, equal radial pulses bilaterally 2+  RESP: CTAB, no respiratory distress  ABD: Soft, non-tender, non-distended  EXT: Normal ROM x4. No edema. No calves tenderness  NEURO: Alert, oriented. no focal deficits   PSYCH: Cooperative, appropriate.

## 2024-05-26 NOTE — ED ADULT NURSE REASSESSMENT NOTE - NS ED NURSE REASSESS COMMENT FT1
Report received from Tona Torres. Patient reassessed. Patient admitted to TELE, remains on cardiac monitor. IVL in place. Denies pain/discomfort. Safety & comfort measures maintained. Plan of care on going.

## 2024-05-26 NOTE — H&P ADULT - NSHPLABSRESULTS_GEN_ALL_CORE
11.0   13.32 )-----------( 337      ( 26 May 2024 02:20 )             35.8     05-26    138  |  105  |  19  ----------------------------<  106<H>  4.3   |  23  |  0.8    Ca    9.2      26 May 2024 02:20    TPro  7.0  /  Alb  4.5  /  TBili  0.3  /  DBili  x   /  AST  27  /  ALT  37  /  AlkPhos  134<H>  05-26          Urinalysis Basic - ( 26 May 2024 02:20 )    Color: x / Appearance: x / SG: x / pH: x  Gluc: 106 mg/dL / Ketone: x  / Bili: x / Urobili: x   Blood: x / Protein: x / Nitrite: x   Leuk Esterase: x / RBC: x / WBC x   Sq Epi: x / Non Sq Epi: x / Bacteria: x        Lactate Trend        CAPILLARY BLOOD GLUCOSE

## 2024-05-26 NOTE — H&P ADULT - ATTENDING COMMENTS
Toric IOL was discussed as needed for full correction of astigmatism. Discussed positioning and rotational stability, along with possibility of requiring rotation of the lens. #SVT  h/o AFib s/p cardioversion  ekg/ tele with rate controlled +pwaves (possible ectopic/ AT)  bp/ hr trending low  cont lopressor 25 bid  cxr clear  tsh, tte  eps, cards eval    #Progress Note Handoff  Pending (specify): monitor sbp, hr; tsh, tte  Family discussion: d/w pt at bedside  Disposition: home #SVT  possible AT vs. AFib with rvr; h/o AFib s/p cardioversion  ekg/ tele with rate controlled +pwaves (possible ectopic/ AT)  bp/ hr trending low  cont lopressor 25 bid  eliquis; confirm dose with cards  cxr clear  tsh, tte  eps, cards eval    #Progress Note Handoff  Pending (specify): monitor sbp, hr; tsh, tte  Family discussion: d/w pt at bedside  Disposition: home

## 2024-05-26 NOTE — ED ADULT NURSE NOTE - NSFALLASSESSNEED_ED_ALL_ED
Elevate your legs above your heart for an hour twice daily. Elevate your legs when sitting.  Watch your salt intake.      Try a Copper Plus knee high sock on your left foot.  On first thing in the morning, then off at bedtime.     They will call you about the appointment with Dr. Stout from ENT for the tongue and the ear.     They will call you from Dr. Fox's office about the Botox.     It is ok to stop the warfarin for 3 days prior to having teeth pulled or the tongue operated on.  Resume the day after the surgery.    no

## 2024-05-26 NOTE — ED PROVIDER NOTE - OBJECTIVE STATEMENT
65 y/o female with PMHx of Afib on eliquis s/p ablation in the past (currently only on metoprolol 25 mg twice daily, previously on Cardizem but has been discontinued) presents to the emergency department with son due to sudden onset of palpitations associated with shortness of breath that began this evening while sitting at home and watching TV.  No provoking factors.  Patient reports she was in this emergency department several weeks ago and was cardioverted and discharged home.  Patient notes she took her metoprolol this evening.  Denies recent illness fever chills chest pain current shortness of breath cough nausea vomiting diarrhea abdominal pain lower extremity edema.

## 2024-05-26 NOTE — H&P ADULT - HISTORY OF PRESENT ILLNESS
63 y/o female with PMHx of Afib on eliquis s/p ablation in the past (currently only on metoprolol 25 mg twice daily, previously on Cardizem but has been discontinued) presents to the emergency department with son due to sudden onset of palpitations associated with shortness of breath that began this evening while sitting at home and watching TV.  No provoking factors.  Patient reports she was in this emergency department several weeks ago and was cardioverted and discharged home.  Patient notes she took her metoprolol this evening.  Denies recent illness fever chills chest pain current shortness of breath cough nausea vomiting diarrhea abdominal pain lower extremity edema    Vital Signs Last 24 Hrs  T(C): 36.6 (26 May 2024 05:14), Max: 36.6 (26 May 2024 05:14)  T(F): 97.8 (26 May 2024 05:14), Max: 97.8 (26 May 2024 05:14)  HR: 107 (26 May 2024 05:14) (80 - 167)  BP: 109/71 (26 May 2024 05:14) (109/71 - 113/67)  RR: 18 (26 May 2024 05:14) (18 - 18)  SpO2: 98% (26 May 2024 05:14) (96% - 98%)  O2 Parameters below as of 26 May 2024 05:14  Patient On (Oxygen Delivery Method): room air         63 y/o female with PMH of Afib on Eliquis s/p ablation in the past (currently only on metoprolol 25 mg twice daily, previously on Cardizem but has been discontinued) presents to the emergency department with son due to sudden onset of palpitations associated with shortness of breath that began this evening while sitting at home and watching TV.  No provoking factors.  Patient reports she was in this emergency department several weeks ago and was cardioverted and discharged home.     Denies recent illness fever, chills, chest pain, current shortness of breath, cough, nausea, vomiting, diarrhea abdominal pain, lower extremity edema.    Vital Signs Last 24 Hrs  T(C): 36.6 (26 May 2024 05:14), Max: 36.6 (26 May 2024 05:14)  T(F): 97.8 (26 May 2024 05:14), Max: 97.8 (26 May 2024 05:14)  HR: 107 (26 May 2024 05:14) (80 - 167)  BP: 109/71 (26 May 2024 05:14) (109/71 - 113/67)  RR: 18 (26 May 2024 05:14) (18 - 18)  SpO2: 98% (26 May 2024 05:14) (96% - 98%)  O2 Parameters below as of 26 May 2024 05:14  Patient On (Oxygen Delivery Method): room air

## 2024-05-26 NOTE — PATIENT PROFILE ADULT - FALL HARM RISK - HARM RISK INTERVENTIONS

## 2024-05-26 NOTE — PATIENT PROFILE ADULT - NSPROPTRIGHTCAREGIVER_GEN_A_NUR
Called pt and pt reports that she has ran out of the humira money and is now on Medicare.  Pt reports that she has to apply for the pt asst program.  Also pt reports that her   suddenly last Monday of a PE and she is out of injections. She reports that she missed  shot. Advised pt how sorry we are for her loss. Advised we will have the pt asst papers for her to finish completing and we will also have a pen available to her at the .  .  Advised pt to let us know if she needs help.  Also advised will update Dr Patel.  Pt verb understanding.   declines

## 2024-05-26 NOTE — ED PROVIDER NOTE - PROGRESS NOTE DETAILS
Authored by Dr. Barth: pt improved s/p IV cardizem 15mg with HR currently in the 80s. No chest pain. Authored by Dr. Barth: pt noted to have restless leg hence given ativan. Pt improved s/p tx with HR mostly in the 80s but at times fluctuates to low 100s, will admit to med tele

## 2024-05-26 NOTE — H&P ADULT - ASSESSMENT
# Parox A fib  # s/p  cardioversion  # SOB  - -180s in ED,   - s/p cardizem in ED  - c/w eliquis  - c/w metoprolol  - f//u Cardio consult            #DVT PPx- eliquis  #GI PPx-protonix  #Diet- DASH  #CHG  #Activity- AAT  #Dispo- Tele     63 y/o female with PMH of Afib on Eliquis s/p ablation in the past (currently only on metoprolol 25 mg twice daily, previously on Cardizem but has been discontinued) presents to the emergency department with son due to sudden onset of palpitations associated with shortness of breath that began this evening while sitting at home and watching TV.  No provoking factors.  Patient reports she was in this emergency department several weeks ago and was cardioverted and discharged home.        # Parox A fib with RVR  # s/p  cardioversion(failed)   # SOB  # Cardiomyopathy?  - cxr clear  - -180s in ED,   - s/p Cardizem 15 mg IV push and 90 mg oral in ED  - c/w Eliquis 5 mg once a day as per patient  - c/w metoprolol  - f//u Cardio consult for Dr. Parker  - Cardiomyopathy, LVEF <40% in March 2023 as per Dr. Parker  - 1/29/24: LV Ejection Fraction by Rangel's Method with a biplane EF of 53 %. Normal global left ventricular systolic function. The left ventricular diastolic function could not be assessed in this   study.  - off note: the patient is rate controlled after Cardizem but hypotensive, likely due to Cardizem vs shock?  - f/u TSH    # SIRS not present on admission  - hypotensive with WBC 13K with no known/suspected cause of infection  - f/u lactate, BC,   - s/p 2 L Fluids in ED    # Microcytic anemia  - MCV 74, Ferritin 5, transfer sat 7, iron total 21  - start Venofer     #Mild dilatation of the ascending aorta (3.8 cm) on March 2023  - OP follow up      #DVT PPx- Eliquis  #GI PPx- Protonix  #Diet- DASH  #CHG  #Activity- AAT  #Dispo- Tele

## 2024-05-26 NOTE — ED PROVIDER NOTE - CLINICAL SUMMARY MEDICAL DECISION MAKING FREE TEXT BOX
64-year-old presented today for a flutter with rapid ventricular response.  Patient treated with Cardizem and noted to have increased heart rate again.  Patient treated with p.o. Cardizem and was admitted for further evaluation and care.    EKG interpretation:  aflutter with RVR, no ST elevations as interpreted by Dino Hilliard DO

## 2024-05-26 NOTE — PHYSICAL THERAPY INITIAL EVALUATION ADULT - SPECIFY REASON(S)
pt currently with SBP < 90 mmHg in semi-henry position in bed. will hold PT evaluation until the pt has stable vital signs. will follow.

## 2024-05-26 NOTE — ED PROVIDER NOTE - NS_BEDUNITTYPES_ED_ALL_ED
[facility]  INFECTIOUS DISEASES   [address] [place] [address]  Tel: [phone]     Fax: [phone]  =======================================================    IDRIS DANIEL [id number]    Follow up: Bacteremia, UTI    Sitting on chair, no new complaint, no fever.   Blood culture and UC positive for ESBL Ecoli. and urine with pseudomonas as well.     Allergies:  No Known Allergies    Antibiotics:  Meropenem      REVIEW OF SYSTEMS:  CONSTITUTIONAL:  No Fever or chills  HEENT:   No diplopia or blurred vision.  No earache, sore throat or runny nose.  CARDIOVASCULAR:  No chest pain or SOB  RESPIRATORY:  No cough, shortness of breath, PND or orthopnea.  GASTROINTESTINAL:  No nausea, vomiting or diarrhea.  GENITOURINARY:  No dysuria, frequency or urgency. No Blood in urine  MUSCULOSKELETAL:  no joint aches, no muscle pain  SKIN:  No change in skin, hair or nails.  NEUROLOGIC:  No paresthesias or weakness.  PSYCHIATRIC:  No disorder of thought or mood.  ENDOCRINE:  No heat or cold intolerance, polyuria or polydipsia.  HEMATOLOGICAL:  No easy bruising or bleeding.      Physical Exam:  Vital Signs Last 24 Hrs  T(C): 36.9 (04 May 2021 12:12), Max: 37.2 (03 May 2021 13:29)  T(F): 98.4 (04 May 2021 12:12), Max: 99 (03 May 2021 13:29)  HR: 73 (04 May 2021 12:12) (64 - 73)  BP: 129/77 (04 May 2021 12:12) (126/69 - 149/76)  BP(mean): --  RR: 18 (04 May 2021 12:12) (18 - 18)  SpO2: 95% (04 May 2021 12:12) (93% - 95%)  GEN: NAD, elderly  HEENT: normocephalic and atraumatic. EOMI. KILO.    NECK: Supple.  No lymphadenopathy   LUNGS: Clear to auscultation.  HEART: Regular rate and rhythm without murmur.  ABDOMEN: Soft, nontender, and nondistended.  Positive bowel sounds.    : No CVA tenderness, no disla   EXTREMITIES: Without any cyanosis, clubbing, rash, lesions or edema.  MSK: no joint swelling  NEUROLOGIC: grossly intact.  PSYCHIATRIC: Appropriate affect .  SKIN: No ulceration or induration present.        Labs:                        12.5   8.33  )-----------( 174      ( 04 May 2021 06:45 )             38.0     05-04    143  |  109<H>  |  12  ----------------------------<  87  3.6   |  28  |  0.73    Ca    9.4      04 May 2021 06:45  Phos  2.0     05-04  Mg     2.0     05-04    Culture - Blood (collected 04-30-21 @ 20:48)  Source: .Blood Blood-Peripheral    Culture - Blood (collected 04-30-21 @ 20:48)  Source: .Blood Blood-Peripheral    Culture - Urine (collected 04-30-21 @ 04:51)  Source: Kidney Kidney  Final Report (05-03-21 @ 09:12):    Moderate Pseudomonas aeruginosa    Rare Escherichia coli ESBL    Moderate Escherichia coli #2    Multiple Morphological Strains  Organism: Escherichia coli  Pseudomonas aeruginosa  Gram Negative Rods (05-03-21 @ 09:13)  Organism: Gram Negative Rods (05-03-21 @ 09:13)    Sensitivities:      -  Amikacin: S <=16      -  Amoxicillin/Clavulanic Acid: I 16/8      -  Ampicillin: R >16 These ampicillin results predict results for amoxicillin      -  Ampicillin/Sulbactam: R >16/8 Enterobacter, Citrobacter, and Serratia may develop resistance during prolonged therapy (3-4 days)      -  Aztreonam: R >16      -  Cefazolin: R >16 (MIC_CL_COM_ENTERIC_CEFAZU) For uncomplicated UTI with K. pneumoniae, E. coli, or P. mirablis: LUIS <=16 is sensitive and LUIS >=32 is resistant. This also predicts results for oral agents cefaclor, cefdinir, cefpodoxime, cefprozil, cefuroxime axetil, cephalexin and locarbef for uncomplicated UTI. Note that some isolates may be susceptible to these agents while testing resistant to cefazolin.      -  Cefepime: R >16      -  Cefoxitin: R >16      -  Ceftriaxone: R >32 Enterobacter, Citrobacter, and Serratia may develop resistance during prolonged therapy      -  Ciprofloxacin: R >2      -  Ertapenem: S <=0.5      -  Gentamicin: S <=2      -  Imipenem: S <=1      -  Levofloxacin: R >4      -  Meropenem: S <=1      -  Piperacillin/Tazobactam: R 16      -  Tigecycline: S <=2      -  Tobramycin: R >8      -  Trimethoprim/Sulfamethoxazole: R >2/38      Method Type: LUIS  Organism: Pseudomonas aeruginosa (05-03-21 @ 09:13)    Sensitivities:      -  Amikacin: S <=16      -  Aztreonam: S <=4      -  Cefepime: S <=2      -  Ceftazidime: S 4      -  Ciprofloxacin: R >2      -  Gentamicin: R >8      -  Imipenem: S <=1      -  Levofloxacin: R >4      -  Meropenem: S <=1      -  Piperacillin/Tazobactam: S <=8      -  Tobramycin: S 4      Method Type: LUIS  Organism: Escherichia coli (05-03-21 @ 09:13)    Sensitivities:      -  Amikacin: S <=16      -  Amoxicillin/Clavulanic Acid: S <=8/4      -  Ampicillin: R >16 These ampicillin results predict results for amoxicillin      -  Ampicillin/Sulbactam: I 16/8 Enterobacter, Citrobacter, and Serratia may develop resistance during prolonged therapy (3-4 days)      -  Aztreonam: S <=4      -  Cefazolin: I 4 (MIC_CL_COM_ENTERIC_CEFAZU) For uncomplicated UTI with K. pneumoniae, E. coli, or P. mirablis: LUIS <=16 is sensitive and LUIS >=32 is resistant. This also predicts results for oral agents cefaclor, cefdinir, cefpodoxime, cefprozil, cefuroxime axetil, cephalexin and locarbef for uncomplicated UTI. Note that some isolates may be susceptible to these agents while testing resistant to cefazolin.      -  Cefepime: S <=2      -  Cefoxitin: S <=8      -  Ceftriaxone: S <=1 Enterobacter, Citrobacter, and Serratia may develop resistance during prolonged therapy      -  Ciprofloxacin: R >2      -  Ertapenem: S <=0.5      -  Gentamicin: R >8      -  Imipenem: S <=1      -  Levofloxacin: R >4      -  Meropenem: S <=1      -  Piperacillin/Tazobactam: S <=8      -  Tigecycline: S <=2      -  Tobramycin: I 8      -  Trimethoprim/Sulfamethoxazole: S <=0.5/9.5      Method Type: LUIS    Culture - Urine (collected 04-30-21 @ 04:51)  Source: OR Collect Bladder (from O.R.)  Final Report (05-02-21 @ 12:36):    Moderate Escherichia coli    Moderate Escherichia coli ESBL    Multiple Morphological Strains  Organism: Escherichia coli  Escherichia coli ESBL (05-02-21 @ 12:36)  Organism: Escherichia coli ESBL (05-02-21 @ 12:36)    Sensitivities:      -  Amikacin: S <=16      -  Amoxicillin/Clavulanic Acid: I 16/8      -  Ampicillin: R >16 These ampicillin results predict results for amoxicillin      -  Ampicillin/Sulbactam: R >16/8 Enterobacter, Citrobacter, and Serratia may develop resistance during prolonged therapy (3-4 days)      -  Aztreonam: R >16      -  Cefazolin: R >16 (MIC_CL_COM_ENTERIC_CEFAZU) For uncomplicated UTI with K. pneumoniae, E. coli, or P. mirablis: LUIS <=16 is sensitive and LUIS >=32 is resistant. This also predicts results for oral agents cefaclor, cefdinir, cefpodoxime, cefprozil, cefuroxime axetil, cephalexin and locarbef for uncomplicated UTI. Note that some isolates may be susceptible to these agents while testing resistant to cefazolin.      -  Cefepime: R >16      -  Cefoxitin: R >16      -  Ceftriaxone: R >32 Enterobacter, Citrobacter, and Serratia may develop resistance during prolonged therapy      -  Ciprofloxacin: R >2      -  Ertapenem: S <=0.5      -  Gentamicin: S <=2      -  Imipenem: S <=1      -  Levofloxacin: R >4      -  Meropenem: S <=1      -  Piperacillin/Tazobactam: R 16      -  Tigecycline: S <=2      -  Tobramycin: R >8      -  Trimethoprim/Sulfamethoxazole: R >2/38      Method Type: LUIS  Organism: Escherichia coli (05-02-21 @ 12:36)    Sensitivities:      -  Amikacin: S <=16      -  Amoxicillin/Clavulanic Acid: I 16/8      -  Ampicillin: R >16 These ampicillin results predict results for amoxicillin      -  Ampicillin/Sulbactam: I 16/8 Enterobacter, Citrobacter, and Serratia may develop resistance during prolonged therapy (3-4 days)      -  Aztreonam: S <=4      -  Cefazolin: R 16 (MIC_CL_COM_ENTERIC_CEFAZU) For uncomplicated UTI with K. pneumoniae, E. coli, or P. mirablis: LUIS <=16 is sensitive and LUIS >=32 is resistant. This also predicts results for oral agents cefaclor, cefdinir, cefpodoxime, cefprozil, cefuroxime axetil, cephalexin and locarbef for uncomplicated UTI. Note that some isolates may be susceptible to these agents while testing resistant to cefazolin.      -  Cefepime: S <=2      -  Cefoxitin: I 16      -  Ceftriaxone: S <=1 Enterobacter, Citrobacter, and Serratia may develop resistance during prolonged therapy      -  Ciprofloxacin: R >2      -  Ertapenem: S <=0.5      -  Gentamicin: S <=2      -  Imipenem: S <=1      -  Levofloxacin: R >4      -  Meropenem: S <=1      -  Piperacillin/Tazobactam: S 16      -  Tigecycline: S <=2      -  Tobramycin: R >8      -  Trimethoprim/Sulfamethoxazole: S <=0.5/9.5      Method Type: LUIS    Culture - Blood (collected 04-30-21 @ 01:05)  Source: .Blood Blood-Peripheral  Gram Stain (04-30-21 @ 14:56):    Growth in aerobic bottle: Gram Negative Rods  Final Report (05-02-21 @ 14:13):    Growth in aerobic bottle: Escherichia coli ESBL    See previous culture [id number]    Culture - Blood (collected 04-30-21 @ 01:05)  Source: .Blood Blood-Peripheral  Gram Stain (04-30-21 @ 14:09):    Growth in anaerobic bottle: Gram Negative Rods  Final Report (05-02-21 @ 14:11):    Growth in anaerobic bottle: Escherichia coli ESBL    MDRO detected in BCID PCR, resistance marker = CTX-M (ESBL)    ***Blood Panel PCR results on this specimen are available    approximately 3 hours after the Gram stain result.***    Gram stain, PCR, and/or culture results may not always    correspond due to difference in methodologies.    ************************************************************    This PCR assay was performed by multiplex PCR. This    Assay tests for 66 bacterial and resistance gene targets.    Please refer to the [facility] test directory    at [email or web address] for details.  Organism: Blood Culture PCR  Escherichia coli ESBL (05-02-21 @ 14:11)  Organism: Escherichia coli ESBL (05-02-21 @ 14:11)    Sensitivities:      -  Amikacin: S <=16      -  Ampicillin: R >16 These ampicillin results predict results for amoxicillin      -  Ampicillin/Sulbactam: R >16/8 Enterobacter, Citrobacter, and Serratia may develop resistance during prolonged therapy (3-4 days)      -  Aztreonam: R 16      -  Cefazolin: R >16 Enterobacter, Citrobacter, and Serratia may develop resistance during prolonged therapy (3-4 days)      -  Cefepime: R >16      -  Cefoxitin: R >16      -  Ceftriaxone: R >32 Enterobacter, Citrobacter, and Serratia may develop resistance during prolonged therapy      -  Ciprofloxacin: R >2      -  Ertapenem: S <=0.5      -  Gentamicin: S <=2      -  Imipenem: S <=1      -  Levofloxacin: R >4      -  Meropenem: S <=1      -  Piperacillin/Tazobactam: R <=8      -  Tobramycin: R >8      -  Trimethoprim/Sulfamethoxazole: R >2/38      Method Type: LUIS  Organism: Blood Culture PCR (05-02-21 @ 14:11)    Sensitivities:      -  ESBL: Detec      -  Escherichia coli: Detec      Method Type: PCR    Culture - Urine (collected 04-30-21 @ 01:04)  Source: .Urine Clean Catch (Midstream)  Final Report (05-02-21 @ 16:29):    >100,000 CFU/ml Escherichia coli    <10,000 CFU/ml of other organisms  Organism: Escherichia coli (05-02-21 @ 16:29)  Organism: Escherichia coli (05-02-21 @ 16:29)    Sensitivities:      -  Amikacin: S <=16      -  Amoxicillin/Clavulanic Acid: I 16/8      -  Ampicillin: R >16 These ampicillin results predict results for amoxicillin      -  Ampicillin/Sulbactam: I 16/8 Enterobacter, Citrobacter, and Serratia may develop resistance during prolonged therapy (3-4 days)      -  Aztreonam: S <=4      -  Cefazolin: S 8 (MIC_CL_COM_ENTERIC_CEFAZU) For uncomplicated UTI with K. pneumoniae, E. coli, or P. mirablis: LUSI <=16 is sensitive and LUIS >=32 is resistant. This also predicts results for oral agents cefaclor, cefdinir, cefpodoxime, cefprozil, cefuroxime axetil, cephalexin and locarbef for uncomplicated UTI. Note that some isolates may be susceptible to these agents while testing resistant to cefazolin.      -  Cefepime: S <=2      -  Cefoxitin: R >16      -  Ceftriaxone: S <=1 Enterobacter, Citrobacter, and Serratia may develop resistance during prolonged therapy      -  Ciprofloxacin: R >2      -  Ertapenem: S <=0.5      -  Gentamicin: S <=2      -  Imipenem: S <=1      -  Levofloxacin: R >4      -  Meropenem: S <=1      -  Nitrofurantoin: S <=32 Should not be used to treat pyelonephritis      -  Piperacillin/Tazobactam: S <=8      -  Tigecycline: S <=2      -  Tobramycin: R >8      -  Trimethoprim/Sulfamethoxazole: S <=0.5/9.5      Method Type: LUIS    WBC Count: 8.33 K/uL (05-04-21 @ 06:45)  WBC Count: 7.84 K/uL (05-03-21 @ 07:05)  WBC Count: 8.85 K/uL (05-02-21 @ 07:30)  WBC Count: 10.34 K/uL (05-01-21 @ 07:11)  WBC Count: 15.99 K/uL (04-30-21 @ 06:24)  WBC Count: 17.71 K/uL (04-29-21 @ 20:54)    Creatinine, Serum: 0.73 mg/dL (05-04-21 @ 06:45)  Creatinine, Serum: 0.70 mg/dL (05-03-21 @ 07:05)  Creatinine, Serum: 0.78 mg/dL (05-02-21 @ 07:30)  Creatinine, Serum: 0.93 mg/dL (05-01-21 @ 07:11)  Creatinine, Serum: 1.10 mg/dL (04-30-21 @ 06:24)  Creatinine, Serum: 1.20 mg/dL (04-29-21 @ 20:54)    Procalcitonin, Serum: 0.98 ng/mL (04-30-21 @ 06:24)    COVID-19 PCR: NotDetec (04-29-21 @ 21:14)    All imaging and data are reviewed.   < from: CT Renal Stone Hunt (04.29.21 @ 22:03) >  IMPRESSION:  Moderate left sided hydroureteronephrosis secondary to 1.2 cm calculus distal left ureter.  Left intrarenal calcification.  Bilateral pulmonary nodular opacities as discussed.      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   Vital Signs T(F) Max: 103.6 HR: 86 - 125 BP: 92/53 RR: 16 SpO2: 95%  Labs significant for: WBC 17.71, C2 1.2, eGFR 53, Lactate 2.9, UA with mod LE, positive nitrites, large blood, WBC 11-25  CXR with low lung volumes with atelectasis (I personally reviewed)   CT renal stone hunt:  Moderate left hydroureteronephrosis secondary to a distal left ureteral calculus measuring up to 12 mm (I personally reviewed)   Urine culture with ecoli and pseudomonas  Blood culture positive for ESBL Ecoli   ureteral Stent placed 4/30 and has since defervesced     5/2:all cultures coming back S to Meropenem, no PO options available, likely will need midline to complete course unless inpatient fo the whole duration  5/4: repeat blood cutlure in less than one day negative, since urine has pseudomonas, will treat with meropenem not ertapenem.     Sepsis due to UTI   Obstructive uropathy   ESBL Ecoli Bacteremia   Nephrolithiases     Suggest-  ·	Continue Meropenem 1g q8hrs    ·	trend WBC and temps   ·	trend creatinine   ·	s/p midline, treat for 10-14 days     D/W Dr. Jaramillo.    Tyler Chiu MD  Division of infectious Diseases  Cell [phone] between 8am and 6pm  After 6pm and over the weekends please call ID service line at [phone].    TELEMETRY

## 2024-05-27 LAB
ALBUMIN SERPL ELPH-MCNC: 3.9 G/DL — SIGNIFICANT CHANGE UP (ref 3.5–5.2)
ALP SERPL-CCNC: 121 U/L — HIGH (ref 30–115)
ALT FLD-CCNC: 31 U/L — SIGNIFICANT CHANGE UP (ref 0–41)
ANION GAP SERPL CALC-SCNC: 11 MMOL/L — SIGNIFICANT CHANGE UP (ref 7–14)
AST SERPL-CCNC: 23 U/L — SIGNIFICANT CHANGE UP (ref 0–41)
BILIRUB SERPL-MCNC: 0.4 MG/DL — SIGNIFICANT CHANGE UP (ref 0.2–1.2)
BUN SERPL-MCNC: 12 MG/DL — SIGNIFICANT CHANGE UP (ref 10–20)
CALCIUM SERPL-MCNC: 9.1 MG/DL — SIGNIFICANT CHANGE UP (ref 8.4–10.5)
CHLORIDE SERPL-SCNC: 110 MMOL/L — SIGNIFICANT CHANGE UP (ref 98–110)
CO2 SERPL-SCNC: 23 MMOL/L — SIGNIFICANT CHANGE UP (ref 17–32)
CREAT SERPL-MCNC: 0.9 MG/DL — SIGNIFICANT CHANGE UP (ref 0.7–1.5)
EGFR: 71 ML/MIN/1.73M2 — SIGNIFICANT CHANGE UP
GLUCOSE BLDC GLUCOMTR-MCNC: 100 MG/DL — HIGH (ref 70–99)
GLUCOSE BLDC GLUCOMTR-MCNC: 109 MG/DL — HIGH (ref 70–99)
GLUCOSE SERPL-MCNC: 100 MG/DL — HIGH (ref 70–99)
HCT VFR BLD CALC: 33.8 % — LOW (ref 37–47)
HGB BLD-MCNC: 10.6 G/DL — LOW (ref 12–16)
MCHC RBC-ENTMCNC: 23.2 PG — LOW (ref 27–31)
MCHC RBC-ENTMCNC: 31.4 G/DL — LOW (ref 32–37)
MCV RBC AUTO: 74.1 FL — LOW (ref 81–99)
NRBC # BLD: 0 /100 WBCS — SIGNIFICANT CHANGE UP (ref 0–0)
PLATELET # BLD AUTO: 325 K/UL — SIGNIFICANT CHANGE UP (ref 130–400)
PMV BLD: 10.9 FL — HIGH (ref 7.4–10.4)
POTASSIUM SERPL-MCNC: 4.7 MMOL/L — SIGNIFICANT CHANGE UP (ref 3.5–5)
POTASSIUM SERPL-SCNC: 4.7 MMOL/L — SIGNIFICANT CHANGE UP (ref 3.5–5)
PROT SERPL-MCNC: 6 G/DL — SIGNIFICANT CHANGE UP (ref 6–8)
RAPID RVP RESULT: SIGNIFICANT CHANGE UP
RBC # BLD: 4.56 M/UL — SIGNIFICANT CHANGE UP (ref 4.2–5.4)
RBC # FLD: 17.6 % — HIGH (ref 11.5–14.5)
SARS-COV-2 RNA SPEC QL NAA+PROBE: SIGNIFICANT CHANGE UP
SODIUM SERPL-SCNC: 144 MMOL/L — SIGNIFICANT CHANGE UP (ref 135–146)
TSH SERPL-MCNC: 3.3 UIU/ML — SIGNIFICANT CHANGE UP (ref 0.27–4.2)
WBC # BLD: 9.09 K/UL — SIGNIFICANT CHANGE UP (ref 4.8–10.8)
WBC # FLD AUTO: 9.09 K/UL — SIGNIFICANT CHANGE UP (ref 4.8–10.8)

## 2024-05-27 PROCEDURE — 99291 CRITICAL CARE FIRST HOUR: CPT | Mod: 25

## 2024-05-27 PROCEDURE — 93010 ELECTROCARDIOGRAM REPORT: CPT

## 2024-05-27 PROCEDURE — 99291 CRITICAL CARE FIRST HOUR: CPT

## 2024-05-27 PROCEDURE — 99222 1ST HOSP IP/OBS MODERATE 55: CPT

## 2024-05-27 PROCEDURE — 76705 ECHO EXAM OF ABDOMEN: CPT | Mod: 26

## 2024-05-27 RX ORDER — DILTIAZEM HCL 120 MG
15 CAPSULE, EXT RELEASE 24 HR ORAL ONCE
Refills: 0 | Status: COMPLETED | OUTPATIENT
Start: 2024-05-27 | End: 2024-05-27

## 2024-05-27 RX ORDER — DILTIAZEM HCL 120 MG
10 CAPSULE, EXT RELEASE 24 HR ORAL ONCE
Refills: 0 | Status: COMPLETED | OUTPATIENT
Start: 2024-05-27 | End: 2024-05-27

## 2024-05-27 RX ORDER — APIXABAN 2.5 MG/1
5 TABLET, FILM COATED ORAL EVERY 12 HOURS
Refills: 0 | Status: DISCONTINUED | OUTPATIENT
Start: 2024-05-27 | End: 2024-05-28

## 2024-05-27 RX ORDER — METOPROLOL TARTRATE 50 MG
5 TABLET ORAL
Refills: 0 | Status: DISCONTINUED | OUTPATIENT
Start: 2024-05-27 | End: 2024-05-27

## 2024-05-27 RX ORDER — DILTIAZEM HCL 120 MG
30 CAPSULE, EXT RELEASE 24 HR ORAL EVERY 6 HOURS
Refills: 0 | Status: DISCONTINUED | OUTPATIENT
Start: 2024-05-27 | End: 2024-05-28

## 2024-05-27 RX ORDER — DILTIAZEM HCL 120 MG
10 CAPSULE, EXT RELEASE 24 HR ORAL ONCE
Refills: 0 | Status: DISCONTINUED | OUTPATIENT
Start: 2024-05-27 | End: 2024-05-27

## 2024-05-27 RX ORDER — DILTIAZEM HCL 120 MG
5 CAPSULE, EXT RELEASE 24 HR ORAL
Qty: 125 | Refills: 0 | Status: DISCONTINUED | OUTPATIENT
Start: 2024-05-27 | End: 2024-05-27

## 2024-05-27 RX ORDER — METOPROLOL TARTRATE 50 MG
25 TABLET ORAL THREE TIMES A DAY
Refills: 0 | Status: DISCONTINUED | OUTPATIENT
Start: 2024-05-27 | End: 2024-05-27

## 2024-05-27 RX ORDER — METOPROLOL TARTRATE 50 MG
5 TABLET ORAL
Refills: 0 | Status: COMPLETED | OUTPATIENT
Start: 2024-05-27 | End: 2024-05-27

## 2024-05-27 RX ORDER — ADENOSINE 3 MG/ML
6 INJECTION INTRAVENOUS ONCE
Refills: 0 | Status: COMPLETED | OUTPATIENT
Start: 2024-05-27 | End: 2024-05-27

## 2024-05-27 RX ORDER — METOPROLOL TARTRATE 50 MG
50 TABLET ORAL EVERY 6 HOURS
Refills: 0 | Status: DISCONTINUED | OUTPATIENT
Start: 2024-05-27 | End: 2024-05-27

## 2024-05-27 RX ORDER — METOPROLOL TARTRATE 50 MG
5 TABLET ORAL ONCE
Refills: 0 | Status: COMPLETED | OUTPATIENT
Start: 2024-05-27 | End: 2024-05-27

## 2024-05-27 RX ADMIN — Medication 25 MILLIGRAM(S): at 12:43

## 2024-05-27 RX ADMIN — Medication 15 MILLIGRAM(S): at 20:30

## 2024-05-27 RX ADMIN — Medication 5 MILLIGRAM(S): at 12:52

## 2024-05-27 RX ADMIN — Medication 30 MILLIGRAM(S): at 21:06

## 2024-05-27 RX ADMIN — Medication 5 MILLIGRAM(S): at 19:50

## 2024-05-27 RX ADMIN — Medication 5 MILLIGRAM(S): at 05:18

## 2024-05-27 RX ADMIN — Medication 10 MILLIGRAM(S): at 15:02

## 2024-05-27 RX ADMIN — Medication 25 MILLIGRAM(S): at 05:03

## 2024-05-27 RX ADMIN — APIXABAN 5 MILLIGRAM(S): 2.5 TABLET, FILM COATED ORAL at 10:44

## 2024-05-27 RX ADMIN — APIXABAN 5 MILLIGRAM(S): 2.5 TABLET, FILM COATED ORAL at 17:28

## 2024-05-27 RX ADMIN — Medication 0.5 MILLIGRAM(S): at 21:17

## 2024-05-27 RX ADMIN — ADENOSINE 6 MILLIGRAM(S): 3 INJECTION INTRAVENOUS at 05:17

## 2024-05-27 RX ADMIN — Medication 5 MILLIGRAM(S): at 12:15

## 2024-05-27 RX ADMIN — Medication 5 MG/HR: at 20:38

## 2024-05-27 NOTE — RAPID RESPONSE TEAM SUMMARY - NSSITUATIONBACKGROUNDRRT_GEN_ALL_CORE
Rapid called for tachyarrhythmia to 170s ECG-  persistent, narrow complex, regular-appearing. BP and O2 sat wnl. Pt is feeling palpitations. Pt has known afib and takes metoprolol at home. Came in yesterday with tachyarrhythmia-presumed afib vs atach and ED treated with cardizem IV and oral. Was to be continued on home metoprolol, however was held due to hypotension. Last cardizem dose was 24 hours prior.  No chest pain, dyspnea. Rapid called for tachyarrhythmia to 170s ECG-  persistent, narrow complex, regular-appearing. BP and O2 sat wnl. Pt is feeling palpitations. Pt has known afib and takes metoprolol at home. Came in yesterday with tachyarrhythmia-presumed afib vs atach and ED treated with IV cardizem 15mg x 1 and PO cardizem 90mg x1. Was to be continued on home metoprolol, however was held due to hypotension. Last cardizem dose was 24 hours prior.  No chest pain, dyspnea. Rapid called for tachyarrhythmia to 170s ECG-  persistent, narrow complex, regular-appearing. BP and O2 sat wnl. /84. Pt is feeling palpitations. Pt has known afib and takes metoprolol at home. Came in yesterday with tachyarrhythmia-presumed afib vs atach and ED treated with IV cardizem 15mg x 1 and PO cardizem 90mg x1. Was to be continued on home metoprolol, however was held due to hypotension. Last cardizem dose was 24 hours prior.  No chest pain, dyspnea.    Given adenosine 6 - slowed for about 7 beats to 85 and then back to 170s.  Pushed IV lopressor 5mg - ECG remained tachy just afterwards- pt requested bedpan to urinate urgently  afterwards ECG slowed to show afib w rvr to about 120s.  Symptoms resolved. Given oral metoprolol 25mg as well. Patient's BP remained 117/84 after lopressor given.   ECG s put in chart.  f/u Cardio and EP

## 2024-05-27 NOTE — RAPID RESPONSE TEAM SUMMARY - NSOTHERINTERVENTIONSRRT_GEN_ALL_CORE
Attending Attestation:    I have personally and independently provided 45 minutes of critical care services. This excludes any time spent on separate procedures or teaching.       
Attending attestation:  I edited the note above.   Critical care time spent: 45 minutes

## 2024-05-27 NOTE — RAPID RESPONSE TEAM SUMMARY - NSMEDICATIONSRRT_GEN_ALL_CORE
Given adenosine 6 - slowed for about 7 beats to 85 and then back to 170s.  Pushed lopressor 5- ECG remained tachy just afterwards- pt requested bedpan to urinate urgently  afterwards ECG slowed to show afib w rvr to about 120s.  Symptoms resolved. Given oral metoprolol as well.   ECG s put in chart.  f/u Cardio and EP Given adenosine 6 - slowed for about 7 beats to 85 and then back to 170s.  Pushed IV lopressor 5mg - ECG remained tachy just afterwards- pt requested bedpan to urinate urgently  afterwards ECG slowed to show afib w rvr to about 120s.  Symptoms resolved. Given oral metoprolol 25mg as well.   ECG s put in chart.  f/u Cardio and EP - IV lopressor 5mg x 1   - PO lopressor 25mg

## 2024-05-27 NOTE — CHART NOTE - NSCHARTNOTEFT_GEN_A_CORE
Around 20:30 patient noted to be tachycardic to 150s despite Metoprolol 5 mg IVPs.   BP on recheck 130s/60s --> as per cardiology note from earlier, will transition patient to Cardizem.   Patient received Cardizem 15 mg IVP x1 and immediately converted to NSR with HR in the 80s. As HR <110 and sinus, will not be starting patient on Cardizem drip and instead transition to Cardizem PO.   BP on recheck 107/75 --> given Cardizem 30 mg PO STAT and ordered for q6h.   Will continue to closely monitor patient, if she reverts back to SVT or AFib w/RVR, will consider cardizem drip depending on HR + blood pressure.

## 2024-05-27 NOTE — RAPID RESPONSE TEAM SUMMARY - NSMEDICATIONSRRT_GEN_ALL_CORE
Metoprolol 5mg IVP Metoprolol 5mg IVP x 1  Metoprolol 10mg IVP x 1 Metoprolol 5mg IVP x 1  Metoprolol 5mg IVP x 1

## 2024-05-27 NOTE — RAPID RESPONSE TEAM SUMMARY - NSSITUATIONBACKGROUNDRRT_GEN_ALL_CORE
65 y/o female w/ Hx of AF on Eliquis and HFrEF.   RR called for Tachy to 170's. Similar issue earlier in day.   Pt otherwise asymptomatic, BP stable.  Per EP, plan to titrate up on Metoprolol as needed.   Given Metoprolol 5mg IVP x 1.   Titrated up on PO Metoprolol to 50mg TID, 1st dose to be given STAT.  Planned for MINNA/DCCV tomorrow. 65 y/o female w/ Hx of AF on Eliquis and HFrEF.   RR called for Tachy to 170's. Similar issue earlier in day.   Pt otherwise asymptomatic, BP stable.  Per EP, plan to titrate up on Metoprolol as needed.   Given Metoprolol 5mg IVP x 1.   HR remained elevated.   Given Metoprolol 10mg IVP x 1.  Titrated up on PO Metoprolol to 50mg TID, 1st dose to be given STAT.  Planned for MINNA/DCCV tomorrow. 65 y/o female w/ Hx of AF on Eliquis and HFrEF.   RR called for Tachy to 170's. Similar issue earlier in day.   Pt otherwise asymptomatic, BP stable.  Per EP, plan to titrate up on Metoprolol as needed.   Given Metoprolol 5mg IVP x 1.   HR remained elevated.   Given Metoprolol 5mg IVP x 1.  Titrated up on PO Metoprolol to 50mg TID, 1st dose to be given STAT.  Planned for MINNA/DCCV tomorrow. 65 y/o female w/ Hx of AF on Eliquis and previous HFrEF in which EF improved (likely due to tachycardia induced cardiomyopathy)  RR called for Tachy to 170's. BP consistently stable before and after administration of medications.   ECG shows SVT vs. 1:1 aflutter HR 170s.   Lopressor push given without response.   Cardiology attending recommended starting cardizem drip.   Cardizem 15mg push given and patient broke into NSR with possible PACs confirmed on tele.   Plan is to give cardizem 30mg po q6hrs first dose STAT. If patient reverts back into afib/flutter RVR, will give another cardizem push and start drip. Will keep npo after MN in case patient reverts back into afib/flutter RVR and remains that way persistently overnight as there is a plan for MINNA/cardioversion.

## 2024-05-27 NOTE — CONSULT NOTE ADULT - SUBJECTIVE AND OBJECTIVE BOX
Patient is a 64y old  Female who presents with a chief complaint of A fib (26 May 2024 08:14)        HPI:  65 y/o female with PMH of Afib on Eliquis s/p ablation in the past (currently only on metoprolol 25 mg twice daily, previously on Cardizem but has been discontinued) presents to the emergency department with son due to sudden onset of palpitations associated with shortness of breath that began this evening while sitting at home and watching TV.  No provoking factors.  Patient reports she was in this emergency department several weeks ago and was cardioverted and discharged home.     Denies recent illness fever, chills, chest pain, current shortness of breath, cough, nausea, vomiting, diarrhea abdominal pain, lower extremity edema.    Vital Signs Last 24 Hrs  T(C): 36.6 (26 May 2024 05:14), Max: 36.6 (26 May 2024 05:14)  T(F): 97.8 (26 May 2024 05:14), Max: 97.8 (26 May 2024 05:14)  HR: 107 (26 May 2024 05:14) (80 - 167)  BP: 109/71 (26 May 2024 05:14) (109/71 - 113/67)  RR: 18 (26 May 2024 05:14) (18 - 18)  SpO2: 98% (26 May 2024 05:14) (96% - 98%)  O2 Parameters below as of 26 May 2024 05:14  Patient On (Oxygen Delivery Method): room air         (26 May 2024 08:14)      Electrophysiology:  64y Female with h/o AFib, s/p prior DCCV, s/p ablation @[facility] 24, with followed visit to ED 24 with AF / palpitations, DCCV at that time, was doing well since then until last before admission, was resting on the couch watching TV eating icecream, develop sudden onset palpitations, dyspnea, dizziness, presented to ED, was found to be on tachyarrhythmia 160s, was given Cardizem IV and slow diwn to AF 80-90s bpm  Overnight developed tachycardia 170s again, symptomatic, Adenosine 6mg was given, Lopressor 5mg and 25mg PO, voided, and slow down to 120s, irregular  Denies any new medications, illnesses, stress. Before ablation was on Lopressor 25mg bid and Diltiazem CD 240mg, then Diltiazem was stopped post ablation due to bradycardia, After DCCV post ablation Metoprolol was increased 50mg bid, patient recently decreased it back to 25mg bid 1-1.5wks ago. In addition patient is taking Eliquis only once daily.  Tele reviewed including EKG from rapid response, atrial flutter vs atrial tachycardia.       REVIEW OF SYSTEMS    [x ] A ten-point review of systems was otherwise negative except as noted.  [ ] Due to altered mental status/intubation, subjective information were not able to be obtained from the patient. History was obtained, to the extent possible, from review of the chart and collateral sources of information.      PAST MEDICAL & SURGICAL HISTORY:  Anxiety      Afib      H/O neck surgery          Home Medications:  Ativan 0.5 mg oral tablet: 1 tab(s) orally once a day (at bedtime), As Needed (26 May 2024 09:49)  Eliquis 5 mg oral tablet: 1 tab(s) orally once a day (26 May 2024 09:49)      Allergies:  Toradol (Rash)      FAMILY HISTORY:  No pertinent family history in first degree relatives        SOCIAL HISTORY: denies tobacco / ETOH / illicit drug use     CIGARETTES:  ALCOHOL:  MARIJUANA:  ILLICIT DRUGS:        PREVIOUS DIAGNOSTIC TESTING:      ECHO  FINDINGS:  < from: TTE Echo Complete w/o Contrast w/ Doppler (24 @ 09:30) >  Summary:   1. LV Ejection Fraction by Rangel's Method with a biplane EF of 53 %.   2. Normal global left ventricular systolic function.   3. The left ventricular diastolic function could not be assessed in this   study.   4. Moderately enlarged left atrium.   5. Mild tricuspid regurgitation.    < end of copied text >    STRESS  FINDINGS:    CATHETERIZATION  FINDINGS:    ELECTROPHYSIOLOGY STUDY  FINDINGS:    CAROTID ULTRASOUND:  FINDINGS    VENOUS DUPLEX SCAN:  FINDINGS:    CHEST CT PULMONARY ANGIO with IV Contrast:  FINDINGS:      MEDICATIONS  (STANDING):  apixaban 5 milliGRAM(s) Oral every 12 hours  metoprolol tartrate 25 milliGRAM(s) Oral three times a day    MEDICATIONS  (PRN):  acetaminophen     Tablet .. 650 milliGRAM(s) Oral every 6 hours PRN Temp greater or equal to 38C (100.4F), Mild Pain (1 - 3)  aluminum hydroxide/magnesium hydroxide/simethicone Suspension 30 milliLiter(s) Oral every 4 hours PRN Dyspepsia  LORazepam     Tablet 0.5 milliGRAM(s) Oral at bedtime PRN Anxiety  melatonin 3 milliGRAM(s) Oral at bedtime PRN Insomnia  metoprolol tartrate Injectable 5 milliGRAM(s) IV Push every 5 minutes PRN tachycardia >140  ondansetron Injectable 4 milliGRAM(s) IV Push every 8 hours PRN Nausea and/or Vomiting      Vital Signs Last 24 Hrs  T(C): 36.7 (27 May 2024 07:40), Max: 37.1 (27 May 2024 06:30)  T(F): 98.1 (27 May 2024 07:40), Max: 98.7 (27 May 2024 06:30)  HR: 103 (27 May 2024 07:40) (73 - 174)  BP: 90/62 (27 May 2024 07:40) (89/53 - 132/87)  BP(mean): 66 (26 May 2024 20:30) (66 - 68)  RR: 18 (27 May 2024 07:40) (18 - 18)  SpO2: 96% (27 May 2024 07:40) (96% - 100%)    Parameters below as of 27 May 2024 07:40  Patient On (Oxygen Delivery Method): room air        PHYSICAL EXAM:    GENERAL: In no apparent distress, well nourished, and hydrated.  HEAD:  Atraumatic, Normocephalic  EYES: EOMI, PERRLA, conjunctiva and sclera clear  NECK: Supple and normal thyroid.  No JVD or carotid bruit.  Carotid pulse is 2+ bilaterally.  HEART: IRRegular rate and rhythm; No murmurs, rubs, or gallops.  PULMONARY: Clear to auscultation and perfusion.  No rales, wheezing, or rhonchi bilaterally.  ABDOMEN: Soft, Nontender, Nondistended; Bowel sounds present  EXTREMITIES:  2+ Peripheral Pulses, No clubbing, cyanosis, or edema  NEUROLOGICAL: Grossly nonfocal    I&O's Detail    Daily     Daily     INTERPRETATION OF TELEMETRY:    EC Lead ECG:   Ventricular Rate 81 BPM    Atrial Rate 81 BPM    P-R Interval 150 ms    QRS Duration 72 ms    Q-T Interval 364 ms    QTC Calculation(Bazett) 422 ms    P Axis 261 degrees    R Axis 86 degrees    T Axis 55 degrees    Diagnosis Line Unusual P axis, possible ectopic atrial rhythm  Low voltage QRS  Abnormal ECG    Confirmed by ASHISH WANG MD ([id number]) on 2024 8:38:39 AM (24 @ 02:28)  12 Lead ECG:   Ventricular Rate 80 BPM    Atrial Rate 80 BPM    P-R Interval 150 ms    QRS Duration 72 ms    Q-T Interval 368 ms    QTC Calculation(Bazett) 424 ms    P Axis 264 degrees    R Axis 86 degrees    T Axis 58 degrees    Diagnosis Line Unusual P axis, possible ectopic atrial rhythm  Abnormal ECG    Confirmed by ASHISH WANG MD ([id number]) on 2024 6:06:44 AM (24 @ 02:25)        LABS:                        10.6   9.09  )-----------( 325      ( 27 May 2024 08:10 )             33.8         144  |  110  |  12  ----------------------------<  100<H>  4.7   |  23  |  0.9    Ca    9.1      27 May 2024 08:10    TPro  6.0  /  Alb  3.9  /  TBili  0.4  /  DBili  x   /  AST  23  /  ALT  31  /  AlkPhos  121<H>              BNP            RADIOLOGY & ADDITIONAL STUDIES:      
   63 y/o female with PMH of Afib on Eliquis s/p ablation in the past (currently only on metoprolol 25 mg twice daily, previously on Cardizem but has been discontinued) presents to the emergency department with son due to sudden onset of palpitations associated with shortness of breath that began this evening while sitting at home and watching TV.  No provoking factors.  Patient reports she was in this emergency department several weeks ago and was cardioverted and discharged home.    Denies recent illness fever, chills, chest pain, current shortness of breath, cough, nausea, vomiting, diarrhea abdominal pain, lower extremity edema.    Review of Systems:  Review of Systems: CONSTITUTIONAL: No weakness, fevers or chills  EYES/ENT: No visual changes;  No vertigo or throat pain   NECK: No pain or stiffness  RESPIRATORY: No cough, wheezing, hemoptysis; No shortness of breath  CARDIOVASCULAR: No chest pain or palpitations  GASTROINTESTINAL: No abdominal or epigastric pain. No nausea, vomiting, or hematemesis; No diarrhea or constipation. No melena or hematochezia.  GENITOURINARY: No dysuria, frequency or hematuria  NEUROLOGICAL: No numbness or weakness  SKIN: No itching, rashes  Other Review of Systems: All other review of systems negative, except as noted in HPI      Allergies and Intolerances:        Allergies:  	Toradol: Drug, Rash    Home Medications:   * Patient Currently Takes Medications as of 26-May-2024 09:49 documented in Structured Notes  · 	metoprolol tartrate 25 mg oral tablet: Last Dose Taken:  , 1 tab(s) orally 2 times a day  · 	Eliquis 5 mg oral tablet: Last Dose Taken:  , 1 tab(s) orally once a day  · 	Ativan 0.5 mg oral tablet: Last Dose Taken:  , 1 tab(s) orally once a day (at bedtime), As Needed    .    Patient History:    Past Medical, Past Surgical, and Family History:  PAST MEDICAL HISTORY:  Afib     Anxiety.     PAST SURGICAL HISTORY:  H/O neck surgery.     FAMILY HISTORY:  No pertinent family history in first degree relatives. No pertinent family history of: cancer.     Social History:  · Substance use	No  · Social History (marital status, living situation, occupation, and sexual history)	no alcohol, smoking hx    Physical Exam:   Physical Exam: GENERAL: NAD, lying in bed comfortably  HEAD:  Atraumatic, Normocephalic  EYES: conjunctiva and sclera clear  ENT: Moist mucous membranes  NECK: Supple, No JVD  CHEST/LUNG: Clear to auscultation bilaterally; No rales, rhonchi, wheezing, or rubs. Unlabored respirations  HEART: Regular rate and rhythm; No murmurs, rubs, or gallops  ABDOMEN: Soft, nontender, nondistended  EXTREMITIES:  No clubbing, cyanosis, or edema  NERVOUS SYSTEM:  A&Ox3       Labs and Results:  Labs, Radiology, Cardiology, and Other Results: 11.0   13.32 )-----------( 337      ( 26 May 2024 02:20 )             35.8         138  |  105  |  19  ----------------------------<  106<H>  4.3   |  23  |  0.8    Ca    9.2      26 May 2024 02:20    TPro  7.0  /  Alb  4.5  /  TBili  0.3  /  DBili  x   /  AST  27  /  ALT  37  /  AlkPhos  134<H>            Urinalysis Basic - ( 26 May 2024 02:20 )    Color: x / Appearance: x / SG: x / pH: x  Gluc: 106 mg/dL / Ketone: x  / Bili: x / Urobili: x   Blood: x / Protein: x / Nitrite: x   Leuk Esterase: x / RBC: x / WBC x   Sq Epi: x / Non Sq Epi: x / Bacteria: x    Laboratory:   General Chemistry:    26-May-2024 02:20, Comprehensive Metabolic Panel  Sodium: 138, [135 - 146 mmol/L]  Potassium: 4.3, [3.5 - 5.0 mmol/L]  Chloride: 105, [98 - 110 mmol/L]  Carbon Dioxide: 23, [17 - 32 mmol/L]  Anion Gap: 10, [7 - 14 mmol/L]  Blood Urea Nitrogen: 19, [10 - 20 mg/dL]  Creatinine: 0.8, [0.7 - 1.5 mg/dL]  Glucose:   106, [70 - 99 mg/dL]  Calcium: 9.2, [8.4 - 10.5 mg/dL]  Protein Total: 7.0, [6.0 - 8.0 g/dL]  Albumin: 4.5, [3.5 - 5.2 g/dL]  Bilirubin Total: 0.3, [0.2 - 1.2 mg/dL]  Alkaline Phosphatase:   134, [30 - 115 U/L]  Aspartate Aminotransferase (AST/SGOT): 27, [0 - 41 U/L]  Alanine Aminotransferase (ALT/SGPT): 37, [0 - 41 U/L]  eGFR: 82, [>=60 mL/min/1.73m2], The estimated glomerular filtration rate (eGFR) is calculated using the  	 CKD-EPI creatinine equation, which does not have a coefficient for  	race and is validated in individuals 18 years of age and older (N Engl J  	Med ; [id number]). Creatinine-based eGFR may be inaccurate in  	various situations including but not limited to extremes of muscle mass,  	altered dietary protein intake, or medications that affect renal tubular  	creatinine secretion.    26-May-2024 04:28, Troponin T, High Sensitivity  Troponin T, High Sensitivity Result:   16, [6 - 13 ng/L], A single cardiac troponin result is not definitive in diagnosing  	myocardial infarction. Serial measurements are required in suspected  	NSTEMI. Elevations are notable in patients with renal impairment,  	rhabdomyolysis and polymyositis. Hemolysis may cause falsely low results.  	Elevated troponin that is decreasing in serial measurements may be  	indicative of resolving ischemia.    26-May-2024 09:24, Lactate, Blood  Lactate, Blood: 1.2, [0.7 - 2.0 mmol/L]    26-May-2024 10:45, Pro-Brain Natriuretic Peptide  Pro-Brain Natriuretic Peptide:   425, [0 - 300 pg/mL]  Hematology:    26-May-2024 02:20, Complete Blood Count + Automated Diff  WBC Count:   13.32, [4.80 - 10.80 K/uL]  RBC Count: 4.78, [4.20 - 5.40 M/uL]  Hemoglobin:   11.0, [12.0 - 16.0 g/dL]  Hematocrit:   35.8, [37.0 - 47.0 %]  Mean Cell Volume:   74.9, [81.0 - 99.0 fL]  Mean Cell Hemoglobin:   23.0, [27.0 - 31.0 pg]  Mean Cell Hemoglobin Conc:   30.7, [32.0 - 37.0 g/dL]  Red Cell Distrib Width:   17.7, [11.5 - 14.5 %]  Platelet Count - Automated: 337, [130 - 400 K/uL]  MPV:   11.7, [7.4 - 10.4 fL]  Auto Neutrophil #:   9.89, [1.40 - 6.50 K/uL]  Auto Lymphocyte #: 2.14, [1.20 - 3.40 K/uL]  Auto Monocyte #:   1.01, [0.10 - 0.60 K/uL]  Auto Eosinophil #: 0.18, [0.00 - 0.70 K/uL]  Auto Basophil #: 0.07, [0.00 - 0.20 K/uL]  Auto Neutrophil %: 74.2, [42.2 - 75.2 %], Differential percentages must be correlated with absolute numbers for  	clinical significance.  Auto Lymphocyte %:   16.1, [20.5 - 51.1 %]  Auto Monocyte %: 7.6, [1.7 - 9.3 %]  Auto Eosinophil %: 1.4, [0.0 - 8.0 %]  Auto Basophil %: 0.5, [0.0 - 1.0 %]  Auto Immature Granulocyte %: 0.2, [0.1 - 0.3 %], (Includes meta, myelo and promyelocytes). Mild elevations in immature  	granulocytes may be seen with many inflammatory processes and pregnancy;  	clinical correlation suggested.  Nucleated RBC: 0, [0 - 0 /100 WBCs]    Radiology:   X-Ray, Fluoroscopy:    -May-2024 02:33, Xray Chest 1 View- PORTABLE-Urgent  PACS Image: Image(s) Available  Xray Chest 1 View- PORTABLE-Urgent:   	ACC: [id number] EXAM:  XR CHEST PORTABLE URGENT 1V   ORDERED BY: BRENNAN ADRIAN   	  	PROCEDURE DATE:  2024    	  	  	  	INTERPRETATION:  Clinical History / Reason for exam: Chest Pain  	  	Comparison : Chest radiograph: 2024  	  	Technique/Positioning: Frontal view of the chest  	  	Findings:  	  	Support devices: None.  	  	Cardiac/mediastinum/hilum: The cardiac silhouette is normal in size.  	  	Skeleton/soft tissues: No evidence of acute osseous abnormality.  	  	Lung parenchyma/Pleura: There is no evidence of focal consolidation,   	pleural effusion or pneumothorax.  	  	Impression:  	No evidence of focal consolidation, pleural effusion or pneumothorax.  	  	  	  	  	  	--- End of Report ---  	  	  	  	  	  	NOAH JOHNSON MD; Attending Radiologist  	Thisdocument has been electronically signed. May 26 2024 10:40AM    EKG:   EK-May-2024 02:25, 12 Lead ECG  12 Lead ECG: Ventricular Rate 80 BPM  	  	Atrial Rate 80 BPM  	  	P-R Interval 150 ms  	  	QRS Duration 72 ms  	  	Q-T Interval 368 ms  	  	QTC Calculation(Bazett) 424 ms  	  	P Axis 264 degrees  	  	R Axis 86 degrees  	  	T Axis 58 degrees  	  	Diagnosis Line Unusual P axis, possible ectopic atrial rhythm  	Abnormal ECG  	  	Confirmed by ASHISH WANG MD ([id number]) on 2024 6:06:44 AM    26-May-2024 02:28, 12 Lead ECG  12 Lead ECG: Ventricular Rate 81 BPM  	  	Atrial Rate 81 BPM  	  	P-R Interval 150 ms  	  	QRS Duration 72 ms  	  	Q-T Interval 364 ms  	  	QTC Calculation(Bazett) 422 ms  	  	P Axis 261 degrees  	  	R Axis 86 degrees  	  	T Axis 55 degrees  	  	Diagnosis Line Unusual P axis, possible ectopic atrial rhythm  	Low voltage QRS  	Abnormal ECG

## 2024-05-27 NOTE — PROGRESS NOTE ADULT - SUBJECTIVE AND OBJECTIVE BOX
STEPHANE CHAN  64y Female    Patient is a 64y old  Female who presents with a chief complaint of palpitaitons    INTERVAL HPI/OVERNIGHT EVENTS:    ROS: Pt states that palpitaitons has improved. The patient denies fever, chills, SOB, nausea, vomiting, abdominal pain, dysuria, diarrhea, constipation, rash, muscle or joint pain.    Overnight events: Rapid response was called for Afib with RVR.    Vital Signs Last 24 Hrs  T(C): 36.8 (27 May 2024 16:11), Max: 37.1 (27 May 2024 06:30)  T(F): 98.3 (27 May 2024 16:11), Max: 98.7 (27 May 2024 06:30)  HR: 89 (27 May 2024 16:11) (84 - 174)  BP: 102/68 (27 May 2024 16:11) (89/53 - 132/87)  BP(mean): 66 (26 May 2024 20:30) (66 - 66)  RR: 18 (27 May 2024 16:11) (18 - 18)  SpO2: 96% (27 May 2024 07:40) (96% - 100%)    Parameters below as of 27 May 2024 16:11  Patient On (Oxygen Delivery Method): room air        Toradol (Rash)      MEDICATIONS  (STANDING):  apixaban 5 milliGRAM(s) Oral every 12 hours  metoprolol tartrate 25 milliGRAM(s) Oral three times a day    MEDICATIONS  (PRN):  acetaminophen     Tablet .. 650 milliGRAM(s) Oral every 6 hours PRN Temp greater or equal to 38C (100.4F), Mild Pain (1 - 3)  aluminum hydroxide/magnesium hydroxide/simethicone Suspension 30 milliLiter(s) Oral every 4 hours PRN Dyspepsia  LORazepam     Tablet 0.5 milliGRAM(s) Oral at bedtime PRN Anxiety  melatonin 3 milliGRAM(s) Oral at bedtime PRN Insomnia  metoprolol tartrate Injectable 5 milliGRAM(s) IV Push every 5 minutes PRN tachycardia >140  ondansetron Injectable 4 milliGRAM(s) IV Push every 8 hours PRN Nausea and/or Vomiting      PHYSICAL EXAM:  General Appearance: NAD, normal for age and gender. 	  Neck: Normal JVP, no bruit.   Eyes: Conjunctiva clear, Extra Ocular muscles intact. No scleral icterus.  ENMT: Moist oral mucosa. No oral lesion.  Cardiovascular: Irregularly irregular, S1 S2, No JVD, systolic murmurs.  Respiratory: Lungs clear to auscultation. No wheezes, rales or rhonchi.  Psychiatry: Alert and oriented x 3, Mood & affect appropriate.  Gastrointestinal:  Soft, Non-tender, Non-distended.  Neurologic: Non-focal deficits.  Musculoskeletal/ extremities: Normal range of motion, No clubbing, cyanosis or edema.  Vascular: Peripheral pulses palpable bilaterally.  Skin/Integumen: No rashes, No ecchymoses, No cyanosis.    LABS:                        10.6   9.09  )-----------( 325      ( 27 May 2024 08:10 )             33.8     05-27    144  |  110  |  12  ----------------------------<  100<H>  4.7   |  23  |  0.9    Ca    9.1      27 May 2024 08:10    TPro  6.0  /  Alb  3.9  /  TBili  0.4  /  DBili  x   /  AST  23  /  ALT  31  /  AlkPhos  121<H>  05-27          RADIOLOGY & ADDITIONAL TESTS:    < from: US Abdomen Upper Quadrant Right (05.27.24 @ 11:00) >  IMPRESSION:  Normal right upper quadrant abdominal ultrasound.

## 2024-05-27 NOTE — CONSULT NOTE ADULT - ASSESSMENT
Afib Chads Vasc score of 2. was taking Eliquis 5mg only once a day.   subtherapeutic AC  s/p Afib cardioversion i nthe past  reduced EF likely tachy induced CM      Heart rate 170's now. start a cardizem drip until tomorrow.  titrate to HR <110  MINNA/Cardioversion tomorrow.  continue eliquis 5mg TWICE  a day.   f/u echo  continue metoprolol  will follow             
Cards Dr Ulloa  EP Dr Mckinley (at [facility])    64y Female with h/o AFib, s/p prior DCCV, s/p ablation @[facility] 4/18/24, with followed visit to ED 4/20/24 with AF / palpitations, DCCV at that time, now with recurrent atrial tachyarrhythmia, AFL/AF/AT  is subtherapeutic on Eliquis      PAF  s/p ablation 4/18/24 @ [facility]  [id number] Score is 1      con't tele  rate control with metoprolol  MINNA/DCCV tomorrow, added to cathlab schedule. patient want to think about it,   considering patient was not taking therapeutic dose of Eliquis, antiarrhythmic are not recommended without MINNA  After MINNA/DCCV, start Amio PO load  Check TSH / Free T4   Maintain electrolytes K>4.0 Mg >2.0   will follow

## 2024-05-28 ENCOUNTER — RESULT REVIEW (OUTPATIENT)
Age: 64
End: 2024-05-28

## 2024-05-28 ENCOUNTER — TRANSCRIPTION ENCOUNTER (OUTPATIENT)
Age: 64
End: 2024-05-28

## 2024-05-28 VITALS
SYSTOLIC BLOOD PRESSURE: 100 MMHG | OXYGEN SATURATION: 98 % | RESPIRATION RATE: 19 BRPM | DIASTOLIC BLOOD PRESSURE: 64 MMHG

## 2024-05-28 LAB
ANION GAP SERPL CALC-SCNC: 13 MMOL/L — SIGNIFICANT CHANGE UP (ref 7–14)
BUN SERPL-MCNC: 13 MG/DL — SIGNIFICANT CHANGE UP (ref 10–20)
CALCIUM SERPL-MCNC: 8.8 MG/DL — SIGNIFICANT CHANGE UP (ref 8.4–10.5)
CHLORIDE SERPL-SCNC: 108 MMOL/L — SIGNIFICANT CHANGE UP (ref 98–110)
CO2 SERPL-SCNC: 21 MMOL/L — SIGNIFICANT CHANGE UP (ref 17–32)
CREAT SERPL-MCNC: 0.9 MG/DL — SIGNIFICANT CHANGE UP (ref 0.7–1.5)
EGFR: 71 ML/MIN/1.73M2 — SIGNIFICANT CHANGE UP
GLUCOSE BLDC GLUCOMTR-MCNC: 102 MG/DL — HIGH (ref 70–99)
GLUCOSE SERPL-MCNC: 93 MG/DL — SIGNIFICANT CHANGE UP (ref 70–99)
HCT VFR BLD CALC: 34 % — LOW (ref 37–47)
HGB BLD-MCNC: 10.3 G/DL — LOW (ref 12–16)
MAGNESIUM SERPL-MCNC: 1.9 MG/DL — SIGNIFICANT CHANGE UP (ref 1.8–2.4)
MCHC RBC-ENTMCNC: 22.7 PG — LOW (ref 27–31)
MCHC RBC-ENTMCNC: 30.3 G/DL — LOW (ref 32–37)
MCV RBC AUTO: 75.1 FL — LOW (ref 81–99)
NRBC # BLD: 0 /100 WBCS — SIGNIFICANT CHANGE UP (ref 0–0)
PLATELET # BLD AUTO: 318 K/UL — SIGNIFICANT CHANGE UP (ref 130–400)
PMV BLD: 11.7 FL — HIGH (ref 7.4–10.4)
POTASSIUM SERPL-MCNC: 4.4 MMOL/L — SIGNIFICANT CHANGE UP (ref 3.5–5)
POTASSIUM SERPL-SCNC: 4.4 MMOL/L — SIGNIFICANT CHANGE UP (ref 3.5–5)
RBC # BLD: 4.53 M/UL — SIGNIFICANT CHANGE UP (ref 4.2–5.4)
RBC # FLD: 17.7 % — HIGH (ref 11.5–14.5)
SODIUM SERPL-SCNC: 142 MMOL/L — SIGNIFICANT CHANGE UP (ref 135–146)
WBC # BLD: 8.11 K/UL — SIGNIFICANT CHANGE UP (ref 4.8–10.8)
WBC # FLD AUTO: 8.11 K/UL — SIGNIFICANT CHANGE UP (ref 4.8–10.8)

## 2024-05-28 PROCEDURE — 93325 DOPPLER ECHO COLOR FLOW MAPG: CPT | Mod: 26

## 2024-05-28 PROCEDURE — 93010 ELECTROCARDIOGRAM REPORT: CPT

## 2024-05-28 PROCEDURE — 93320 DOPPLER ECHO COMPLETE: CPT | Mod: 26

## 2024-05-28 PROCEDURE — 93312 ECHO TRANSESOPHAGEAL: CPT | Mod: 26,XU

## 2024-05-28 PROCEDURE — 92960 CARDIOVERSION ELECTRIC EXT: CPT

## 2024-05-28 PROCEDURE — 99239 HOSP IP/OBS DSCHRG MGMT >30: CPT

## 2024-05-28 RX ORDER — AMIODARONE HYDROCHLORIDE 400 MG/1
400 TABLET ORAL EVERY 12 HOURS
Refills: 0 | Status: DISCONTINUED | OUTPATIENT
Start: 2024-05-28 | End: 2024-05-28

## 2024-05-28 RX ORDER — AMIODARONE HYDROCHLORIDE 400 MG/1
2 TABLET ORAL
Qty: 56 | Refills: 0
Start: 2024-05-28 | End: 2024-06-10

## 2024-05-28 RX ORDER — AMIODARONE HYDROCHLORIDE 400 MG/1
1 TABLET ORAL
Qty: 30 | Refills: 0
Start: 2024-05-28 | End: 2024-06-26

## 2024-05-28 RX ORDER — SODIUM CHLORIDE 9 MG/ML
500 INJECTION, SOLUTION INTRAVENOUS ONCE
Refills: 0 | Status: COMPLETED | OUTPATIENT
Start: 2024-05-28 | End: 2024-05-28

## 2024-05-28 RX ORDER — AMIODARONE HYDROCHLORIDE 400 MG/1
400 TABLET ORAL EVERY 8 HOURS
Refills: 0 | Status: DISCONTINUED | OUTPATIENT
Start: 2024-05-28 | End: 2024-05-28

## 2024-05-28 RX ADMIN — APIXABAN 5 MILLIGRAM(S): 2.5 TABLET, FILM COATED ORAL at 06:19

## 2024-05-28 RX ADMIN — SODIUM CHLORIDE 2000 MILLILITER(S): 9 INJECTION, SOLUTION INTRAVENOUS at 14:36

## 2024-05-28 RX ADMIN — Medication 30 MILLIGRAM(S): at 06:19

## 2024-05-28 RX ADMIN — Medication 30 MILLIGRAM(S): at 01:07

## 2024-05-28 RX ADMIN — AMIODARONE HYDROCHLORIDE 400 MILLIGRAM(S): 400 TABLET ORAL at 14:35

## 2024-05-28 RX ADMIN — Medication 30 MILLIGRAM(S): at 12:01

## 2024-05-28 NOTE — CHART NOTE - NSCHARTNOTEFT_GEN_A_CORE
65 y/o female with PMH of Afib on Eliquis s/p ablation in the past (currently only on metoprolol 25 mg twice daily, previously on Cardizem but has been discontinued) presents to the emergency department with son due to sudden onset of palpitations associated with shortness of breath. Noted to have A Fib with RVR. Was rapid response on the floors for SVT/AFL. Scheduled for MINNA/DCCV this am.    Discussed risks and benefits of MINNA/CV with the patient. No contraindications, informed consent obtained. Of note pt is on Eliquis for AC, last dose this am and she was NPO overnight. 65 y/o female with PMH of Afib on Eliquis s/p ablation in the past (currently only on metoprolol 25 mg twice daily, previously on Cardizem but has been discontinued) presents to the emergency department with son due to sudden onset of palpitations associated with shortness of breath. Noted to have A Fib with RVR. Was rapid response on the floors for SVT/AFL. Scheduled for MINNA/DCCV this am.    Discussed risks and benefits of MINNA/CV with the patient. No contraindications, informed consent obtained. Of note pt is on Eliquis for AC, last dose this am and she was NPO overnight.    Attending attestation:  - Patient is appropriate for MINNA/DCCV with anesthesia.  - Indication, risks (<1/1000 risk of severe complication including esophageal injury/perforation and ~10% risk of minor complications including hoarse voice/ throat discomfort/ difficulty swallowing), and benefits were discussed with the patient, who elects to proceed.  - Will proceed

## 2024-05-28 NOTE — DISCHARGE NOTE NURSING/CASE MANAGEMENT/SOCIAL WORK - NSDCPEFALRISK_GEN_ALL_CORE
For information on Fall & Injury Prevention, visit: [email or web address] OR  [email or web address] OR  https://www.cdc.gov/steadi/patient.html

## 2024-05-28 NOTE — PROGRESS NOTE ADULT - SUBJECTIVE AND OBJECTIVE BOX
24H events:    Patient is a 64y old Female who presents with a chief complaint of A fib (27 May 2024 17:43)    Primary diagnosis of Atrial fibrillation and flutter      Day 1:  Day 2:  Day 3:     Today is hospital day 2d. This morning patient was seen and examined at bedside, resting comfortably in bed.    No acute or major events overnight.    Code Status:    Family communication:  Contact date:  Name of person contacted:  Relationship to patient:  Communication details:  What matters most:    PAST MEDICAL & SURGICAL HISTORY  Anxiety    Afib    H/O neck surgery      SOCIAL HISTORY:  Social History:  no alcohol, smoking hx (26 May 2024 08:14)      ALLERGIES:  Toradol (Rash)    MEDICATIONS:  STANDING MEDICATIONS  apixaban 5 milliGRAM(s) Oral every 12 hours  diltiazem    Tablet 30 milliGRAM(s) Oral every 6 hours    PRN MEDICATIONS  acetaminophen     Tablet .. 650 milliGRAM(s) Oral every 6 hours PRN  aluminum hydroxide/magnesium hydroxide/simethicone Suspension 30 milliLiter(s) Oral every 4 hours PRN  LORazepam     Tablet 0.5 milliGRAM(s) Oral at bedtime PRN  melatonin 3 milliGRAM(s) Oral at bedtime PRN  ondansetron Injectable 4 milliGRAM(s) IV Push every 8 hours PRN    VITALS:   T(F): 97.6  HR: 88  BP: 90/60  RR: 18  SpO2: --    PHYSICAL EXAM:  GENERAL:   (  ) NAD, lying in bed comfortably     (  ) obtunded     (  ) lethargic     (  ) somnolent    HEAD:   (  ) Atraumatic     (  ) hematoma     (  ) laceration (specify location:       )     NECK:  (  ) Supple     (  ) neck stiffness     (  ) nuchal rigidity     (  )  no JVD     (  ) JVD present ( -- cm)    HEART:  Rate -->     (  ) normal rate     (  ) bradycardic     (  ) tachycardic  Rhythm -->     (  ) regular     (  ) regularly irregular     (  ) irregularly irregular  Murmurs -->     (  ) normal s1s2     (  ) systolic murmur     (  ) diastolic murmur     (  ) continuous murmur      (  ) S3 present     (  ) S4 present    LUNGS:   (  )Unlabored respirations     (  ) tachypnea  (  ) B/L air entry     (  ) decreased breath sounds in:  (location     )    (  ) no adventitious sound     (  ) crackles     (  ) wheezing      (  ) rhonchi      (specify location:       )  (  ) chest wall tenderness (specify location:       )    ABDOMEN:   (  ) Soft     (  ) tense   |   (  ) nondistended     (  ) distended   |   (  ) +BS     (  ) hypoactive bowel sounds     (  ) hyperactive bowel sounds  (  ) nontender     (  ) RUQ tenderness     (  ) RLQ tenderness     (  ) LLQ tenderness     (  ) epigastric tenderness     (  ) diffuse tenderness  (  ) Splenomegaly      (  ) Hepatomegaly      (  ) Jaundice     (  ) ecchymosis     EXTREMITIES:  (  ) Normal     (  ) Rash     (  ) ecchymosis     (  ) varicose veins      (  ) pitting edema     (  ) non-pitting edema   (  ) ulceration     (  ) gangrene:     (location:     )    NERVOUS SYSTEM:    (  ) A&Ox3     (  ) confused     (  ) lethargic  CN II-XII:     (  ) Intact     (  ) deficits found     (Specify:     )   Upper extremities:     (  ) no sensorimotor deficits     (  ) weakness     (  ) loss of proprioception/vibration     (  ) loss of touch/temperature (specify:    )  Lower extremities:     (  ) no sensorimotor deficits     (  ) weakness     (  ) loss of proprioception/vibration     (  ) loss of touch/temperature (specify:    )    SKIN:   (  ) No rashes or lesions     (  ) maculopapular rash     (  ) pustules     (  ) vesicles     (  ) ulcer     (  ) ecchymosis     (specify location:     )    AMPAC score:    (  ) Indwelling Diaz Catheter:   Date insterted:    Reason (  ) Critical illness     (  ) urinary retention    (  ) Accurate Ins/Outs Monitoring     (  ) CMO patient    (  ) Central Line:   Date inserted:  Location: (  ) Right IJ     (  ) Left IJ     (  ) Right Fem     (  ) Left Fem    (  ) SPC        (  ) pigtail       (  ) PEG tube       (  ) colostomy       (  ) jejunostomy  (  ) U-Dall    LABS:                        10.3   8.11  )-----------( 318      ( 28 May 2024 05:25 )             34.0     05-28    142  |  108  |  13  ----------------------------<  93  4.4   |  21  |  0.9    Ca    8.8      28 May 2024 05:25  Mg     1.9     05-28    TPro  6.0  /  Alb  3.9  /  TBili  0.4  /  DBili  x   /  AST  23  /  ALT  31  /  AlkPhos  121<H>  05-27      Urinalysis Basic - ( 28 May 2024 05:25 )    Color: x / Appearance: x / SG: x / pH: x  Gluc: 93 mg/dL / Ketone: x  / Bili: x / Urobili: x   Blood: x / Protein: x / Nitrite: x   Leuk Esterase: x / RBC: x / WBC x   Sq Epi: x / Non Sq Epi: x / Bacteria: x            Culture - Blood (collected 26 May 2024 09:24)  Source: .Blood Blood  Preliminary Report (27 May 2024 18:02):    No growth at 24 hours          RADIOLOGY:           24H events:    Patient is a 64y old Female who presents with a chief complaint of A fib (27 May 2024 17:43)    Primary diagnosis of Atrial fibrillation and flutter    Today is hospital day 2d. This morning patient was seen and examined at bedside, resting comfortably in bed.    No acute or major events overnight.      PAST MEDICAL & SURGICAL HISTORY  Anxiety    Afib    H/O neck surgery      SOCIAL HISTORY:  Social History:  no alcohol, smoking hx (26 May 2024 08:14)      ALLERGIES:  Toradol (Rash)    MEDICATIONS:  STANDING MEDICATIONS  apixaban 5 milliGRAM(s) Oral every 12 hours  diltiazem    Tablet 30 milliGRAM(s) Oral every 6 hours    PRN MEDICATIONS  acetaminophen     Tablet .. 650 milliGRAM(s) Oral every 6 hours PRN  aluminum hydroxide/magnesium hydroxide/simethicone Suspension 30 milliLiter(s) Oral every 4 hours PRN  LORazepam     Tablet 0.5 milliGRAM(s) Oral at bedtime PRN  melatonin 3 milliGRAM(s) Oral at bedtime PRN  ondansetron Injectable 4 milliGRAM(s) IV Push every 8 hours PRN    VITALS:   T(F): 97.6  HR: 88  BP: 90/60  RR: 18  SpO2: --    PHYSICAL EXAM:  GENERAL:   ( x ) NAD, lying in bed comfortably     (  ) obtunded     (  ) lethargic     (  ) somnolent    HEAD:   ( x ) Atraumatic     (  ) hematoma     (  ) laceration (specify location:       )     HEART:  Rate -->     ( x ) normal rate     (  ) bradycardic     (  ) tachycardic  Rhythm -->     ( x ) regular     (  ) regularly irregular     (  ) irregularly irregular  Murmurs -->     ( x ) normal s1s2     (  ) systolic murmur     (  ) diastolic murmur     (  ) continuous murmur      (  ) S3 present     (  ) S4 present    LUNGS:   ( x )Unlabored respirations     (  ) tachypnea  ( x ) B/L air entry     (  ) decreased breath sounds in:  (location     )    (  ) no adventitious sound     (  ) crackles     (  ) wheezing      (  ) rhonchi      (specify location:       )  (  ) chest wall tenderness (specify location:       )    ABDOMEN:   ( x ) Soft     (  ) tense   |   ( x ) nondistended     (  ) distended   |   (  ) +BS     (  ) hypoactive bowel sounds     (  ) hyperactive bowel sounds  (  ) nontender     (  ) RUQ tenderness     (  ) RLQ tenderness     (  ) LLQ tenderness     (  ) epigastric tenderness     (  ) diffuse tenderness  (  ) Splenomegaly      (  ) Hepatomegaly      (  ) Jaundice     (  ) ecchymosis     EXTREMITIES:  ( x ) Normal     (  ) Rash     (  ) ecchymosis     (  ) varicose veins      (  ) pitting edema     (  ) non-pitting edema   (  ) ulceration     (  ) gangrene:     (location:     )    NERVOUS SYSTEM:    ( x ) A&Ox3     (  ) confused     (  ) lethargic  CN II-XII:     (  ) Intact     (  ) deficits found     (Specify:     )   Upper extremities:     (  ) no sensorimotor deficits     (  ) weakness     (  ) loss of proprioception/vibration     (  ) loss of touch/temperature (specify:    )  Lower extremities:     (  ) no sensorimotor deficits     (  ) weakness     (  ) loss of proprioception/vibration     (  ) loss of touch/temperature (specify:    )    (  ) Indwelling Diaz Catheter:   Date insterted:    Reason (  ) Critical illness     (  ) urinary retention    (  ) Accurate Ins/Outs Monitoring     (  ) CMO patient    (  ) Central Line:   Date inserted:  Location: (  ) Right IJ     (  ) Left IJ     (  ) Right Fem     (  ) Left Fem    (  ) SPC        (  ) pigtail       (  ) PEG tube       (  ) colostomy       (  ) jejunostomy  (  ) U-Dall    LABS:                        10.3   8.11  )-----------( 318      ( 28 May 2024 05:25 )             34.0     05-28    142  |  108  |  13  ----------------------------<  93  4.4   |  21  |  0.9    Ca    8.8      28 May 2024 05:25  Mg     1.9     05-28    TPro  6.0  /  Alb  3.9  /  TBili  0.4  /  DBili  x   /  AST  23  /  ALT  31  /  AlkPhos  121<H>  05-27      Urinalysis Basic - ( 28 May 2024 05:25 )    Color: x / Appearance: x / SG: x / pH: x  Gluc: 93 mg/dL / Ketone: x  / Bili: x / Urobili: x   Blood: x / Protein: x / Nitrite: x   Leuk Esterase: x / RBC: x / WBC x   Sq Epi: x / Non Sq Epi: x / Bacteria: x            Culture - Blood (collected 26 May 2024 09:24)  Source: .Blood Blood  Preliminary Report (27 May 2024 18:02):    No growth at 24 hours          RADIOLOGY:

## 2024-05-28 NOTE — DISCHARGE NOTE PROVIDER - HOSPITAL COURSE
63 y/o female with PMH of Afib on Eliquis s/p ablation in the past (currently only on metoprolol 25 mg twice daily, previously on Cardizem but has been discontinued) presents to the emergency department with son due to sudden onset of palpitations associated with shortness of breath that began this evening while sitting at home and watching TV.  No provoking factors.  Patient reports she was in this emergency department several weeks ago and was cardioverted and discharged home.     Denies recent illness fever, chills, chest pain, current shortness of breath, cough, nausea, vomiting, diarrhea abdominal pain, lower extremity edema.    Vital Signs Last 24 Hrs  T(C): 36.6 (26 May 2024 05:14), Max: 36.6 (26 May 2024 05:14)  T(F): 97.8 (26 May 2024 05:14), Max: 97.8 (26 May 2024 05:14)  HR: 107 (26 May 2024 05:14) (80 - 167)  BP: 109/71 (26 May 2024 05:14) (109/71 - 113/67)  RR: 18 (26 May 2024 05:14) (18 - 18)  SpO2: 98% (26 May 2024 05:14) (96% - 98%)  O2 Parameters below as of 26 May 2024 05:14  Patient On (Oxygen Delivery Method): room air    # Paroxysmal A fib with RVR, history of Ablation  # Cardiomyopathy?  - multiple rapid responses due to elevated HR  - MINNA/CV today - successful convereted to sinus rhythym  - c/w Eliquis 5 mg once a day as per patient therefore need MINNA, recommend compliance with therapeutic regimen  - metoprolol 25 mg TID for better rate control  - Cardio consul Dr. Parker - [facility] appreciated  - Cardiomyopathy, LVEF <40% in March 2023 as per Dr. Thomas  - 1/29/24: LV Ejection Fraction by Rangel's Method with a biplane EF of 53 %. Normal global left ventricular systolic function. The left ventricular diastolic function could not be assessed in this   study    # SIRS not present on admission  - hypotensive with WBC 13K with no known/suspected cause of infection  - lactate 1.2, BCx NGTD  - s/p 2 L Fluids in ED  - RUQ negative    # Microcytic anemia  - MCV 74, Ferritin 5, transfer sat 7, iron total 21  - started on Venofer     # Mild dilatation of the ascending aorta (3.8 cm) on March 2023  - OP follow up    # DVT PPx - Eliquis  # Diet- NPO for MINNA  # Activity- AAT  # Dispo - pending MINNA/CV, amiodarone load to be started after 65 y/o female with PMH of Afib on Eliquis s/p ablation in the past (currently only on   metoprolol 25 mg twice daily, previously on Cardizem but has been discontinued) presents to the emergency department with son due to sudden onset of palpitations associated with shortness of breath that began this evening while sitting at home and watching TV.  No provoking factors.  Patient reports she was in this emergency department several weeks ago and was cardioverted and discharged home.     Denies recent illness fever, chills, chest pain, current shortness of breath, cough, nausea, vomiting, diarrhea abdominal pain, lower extremity edema.    Vital Signs Last 24 Hrs  T(C): 36.6 (26 May 2024 05:14), Max: 36.6 (26 May 2024 05:14)  T(F): 97.8 (26 May 2024 05:14), Max: 97.8 (26 May 2024 05:14)  HR: 107 (26 May 2024 05:14) (80 - 167)  BP: 109/71 (26 May 2024 05:14) (109/71 - 113/67)  RR: 18 (26 May 2024 05:14) (18 - 18)  SpO2: 98% (26 May 2024 05:14) (96% - 98%)  O2 Parameters below as of 26 May 2024 05:14  Patient On (Oxygen Delivery Method): room air    # Paroxysmal A fib with RVR, history of Ablation  # Cardiomyopathy  - multiple rapid responses due to elevated HR  - MINNA/CV today - successful convereted to sinus rhythym  - c/w Eliquis 5 mg once a day as per patient therefore need MINNA, recommend compliance with therapeutic regimen  - Cardio consul Dr. Parker - [facility] appreciated  - Cardiomyopathy,  - MINNA, EF 40-45%,         # Microcytic anemia  - MCV 74, Ferritin 5, transfer sat 7, iron total 21  - started on Venofer     # Mild dilatation of the ascending aorta (3.8 cm) on March 2023  - OP follow up    # DVT PPx - Eliquis            attending comments:  - s/p cardioversion, currently on NSR, DC on PO amiodarone loading dose followed w   200mg qd as maintenance, f/u w EP as outpt    needs f/u w cardio as outpt for GDM

## 2024-05-28 NOTE — DISCHARGE NOTE PROVIDER - PROVIDER TOKENS
PROVIDER:[TOKEN:[[id number]],FOLLOWUP:[2 weeks],ESTABLISHEDPATIENT:[T]],PROVIDER:[TOKEN:[[id number]],FOLLOWUP:[2 weeks],ESTABLISHEDPATIENT:[T]] PROVIDER:[TOKEN:[[id number]],FOLLOWUP:[2 weeks],ESTABLISHEDPATIENT:[T]],PROVIDER:[TOKEN:[[id number]],FOLLOWUP:[2 weeks],ESTABLISHEDPATIENT:[T]],PROVIDER:[TOKEN:[[id number]],FOLLOWUP:[2 weeks]]

## 2024-05-28 NOTE — DISCHARGE NOTE NURSING/CASE MANAGEMENT/SOCIAL WORK - PATIENT PORTAL LINK FT
You can access the FollowMyHealth Patient Portal offered by [facility] by registering at the following website: [email or web address]. By joining [facility]’s FollowMyHealth portal, you will also be able to view your health information using other applications (apps) compatible with our system.

## 2024-05-28 NOTE — DISCHARGE NOTE PROVIDER - CARE PROVIDERS DIRECT ADDRESSES
,DirectAddress_Unknown,[email or web address] ,DirectAddress_Unknown,[email or web address]

## 2024-05-28 NOTE — PROGRESS NOTE ADULT - SUBJECTIVE AND OBJECTIVE BOX
This is a late entry.  Patient was seen and examined earlier this morning on 3C @0815 HRs.  EMR reviewed    Events over the weekend noted.  Comfortable in bed.  Waiting for MINNA directed DCCV    Vitals:  T(C): 36.4 (05-28-24 @ 12:09), Max: 37.1 (05-27-24 @ 06:30)  HR: 69 (05-28-24 @ 12:09) (69 - 174)  BP: 89/58 (05-28-24 @ 13:47) (86/61 - 132/96)  RR: 20 (05-28-24 @ 13:47) (18 - 20)  SpO2: 98% (05-28-24 @ 13:47) (96% - 100%)    Telemetry: Atrial Fi    LABS:                        10.3   8.11  )-----------( 318      ( 28 May 2024 05:25 )             34.0     05-28    142  |  108  |  13  ----------------------------<  93  4.4   |  21  |  0.9    Ca    8.8      28 May 2024 05:25  Mg     1.9     05-28    TPro  6.0  /  Alb  3.9  /  TBili  0.4  /  DBili  x   /  AST  23  /  ALT  31  /  AlkPhos  121<H>  05-27      MEDICATIONS  (STANDING):  aMIOdarone    Tablet 400 milliGRAM(s) Oral every 12 hours  apixaban 5 milliGRAM(s) Oral every 12 hours  diltiazem    Tablet 30 milliGRAM(s) Oral every 6 hours  lactated ringers Bolus 500 milliLiter(s) IV Bolus once    MEDICATIONS  (PRN):  acetaminophen     Tablet .. 650 milliGRAM(s) Oral every 6 hours PRN Temp greater or equal to 38C (100.4F), Mild Pain (1 - 3)  aluminum hydroxide/magnesium hydroxide/simethicone Suspension 30 milliLiter(s) Oral every 4 hours PRN Dyspepsia  LORazepam     Tablet 0.5 milliGRAM(s) Oral at bedtime PRN Anxiety  melatonin 3 milliGRAM(s) Oral at bedtime PRN Insomnia  ondansetron Injectable 4 milliGRAM(s) IV Push every 8 hours PRN Nausea and/or Vomiting      PHYSICAL EXAM:  Constitutional: appears stated age, well developed/nourished, no acute distress  Eyes: EOM's intact.  PERRLA  ENMT: Normocephalic, atraumatic.  Neck: Jugular veins non-distended; no carotid bruits bilaterally.  Respiratory: respiratory pattern unlabored; no dullness to percussion; lungs clear to auscultation bilaterally.  Cardiovascular: Irregular rhythm.  S1 and S2 normal.  No murmur nor rub appreciated.  Abdomen: Soft, non-tender.  Normal bowel sounds.  Extremities: extremities warm; no  edema.  Pulses: Intact bilaterally  Skin: No gross abnormalities noted.  Musculoskeletal: No gross deformities  Neurological: Alert, oriented x 3.  No focal neurologic deficits noted.

## 2024-05-28 NOTE — RAPID RESPONSE TEAM SUMMARY - NSSITUATIONBACKGROUNDRRT_GEN_ALL_CORE
Rapid Response called because pt's HR on tele noted to be 180's  Upon assessment pt was in bathroom likely straining  pt was taken back to bed  Vitals taken - /88, satting 100% on RA, Pulse 119  EKG taken noted to be in afib w RVR  after being seated on bed HR back to 91 on tele  no interventions done  Pt is going for MINNA/DCCV today with EP

## 2024-05-28 NOTE — DISCHARGE NOTE PROVIDER - NSDCMRMEDTOKEN_GEN_ALL_CORE_FT
Eliquis 5 mg oral tablet: 1 tab(s) orally once a day  LORazepam 0.5 mg oral tablet: 1 tab(s) orally once a day (at bedtime) As needed Anxiety  metoprolol tartrate 25 mg oral tablet: 1 tab(s) orally 2 times a day   amiodarone 200 mg oral tablet: 2 tab(s) orally every 12 hours take every 12 hours until 6/11/24  amiodarone 200 mg oral tablet: 1 tab(s) orally once a day Take  200mg once a day starting 6/12/24  Eliquis 5 mg oral tablet: 1 tab(s) orally once a day  LORazepam 0.5 mg oral tablet: 1 tab(s) orally once a day (at bedtime) As needed Anxiety   amiodarone 200 mg oral tablet: 2 tab(s) orally every 12 hours take 2 tabs every 12 hours until 6/11/24, on 6/12/24 take 1 tab daily  Eliquis 5 mg oral tablet: 1 tab(s) orally once a day  LORazepam 0.5 mg oral tablet: 1 tab(s) orally once a day (at bedtime) As needed Anxiety

## 2024-05-28 NOTE — CHART NOTE - NSCHARTNOTEFT_GEN_A_CORE
POST OPERATIVE PROCEDURAL DOCUMENTATION  PRE-OP DIAGNOSIS:  AFL      POST-OP DIAGNOSIS:  AFL s/p successful CV    PROCEDURE: Transesophageal echocardiogram    Primary Physician:  Dr. Miller  Assistant: Dr. SRIKANTH Whipple    ANESTHESIA TYPE  [  ] General Anesthesia  [ x ] Conscious Sedation  [  ] Local/Regional    CONDITION  [  ] Critical  [  ] Serious  [  ] Fair  [ x ] Good    SPECIMENS REMOVED (IF APPLICABLE): N/A    IMPLANTS (IF APPLICABLE): None    ESTIMATED BLOOD LOSS: None    COMPLICATIONS: None      FINDINGS:    After risks and benefits of procedures were explained, informed consent was obtained and placed in chart. Refer to Anesthesia note for sedation details.  The MINNA probe was passed into the esophagus without difficulty.  Transesophageal images were obtained.  The MINNA probe was removed without difficulty and examined.  There was no evidence for bleeding.  The patient tolerated the procedure well without any immediate MINNA-related complications.      Preliminary Findings:  LA: Mildly enlarged   BRAN: Left atrial appendage was clear of clot and smoke.  LV: LVEF 40 - 45%  MV: Mild MR, no evidence of MS.   AV: No evidence of AI, no evidence of AS.   RA: normal size  TV: Mild TR.   PV: no PI.   IAS: small size PFO with left to right direction predominantly  Aorta: Dilated ascending aorta root. Simple atheroma of desc aorta      Patient successfully converted to sinus rhythm with synchronized 200J of direct current cardioversion.    DIAGNOSIS/IMPRESSION:  No evidence of BRAN thrombus     PLAN OF CARE:  c/w Eliquis 5 mg BID  return to floor  f/u with cardiologist and electrophysiologist POST OPERATIVE PROCEDURAL DOCUMENTATION  PRE-OP DIAGNOSIS:  AFL      POST-OP DIAGNOSIS:  AFL s/p successful CV    PROCEDURE: Transesophageal echocardiogram    Primary Physician:  Dr. Miller  Assistant: Dr. SRIKANTH Whipple    ANESTHESIA TYPE  [  ] General Anesthesia  [ x ] Conscious Sedation  [  ] Local/Regional    CONDITION  [  ] Critical  [  ] Serious  [  ] Fair  [ x ] Good    SPECIMENS REMOVED (IF APPLICABLE): N/A    IMPLANTS (IF APPLICABLE): None    ESTIMATED BLOOD LOSS: None    COMPLICATIONS: None      FINDINGS:    After risks and benefits of procedures were explained, informed consent was obtained and placed in chart. Refer to Anesthesia note for sedation details.  The MINNA probe was passed into the esophagus without difficulty.  Transesophageal images were obtained.  The MINNA probe was removed without difficulty and examined.  There was no evidence for bleeding.  The patient tolerated the procedure well without any immediate MINNA-related complications.      Preliminary Findings:  LA: Mildly enlarged   BRAN: Left atrial appendage was clear of clot and smoke.  LV: LVEF 40 - 45%  MV: Mild MR, no evidence of MS.   AV: No evidence of AI, no evidence of AS.   RA: normal size  TV: Mild TR.   PV: no PI.   IAS: small sized PFO with left to right direction predominantly  Aorta: Dilated ascending aorta root. Simple atheroma of desc aorta    Patient successfully converted to sinus rhythm with synchronized 200J of direct current cardioversion.    DIAGNOSIS/IMPRESSION:  No evidence of BRAN thrombus     PLAN OF CARE:  c/w Eliquis 5 mg BID  return to floor  f/u with cardiologist and electrophysiologist

## 2024-05-28 NOTE — PROGRESS NOTE ADULT - ASSESSMENT
1] Atrial Fibrillation      S/P Ablation  - Patient advised to take cardiac medications as prescribed  - For MINNA directed DCCV today  - Discharge planning per primary team    Plan discussed with House Staff during morning rounds.    Hernandez Enriquez MD(covering for Dr. Froylan Thomas)  
65 y/o female with PMH of Afib on Eliquis s/p ablation in the past (currently only on metoprolol 25 mg twice daily, previously on Cardizem but has been discontinued) presents to the emergency department with son due to sudden onset of palpitations associated with shortness of breath that began this evening while sitting at home and watching TV.  No provoking factors.  Patient reports she was in this emergency department several weeks ago and was cardioverted and discharged home.        # Paroxysmal A fib with RVR, history of Ablation  # Cardiomyopathy?  - NPO after midnight for MINNA/CV tomorrow  - c/w Eliquis 5 mg once a day as per patient therefore need MINNA, recommend compliance with therapeutic regimen  - increase metoprolol 25 mg TID for better rate control  - f//u Cardio consult for Dr. Parker  - Cardiomyopathy, LVEF <40% in March 2023 as per Dr. Thomas  - 1/29/24: LV Ejection Fraction by Rangel's Method with a biplane EF of 53 %. Normal global left ventricular systolic function. The left ventricular diastolic function could not be assessed in this   study.    # SIRS not present on admission  - hypotensive with WBC 13K with no known/suspected cause of infection  - f/u lactate, BC,   - s/p 2 L Fluids in ED  - RUQ negative    # Microcytic anemia  - MCV 74, Ferritin 5, transfer sat 7, iron total 21  - started on Venofer     # Mild dilatation of the ascending aorta (3.8 cm) on March 2023  - OP follow up    # DVT PPx - Eliquis  # Diet- DASH  # Activity- AAT  # Dispo - pending MINNA/CV, amiodarone load to be started after      
65 y/o female with PMH of Afib on Eliquis s/p ablation in the past (currently only on metoprolol 25 mg twice daily, previously on Cardizem but has been discontinued) presents to the emergency department with son due to sudden onset of palpitations associated with shortness of breath that began this evening while sitting at home and watching TV.  No provoking factors.  Patient reports she was in this emergency department several weeks ago and was cardioverted and discharged home.      # Paroxysmal A fib with RVR, history of Ablation  # Cardiomyopathy?  - multiple rapid responses due to elevated HR  - MINNA/CV today  - c/w Eliquis 5 mg once a day as per patient therefore need MINNA, recommend compliance with therapeutic regimen  - metoprolol 25 mg TID for better rate control  - Cardio consul Dr. Parker - aarti appreciated  - Cardiomyopathy, LVEF <40% in March 2023 as per Dr. Thomas  - 1/29/24: LV Ejection Fraction by Rangel's Method with a biplane EF of 53 %. Normal global left ventricular systolic function. The left ventricular diastolic function could not be assessed in this   study    # SIRS not present on admission  - hypotensive with WBC 13K with no known/suspected cause of infection  - lactate 1.2, BCx NGTD  - s/p 2 L Fluids in ED  - RUQ negative    # Microcytic anemia  - MCV 74, Ferritin 5, transfer sat 7, iron total 21  - started on Venofer     # Mild dilatation of the ascending aorta (3.8 cm) on March 2023  - OP follow up    # DVT PPx - Eliquis  # Diet- NPO for MINAN  # Activity- AAT  # Dispo - pending MINNA/CV, amiodarone load to be started after

## 2024-05-28 NOTE — DISCHARGE NOTE PROVIDER - NSDCCPCAREPLAN_GEN_ALL_CORE_FT
PRINCIPAL DISCHARGE DIAGNOSIS  Diagnosis: Atrial fibrillation and flutter  Assessment and Plan of Treatment: You came to the hospital because of an abnormal rhthym called atrial fibrilliation. You were given multiple medications to try to control the atrial fibrillation which was unsuccessful and resulted in multiple rapid responses. You were then taken for a MINNA and DCCV procedure which helped convert you back to sinus rhthtym.  Please continue taking your medications as directed and follow up outpatient with your cardiologist and PCP in 2 weeks.     PRINCIPAL DISCHARGE DIAGNOSIS  Diagnosis: Atrial fibrillation and flutter  Assessment and Plan of Treatment: You came to the hospital because of an abnormal rhthym called atrial fibrilliation. You were given multiple medications to try to control the atrial fibrillation which was unsuccessful and resulted in multiple rapid responses. You were then taken for a MINNA and DCCV procedure which helped convert you back to sinus rhthtym.  Please continue taking your medications as directed and follow up outpatient with your cardiologist and PCP in 2 weeks.      SECONDARY DISCHARGE DIAGNOSES  Diagnosis: Chronic systolic congestive heart failure  Assessment and Plan of Treatment: you have heart failure with ejection fraction of 40-45%, you need to follow up with your cardiologist for further evauation and management

## 2024-05-28 NOTE — DISCHARGE NOTE PROVIDER - CARE PROVIDER_API CALL
Afua Holguin  Internal Medicine  [address] [place] [address]  Phone: [phone]  Fax: [phone]  Established Patient  Follow Up Time: 2 weeks    Des Hargrove  Cardiology  [address] [facility], [address]  [place] [address]  Phone: [phone]  Fax: [phone]  Established Patient  Follow Up Time: 2 weeks   Afua Holguin  Internal Medicine  [address] [place] [address]  Phone: [phone]  Fax: [phone]  Established Patient  Follow Up Time: 2 weeks    Des Hargrove  Cardiology  [address] [place] [address], [address]  [place] [address]  Phone: [phone]  Fax: [phone]  Established Patient  Follow Up Time: 2 weeks    Chaim Payne  Cardiac Electrophysiology  [address] [place] [address], [address]  [place] [address]  Phone: [phone]  Fax: [phone]  Follow Up Time: 2 weeks

## 2024-05-31 LAB
CULTURE RESULTS: SIGNIFICANT CHANGE UP
SPECIMEN SOURCE: SIGNIFICANT CHANGE UP

## 2024-06-04 DIAGNOSIS — I47.19 OTHER SUPRAVENTRICULAR TACHYCARDIA: ICD-10-CM

## 2024-06-04 DIAGNOSIS — Z88.5 ALLERGY STATUS TO NARCOTIC AGENT: ICD-10-CM

## 2024-06-04 DIAGNOSIS — R00.2 PALPITATIONS: ICD-10-CM

## 2024-06-04 DIAGNOSIS — R65.10 SYSTEMIC INFLAMMATORY RESPONSE SYNDROME (SIRS) OF NON-INFECTIOUS ORIGIN WITHOUT ACUTE ORGAN DYSFUNCTION: ICD-10-CM

## 2024-06-04 DIAGNOSIS — D50.9 IRON DEFICIENCY ANEMIA, UNSPECIFIED: ICD-10-CM

## 2024-06-04 DIAGNOSIS — I50.22 CHRONIC SYSTOLIC (CONGESTIVE) HEART FAILURE: ICD-10-CM

## 2024-06-04 DIAGNOSIS — I48.0 PAROXYSMAL ATRIAL FIBRILLATION: ICD-10-CM

## 2024-06-04 DIAGNOSIS — I48.92 UNSPECIFIED ATRIAL FLUTTER: ICD-10-CM

## 2024-06-04 DIAGNOSIS — I77.810 THORACIC AORTIC ECTASIA: ICD-10-CM

## 2024-06-04 DIAGNOSIS — I42.9 CARDIOMYOPATHY, UNSPECIFIED: ICD-10-CM

## 2024-06-04 DIAGNOSIS — F41.9 ANXIETY DISORDER, UNSPECIFIED: ICD-10-CM

## 2024-06-04 DIAGNOSIS — G25.81 RESTLESS LEGS SYNDROME: ICD-10-CM

## 2024-06-04 DIAGNOSIS — Z79.01 LONG TERM (CURRENT) USE OF ANTICOAGULANTS: ICD-10-CM

## 2024-06-16 ENCOUNTER — EMERGENCY (EMERGENCY)
Facility: HOSPITAL | Age: 64
LOS: 0 days | Discharge: HOME | End: 2024-06-16
Attending: STUDENT IN AN ORGANIZED HEALTH CARE EDUCATION/TRAINING PROGRAM
Payer: COMMERCIAL

## 2024-06-16 VITALS
HEIGHT: 64 IN | WEIGHT: 130.07 LBS | HEART RATE: 132 BPM | SYSTOLIC BLOOD PRESSURE: 125 MMHG | RESPIRATION RATE: 18 BRPM | TEMPERATURE: 98 F | OXYGEN SATURATION: 99 % | DIASTOLIC BLOOD PRESSURE: 83 MMHG

## 2024-06-16 VITALS — HEART RATE: 93 BPM

## 2024-06-16 DIAGNOSIS — R42 DIZZINESS AND GIDDINESS: ICD-10-CM

## 2024-06-16 DIAGNOSIS — R00.2 PALPITATIONS: ICD-10-CM

## 2024-06-16 DIAGNOSIS — Z87.891 PERSONAL HISTORY OF NICOTINE DEPENDENCE: ICD-10-CM

## 2024-06-16 DIAGNOSIS — R74.01 ELEVATION OF LEVELS OF LIVER TRANSAMINASE LEVELS: ICD-10-CM

## 2024-06-16 DIAGNOSIS — Z88.6 ALLERGY STATUS TO ANALGESIC AGENT: ICD-10-CM

## 2024-06-16 DIAGNOSIS — I48.92 UNSPECIFIED ATRIAL FLUTTER: ICD-10-CM

## 2024-06-16 DIAGNOSIS — I48.91 UNSPECIFIED ATRIAL FIBRILLATION: ICD-10-CM

## 2024-06-16 DIAGNOSIS — D64.9 ANEMIA, UNSPECIFIED: ICD-10-CM

## 2024-06-16 DIAGNOSIS — Z98.890 OTHER SPECIFIED POSTPROCEDURAL STATES: Chronic | ICD-10-CM

## 2024-06-16 DIAGNOSIS — Z79.01 LONG TERM (CURRENT) USE OF ANTICOAGULANTS: ICD-10-CM

## 2024-06-16 LAB
ALBUMIN SERPL ELPH-MCNC: 4.3 G/DL — SIGNIFICANT CHANGE UP (ref 3.5–5.2)
ALP SERPL-CCNC: 114 U/L — SIGNIFICANT CHANGE UP (ref 30–115)
ALT FLD-CCNC: 59 U/L — HIGH (ref 0–41)
ANION GAP SERPL CALC-SCNC: 11 MMOL/L — SIGNIFICANT CHANGE UP (ref 7–14)
AST SERPL-CCNC: 64 U/L — HIGH (ref 0–41)
BASOPHILS # BLD AUTO: 0.06 K/UL — SIGNIFICANT CHANGE UP (ref 0–0.2)
BASOPHILS NFR BLD AUTO: 0.7 % — SIGNIFICANT CHANGE UP (ref 0–1)
BILIRUB SERPL-MCNC: 0.3 MG/DL — SIGNIFICANT CHANGE UP (ref 0.2–1.2)
BUN SERPL-MCNC: 23 MG/DL — HIGH (ref 10–20)
CALCIUM SERPL-MCNC: 8.8 MG/DL — SIGNIFICANT CHANGE UP (ref 8.4–10.5)
CHLORIDE SERPL-SCNC: 100 MMOL/L — SIGNIFICANT CHANGE UP (ref 98–110)
CO2 SERPL-SCNC: 21 MMOL/L — SIGNIFICANT CHANGE UP (ref 17–32)
CREAT SERPL-MCNC: 1.3 MG/DL — SIGNIFICANT CHANGE UP (ref 0.7–1.5)
EGFR: 46 ML/MIN/1.73M2 — LOW
EOSINOPHIL # BLD AUTO: 0.11 K/UL — SIGNIFICANT CHANGE UP (ref 0–0.7)
EOSINOPHIL NFR BLD AUTO: 1.2 % — SIGNIFICANT CHANGE UP (ref 0–8)
GLUCOSE SERPL-MCNC: 101 MG/DL — HIGH (ref 70–99)
HCT VFR BLD CALC: 31.9 % — LOW (ref 37–47)
HGB BLD-MCNC: 9.8 G/DL — LOW (ref 12–16)
IMM GRANULOCYTES NFR BLD AUTO: 0.3 % — SIGNIFICANT CHANGE UP (ref 0.1–0.3)
LYMPHOCYTES # BLD AUTO: 2.42 K/UL — SIGNIFICANT CHANGE UP (ref 1.2–3.4)
LYMPHOCYTES # BLD AUTO: 26.6 % — SIGNIFICANT CHANGE UP (ref 20.5–51.1)
MAGNESIUM SERPL-MCNC: 2 MG/DL — SIGNIFICANT CHANGE UP (ref 1.8–2.4)
MCHC RBC-ENTMCNC: 22.8 PG — LOW (ref 27–31)
MCHC RBC-ENTMCNC: 30.7 G/DL — LOW (ref 32–37)
MCV RBC AUTO: 74.4 FL — LOW (ref 81–99)
MONOCYTES # BLD AUTO: 0.77 K/UL — HIGH (ref 0.1–0.6)
MONOCYTES NFR BLD AUTO: 8.5 % — SIGNIFICANT CHANGE UP (ref 1.7–9.3)
NEUTROPHILS # BLD AUTO: 5.71 K/UL — SIGNIFICANT CHANGE UP (ref 1.4–6.5)
NEUTROPHILS NFR BLD AUTO: 62.7 % — SIGNIFICANT CHANGE UP (ref 42.2–75.2)
NRBC # BLD: 0 /100 WBCS — SIGNIFICANT CHANGE UP (ref 0–0)
NT-PROBNP SERPL-SCNC: 2282 PG/ML — HIGH (ref 0–300)
PLATELET # BLD AUTO: 445 K/UL — HIGH (ref 130–400)
PMV BLD: 11.3 FL — HIGH (ref 7.4–10.4)
POTASSIUM SERPL-MCNC: 4.9 MMOL/L — SIGNIFICANT CHANGE UP (ref 3.5–5)
POTASSIUM SERPL-SCNC: 4.9 MMOL/L — SIGNIFICANT CHANGE UP (ref 3.5–5)
PROT SERPL-MCNC: 6.6 G/DL — SIGNIFICANT CHANGE UP (ref 6–8)
RBC # BLD: 4.29 M/UL — SIGNIFICANT CHANGE UP (ref 4.2–5.4)
RBC # FLD: 18.2 % — HIGH (ref 11.5–14.5)
SODIUM SERPL-SCNC: 132 MMOL/L — LOW (ref 135–146)
TROPONIN T, HIGH SENSITIVITY RESULT: 10 NG/L — SIGNIFICANT CHANGE UP (ref 6–13)
TROPONIN T, HIGH SENSITIVITY RESULT: 10 NG/L — SIGNIFICANT CHANGE UP (ref 6–13)
WBC # BLD: 9.1 K/UL — SIGNIFICANT CHANGE UP (ref 4.8–10.8)
WBC # FLD AUTO: 9.1 K/UL — SIGNIFICANT CHANGE UP (ref 4.8–10.8)

## 2024-06-16 PROCEDURE — 84484 ASSAY OF TROPONIN QUANT: CPT

## 2024-06-16 PROCEDURE — 93010 ELECTROCARDIOGRAM REPORT: CPT | Mod: 76

## 2024-06-16 PROCEDURE — 99285 EMERGENCY DEPT VISIT HI MDM: CPT

## 2024-06-16 PROCEDURE — 93005 ELECTROCARDIOGRAM TRACING: CPT

## 2024-06-16 PROCEDURE — 71045 X-RAY EXAM CHEST 1 VIEW: CPT

## 2024-06-16 PROCEDURE — 36000 PLACE NEEDLE IN VEIN: CPT

## 2024-06-16 PROCEDURE — 99285 EMERGENCY DEPT VISIT HI MDM: CPT | Mod: 25

## 2024-06-16 PROCEDURE — 83735 ASSAY OF MAGNESIUM: CPT

## 2024-06-16 PROCEDURE — 80053 COMPREHEN METABOLIC PANEL: CPT

## 2024-06-16 PROCEDURE — 83880 ASSAY OF NATRIURETIC PEPTIDE: CPT

## 2024-06-16 PROCEDURE — 85025 COMPLETE CBC W/AUTO DIFF WBC: CPT

## 2024-06-16 PROCEDURE — 71045 X-RAY EXAM CHEST 1 VIEW: CPT | Mod: 26

## 2024-06-16 PROCEDURE — 36415 COLL VENOUS BLD VENIPUNCTURE: CPT

## 2024-06-16 RX ORDER — SODIUM CHLORIDE 9 MG/ML
1000 INJECTION INTRAMUSCULAR; INTRAVENOUS; SUBCUTANEOUS ONCE
Refills: 0 | Status: COMPLETED | OUTPATIENT
Start: 2024-06-16 | End: 2024-06-16

## 2024-06-16 RX ADMIN — SODIUM CHLORIDE 1000 MILLILITER(S): 9 INJECTION INTRAMUSCULAR; INTRAVENOUS; SUBCUTANEOUS at 05:00

## 2024-06-16 NOTE — ED PROVIDER NOTE - OBJECTIVE STATEMENT
64-year-old female with past medical history of A-fib on metoprolol, amiodarone and Eliquis, presents with palpitations that worsened tonight.  Patient states she felt mild palpitations earlier today while sitting out in the sun, which resolved but when trying to sleep tonight felt palpitations and discomfort so came to ED.  Denies fevers, shortness of breath, back pain, abdominal pain, N/V.  Patient reports mild lightheadedness when standing earlier today, but is since resolved.  Patient states she took a second dose of her metoprolol for symptoms prior to arrival.

## 2024-06-16 NOTE — ED PROVIDER NOTE - NSFOLLOWUPINSTRUCTIONS_ED_ALL_ED_FT
Atrial Fibrillation    Atrial fibrillation is a type of irregular heartbeat (arrhythmia) where the heart quivers continuously in a chaotic pattern that makes the heart unable to pump blood normally. This can increase the risk for stroke, heart failure, and other heart-related conditions. Atrial fibrillation can be caused by a variety of conditions and may be temporary, intermittent, or permanent. Symptoms include feeling that your heart is beating rapidly or irregularly, chest discomfort, shortness of breath, or dizziness/lightheadedness that may be worse with exertion. Treatment is varied but may involve medication or electrical shock (cardioversion).    SEEK IMMEDIATE MEDICAL CARE IF YOU HAVE ANY OF THE FOLLOWING SYMPTOMS: chest pain, shortness of breath, abdominal pain, sweating, vomiting, blood in vomit/bowel movements/urine, dizziness/lightheadedness, weakness or numbness to face/arm/leg, trouble speaking or understanding, facial droop. *********    We spoke with your cardiologist and recommended to reduce your amiodarone 2/2 your slightly elevated LFT to once a day instead of twice a day. Please follow up with him this week.     *************    Atrial Fibrillation    Atrial fibrillation is a type of irregular heartbeat (arrhythmia) where the heart quivers continuously in a chaotic pattern that makes the heart unable to pump blood normally. This can increase the risk for stroke, heart failure, and other heart-related conditions. Atrial fibrillation can be caused by a variety of conditions and may be temporary, intermittent, or permanent. Symptoms include feeling that your heart is beating rapidly or irregularly, chest discomfort, shortness of breath, or dizziness/lightheadedness that may be worse with exertion. Treatment is varied but may involve medication or electrical shock (cardioversion).    SEEK IMMEDIATE MEDICAL CARE IF YOU HAVE ANY OF THE FOLLOWING SYMPTOMS: chest pain, shortness of breath, abdominal pain, sweating, vomiting, blood in vomit/bowel movements/urine, dizziness/lightheadedness, weakness or numbness to face/arm/leg, trouble speaking or understanding, facial droop. *********    We spoke with your cardiologist and recommended to reduce your amiodarone due to your slightly elevated LFT to once a day instead of twice a day. Please follow up with him this week.     *************    Atrial Fibrillation    Atrial fibrillation is a type of irregular heartbeat (arrhythmia) where the heart quivers continuously in a chaotic pattern that makes the heart unable to pump blood normally. This can increase the risk for stroke, heart failure, and other heart-related conditions. Atrial fibrillation can be caused by a variety of conditions and may be temporary, intermittent, or permanent. Symptoms include feeling that your heart is beating rapidly or irregularly, chest discomfort, shortness of breath, or dizziness/lightheadedness that may be worse with exertion. Treatment is varied but may involve medication or electrical shock (cardioversion).    SEEK IMMEDIATE MEDICAL CARE IF YOU HAVE ANY OF THE FOLLOWING SYMPTOMS: chest pain, shortness of breath, abdominal pain, sweating, vomiting, blood in vomit/bowel movements/urine, dizziness/lightheadedness, weakness or numbness to face/arm/leg, trouble speaking or understanding, facial droop.

## 2024-06-16 NOTE — ED PROVIDER NOTE - CLINICAL SUMMARY MEDICAL DECISION MAKING FREE TEXT BOX
Received signout from Dr. Brandon. Pt here with lightheadedness and palpitations. Initially in aflutter with HR up to 130s which improved to 90s on own. Currently has no sx. Initial ekg with aflutter rvr, repeat ekg with afib. Labs notable for mildly elevated LFTs (presumed 2/2 amio) and trop 10>10. Cxr clear. Dr. Thomas was consulted who recommended outpatient f/u if trop delta <5, possible repeat ablation, decrease amio from BID to daily. Findings discussed with pt and son and she is agreeable with plan. Considered observation vs admission however pt is a good candidate for d/c home with outpatient f/u given that no life threatening pathology found in ED and pt has close outpatient f/u. Strict ED return precautions given. Pt verbalized understanding and was agreeable with plan.

## 2024-06-16 NOTE — ED PROVIDER NOTE - ATTENDING APP SHARED VISIT CONTRIBUTION OF CARE
64-year-old female with past medical history of A-fib on metoprolol, amiodarone and Eliquis, presents with palpitations that worsened tonight.  pt states she was out in the sun and started feeling a little light headed. pt states she thought she hydrated well. overnight, pt felt worsening palpitations and lightheadedness. no syncope/trauma. + R pulsing intermittent headache. no vision changes, n,v.      vs tachy/irreg   gen- NAD, aaox3  card-irir  lungs-ctab, no wheezing or rhonchi  abd-sntnd, no guarding or rebound  neuro- full str/sensation, cn ii-xii grossly intact, normal coordination

## 2024-06-16 NOTE — ED PROVIDER NOTE - CARE PROVIDER_API CALL
Froylan Thomas  Cardiology  [address] [facility], [address]  [place] [address]  Phone: [phone]  Fax: [phone]  Follow Up Time:

## 2024-06-16 NOTE — ED PROVIDER NOTE - PHYSICAL EXAMINATION
Vital Signs: I have reviewed the initial vital signs.  CONSTITUTIONAL: Pt in no acute distress laying on stretcher.  SKIN: Skin exam is warm and dry, no acute rash.  HEAD: Normocephalic; atraumatic.  EYES: PERRL, EOM intact; conjunctiva and sclera clear.  NECK: Supple; FROM  CARD: S1, S2 normal; no murmurs, gallops, or rubs. +tachycardia  RESP: CTAB. No wheezes, rales or rhonchi.  ABD:  soft; non-distended; non-tender; no hepatosplenomegaly.  MSK: Normal ROM. No clubbing, cyanosis or edema.  NEURO: Alert, oriented. Grossly unremarkable. No focal deficits.  PSYCH: Cooperative, appropriate.

## 2024-06-16 NOTE — ED ADULT NURSE NOTE - ALCOHOL PRE SCREEN (AUDIT - C)
Lab Results   Component Value Date    HGBA1C 8 2 (H) 08/25/2021   stable with Metformin, pt states that she has glucometer at home  Instructed to monitor her BS and f/u with her PCP  Statement Selected

## 2024-06-16 NOTE — ED PROVIDER NOTE - PROGRESS NOTE DETAILS
MT: spoke with Dr. Thomas. Recommends follow up as out patient if repeat troponin delta within normal limits. Also recommends decreasing Amiodarone dose to once per day in setting of slightly elevated LFTs. CO- pt endorsed to Dr. Colorado- f/u rpt trop, reassess, dispo. rhythm went from aflutter to AF w/ fluids and sx improving TC: Received signout from Dr. Brandon. Pt here with lightheadedness and palpitations. Initially in aflutter with HR up to 130s which improved to 90s on own. Currently has no sx. Initial ekg with aflutter rvr, repeat ekg with afib. Labs notable for mildly elevated LFTs (presumed 2/2 amio) and trop 10>10. Cxr clear. Dr. Thomas was consulted who recommended outpatient f/u if trop delta <5, possible repeat ablation, decrease amio from BID to daily. Findings discussed with pt and son and she is agreeable with plan.

## 2024-06-16 NOTE — ED PROVIDER NOTE - CARE PLAN
1 Principal Discharge DX:	Palpitations  Secondary Diagnosis:	Atrial fibrillation and flutter   Principal Discharge DX:	Palpitations  Secondary Diagnosis:	Atrial fibrillation and flutter  Secondary Diagnosis:	Elevated liver enzymes  Secondary Diagnosis:	Mild chronic anemia

## 2024-06-16 NOTE — ED PROVIDER NOTE - PATIENT PORTAL LINK FT
You can access the FollowMyHealth Patient Portal offered by [facility] by registering at the following website: [email or web address]. By joining [facility] FollowMyHealth portal, you will also be able to view your health information using other applications (apps) compatible with our system.

## 2024-06-20 ENCOUNTER — INPATIENT (INPATIENT)
Facility: HOSPITAL | Age: 64
LOS: 0 days | Discharge: HOME | DRG: 309 | End: 2024-06-21
Attending: INTERNAL MEDICINE | Admitting: HOSPITALIST
Payer: COMMERCIAL

## 2024-06-20 ENCOUNTER — TRANSCRIPTION ENCOUNTER (OUTPATIENT)
Age: 64
End: 2024-06-20

## 2024-06-20 VITALS
DIASTOLIC BLOOD PRESSURE: 89 MMHG | HEIGHT: 64 IN | HEART RATE: 139 BPM | WEIGHT: 130.07 LBS | OXYGEN SATURATION: 100 % | RESPIRATION RATE: 19 BRPM | SYSTOLIC BLOOD PRESSURE: 121 MMHG

## 2024-06-20 DIAGNOSIS — Z98.890 OTHER SPECIFIED POSTPROCEDURAL STATES: Chronic | ICD-10-CM

## 2024-06-20 DIAGNOSIS — I48.91 UNSPECIFIED ATRIAL FIBRILLATION: ICD-10-CM

## 2024-06-20 LAB
ALBUMIN SERPL ELPH-MCNC: 4.5 G/DL — SIGNIFICANT CHANGE UP (ref 3.5–5.2)
ALP SERPL-CCNC: 142 U/L — HIGH (ref 30–115)
ALT FLD-CCNC: 41 U/L — SIGNIFICANT CHANGE UP (ref 0–41)
ANION GAP SERPL CALC-SCNC: 11 MMOL/L — SIGNIFICANT CHANGE UP (ref 7–14)
APTT BLD: 34.3 SEC — SIGNIFICANT CHANGE UP (ref 27–39.2)
AST SERPL-CCNC: 31 U/L — SIGNIFICANT CHANGE UP (ref 0–41)
BASOPHILS # BLD AUTO: 0.05 K/UL — SIGNIFICANT CHANGE UP (ref 0–0.2)
BASOPHILS NFR BLD AUTO: 0.6 % — SIGNIFICANT CHANGE UP (ref 0–1)
BILIRUB SERPL-MCNC: 0.3 MG/DL — SIGNIFICANT CHANGE UP (ref 0.2–1.2)
BUN SERPL-MCNC: 21 MG/DL — HIGH (ref 10–20)
CALCIUM SERPL-MCNC: 8.9 MG/DL — SIGNIFICANT CHANGE UP (ref 8.4–10.4)
CHLORIDE SERPL-SCNC: 103 MMOL/L — SIGNIFICANT CHANGE UP (ref 98–110)
CO2 SERPL-SCNC: 21 MMOL/L — SIGNIFICANT CHANGE UP (ref 17–32)
CREAT SERPL-MCNC: 1.3 MG/DL — SIGNIFICANT CHANGE UP (ref 0.7–1.5)
EGFR: 46 ML/MIN/1.73M2 — LOW
EOSINOPHIL # BLD AUTO: 0.09 K/UL — SIGNIFICANT CHANGE UP (ref 0–0.7)
EOSINOPHIL NFR BLD AUTO: 1 % — SIGNIFICANT CHANGE UP (ref 0–8)
GAS PNL BLDV: SIGNIFICANT CHANGE UP
GLUCOSE SERPL-MCNC: 119 MG/DL — HIGH (ref 70–99)
HCT VFR BLD CALC: 37.1 % — SIGNIFICANT CHANGE UP (ref 37–47)
HGB BLD-MCNC: 11.3 G/DL — LOW (ref 12–16)
IMM GRANULOCYTES NFR BLD AUTO: 0.2 % — SIGNIFICANT CHANGE UP (ref 0.1–0.3)
INR BLD: 1.34 RATIO — HIGH (ref 0.65–1.3)
LYMPHOCYTES # BLD AUTO: 2.36 K/UL — SIGNIFICANT CHANGE UP (ref 1.2–3.4)
LYMPHOCYTES # BLD AUTO: 27.3 % — SIGNIFICANT CHANGE UP (ref 20.5–51.1)
MAGNESIUM SERPL-MCNC: 2.2 MG/DL — SIGNIFICANT CHANGE UP (ref 1.8–2.4)
MCHC RBC-ENTMCNC: 23 PG — LOW (ref 27–31)
MCHC RBC-ENTMCNC: 30.5 G/DL — LOW (ref 32–37)
MCV RBC AUTO: 75.4 FL — LOW (ref 81–99)
MONOCYTES # BLD AUTO: 0.72 K/UL — HIGH (ref 0.1–0.6)
MONOCYTES NFR BLD AUTO: 8.3 % — SIGNIFICANT CHANGE UP (ref 1.7–9.3)
NEUTROPHILS # BLD AUTO: 5.4 K/UL — SIGNIFICANT CHANGE UP (ref 1.4–6.5)
NEUTROPHILS NFR BLD AUTO: 62.6 % — SIGNIFICANT CHANGE UP (ref 42.2–75.2)
NRBC # BLD: 0 /100 WBCS — SIGNIFICANT CHANGE UP (ref 0–0)
NT-PROBNP SERPL-SCNC: 2134 PG/ML — HIGH (ref 0–300)
PLATELET # BLD AUTO: 416 K/UL — HIGH (ref 130–400)
PMV BLD: 11.4 FL — HIGH (ref 7.4–10.4)
POTASSIUM SERPL-MCNC: 5 MMOL/L — SIGNIFICANT CHANGE UP (ref 3.5–5)
POTASSIUM SERPL-SCNC: 5 MMOL/L — SIGNIFICANT CHANGE UP (ref 3.5–5)
PROT SERPL-MCNC: 7 G/DL — SIGNIFICANT CHANGE UP (ref 6–8)
PROTHROM AB SERPL-ACNC: 15.3 SEC — HIGH (ref 9.95–12.87)
RBC # BLD: 4.92 M/UL — SIGNIFICANT CHANGE UP (ref 4.2–5.4)
RBC # FLD: 18.4 % — HIGH (ref 11.5–14.5)
SODIUM SERPL-SCNC: 135 MMOL/L — SIGNIFICANT CHANGE UP (ref 135–146)
TROPONIN T, HIGH SENSITIVITY RESULT: 12 NG/L — SIGNIFICANT CHANGE UP (ref 6–13)
TROPONIN T, HIGH SENSITIVITY RESULT: 7 NG/L — SIGNIFICANT CHANGE UP (ref 6–13)
WBC # BLD: 8.64 K/UL — SIGNIFICANT CHANGE UP (ref 4.8–10.8)
WBC # FLD AUTO: 8.64 K/UL — SIGNIFICANT CHANGE UP (ref 4.8–10.8)

## 2024-06-20 PROCEDURE — 83540 ASSAY OF IRON: CPT

## 2024-06-20 PROCEDURE — 85025 COMPLETE CBC W/AUTO DIFF WBC: CPT

## 2024-06-20 PROCEDURE — 99291 CRITICAL CARE FIRST HOUR: CPT

## 2024-06-20 PROCEDURE — 80048 BASIC METABOLIC PNL TOTAL CA: CPT

## 2024-06-20 PROCEDURE — 71045 X-RAY EXAM CHEST 1 VIEW: CPT | Mod: 26

## 2024-06-20 PROCEDURE — 36415 COLL VENOUS BLD VENIPUNCTURE: CPT

## 2024-06-20 PROCEDURE — 93005 ELECTROCARDIOGRAM TRACING: CPT

## 2024-06-20 PROCEDURE — 99222 1ST HOSP IP/OBS MODERATE 55: CPT

## 2024-06-20 PROCEDURE — 84484 ASSAY OF TROPONIN QUANT: CPT

## 2024-06-20 PROCEDURE — 82607 VITAMIN B-12: CPT

## 2024-06-20 PROCEDURE — 84466 ASSAY OF TRANSFERRIN: CPT

## 2024-06-20 PROCEDURE — 93308 TTE F-UP OR LMTD: CPT | Mod: 26

## 2024-06-20 PROCEDURE — 83550 IRON BINDING TEST: CPT

## 2024-06-20 PROCEDURE — 82728 ASSAY OF FERRITIN: CPT

## 2024-06-20 RX ORDER — AMIODARONE HYDROCHLORIDE 50 MG/ML
200 INJECTION, SOLUTION INTRAVENOUS DAILY
Refills: 0 | Status: DISCONTINUED | OUTPATIENT
Start: 2024-06-20 | End: 2024-06-21

## 2024-06-20 RX ORDER — AMIODARONE HYDROCHLORIDE 50 MG/ML
1 INJECTION, SOLUTION INTRAVENOUS
Refills: 0 | DISCHARGE

## 2024-06-20 RX ORDER — METOPROLOL TARTRATE 50 MG
1 TABLET ORAL
Qty: 0 | Refills: 0 | DISCHARGE
Start: 2024-06-20

## 2024-06-20 RX ORDER — METOPROLOL TARTRATE 50 MG
50 TABLET ORAL ONCE
Refills: 0 | Status: COMPLETED | OUTPATIENT
Start: 2024-06-20 | End: 2024-06-20

## 2024-06-20 RX ORDER — LORAZEPAM 0.5 MG
0.5 TABLET ORAL AT BEDTIME
Refills: 0 | Status: DISCONTINUED | OUTPATIENT
Start: 2024-06-20 | End: 2024-06-21

## 2024-06-20 RX ORDER — DEXTROSE MONOHYDRATE AND SODIUM CHLORIDE 5; .3 G/100ML; G/100ML
500 INJECTION, SOLUTION INTRAVENOUS ONCE
Refills: 0 | Status: COMPLETED | OUTPATIENT
Start: 2024-06-20 | End: 2024-06-20

## 2024-06-20 RX ORDER — METOPROLOL TARTRATE 50 MG
1 TABLET ORAL
Qty: 60 | Refills: 0
Start: 2024-06-20 | End: 2024-07-19

## 2024-06-20 RX ORDER — METOPROLOL TARTRATE 50 MG
50 TABLET ORAL
Refills: 0 | Status: DISCONTINUED | OUTPATIENT
Start: 2024-06-20 | End: 2024-06-21

## 2024-06-20 RX ORDER — METOPROLOL TARTRATE 50 MG
5 TABLET ORAL ONCE
Refills: 0 | Status: COMPLETED | OUTPATIENT
Start: 2024-06-20 | End: 2024-06-20

## 2024-06-20 RX ORDER — APIXABAN 5 MG/1
5 TABLET, FILM COATED ORAL ONCE
Refills: 0 | Status: COMPLETED | OUTPATIENT
Start: 2024-06-20 | End: 2024-06-20

## 2024-06-20 RX ORDER — ACETAMINOPHEN 325 MG
650 TABLET ORAL EVERY 6 HOURS
Refills: 0 | Status: DISCONTINUED | OUTPATIENT
Start: 2024-06-20 | End: 2024-06-21

## 2024-06-20 RX ORDER — METOPROLOL TARTRATE 50 MG
50 TABLET ORAL
Refills: 0 | Status: DISCONTINUED | OUTPATIENT
Start: 2024-06-20 | End: 2024-06-20

## 2024-06-20 RX ORDER — AMIODARONE HYDROCHLORIDE 50 MG/ML
200 INJECTION, SOLUTION INTRAVENOUS ONCE
Refills: 0 | Status: COMPLETED | OUTPATIENT
Start: 2024-06-20 | End: 2024-06-20

## 2024-06-20 RX ORDER — APIXABAN 5 MG/1
5 TABLET, FILM COATED ORAL EVERY 12 HOURS
Refills: 0 | Status: DISCONTINUED | OUTPATIENT
Start: 2024-06-20 | End: 2024-06-21

## 2024-06-20 RX ORDER — METOPROLOL TARTRATE 50 MG
1 TABLET ORAL
Refills: 0 | DISCHARGE

## 2024-06-20 RX ORDER — LORAZEPAM 0.5 MG
0.5 TABLET ORAL ONCE
Refills: 0 | Status: DISCONTINUED | OUTPATIENT
Start: 2024-06-20 | End: 2024-06-20

## 2024-06-20 RX ADMIN — Medication 0.5 MILLIGRAM(S): at 20:36

## 2024-06-20 RX ADMIN — Medication 50 MILLIGRAM(S): at 17:39

## 2024-06-20 RX ADMIN — Medication 50 MILLIGRAM(S): at 10:48

## 2024-06-20 RX ADMIN — DEXTROSE MONOHYDRATE AND SODIUM CHLORIDE 500 MILLILITER(S): 5; .3 INJECTION, SOLUTION INTRAVENOUS at 12:26

## 2024-06-20 RX ADMIN — Medication 0.5 MILLIGRAM(S): at 10:49

## 2024-06-20 RX ADMIN — Medication 0.5 MILLIGRAM(S): at 10:03

## 2024-06-20 RX ADMIN — Medication 5 MILLIGRAM(S): at 08:12

## 2024-06-20 RX ADMIN — Medication 5 MILLIGRAM(S): at 10:03

## 2024-06-20 RX ADMIN — AMIODARONE HYDROCHLORIDE 200 MILLIGRAM(S): 50 INJECTION, SOLUTION INTRAVENOUS at 17:40

## 2024-06-20 RX ADMIN — APIXABAN 5 MILLIGRAM(S): 5 TABLET, FILM COATED ORAL at 08:32

## 2024-06-20 RX ADMIN — AMIODARONE HYDROCHLORIDE 200 MILLIGRAM(S): 50 INJECTION, SOLUTION INTRAVENOUS at 08:31

## 2024-06-20 RX ADMIN — DEXTROSE MONOHYDRATE AND SODIUM CHLORIDE 500 MILLILITER(S): 5; .3 INJECTION, SOLUTION INTRAVENOUS at 14:50

## 2024-06-20 RX ADMIN — DEXTROSE MONOHYDRATE AND SODIUM CHLORIDE 500 MILLILITER(S): 5; .3 INJECTION, SOLUTION INTRAVENOUS at 12:58

## 2024-06-20 RX ADMIN — APIXABAN 5 MILLIGRAM(S): 5 TABLET, FILM COATED ORAL at 17:39

## 2024-06-21 ENCOUNTER — TRANSCRIPTION ENCOUNTER (OUTPATIENT)
Age: 64
End: 2024-06-21

## 2024-06-21 VITALS
SYSTOLIC BLOOD PRESSURE: 129 MMHG | TEMPERATURE: 97 F | HEART RATE: 114 BPM | RESPIRATION RATE: 18 BRPM | DIASTOLIC BLOOD PRESSURE: 98 MMHG

## 2024-06-21 LAB
ANION GAP SERPL CALC-SCNC: 10 MMOL/L — SIGNIFICANT CHANGE UP (ref 7–14)
BASOPHILS # BLD AUTO: 0.05 K/UL — SIGNIFICANT CHANGE UP (ref 0–0.2)
BASOPHILS NFR BLD AUTO: 0.8 % — SIGNIFICANT CHANGE UP (ref 0–1)
BUN SERPL-MCNC: 18 MG/DL — SIGNIFICANT CHANGE UP (ref 10–20)
CALCIUM SERPL-MCNC: 9 MG/DL — SIGNIFICANT CHANGE UP (ref 8.4–10.5)
CHLORIDE SERPL-SCNC: 108 MMOL/L — SIGNIFICANT CHANGE UP (ref 98–110)
CO2 SERPL-SCNC: 26 MMOL/L — SIGNIFICANT CHANGE UP (ref 17–32)
CREAT SERPL-MCNC: 1.1 MG/DL — SIGNIFICANT CHANGE UP (ref 0.7–1.5)
EGFR: 56 ML/MIN/1.73M2 — LOW
EOSINOPHIL # BLD AUTO: 0.12 K/UL — SIGNIFICANT CHANGE UP (ref 0–0.7)
EOSINOPHIL NFR BLD AUTO: 1.9 % — SIGNIFICANT CHANGE UP (ref 0–8)
FERRITIN SERPL-MCNC: 6 NG/ML — LOW (ref 13–330)
GLUCOSE SERPL-MCNC: 88 MG/DL — SIGNIFICANT CHANGE UP (ref 70–99)
HCT VFR BLD CALC: 34.2 % — LOW (ref 37–47)
HGB BLD-MCNC: 10.3 G/DL — LOW (ref 12–16)
IMM GRANULOCYTES NFR BLD AUTO: 0.3 % — SIGNIFICANT CHANGE UP (ref 0.1–0.3)
IRON SATN MFR SERPL: 19 UG/DL — LOW (ref 35–150)
IRON SATN MFR SERPL: 5 % — LOW (ref 15–50)
LYMPHOCYTES # BLD AUTO: 2.11 K/UL — SIGNIFICANT CHANGE UP (ref 1.2–3.4)
LYMPHOCYTES # BLD AUTO: 33.1 % — SIGNIFICANT CHANGE UP (ref 20.5–51.1)
MCHC RBC-ENTMCNC: 22.6 PG — LOW (ref 27–31)
MCHC RBC-ENTMCNC: 30.1 G/DL — LOW (ref 32–37)
MCV RBC AUTO: 75 FL — LOW (ref 81–99)
MONOCYTES # BLD AUTO: 0.51 K/UL — SIGNIFICANT CHANGE UP (ref 0.1–0.6)
MONOCYTES NFR BLD AUTO: 8 % — SIGNIFICANT CHANGE UP (ref 1.7–9.3)
NEUTROPHILS # BLD AUTO: 3.57 K/UL — SIGNIFICANT CHANGE UP (ref 1.4–6.5)
NEUTROPHILS NFR BLD AUTO: 55.9 % — SIGNIFICANT CHANGE UP (ref 42.2–75.2)
NRBC # BLD: 0 /100 WBCS — SIGNIFICANT CHANGE UP (ref 0–0)
PLATELET # BLD AUTO: 362 K/UL — SIGNIFICANT CHANGE UP (ref 130–400)
PMV BLD: 11.7 FL — HIGH (ref 7.4–10.4)
POTASSIUM SERPL-MCNC: 4.9 MMOL/L — SIGNIFICANT CHANGE UP (ref 3.5–5)
POTASSIUM SERPL-SCNC: 4.9 MMOL/L — SIGNIFICANT CHANGE UP (ref 3.5–5)
RBC # BLD: 4.56 M/UL — SIGNIFICANT CHANGE UP (ref 4.2–5.4)
RBC # FLD: 18.2 % — HIGH (ref 11.5–14.5)
SODIUM SERPL-SCNC: 144 MMOL/L — SIGNIFICANT CHANGE UP (ref 135–146)
TIBC SERPL-MCNC: 350 UG/DL — SIGNIFICANT CHANGE UP (ref 220–430)
TRANSFERRIN SERPL-MCNC: 282 MG/DL — SIGNIFICANT CHANGE UP (ref 200–360)
TROPONIN T, HIGH SENSITIVITY RESULT: 14 NG/L — HIGH (ref 6–13)
UIBC SERPL-MCNC: 331 UG/DL — SIGNIFICANT CHANGE UP (ref 110–370)
VIT B12 SERPL-MCNC: 285 PG/ML — SIGNIFICANT CHANGE UP (ref 232–1245)
WBC # BLD: 6.38 K/UL — SIGNIFICANT CHANGE UP (ref 4.8–10.8)
WBC # FLD AUTO: 6.38 K/UL — SIGNIFICANT CHANGE UP (ref 4.8–10.8)

## 2024-06-21 PROCEDURE — 93010 ELECTROCARDIOGRAM REPORT: CPT

## 2024-06-21 PROCEDURE — 93010 ELECTROCARDIOGRAM REPORT: CPT | Mod: 77

## 2024-06-21 PROCEDURE — 99239 HOSP IP/OBS DSCHRG MGMT >30: CPT

## 2024-06-21 PROCEDURE — 99223 1ST HOSP IP/OBS HIGH 75: CPT

## 2024-06-21 RX ORDER — DILTIAZEM HYDROCHLORIDE 240 MG/1
10 CAPSULE, EXTENDED RELEASE ORAL ONCE
Refills: 0 | Status: COMPLETED | OUTPATIENT
Start: 2024-06-21 | End: 2024-06-21

## 2024-06-21 RX ORDER — IRON SUCROSE 20 MG/ML
200 INJECTION, SOLUTION INTRAVENOUS ONCE
Refills: 0 | Status: COMPLETED | OUTPATIENT
Start: 2024-06-21 | End: 2024-06-21

## 2024-06-21 RX ORDER — METOPROLOL TARTRATE 100 MG/1
1 TABLET ORAL
Qty: 90 | Refills: 0 | DISCHARGE
Start: 2024-06-21 | End: 2024-07-20

## 2024-06-21 RX ORDER — FERROUS SULFATE 325(65) MG
1 TABLET ORAL
Qty: 30 | Refills: 0
Start: 2024-06-21 | End: 2024-07-20

## 2024-06-21 RX ORDER — METOPROLOL TARTRATE 50 MG
1 TABLET ORAL
Qty: 90 | Refills: 0
Start: 2024-06-21 | End: 2024-07-20

## 2024-06-21 RX ADMIN — AMIODARONE HYDROCHLORIDE 200 MILLIGRAM(S): 50 INJECTION, SOLUTION INTRAVENOUS at 05:26

## 2024-06-21 RX ADMIN — APIXABAN 5 MILLIGRAM(S): 5 TABLET, FILM COATED ORAL at 05:26

## 2024-06-21 RX ADMIN — IRON SUCROSE 110 MILLIGRAM(S): 20 INJECTION, SOLUTION INTRAVENOUS at 11:47

## 2024-06-21 RX ADMIN — Medication 50 MILLIGRAM(S): at 05:27

## 2024-06-21 RX ADMIN — Medication 0.5 MILLIGRAM(S): at 11:25

## 2024-06-21 RX ADMIN — DILTIAZEM HYDROCHLORIDE 10 MILLIGRAM(S): 240 CAPSULE, EXTENDED RELEASE ORAL at 13:00

## 2024-06-26 DIAGNOSIS — Z88.8 ALLERGY STATUS TO OTHER DRUGS, MEDICAMENTS AND BIOLOGICAL SUBSTANCES: ICD-10-CM

## 2024-06-26 DIAGNOSIS — Z79.01 LONG TERM (CURRENT) USE OF ANTICOAGULANTS: ICD-10-CM

## 2024-06-26 DIAGNOSIS — N17.9 ACUTE KIDNEY FAILURE, UNSPECIFIED: ICD-10-CM

## 2024-06-26 DIAGNOSIS — Z11.52 ENCOUNTER FOR SCREENING FOR COVID-19: ICD-10-CM

## 2024-06-26 DIAGNOSIS — I50.22 CHRONIC SYSTOLIC (CONGESTIVE) HEART FAILURE: ICD-10-CM

## 2024-06-26 DIAGNOSIS — D50.9 IRON DEFICIENCY ANEMIA, UNSPECIFIED: ICD-10-CM

## 2024-06-26 DIAGNOSIS — I42.8 OTHER CARDIOMYOPATHIES: ICD-10-CM

## 2024-06-26 DIAGNOSIS — I48.0 PAROXYSMAL ATRIAL FIBRILLATION: ICD-10-CM

## 2024-06-26 DIAGNOSIS — F41.9 ANXIETY DISORDER, UNSPECIFIED: ICD-10-CM

## 2024-06-26 DIAGNOSIS — I47.10 SUPRAVENTRICULAR TACHYCARDIA, UNSPECIFIED: ICD-10-CM

## 2024-06-26 DIAGNOSIS — I08.1 RHEUMATIC DISORDERS OF BOTH MITRAL AND TRICUSPID VALVES: ICD-10-CM

## 2024-07-02 ENCOUNTER — INPATIENT (INPATIENT)
Facility: HOSPITAL | Age: 64
LOS: 0 days | Discharge: HOME | DRG: 309 | End: 2024-07-03
Attending: STUDENT IN AN ORGANIZED HEALTH CARE EDUCATION/TRAINING PROGRAM | Admitting: INTERNAL MEDICINE
Payer: COMMERCIAL

## 2024-07-02 VITALS
SYSTOLIC BLOOD PRESSURE: 119 MMHG | TEMPERATURE: 98 F | DIASTOLIC BLOOD PRESSURE: 82 MMHG | HEART RATE: 144 BPM | OXYGEN SATURATION: 100 % | HEIGHT: 64 IN | RESPIRATION RATE: 16 BRPM

## 2024-07-02 DIAGNOSIS — I48.92 UNSPECIFIED ATRIAL FLUTTER: ICD-10-CM

## 2024-07-02 DIAGNOSIS — Z98.890 OTHER SPECIFIED POSTPROCEDURAL STATES: Chronic | ICD-10-CM

## 2024-07-02 LAB
ALBUMIN SERPL ELPH-MCNC: 4.3 G/DL — SIGNIFICANT CHANGE UP (ref 3.5–5.2)
ALP SERPL-CCNC: 137 U/L — HIGH (ref 30–115)
ALT FLD-CCNC: 125 U/L — HIGH (ref 0–41)
ANION GAP SERPL CALC-SCNC: 13 MMOL/L — SIGNIFICANT CHANGE UP (ref 7–14)
AST SERPL-CCNC: 57 U/L — HIGH (ref 0–41)
BASOPHILS # BLD AUTO: 0.06 K/UL — SIGNIFICANT CHANGE UP (ref 0–0.2)
BASOPHILS NFR BLD AUTO: 0.7 % — SIGNIFICANT CHANGE UP (ref 0–1)
BILIRUB SERPL-MCNC: 0.3 MG/DL — SIGNIFICANT CHANGE UP (ref 0.2–1.2)
BUN SERPL-MCNC: 21 MG/DL — HIGH (ref 10–20)
CALCIUM SERPL-MCNC: 8.7 MG/DL — SIGNIFICANT CHANGE UP (ref 8.4–10.5)
CHLORIDE SERPL-SCNC: 104 MMOL/L — SIGNIFICANT CHANGE UP (ref 98–110)
CO2 SERPL-SCNC: 20 MMOL/L — SIGNIFICANT CHANGE UP (ref 17–32)
CREAT SERPL-MCNC: 1.3 MG/DL — SIGNIFICANT CHANGE UP (ref 0.7–1.5)
EGFR: 46 ML/MIN/1.73M2 — LOW
EOSINOPHIL # BLD AUTO: 0.12 K/UL — SIGNIFICANT CHANGE UP (ref 0–0.7)
EOSINOPHIL NFR BLD AUTO: 1.4 % — SIGNIFICANT CHANGE UP (ref 0–8)
GLUCOSE SERPL-MCNC: 108 MG/DL — HIGH (ref 70–99)
HCT VFR BLD CALC: 32.8 % — LOW (ref 37–47)
HGB BLD-MCNC: 10.1 G/DL — LOW (ref 12–16)
IMM GRANULOCYTES NFR BLD AUTO: 0.3 % — SIGNIFICANT CHANGE UP (ref 0.1–0.3)
LYMPHOCYTES # BLD AUTO: 1.44 K/UL — SIGNIFICANT CHANGE UP (ref 1.2–3.4)
LYMPHOCYTES # BLD AUTO: 16.4 % — LOW (ref 20.5–51.1)
MCHC RBC-ENTMCNC: 23.3 PG — LOW (ref 27–31)
MCHC RBC-ENTMCNC: 30.8 G/DL — LOW (ref 32–37)
MCV RBC AUTO: 75.8 FL — LOW (ref 81–99)
MONOCYTES # BLD AUTO: 0.5 K/UL — SIGNIFICANT CHANGE UP (ref 0.1–0.6)
MONOCYTES NFR BLD AUTO: 5.7 % — SIGNIFICANT CHANGE UP (ref 1.7–9.3)
NEUTROPHILS # BLD AUTO: 6.62 K/UL — HIGH (ref 1.4–6.5)
NEUTROPHILS NFR BLD AUTO: 75.5 % — HIGH (ref 42.2–75.2)
NRBC # BLD: 0 /100 WBCS — SIGNIFICANT CHANGE UP (ref 0–0)
PLATELET # BLD AUTO: 268 K/UL — SIGNIFICANT CHANGE UP (ref 130–400)
PMV BLD: 11.8 FL — HIGH (ref 7.4–10.4)
POTASSIUM SERPL-MCNC: 4.7 MMOL/L — SIGNIFICANT CHANGE UP (ref 3.5–5)
POTASSIUM SERPL-SCNC: 4.7 MMOL/L — SIGNIFICANT CHANGE UP (ref 3.5–5)
PROT SERPL-MCNC: 6.5 G/DL — SIGNIFICANT CHANGE UP (ref 6–8)
RBC # BLD: 4.33 M/UL — SIGNIFICANT CHANGE UP (ref 4.2–5.4)
RBC # FLD: 19.9 % — HIGH (ref 11.5–14.5)
SODIUM SERPL-SCNC: 137 MMOL/L — SIGNIFICANT CHANGE UP (ref 135–146)
TROPONIN T, HIGH SENSITIVITY RESULT: 13 NG/L — SIGNIFICANT CHANGE UP (ref 6–13)
TROPONIN T, HIGH SENSITIVITY RESULT: 18 NG/L — HIGH (ref 6–13)
WBC # BLD: 8.77 K/UL — SIGNIFICANT CHANGE UP (ref 4.8–10.8)
WBC # FLD AUTO: 8.77 K/UL — SIGNIFICANT CHANGE UP (ref 4.8–10.8)

## 2024-07-02 PROCEDURE — 83735 ASSAY OF MAGNESIUM: CPT

## 2024-07-02 PROCEDURE — 85025 COMPLETE CBC W/AUTO DIFF WBC: CPT

## 2024-07-02 PROCEDURE — 71045 X-RAY EXAM CHEST 1 VIEW: CPT | Mod: 26

## 2024-07-02 PROCEDURE — 84443 ASSAY THYROID STIM HORMONE: CPT

## 2024-07-02 PROCEDURE — 80053 COMPREHEN METABOLIC PANEL: CPT

## 2024-07-02 PROCEDURE — 84439 ASSAY OF FREE THYROXINE: CPT

## 2024-07-02 PROCEDURE — 99222 1ST HOSP IP/OBS MODERATE 55: CPT

## 2024-07-02 PROCEDURE — 84484 ASSAY OF TROPONIN QUANT: CPT

## 2024-07-02 PROCEDURE — 36415 COLL VENOUS BLD VENIPUNCTURE: CPT

## 2024-07-02 PROCEDURE — 99291 CRITICAL CARE FIRST HOUR: CPT

## 2024-07-02 PROCEDURE — 92960 CARDIOVERSION ELECTRIC EXT: CPT

## 2024-07-02 RX ORDER — APIXABAN 5 MG/1
5 TABLET, FILM COATED ORAL EVERY 12 HOURS
Refills: 0 | Status: DISCONTINUED | OUTPATIENT
Start: 2024-07-02 | End: 2024-07-03

## 2024-07-02 RX ORDER — AMIODARONE HYDROCHLORIDE 50 MG/ML
200 INJECTION, SOLUTION INTRAVENOUS DAILY
Refills: 0 | Status: DISCONTINUED | OUTPATIENT
Start: 2024-07-02 | End: 2024-07-03

## 2024-07-02 RX ORDER — METOPROLOL TARTRATE 50 MG
50 TABLET ORAL THREE TIMES A DAY
Refills: 0 | Status: DISCONTINUED | OUTPATIENT
Start: 2024-07-02 | End: 2024-07-02

## 2024-07-02 RX ORDER — METOPROLOL TARTRATE 50 MG
50 TABLET ORAL THREE TIMES A DAY
Refills: 0 | Status: DISCONTINUED | OUTPATIENT
Start: 2024-07-02 | End: 2024-07-03

## 2024-07-02 RX ORDER — FERROUS SULFATE 325(65) MG
325 TABLET ORAL DAILY
Refills: 0 | Status: DISCONTINUED | OUTPATIENT
Start: 2024-07-02 | End: 2024-07-03

## 2024-07-02 RX ORDER — DILTIAZEM HYDROCHLORIDE 240 MG/1
15 CAPSULE, EXTENDED RELEASE ORAL ONCE
Refills: 0 | Status: COMPLETED | OUTPATIENT
Start: 2024-07-02 | End: 2024-07-02

## 2024-07-02 RX ORDER — LORAZEPAM 0.5 MG
0.5 TABLET ORAL AT BEDTIME
Refills: 0 | Status: DISCONTINUED | OUTPATIENT
Start: 2024-07-02 | End: 2024-07-03

## 2024-07-02 RX ADMIN — DILTIAZEM HYDROCHLORIDE 15 MILLIGRAM(S): 240 CAPSULE, EXTENDED RELEASE ORAL at 10:53

## 2024-07-02 RX ADMIN — Medication 50 MILLIGRAM(S): at 21:26

## 2024-07-02 RX ADMIN — Medication 0.5 MILLIGRAM(S): at 21:31

## 2024-07-02 RX ADMIN — Medication 5 MILLIGRAM(S): at 22:35

## 2024-07-02 RX ADMIN — APIXABAN 5 MILLIGRAM(S): 5 TABLET, FILM COATED ORAL at 18:04

## 2024-07-02 RX ADMIN — Medication 50 MILLIGRAM(S): at 18:04

## 2024-07-03 ENCOUNTER — TRANSCRIPTION ENCOUNTER (OUTPATIENT)
Age: 64
End: 2024-07-03

## 2024-07-03 VITALS
HEART RATE: 71 BPM | RESPIRATION RATE: 18 BRPM | SYSTOLIC BLOOD PRESSURE: 98 MMHG | DIASTOLIC BLOOD PRESSURE: 64 MMHG | TEMPERATURE: 98 F

## 2024-07-03 LAB
ALBUMIN SERPL ELPH-MCNC: 4 G/DL — SIGNIFICANT CHANGE UP (ref 3.5–5.2)
ALP SERPL-CCNC: 110 U/L — SIGNIFICANT CHANGE UP (ref 30–115)
ALT FLD-CCNC: 101 U/L — HIGH (ref 0–41)
ANION GAP SERPL CALC-SCNC: 12 MMOL/L — SIGNIFICANT CHANGE UP (ref 7–14)
AST SERPL-CCNC: 40 U/L — SIGNIFICANT CHANGE UP (ref 0–41)
BASOPHILS # BLD AUTO: 0.05 K/UL — SIGNIFICANT CHANGE UP (ref 0–0.2)
BASOPHILS NFR BLD AUTO: 0.7 % — SIGNIFICANT CHANGE UP (ref 0–1)
BILIRUB SERPL-MCNC: 0.4 MG/DL — SIGNIFICANT CHANGE UP (ref 0.2–1.2)
BUN SERPL-MCNC: 17 MG/DL — SIGNIFICANT CHANGE UP (ref 10–20)
CALCIUM SERPL-MCNC: 8.8 MG/DL — SIGNIFICANT CHANGE UP (ref 8.4–10.5)
CHLORIDE SERPL-SCNC: 108 MMOL/L — SIGNIFICANT CHANGE UP (ref 98–110)
CO2 SERPL-SCNC: 22 MMOL/L — SIGNIFICANT CHANGE UP (ref 17–32)
CREAT SERPL-MCNC: 1.2 MG/DL — SIGNIFICANT CHANGE UP (ref 0.7–1.5)
EGFR: 51 ML/MIN/1.73M2 — LOW
EOSINOPHIL # BLD AUTO: 0.13 K/UL — SIGNIFICANT CHANGE UP (ref 0–0.7)
EOSINOPHIL NFR BLD AUTO: 1.9 % — SIGNIFICANT CHANGE UP (ref 0–8)
GLUCOSE SERPL-MCNC: 106 MG/DL — HIGH (ref 70–99)
HCT VFR BLD CALC: 30.5 % — LOW (ref 37–47)
HGB BLD-MCNC: 9.5 G/DL — LOW (ref 12–16)
IMM GRANULOCYTES NFR BLD AUTO: 0.3 % — SIGNIFICANT CHANGE UP (ref 0.1–0.3)
LYMPHOCYTES # BLD AUTO: 2.04 K/UL — SIGNIFICANT CHANGE UP (ref 1.2–3.4)
LYMPHOCYTES # BLD AUTO: 29.1 % — SIGNIFICANT CHANGE UP (ref 20.5–51.1)
MAGNESIUM SERPL-MCNC: 3 MG/DL — HIGH (ref 1.8–2.4)
MCHC RBC-ENTMCNC: 23.3 PG — LOW (ref 27–31)
MCHC RBC-ENTMCNC: 31.1 G/DL — LOW (ref 32–37)
MCV RBC AUTO: 74.9 FL — LOW (ref 81–99)
MONOCYTES # BLD AUTO: 0.52 K/UL — SIGNIFICANT CHANGE UP (ref 0.1–0.6)
MONOCYTES NFR BLD AUTO: 7.4 % — SIGNIFICANT CHANGE UP (ref 1.7–9.3)
NEUTROPHILS # BLD AUTO: 4.26 K/UL — SIGNIFICANT CHANGE UP (ref 1.4–6.5)
NEUTROPHILS NFR BLD AUTO: 60.6 % — SIGNIFICANT CHANGE UP (ref 42.2–75.2)
NRBC # BLD: 0 /100 WBCS — SIGNIFICANT CHANGE UP (ref 0–0)
PLATELET # BLD AUTO: 271 K/UL — SIGNIFICANT CHANGE UP (ref 130–400)
PMV BLD: 11.3 FL — HIGH (ref 7.4–10.4)
POTASSIUM SERPL-MCNC: 5 MMOL/L — SIGNIFICANT CHANGE UP (ref 3.5–5)
POTASSIUM SERPL-SCNC: 5 MMOL/L — SIGNIFICANT CHANGE UP (ref 3.5–5)
PROT SERPL-MCNC: 5.6 G/DL — LOW (ref 6–8)
RBC # BLD: 4.07 M/UL — LOW (ref 4.2–5.4)
RBC # FLD: 19.9 % — HIGH (ref 11.5–14.5)
SODIUM SERPL-SCNC: 142 MMOL/L — SIGNIFICANT CHANGE UP (ref 135–146)
T4 FREE SERPL-MCNC: 1.7 NG/DL — SIGNIFICANT CHANGE UP (ref 0.9–1.8)
TROPONIN T, HIGH SENSITIVITY RESULT: 14 NG/L — HIGH (ref 6–13)
TSH SERPL-MCNC: 4.86 UIU/ML — HIGH (ref 0.27–4.2)
WBC # BLD: 7.02 K/UL — SIGNIFICANT CHANGE UP (ref 4.8–10.8)
WBC # FLD AUTO: 7.02 K/UL — SIGNIFICANT CHANGE UP (ref 4.8–10.8)

## 2024-07-03 PROCEDURE — 92960 CARDIOVERSION ELECTRIC EXT: CPT | Mod: GC

## 2024-07-03 PROCEDURE — 99239 HOSP IP/OBS DSCHRG MGMT >30: CPT

## 2024-07-03 PROCEDURE — 99222 1ST HOSP IP/OBS MODERATE 55: CPT

## 2024-07-03 RX ORDER — METOPROLOL TARTRATE 50 MG
100 TABLET ORAL
Refills: 0 | Status: DISCONTINUED | OUTPATIENT
Start: 2024-07-03 | End: 2024-07-03

## 2024-07-03 RX ORDER — FERROUS SULFATE 325(65) MG
1 TABLET ORAL
Qty: 30 | Refills: 0
Start: 2024-07-03 | End: 2024-08-01

## 2024-07-03 RX ADMIN — AMIODARONE HYDROCHLORIDE 200 MILLIGRAM(S): 50 INJECTION, SOLUTION INTRAVENOUS at 06:17

## 2024-07-03 RX ADMIN — APIXABAN 5 MILLIGRAM(S): 5 TABLET, FILM COATED ORAL at 17:20

## 2024-07-03 RX ADMIN — Medication 325 MILLIGRAM(S): at 13:19

## 2024-07-03 RX ADMIN — APIXABAN 5 MILLIGRAM(S): 5 TABLET, FILM COATED ORAL at 06:17

## 2024-07-03 RX ADMIN — Medication 50 MILLIGRAM(S): at 06:17

## 2024-07-03 RX ADMIN — Medication 100 MILLIGRAM(S): at 17:20

## 2024-07-10 DIAGNOSIS — Z88.8 ALLERGY STATUS TO OTHER DRUGS, MEDICAMENTS AND BIOLOGICAL SUBSTANCES: ICD-10-CM

## 2024-07-10 DIAGNOSIS — I50.20 UNSPECIFIED SYSTOLIC (CONGESTIVE) HEART FAILURE: ICD-10-CM

## 2024-07-10 DIAGNOSIS — K21.9 GASTRO-ESOPHAGEAL REFLUX DISEASE WITHOUT ESOPHAGITIS: ICD-10-CM

## 2024-07-10 DIAGNOSIS — Z79.01 LONG TERM (CURRENT) USE OF ANTICOAGULANTS: ICD-10-CM

## 2024-07-10 DIAGNOSIS — I48.0 PAROXYSMAL ATRIAL FIBRILLATION: ICD-10-CM

## 2024-07-10 DIAGNOSIS — I48.92 UNSPECIFIED ATRIAL FLUTTER: ICD-10-CM

## 2024-07-10 DIAGNOSIS — I49.1 ATRIAL PREMATURE DEPOLARIZATION: ICD-10-CM

## 2024-07-10 DIAGNOSIS — F41.9 ANXIETY DISORDER, UNSPECIFIED: ICD-10-CM

## 2024-07-10 DIAGNOSIS — D50.9 IRON DEFICIENCY ANEMIA, UNSPECIFIED: ICD-10-CM

## 2024-08-13 NOTE — ED ADULT TRIAGE NOTE - NSSEPSISSUSPECTED_ED_A_ED
[de-identified] : AMY CARRILLO  is a pleasant 50 year year old man  who came in 2023 for pancreatic duct dilatation . 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  , no ETOH abuse, smoking > 30 years i pack/day, \par  family hx: father  at 80s with CA, mother  at 40s from brain tumor and she had diabetes, \par  \par  CT chest 2023: done for smoking history showed calcifications in pancreas and dilated PD to 10mm, \par  MRI 2023: 8MM PD\par  3/9/2023: EUS DILATED pd 5-7MM, HYPERECHOIC STRUCTURE 9MM UNCINATE, WITHIN MAIN PD SUGGESTIVE OF STONE\par  He had pancreatic stent placed on  with resolution of his pain but possible returning back recently\par  \par  : he was recently admitted to [facility] for severe pain with ERCP done with change of his PD on may , he feels better now, brushing during ERCP showed atypical cells, 2023-> MRI showed enlarged head of pancreas suggestive of mild localized pancreatitis \par  \par    \par  \par  
No

## 2024-08-29 NOTE — ED PROVIDER NOTE - MDM ORDERS SUBMITTED SELECTION
Detail Level: Detailed
Quality 226: Preventive Care And Screening: Tobacco Use: Screening And Cessation Intervention: Patient screened for tobacco use and is an ex/non-smoker
Imaging Studies/Medications

## 2024-09-08 ENCOUNTER — INPATIENT (INPATIENT)
Facility: HOSPITAL | Age: 64
LOS: 4 days | Discharge: HOME | DRG: 310 | End: 2024-09-13
Attending: INTERNAL MEDICINE | Admitting: STUDENT IN AN ORGANIZED HEALTH CARE EDUCATION/TRAINING PROGRAM
Payer: COMMERCIAL

## 2024-09-08 VITALS
WEIGHT: 139.99 LBS | HEIGHT: 64 IN | TEMPERATURE: 98 F | SYSTOLIC BLOOD PRESSURE: 101 MMHG | DIASTOLIC BLOOD PRESSURE: 79 MMHG | HEART RATE: 167 BPM | OXYGEN SATURATION: 100 % | RESPIRATION RATE: 18 BRPM

## 2024-09-08 DIAGNOSIS — Z98.890 OTHER SPECIFIED POSTPROCEDURAL STATES: Chronic | ICD-10-CM

## 2024-09-08 DIAGNOSIS — I48.91 UNSPECIFIED ATRIAL FIBRILLATION: ICD-10-CM

## 2024-09-08 PROBLEM — K21.9 GASTRO-ESOPHAGEAL REFLUX DISEASE WITHOUT ESOPHAGITIS: Chronic | Status: ACTIVE | Noted: 2024-07-02

## 2024-09-08 LAB
ALBUMIN SERPL ELPH-MCNC: 3.8 G/DL — SIGNIFICANT CHANGE UP (ref 3.5–5.2)
ALBUMIN SERPL ELPH-MCNC: 4.1 G/DL — SIGNIFICANT CHANGE UP (ref 3.5–5.2)
ALP SERPL-CCNC: 159 U/L — HIGH (ref 30–115)
ALP SERPL-CCNC: 192 U/L — HIGH (ref 30–115)
ALT FLD-CCNC: 108 U/L — HIGH (ref 0–41)
ALT FLD-CCNC: 171 U/L — HIGH (ref 0–41)
ANION GAP SERPL CALC-SCNC: 10 MMOL/L — SIGNIFICANT CHANGE UP (ref 7–14)
ANION GAP SERPL CALC-SCNC: 10 MMOL/L — SIGNIFICANT CHANGE UP (ref 7–14)
AST SERPL-CCNC: 138 U/L — HIGH (ref 0–41)
AST SERPL-CCNC: 63 U/L — HIGH (ref 0–41)
BASOPHILS # BLD AUTO: 0.04 K/UL — SIGNIFICANT CHANGE UP (ref 0–0.2)
BASOPHILS NFR BLD AUTO: 0.5 % — SIGNIFICANT CHANGE UP (ref 0–1)
BILIRUB SERPL-MCNC: 0.4 MG/DL — SIGNIFICANT CHANGE UP (ref 0.2–1.2)
BILIRUB SERPL-MCNC: <0.2 MG/DL — SIGNIFICANT CHANGE UP (ref 0.2–1.2)
BUN SERPL-MCNC: 20 MG/DL — SIGNIFICANT CHANGE UP (ref 10–20)
BUN SERPL-MCNC: 26 MG/DL — HIGH (ref 10–20)
CALCIUM SERPL-MCNC: 8.4 MG/DL — SIGNIFICANT CHANGE UP (ref 8.4–10.5)
CALCIUM SERPL-MCNC: 9.1 MG/DL — SIGNIFICANT CHANGE UP (ref 8.4–10.5)
CHLORIDE SERPL-SCNC: 106 MMOL/L — SIGNIFICANT CHANGE UP (ref 98–110)
CHLORIDE SERPL-SCNC: 111 MMOL/L — HIGH (ref 98–110)
CO2 SERPL-SCNC: 19 MMOL/L — SIGNIFICANT CHANGE UP (ref 17–32)
CO2 SERPL-SCNC: 22 MMOL/L — SIGNIFICANT CHANGE UP (ref 17–32)
CREAT SERPL-MCNC: 1.1 MG/DL — SIGNIFICANT CHANGE UP (ref 0.7–1.5)
CREAT SERPL-MCNC: 1.2 MG/DL — SIGNIFICANT CHANGE UP (ref 0.7–1.5)
EGFR: 51 ML/MIN/1.73M2 — LOW
EGFR: 56 ML/MIN/1.73M2 — LOW
EOSINOPHIL # BLD AUTO: 0.06 K/UL — SIGNIFICANT CHANGE UP (ref 0–0.7)
EOSINOPHIL NFR BLD AUTO: 0.7 % — SIGNIFICANT CHANGE UP (ref 0–8)
GLUCOSE SERPL-MCNC: 147 MG/DL — HIGH (ref 70–99)
GLUCOSE SERPL-MCNC: 88 MG/DL — SIGNIFICANT CHANGE UP (ref 70–99)
HCT VFR BLD CALC: 31.8 % — LOW (ref 37–47)
HCT VFR BLD CALC: 37.3 % — SIGNIFICANT CHANGE UP (ref 37–47)
HGB BLD-MCNC: 11.4 G/DL — LOW (ref 12–16)
HGB BLD-MCNC: 9.8 G/DL — LOW (ref 12–16)
IMM GRANULOCYTES NFR BLD AUTO: 0.2 % — SIGNIFICANT CHANGE UP (ref 0.1–0.3)
LIDOCAIN IGE QN: 36 U/L — SIGNIFICANT CHANGE UP (ref 7–60)
LYMPHOCYTES # BLD AUTO: 2.51 K/UL — SIGNIFICANT CHANGE UP (ref 1.2–3.4)
LYMPHOCYTES # BLD AUTO: 29.9 % — SIGNIFICANT CHANGE UP (ref 20.5–51.1)
MAGNESIUM SERPL-MCNC: 1.9 MG/DL — SIGNIFICANT CHANGE UP (ref 1.8–2.4)
MCHC RBC-ENTMCNC: 23.7 PG — LOW (ref 27–31)
MCHC RBC-ENTMCNC: 23.8 PG — LOW (ref 27–31)
MCHC RBC-ENTMCNC: 30.6 G/DL — LOW (ref 32–37)
MCHC RBC-ENTMCNC: 30.8 G/DL — LOW (ref 32–37)
MCV RBC AUTO: 77.2 FL — LOW (ref 81–99)
MCV RBC AUTO: 77.5 FL — LOW (ref 81–99)
MONOCYTES # BLD AUTO: 0.6 K/UL — SIGNIFICANT CHANGE UP (ref 0.1–0.6)
MONOCYTES NFR BLD AUTO: 7.1 % — SIGNIFICANT CHANGE UP (ref 1.7–9.3)
NEUTROPHILS # BLD AUTO: 5.17 K/UL — SIGNIFICANT CHANGE UP (ref 1.4–6.5)
NEUTROPHILS NFR BLD AUTO: 61.6 % — SIGNIFICANT CHANGE UP (ref 42.2–75.2)
NRBC # BLD: 0 /100 WBCS — SIGNIFICANT CHANGE UP (ref 0–0)
NRBC # BLD: 0 /100 WBCS — SIGNIFICANT CHANGE UP (ref 0–0)
NT-PROBNP SERPL-SCNC: 5943 PG/ML — HIGH (ref 0–300)
PLATELET # BLD AUTO: 290 K/UL — SIGNIFICANT CHANGE UP (ref 130–400)
PLATELET # BLD AUTO: 326 K/UL — SIGNIFICANT CHANGE UP (ref 130–400)
PMV BLD: 11.7 FL — HIGH (ref 7.4–10.4)
PMV BLD: 11.9 FL — HIGH (ref 7.4–10.4)
POTASSIUM SERPL-MCNC: 4.5 MMOL/L — SIGNIFICANT CHANGE UP (ref 3.5–5)
POTASSIUM SERPL-MCNC: 4.8 MMOL/L — SIGNIFICANT CHANGE UP (ref 3.5–5)
POTASSIUM SERPL-SCNC: 4.5 MMOL/L — SIGNIFICANT CHANGE UP (ref 3.5–5)
POTASSIUM SERPL-SCNC: 4.8 MMOL/L — SIGNIFICANT CHANGE UP (ref 3.5–5)
PROT SERPL-MCNC: 5.9 G/DL — LOW (ref 6–8)
PROT SERPL-MCNC: 6.5 G/DL — SIGNIFICANT CHANGE UP (ref 6–8)
RBC # BLD: 4.12 M/UL — LOW (ref 4.2–5.4)
RBC # BLD: 4.81 M/UL — SIGNIFICANT CHANGE UP (ref 4.2–5.4)
RBC # FLD: 20.2 % — HIGH (ref 11.5–14.5)
RBC # FLD: 21 % — HIGH (ref 11.5–14.5)
SODIUM SERPL-SCNC: 135 MMOL/L — SIGNIFICANT CHANGE UP (ref 135–146)
SODIUM SERPL-SCNC: 143 MMOL/L — SIGNIFICANT CHANGE UP (ref 135–146)
TROPONIN T, HIGH SENSITIVITY RESULT: 37 NG/L — HIGH (ref 6–13)
TROPONIN T, HIGH SENSITIVITY RESULT: 44 NG/L — HIGH (ref 6–13)
WBC # BLD: 7.42 K/UL — SIGNIFICANT CHANGE UP (ref 4.8–10.8)
WBC # BLD: 8.4 K/UL — SIGNIFICANT CHANGE UP (ref 4.8–10.8)
WBC # FLD AUTO: 7.42 K/UL — SIGNIFICANT CHANGE UP (ref 4.8–10.8)
WBC # FLD AUTO: 8.4 K/UL — SIGNIFICANT CHANGE UP (ref 4.8–10.8)

## 2024-09-08 PROCEDURE — 83540 ASSAY OF IRON: CPT

## 2024-09-08 PROCEDURE — 88312 SPECIAL STAINS GROUP 1: CPT

## 2024-09-08 PROCEDURE — 71045 X-RAY EXAM CHEST 1 VIEW: CPT

## 2024-09-08 PROCEDURE — 80053 COMPREHEN METABOLIC PANEL: CPT

## 2024-09-08 PROCEDURE — 84443 ASSAY THYROID STIM HORMONE: CPT

## 2024-09-08 PROCEDURE — 85027 COMPLETE CBC AUTOMATED: CPT

## 2024-09-08 PROCEDURE — 74177 CT ABD & PELVIS W/CONTRAST: CPT | Mod: 26,MC

## 2024-09-08 PROCEDURE — 93010 ELECTROCARDIOGRAM REPORT: CPT

## 2024-09-08 PROCEDURE — 80299 QUANTITATIVE ASSAY DRUG: CPT

## 2024-09-08 PROCEDURE — 71045 X-RAY EXAM CHEST 1 VIEW: CPT | Mod: 26

## 2024-09-08 PROCEDURE — 82784 ASSAY IGA/IGD/IGG/IGM EACH: CPT

## 2024-09-08 PROCEDURE — 86364 TISS TRNSGLTMNASE EA IG CLAS: CPT

## 2024-09-08 PROCEDURE — 82728 ASSAY OF FERRITIN: CPT

## 2024-09-08 PROCEDURE — 92960 CARDIOVERSION ELECTRIC EXT: CPT

## 2024-09-08 PROCEDURE — 99291 CRITICAL CARE FIRST HOUR: CPT

## 2024-09-08 PROCEDURE — 76705 ECHO EXAM OF ABDOMEN: CPT | Mod: 26

## 2024-09-08 PROCEDURE — 84439 ASSAY OF FREE THYROXINE: CPT

## 2024-09-08 PROCEDURE — 93971 EXTREMITY STUDY: CPT | Mod: RT

## 2024-09-08 PROCEDURE — 87389 HIV-1 AG W/HIV-1&-2 AB AG IA: CPT

## 2024-09-08 PROCEDURE — 83735 ASSAY OF MAGNESIUM: CPT

## 2024-09-08 PROCEDURE — 93005 ELECTROCARDIOGRAM TRACING: CPT

## 2024-09-08 PROCEDURE — 84466 ASSAY OF TRANSFERRIN: CPT

## 2024-09-08 PROCEDURE — 99223 1ST HOSP IP/OBS HIGH 75: CPT

## 2024-09-08 PROCEDURE — 84484 ASSAY OF TROPONIN QUANT: CPT

## 2024-09-08 PROCEDURE — 85025 COMPLETE CBC W/AUTO DIFF WBC: CPT

## 2024-09-08 PROCEDURE — 93320 DOPPLER ECHO COMPLETE: CPT

## 2024-09-08 PROCEDURE — 93312 ECHO TRANSESOPHAGEAL: CPT

## 2024-09-08 PROCEDURE — 80162 ASSAY OF DIGOXIN TOTAL: CPT

## 2024-09-08 PROCEDURE — 83550 IRON BINDING TEST: CPT

## 2024-09-08 PROCEDURE — 93325 DOPPLER ECHO COLOR FLOW MAPG: CPT

## 2024-09-08 PROCEDURE — 36415 COLL VENOUS BLD VENIPUNCTURE: CPT

## 2024-09-08 PROCEDURE — 88305 TISSUE EXAM BY PATHOLOGIST: CPT

## 2024-09-08 PROCEDURE — 93307 TTE W/O DOPPLER COMPLETE: CPT

## 2024-09-08 PROCEDURE — 93010 ELECTROCARDIOGRAM REPORT: CPT | Mod: 77

## 2024-09-08 RX ORDER — DILTIAZEM HYDROCHLORIDE 5 MG/ML
20 INJECTION INTRAVENOUS ONCE
Refills: 0 | Status: COMPLETED | OUTPATIENT
Start: 2024-09-08 | End: 2024-09-08

## 2024-09-08 RX ORDER — ADENOSINE 3 MG/ML
6 INJECTION INTRAVENOUS ONCE
Refills: 0 | Status: DISCONTINUED | OUTPATIENT
Start: 2024-09-08 | End: 2024-09-08

## 2024-09-08 RX ORDER — KETOROLAC TROMETHAMINE 30 MG/ML
15 INJECTION, SOLUTION INTRAMUSCULAR ONCE
Refills: 0 | Status: DISCONTINUED | OUTPATIENT
Start: 2024-09-08 | End: 2024-09-08

## 2024-09-08 RX ORDER — FUROSEMIDE 40 MG
40 TABLET ORAL ONCE
Refills: 0 | Status: COMPLETED | OUTPATIENT
Start: 2024-09-08 | End: 2024-09-08

## 2024-09-08 RX ORDER — METOPROLOL TARTRATE 100 MG/1
50 TABLET ORAL
Refills: 0 | Status: DISCONTINUED | OUTPATIENT
Start: 2024-09-08 | End: 2024-09-11

## 2024-09-08 RX ORDER — METOPROLOL TARTRATE 100 MG/1
50 TABLET ORAL ONCE
Refills: 0 | Status: COMPLETED | OUTPATIENT
Start: 2024-09-08 | End: 2024-09-08

## 2024-09-08 RX ORDER — LORAZEPAM 4 MG/ML
0.5 INJECTION INTRAMUSCULAR; INTRAVENOUS AT BEDTIME
Refills: 0 | Status: DISCONTINUED | OUTPATIENT
Start: 2024-09-08 | End: 2024-09-13

## 2024-09-08 RX ORDER — FERROUS SULFATE 325(65) MG
325 TABLET ORAL DAILY
Refills: 0 | Status: DISCONTINUED | OUTPATIENT
Start: 2024-09-08 | End: 2024-09-09

## 2024-09-08 RX ORDER — ACETAMINOPHEN 325 MG/1
650 TABLET ORAL EVERY 6 HOURS
Refills: 0 | Status: DISCONTINUED | OUTPATIENT
Start: 2024-09-08 | End: 2024-09-13

## 2024-09-08 RX ORDER — FUROSEMIDE 40 MG
40 TABLET ORAL EVERY 12 HOURS
Refills: 0 | Status: DISCONTINUED | OUTPATIENT
Start: 2024-09-08 | End: 2024-09-10

## 2024-09-08 RX ORDER — DILTIAZEM HYDROCHLORIDE 5 MG/ML
16 INJECTION INTRAVENOUS ONCE
Refills: 0 | Status: COMPLETED | OUTPATIENT
Start: 2024-09-08 | End: 2024-09-08

## 2024-09-08 RX ORDER — APIXABAN 5 MG/1
5 TABLET, FILM COATED ORAL EVERY 12 HOURS
Refills: 0 | Status: DISCONTINUED | OUTPATIENT
Start: 2024-09-08 | End: 2024-09-09

## 2024-09-08 RX ADMIN — DILTIAZEM HYDROCHLORIDE 16 MILLIGRAM(S): 5 INJECTION INTRAVENOUS at 02:12

## 2024-09-08 RX ADMIN — METOPROLOL TARTRATE 50 MILLIGRAM(S): 100 TABLET ORAL at 16:38

## 2024-09-08 RX ADMIN — Medication 1000 MILLILITER(S): at 01:52

## 2024-09-08 RX ADMIN — LORAZEPAM 0.5 MILLIGRAM(S): 4 INJECTION INTRAMUSCULAR; INTRAVENOUS at 21:33

## 2024-09-08 RX ADMIN — KETOROLAC TROMETHAMINE 15 MILLIGRAM(S): 30 INJECTION, SOLUTION INTRAMUSCULAR at 06:03

## 2024-09-08 RX ADMIN — Medication 40 MILLIGRAM(S): at 09:30

## 2024-09-08 RX ADMIN — APIXABAN 5 MILLIGRAM(S): 5 TABLET, FILM COATED ORAL at 16:57

## 2024-09-08 RX ADMIN — DILTIAZEM HYDROCHLORIDE 20 MILLIGRAM(S): 5 INJECTION INTRAVENOUS at 16:38

## 2024-09-08 NOTE — ED PROVIDER NOTE - PHYSICAL EXAMINATION
PHYSICAL EXAM: I have reviewed current vital signs.  GENERAL: NAD, well-nourished; well-developed.  HEAD:  Normocephalic, atraumatic.  EYES: Conjunctiva and sclera clear.  ENT: MMM, no erythema/exudates.  CHEST/LUNG: Clear to auscultation bilaterally; no wheezes, rales, or rhonchi.  HEART: Regular rate and rhythm, normal S1 and S2; no murmurs, rubs, or gallops.  ABDOMEN: Tenderness to palpation around epigastric region.  EXTREMITIES:  2+ peripheral pulses; no clubbing, cyanosis, or edema.  PSYCH: Cooperative, appropriate, normal mood and affect.  NEUROLOGY: A&O x 3.  No focal neurological deficits.  SKIN: Warm and dry.

## 2024-09-08 NOTE — ED PROVIDER NOTE - DIFFERENTIAL DIAGNOSIS
Differential Diagnosis GERD, gastritis, gastric ulcers, pancreatitis, hepatitis, mesenteric ischemia, inflammatory bowel disease, small bowel obstruction, enteritis, early appendicitis, hernia, biliary colic, UTI, diverticulitis, colitis, cholelithiasis, cholangitis, AAA, renal colic, torsion CHF pneumonia A-fib with RVR

## 2024-09-08 NOTE — ED PROVIDER NOTE - PROGRESS NOTE DETAILS
AE: Attempted vagal maneuvers without success.  Patient given 1 dose of diltiazem 60 mg with significant improvement of heart rate to low 80s.  Patient states symptoms have significantly improved. AE: Attempted vagal maneuvers without success.  Patient given 1 dose of diltiazem 16 mg with significant improvement of heart rate to low 80s.  Patient states symptoms have significantly improved. Dr. Dejesus: sign out to Dr. Aquino  Pt pending US Dr. Dejesus: sign out to Dr. Aquino  Pt pending US and surgery Authored by Dr. Dumont: Cardiology consulted for elevated troponin

## 2024-09-08 NOTE — H&P ADULT - NSHPLABSRESULTS_GEN_ALL_CORE
LABS:                          11.4   8.40  )-----------( 326      ( 08 Sep 2024 01:37 )             37.3     09-08    135  |  106  |  26<H>  ----------------------------<  147<H>  4.8   |  19  |  1.1    Ca    9.1      08 Sep 2024 01:37    TPro  6.5  /  Alb  4.1  /  TBili  <0.2  /  DBili  x   /  AST  138<H>  /  ALT  171<H>  /  AlkPhos  192<H>  09-08

## 2024-09-08 NOTE — CONSULT NOTE ADULT - SUBJECTIVE AND OBJECTIVE BOX
[facility] SURGERY CONSULT NOTE  ----------------------------------------------------------------------------------------------    Patient: STEPHANE CHAN , 64y ([birth year])Female   MRN: [id number]  Location: [facility] ED  Visit: 09-08-24 Emergency  Date: 09-08-24 @ 08:39    HPI:  Patient is a 63y/o female with PMHx of anxiety, afib (on Eliquis) s/p ablation, GERD who presented to the ED c/o palpitations and epigastric pain for a few hours. Patient reports she has a known history of afib so she took an extra dose of her home medications (metoprolol and Eliquis) along with benadryl to help her symptoms with no improvement. She states she never experienced the epigastric/RUQ pain previously. Reports it as a dull/achy pain that does not radiate anywhere. She denies any associated nausea/vomiting, fever, chills, inability to tolerate PO. Reports she is passing flatus and BM.   In ED, GCS 15, afebrile, normotensive but tachycardic to 160s; patient given cardizem with HR response to 80s. Surgery was consulted for pericholecystic fluid seen on CT.         PAST MEDICAL & SURGICAL HISTORY:  Anxiety  Afib  Eliquis  GERD (gastroesophageal reflux disease)  H/O neck surgery      Home Medications:  Metoprolol tartrate   Eliquis 5 mg oral tablet BID  LORazepam 0.5 mg oral tablet PRN      VITALS:  T(F): 98 (09-08-24 @ 01:05), Max: 98 (09-08-24 @ 01:05)  HR: 84 (09-08-24 @ 05:53) (69 - 167)  BP: 117/90 (09-08-24 @ 05:53) (101/79 - 117/90)  RR: 18 (09-08-24 @ 05:53) (17 - 18)  SpO2: 96% (09-08-24 @ 05:53) (96% - 100%)    PHYSICAL EXAM:  General: NAD, AAOx3, calm and cooperative.   Neuro: CN grossly intact. No focal deficits.   Cardiac: S1, S2. Nontachycardic.   Respiratory: normal respiratory effort. No signs of distress on RA.   Abdomen: Soft, non-distended. Slightly tender to epigastric region but no guarding.   Musculoskeletal: Moving all extremities spontaneously without limitations.   Vascular: Pulses 2+ throughout, extremities well perfused  Skin: Warm/dry.         LAB/STUDIES:                        11.4   8.40  )-----------( 326      ( 08 Sep 2024 01:37 )             37.3     09-08    135  |  106  |  26<H>  ----------------------------<  147<H>  4.8   |  19  |  1.1    Ca    9.1      08 Sep 2024 01:37    TPro  6.5  /  Alb  4.1  /  TBili  <0.2  /  DBili  x   /  AST  138<H>  /  ALT  171<H>  /  AlkPhos  192<H>  09-08      LIVER FUNCTIONS - ( 08 Sep 2024 01:37 )  Alb: 4.1 g/dL / Pro: 6.5 g/dL / ALK PHOS: 192 U/L / ALT: 171 U/L / AST: 138 U/L / GGT: x           Urinalysis Basic - ( 08 Sep 2024 01:37 )  Color: x / Appearance: x / SG: x / pH: x  Gluc: 147 mg/dL / Ketone: x  / Bili: x / Urobili: x   Blood: x / Protein: x / Nitrite: x   Leuk Esterase: x / RBC: x / WBC x   Sq Epi: x / Non Sq Epi: x / Bacteria: x            IMAGING:    ACC: [id number] EXAM:  CT ABDOMEN AND PELVIS IC   ORDERED BY: NIKOLAS LUQUE   PROCEDURE DATE:  09/08/2024      INTERPRETATION:  CLINICAL INFORMATION: Upper abdominal pain  COMPARISON: None.    CONTRAST/COMPLICATIONS:  IV Contrast: Omnipaque 350  100 cc administered   0 cc discarded  Oral Contrast: NONE  Complications: None reported at time of study completion    PROCEDURE:  CT of the Abdomen and Pelvis was performed.  Sagittal and coronal reformats were performed.    FINDINGS:  LOWER CHEST: Small right pleural effusion. Bibasilar patchy subsegmental   atelectatic change. Bibasilar subpleural patchy groundglass opacities and   questionable interlobular septal thickening.    LIVER/GALLBLADDER/BILE DUCTS: Nonspecific mild periportal edema. Markedly   thickened edematous gallbladder wall with possible superimposed   pericholecystic fluid. No radiopaque stones. No definite CBD dilation.  SPLEEN: Within normal limits.  PANCREAS: Within normal limits.  ADRENALS: Within normal limits.  KIDNEYS/URETERS: Within normal limits.    BLADDER: Within normal limits.  REPRODUCTIVE ORGANS: Uterus and adnexa within normal limits.    BOWEL: No bowel obstruction. Appendix is not visualized. No evidence of   inflammation in the pericecal region.  PERITONEUM/RETROPERITONEUM: Right upper quadrant/perihepatic small   ascites.  VESSELS: Atherosclerotic changes.  LYMPH NODES: Multiple prominent enlarged mesenteric lymph nodes measuring   up to 1.4 cm, likely reactive. (Series 3/69)  ABDOMINAL WALL: Within normal limits.  BONES: Degenerative changes within the spine. Prominent L4-L5 posterior   disc osteophyte complex.    IMPRESSION:  1.  Markedly thickened gallbladder wall which is edematous with   superimposed pericholecystic fluid. No radiopaque gallstones. Findings   are nonspecific and unclear primarily related to gallbladder pathology   versus generalized fluid status.  2.  Additionally there is nonspecific periportal edema, small volume   perihepatic/right upper quadrant ascites, small right pleural effusion   and suggestion of possible pulmonary edema.    --- End of Report ---          ACC: [id number] EXAM:  US ABDOMEN RT UPR QUADRANT   ORDERED BY: DENICE MONTES DE OCA   PROCEDURE DATE:  09/08/2024    INTERPRETATION:  CLINICAL INFORMATION: Rule out cholecystitis    COMPARISON: Same-day abdomen pelvis CT    TECHNIQUE: Sonography of the right upper quadrant.    FINDINGS:  Liver: Within normal limits.  Bile ducts: Normal caliber. Common bile duct measures 7 mm, upper limit   of normal for the patient's age.  Gallbladder: No gallstones, negative sonographic Ferguson sign. Severe   gallbladder wall edema.  Pancreas: Visualized portions are within normal limits.  Right kidney: 10.6 cm. No hydronephrosis.  Ascites: Perihepatic free fluid.  IVC: Visualized portions are within normal limits.    IMPRESSION:  No gallstones. Severe gallbladder wall edema, may be non biliary in   origin. Correlate for volume overload.    Perihepatic free fluid.        --- End of Report ---        ACCESS DEVICES:  [X] Peripheral IV  [ ] Central Venous Line	[ ] R	[ ] L	[ ] IJ	[ ] Fem	[ ] SC	Placed:   [ ] Arterial Line		[ ] R	[ ] L	[ ] Fem	[ ] Rad	[ ] Ax	Placed:   [ ] PICC:					[ ] Mediport  [ ] Urinary Catheter, Date Placed:

## 2024-09-08 NOTE — ED PROVIDER NOTE - OBJECTIVE STATEMENT
64-year-old female past medical history of anxiety, A-fib on Eliquis and metoprolol s/p multiple cardioversions and ablation, presents to the ED complaining of palpitations with associated shortness of breath and and upper abdominal pain that began earlier today.  Patient states she was walking around running errands when the symptoms began.  Patient took an extra dose of her metoprolol, Eliquis, and Benadryl with no relief of symptoms.  Patient has not had any recent fever, headache, dizziness, chest pain, nausea, vomiting, or diarrhea. 64-year-old female past medical history of anxiety, A-fib on Eliquis and metoprolol s/p multiple cardioversions and ablation (Follows with Dr. Thomas for cardiology), presents to the ED complaining of palpitations with associated shortness of breath and and upper abdominal pain that began earlier today.  Patient states she was walking around running errands when the symptoms began.  Patient took an extra dose of her metoprolol, Eliquis, and Benadryl with no relief of symptoms.  Patient has not had any recent fever, headache, dizziness, chest pain, nausea, vomiting, or diarrhea.

## 2024-09-08 NOTE — ED ADULT TRIAGE NOTE - CHIEF COMPLAINT QUOTE
I'm having palpitations and pain right here (upper mid quadrant)  - patient  Patient reports palpitations all day, took extra metoprolol, took extra Eliquis and Ativan and Benadryl PTA

## 2024-09-08 NOTE — ED PROVIDER NOTE - CONSIDERATION OF ADMISSION OBSERVATION
Patient with episode of A-fib with RVR found to have a fluid overload state causing GB edema, admit for further monitoring and treatment. Consideration of Admission/Observation

## 2024-09-08 NOTE — ED PROVIDER NOTE - CLINICAL SUMMARY MEDICAL DECISION MAKING FREE TEXT BOX
Patient signed today follow-up surgery consult right quadrant ultrasound and reevaluation.  Patient presented with palpitations and upper abdominal pain.  Initial EKG SVT repeat EKG concerning for A-fib.  Here x-ray shows bilateral opacities and ultrasound demonstrates severe gallbladder wall edema and perihepatic fluid concerning for fluid overload state.  Patient admitted to telemetry for further evaluation cardiac monitoring and treatment of volume overload state.  Patient was given Lasix in the ED.

## 2024-09-08 NOTE — H&P ADULT - ASSESSMENT
# Acute on chronic HFmrEF likely precipated due to Afib RVR   # Atrial Fibrillation with RVR:   # GB edema ( From volume overload)   - came to the hospital for palpitation with associated shortness of breath ; also complaints of upper abdominal pain   - In the ED ; found to have atrial fibrillation ; s/p Adenosine and Diltiazem .   - Chest Xray: Increased bilateral interstitial opacities. Pulmonary vascular congestion present.   - USG Right upper quadrant: No gallstones. Severe gallbladder wall edema, may be non biliary in origin. Correlate for volume overload. Perihepatic free fluid.  - CT Abdomen and pelvis: Additionally there is nonspecific periportal edema, small volume perihepatic/right upper quadrant ascites, small right pleural effusion and suggestion of possible pulmonary edema.   - All the features suggesting volume overload.   - Pro BNP 5 k ( Baseline 2 K )   - EKG : Afin with RVR   - MINNA ( 5/2024): EF 40 to 45%.  - s/p 40 mg IV Lasix in the ED.   - Continue Diuresis ; Lasix 40 mg BID.   - Continue home medications.   - Fluid restriction   - Keep K> 4.0 and Mag > 2.0   - Strict Intake and output   - Surgery consult appreciated: No acute intervention for GB findings.   - Cardiology consult ( Dr. Thomas)   - Consider EP consult once euvolemia.       #Iron Def Anemia  - Hb 11.4 ( at baseline ) MCV 77.5   - Serum iron is 19 and percent sat is 5 ( Last Labs on 21 June)  - Repeat Iron profile ; If found to be deficient ; will benefit from IV iron.    - c/w Ferrous Sulfate 325 mg po daily  - monitor H/H, no active bleeding noted     #Anxiety  - on Lorazepam   - pt was educated about behavioral modification     #Misc  - DVT Prophylaxis: Eliquis  - GI Prophylaxis: None  - Diet: DASH/TLC   - Activity: IAT  - IV Fluids: None  - Code Status: Full code          64-year-old female past medical history of anxiety, A-fib on Eliquis and metoprolol s/p multiple cardioversions and ablation (Follows with Dr. Thomas for cardiology), presents to the ED complaining of palpitations with associated shortness of breath and upper abdominal pain that began earlier today.  Patient states she was walking around running errands when the symptoms began.  Patient took an extra dose of her metoprolol, Eliquis, and Benadryl with no relief of symptoms.   She has been non compliant with her medications since 2 months now ; felt that her heart is in normal sinus rhythm so started skipping her home medications. She follows Dr. Mckinley for EP. She didnot know that she has hx of moderately reduced HF as per her last MINNA.   Patient has not had any recent fever, headache, dizziness, chest pain, nausea, vomiting, or diarrhea.    # Acute on chronic HFmrEF likely precipated due to Afib RVR 2/2 medications non compliance:   # Atrial Fibrillation with RVR:   # GB edema  and transaminitis ( From volume overload)   - came to the hospital for palpitation with associated shortness of breath ; also complaints of upper abdominal pain   - In the ED ; found to have atrial fibrillation ; s/p Adenosine and Diltiazem .   - Chest Xray: Increased bilateral interstitial opacities. Pulmonary vascular congestion present.   - USG Right upper quadrant: No gallstones. Severe gallbladder wall edema, may be non biliary in origin. Correlate for volume overload. Perihepatic free fluid.  - CT Abdomen and pelvis: Additionally there is nonspecific periportal edema, small volume perihepatic/right upper quadrant ascites, small right pleural effusion and suggestion of possible pulmonary edema.   - All the features suggesting volume overload.   - Pro BNP 5 k ( Baseline 2 K )   - EKG : Afin with RVR   - MINNA ( 5/2024): EF 40 to 45%.  - s/p 40 mg IV Lasix in the ED.   - Continue Diuresis ; Lasix 40 mg BID.   - Continue home medications; Metoprolol tartrate 50 mg BID ; Hold Cardizem for now ( Given the findings of Heart failure)   - Repeat TTE.   - Fluid restriction   - Keep K> 4.0 and Mag > 2.0   - Strict Intake and output   - Surgery consult appreciated: No acute intervention for GB findings.   - Cardiology consult ( Dr. Thomas)   - Can consider EP consult after euvolemia.        #Iron Def Anemia  - Hb 11.4 ( at baseline ) MCV 77.5   - Serum iron is 19 and percent sat is 5 ( Last Labs on 21 June)  - Repeat Iron profile ; If found to be deficient ; will benefit from IV iron.    - c/w Ferrous Sulfate 325 mg po daily  - monitor H/H, no active bleeding noted     #Anxiety  - on Lorazepam 0.5 PO PRN   - pt was educated about behavioral modification     #Misc  - DVT Prophylaxis: Eliquis  - GI Prophylaxis: None  - Diet: DASH/TLC   - Activity: IAT  - IV Fluids: None  - Code Status: Full code

## 2024-09-08 NOTE — H&P ADULT - ATTENDING COMMENTS
Assessment    CHF exacerbation secondary to afib w/ rvr, likely precipitated by medication noncompliance  Hx of afib s/p ablation  Likely congestive hepatopathy, no abdominal pain on my exam  Anxiety on ativan PRN    Plan    - symptoms improved after dose of lasix and IV cardizem, rate is better controlled now c/w home metoprolol, patient states she hasn't been taking any of her medications, f/u cardiology, c/w lasix for now  - GB wall edema likely from fluid overload, surgery recs appreciated  - ativan 0.5 prn qd    Pending: cardio follow up    # DVT PPX: Eliquis    75 minutes spent on review of labs, imaging studies, old records, obtaining history, personally examining patient, counselling and communicating with patient/ family, entering orders for medications/tests/etc, discussions with other health care providers, documentation in electronic health records, independent interpretation of labs, imaging/procedure results and care coordination.

## 2024-09-08 NOTE — CONSULT NOTE ADULT - ASSESSMENT
ASSESSMENT:  64yF w/ PMHx of anxiety, afib, GERD who presented with palpitations/RUQ pain. Physical exam findings, imaging, and labs as documented above. Surgery consulted for pericholecystic fluid seen on CT, r/o cholecystitis.     PLAN:  - no surgical intervention- imaging and labs not consistent with acute cholecystitis   - Dispo per ED    Lines/Tubes: PIV    Above plan discussed with Attending Surgeon Dr. Griffiths.   Patient and team aware and in agreement.   09-08-24 @ 08:40      TAP [phone]   ASSESSMENT:  64yF w/ PMHx of anxiety, afib (on Eliquis), GERD who presented with palpitations/RUQ pain. Physical exam findings, imaging, and labs as documented above. Surgery consulted for pericholecystic fluid seen on CT, r/o cholecystitis.     PLAN:  - no surgical intervention indicated at this time  - Dispo per ED    Lines/Tubes: PIV    Above plan discussed with Attending Surgeon Dr. Griffiths.   Patient and team aware and in agreement.   09-08-24 @ 08:40      TAP [phone]

## 2024-09-08 NOTE — H&P ADULT - HISTORY OF PRESENT ILLNESS
64-year-old female past medical history of anxiety, A-fib on Eliquis and metoprolol s/p multiple cardioversions and ablation (Follows with Dr. Thomas for cardiology), presents to the ED complaining of palpitations with associated shortness of breath and and upper abdominal pain that began earlier today.  Patient states she was walking around running errands when the symptoms began.  Patient took an extra dose of her metoprolol, Eliquis, and Benadryl with no relief of symptoms.  Patient has not had any recent fever, headache, dizziness, chest pain, nausea, vomiting, or diarrhea.    In the ED; Vitals: /79 ;  ; RR 18 ; Afebrile; Maintaining saturation on RA.     Labs:   Hb 11.4( at baseline ) ; MCV 77.5; Platelets 326 k   Na 135 K 4.8   BUN/ Creatinine 26/1.1 ( At Baseline)   AST//171    ProBNP 5943 ( baseline 2000s)   Troponin 44      Chest Xray: Increased bilateral interstitial opacities. Pulmonary vascular congestion present.     USG Right upper quadrant: No gallstones. Severe gallbladder wall edema, may be non biliary in origin. Correlate for volume overload.Perihepatic free fluid.    CT Abdomen and Pelvis:   1.Markedly thickened gallbladder wall which is edematous with superimposed pericholecystic fluid. No radiopaque gallstones. Findings are nonspecific and unclear primarily related to gallbladder pathology versus generalized fluid status.  2. Additionally there is nonspecific periportal edema, small volume perihepatic/right upper quadrant ascites, small right pleural effusion and suggestion of possible pulmonary edema    s/p Lasix 40 mg IV once; Adenosine and Cardizem 16 mg IV push.     Admitted for heart failure exacerbation    64-year-old female past medical history of anxiety, A-fib on Eliquis and metoprolol s/p multiple cardioversions and ablation (Follows with Dr. Thomas for cardiology), presents to the ED complaining of palpitations with associated shortness of breath and and upper abdominal pain that began earlier today.  Patient states she was walking around running errands when the symptoms began.  Patient took an extra dose of her metoprolol, Eliquis, and Benadryl with no relief of symptoms.  Patient has not had any recent fever, headache, dizziness, chest pain, nausea, vomiting, or diarrhea.    Of note; The patient has a history of atrial fibrillation, diagnosed 2 years ago, and has undergone multiple treatments including five cardioversion procedures and an ablation. On April 18, 2024, the patient underwent pulse field ablation (PFA) performed by Dr. Jac Mckinley at [facility], during which the patient required two cardioversion procedures and was subsequently discharged on Amiodarone therapy. However, two days post-procedure, the patient experienced a recurrence of atrial fibrillation with rapid ventricular response (RVR), necessitating a return to [place] for another cardioversion. The patient presented to [facility] on May 28, 2024, for additional cardioversion. On June 20, 2024, the patient was admitted again for RVR, and a similar episode occurred on July 3, 2024, with atrial flutter and RVR, presenting with a heart rate in the 140s. The patient was treated with 15 mg of Cardizem IV, resulting in a heart rate reduction to the 70s.        In the ED; Vitals: /79 ;  ; RR 18 ; Afebrile; Maintaining saturation on RA.     Labs:   Hb 11.4( at baseline ) ; MCV 77.5; Platelets 326 k   Na 135 K 4.8   BUN/ Creatinine 26/1.1 ( At Baseline)   AST//171    ProBNP 5943 ( baseline 2000s)   Troponin 44      Chest Xray: Increased bilateral interstitial opacities. Pulmonary vascular congestion present.     USG Right upper quadrant: No gallstones. Severe gallbladder wall edema, may be non biliary in origin. Correlate for volume overload.Perihepatic free fluid.    CT Abdomen and Pelvis:   1.Markedly thickened gallbladder wall which is edematous with superimposed pericholecystic fluid. No radiopaque gallstones. Findings are nonspecific and unclear primarily related to gallbladder pathology versus generalized fluid status.  2. Additionally there is nonspecific periportal edema, small volume perihepatic/right upper quadrant ascites, small right pleural effusion and suggestion of possible pulmonary edema    s/p Lasix 40 mg IV once; Adenosine and Cardizem 16 mg IV push.     Admitted for heart failure exacerbation    64-year-old female past medical history of anxiety, A-fib on Eliquis and metoprolol s/p multiple cardioversions and ablation (Follows with Dr. Thomas for cardiology), presents to the ED complaining of palpitations with associated shortness of breath and upper abdominal pain that began earlier today.  Patient states she was walking around running errands when the symptoms began.  Patient took an extra dose of her metoprolol, Eliquis, and Benadryl with no relief of symptoms.   She has been non compliant with her medications since 2 months now ; felt that her heart is in normal sinus rhythm so started skipping her home medications.   She follows Dr. Mckinley for EP. She didnot know that she has hx of moderately reduced HF as per her last MINNA.   Patient has not had any recent fever, headache, dizziness, chest pain, nausea, vomiting, or diarrhea.    Of note; The patient has a history of atrial fibrillation, diagnosed 2 years ago, and has undergone multiple treatments including five cardioversion procedures and an ablation. On April 18, 2024, the patient underwent pulse field ablation (PFA) performed by Dr. Jac Mckinley at [facility], during which the patient required two cardioversion procedures and was subsequently discharged on Amiodarone therapy. However, two days post-procedure, the patient experienced a recurrence of atrial fibrillation with rapid ventricular response (RVR), necessitating a return to [place] for another cardioversion. The patient presented to [facility] on May 28, 2024, for additional cardioversion. On June 20, 2024, the patient was admitted again for RVR, and a similar episode occurred on July 3, 2024, with atrial flutter and RVR, presenting with a heart rate in the 140s. The patient was treated with 15 mg of Cardizem IV, resulting in a heart rate reduction to the 70s.        In the ED; Vitals: /79 ;  ; RR 18 ; Afebrile; Maintaining saturation on RA.     Labs:   Hb 11.4( at baseline ) ; MCV 77.5; Platelets 326 k   Na 135 K 4.8   BUN/ Creatinine 26/1.1 ( At Baseline)   AST//171    ProBNP 5943 ( baseline 2000s)   Troponin 44      Chest Xray: Increased bilateral interstitial opacities. Pulmonary vascular congestion present.     USG Right upper quadrant: No gallstones. Severe gallbladder wall edema, may be non biliary in origin. Correlate for volume overload.Perihepatic free fluid.    CT Abdomen and Pelvis:   1.Markedly thickened gallbladder wall which is edematous with superimposed pericholecystic fluid. No radiopaque gallstones. Findings are nonspecific and unclear primarily related to gallbladder pathology versus generalized fluid status.  2. Additionally there is nonspecific periportal edema, small volume perihepatic/right upper quadrant ascites, small right pleural effusion and suggestion of possible pulmonary edema    s/p Lasix 40 mg IV once; Adenosine and Cardizem 16 mg IV push.     Admitted for heart failure exacerbation

## 2024-09-08 NOTE — H&P ADULT - NSHPPHYSICALEXAM_GEN_ALL_CORE
CONSTITUTIONAL: NAD   EYES: PERRLA and symmetric, EOMI,  ENMT: Oral mucosa with moist membranes.   NECK: Supple  RESP:   CV: RRR, +S1S2,  GI: Soft, Nontender; BS present.   MSK: Grossly intact   SKIN: No rashes or ulcers noted  NEURO: Grossly Intact CONSTITUTIONAL: NAD   EYES: PERRLA and symmetric, EOMI,  ENMT: Oral mucosa with moist membranes.   NECK: Supple  RESP: Bilateral crackles present ; bilateral decreased breath sounds   CV: RRR, +S1S2, pitting edema present.  GI: Soft, Nontender; BS present.   MSK: Grossly intact   SKIN: No rashes or ulcers noted  NEURO: Grossly Intact

## 2024-09-08 NOTE — ED PROVIDER NOTE - CARE PLAN
Principal Discharge DX:	Atrial fibrillation with RVR  Secondary Diagnosis:	Pleural effusion  Secondary Diagnosis:	Shortness of breath  Secondary Diagnosis:	Abdominal pain   1

## 2024-09-08 NOTE — ED ADULT NURSE NOTE - NSFALLHARMRISKINTERV_ED_ALL_ED

## 2024-09-08 NOTE — ED PROVIDER NOTE - ATTENDING CONTRIBUTION TO CARE
64-year-old female with past medical history of A-fib on Eliquis status post multiple ablations presents to the emergency department for palpitations and abdominal pain that began earlier today.  No chest pain no nausea no vomiting no fever no chills.  Patient took an extra dose of her metoprolol, Eliquis and Benadryl with minimal relief so she came to the emergency department.    Const: No apparent distress  Eyes: PERRL, no conjunctival injection  HENT:  Neck supple without meningismus   CV: tachycardic Warm, well-perfused extremities  RESP: CTA B/L, no tachypnea   GI: soft, RUQ and epigastric tenderness, non-distended  MSK: No gross deformities appreciated  Skin: Warm, dry. No rashes  Neuro: Alert, CNs II-XII grossly intact. Sensation and motor function of extremities grossly intact.  Psych: Appropriate mood and affect.

## 2024-09-08 NOTE — ED ADULT NURSE REASSESSMENT NOTE - NS ED NURSE REASSESS COMMENT FT1
Patient assessed, VSS, no s.s of distress noted, patient resting in bed, B/L chest rise noted. Safety and comfort maintained.

## 2024-09-09 ENCOUNTER — RESULT REVIEW (OUTPATIENT)
Age: 64
End: 2024-09-09

## 2024-09-09 LAB
ALBUMIN SERPL ELPH-MCNC: 3.7 G/DL — SIGNIFICANT CHANGE UP (ref 3.5–5.2)
ALP SERPL-CCNC: 157 U/L — HIGH (ref 30–115)
ALT FLD-CCNC: 89 U/L — HIGH (ref 0–41)
ANION GAP SERPL CALC-SCNC: 10 MMOL/L — SIGNIFICANT CHANGE UP (ref 7–14)
AST SERPL-CCNC: 42 U/L — HIGH (ref 0–41)
BASOPHILS # BLD AUTO: 0.06 K/UL — SIGNIFICANT CHANGE UP (ref 0–0.2)
BASOPHILS NFR BLD AUTO: 1 % — SIGNIFICANT CHANGE UP (ref 0–1)
BILIRUB SERPL-MCNC: 0.7 MG/DL — SIGNIFICANT CHANGE UP (ref 0.2–1.2)
BUN SERPL-MCNC: 16 MG/DL — SIGNIFICANT CHANGE UP (ref 10–20)
CALCIUM SERPL-MCNC: 8.5 MG/DL — SIGNIFICANT CHANGE UP (ref 8.4–10.5)
CHLORIDE SERPL-SCNC: 108 MMOL/L — SIGNIFICANT CHANGE UP (ref 98–110)
CO2 SERPL-SCNC: 22 MMOL/L — SIGNIFICANT CHANGE UP (ref 17–32)
CREAT SERPL-MCNC: 1.1 MG/DL — SIGNIFICANT CHANGE UP (ref 0.7–1.5)
EGFR: 56 ML/MIN/1.73M2 — LOW
EOSINOPHIL # BLD AUTO: 0.19 K/UL — SIGNIFICANT CHANGE UP (ref 0–0.7)
EOSINOPHIL NFR BLD AUTO: 3.3 % — SIGNIFICANT CHANGE UP (ref 0–8)
FERRITIN SERPL-MCNC: 9 NG/ML — LOW (ref 13–330)
GLUCOSE SERPL-MCNC: 86 MG/DL — SIGNIFICANT CHANGE UP (ref 70–99)
HCT VFR BLD CALC: 31.6 % — LOW (ref 37–47)
HGB BLD-MCNC: 9.9 G/DL — LOW (ref 12–16)
IMM GRANULOCYTES NFR BLD AUTO: 0.2 % — SIGNIFICANT CHANGE UP (ref 0.1–0.3)
IRON SATN MFR SERPL: 10 % — LOW (ref 15–50)
IRON SATN MFR SERPL: 29 UG/DL — LOW (ref 35–150)
LYMPHOCYTES # BLD AUTO: 2.14 K/UL — SIGNIFICANT CHANGE UP (ref 1.2–3.4)
LYMPHOCYTES # BLD AUTO: 37.2 % — SIGNIFICANT CHANGE UP (ref 20.5–51.1)
MAGNESIUM SERPL-MCNC: 2 MG/DL — SIGNIFICANT CHANGE UP (ref 1.8–2.4)
MCHC RBC-ENTMCNC: 24.1 PG — LOW (ref 27–31)
MCHC RBC-ENTMCNC: 31.3 G/DL — LOW (ref 32–37)
MCV RBC AUTO: 77.1 FL — LOW (ref 81–99)
MONOCYTES # BLD AUTO: 0.51 K/UL — SIGNIFICANT CHANGE UP (ref 0.1–0.6)
MONOCYTES NFR BLD AUTO: 8.9 % — SIGNIFICANT CHANGE UP (ref 1.7–9.3)
NEUTROPHILS # BLD AUTO: 2.85 K/UL — SIGNIFICANT CHANGE UP (ref 1.4–6.5)
NEUTROPHILS NFR BLD AUTO: 49.4 % — SIGNIFICANT CHANGE UP (ref 42.2–75.2)
NRBC # BLD: 0 /100 WBCS — SIGNIFICANT CHANGE UP (ref 0–0)
PLATELET # BLD AUTO: 293 K/UL — SIGNIFICANT CHANGE UP (ref 130–400)
PMV BLD: 11.3 FL — HIGH (ref 7.4–10.4)
POTASSIUM SERPL-MCNC: 4.3 MMOL/L — SIGNIFICANT CHANGE UP (ref 3.5–5)
POTASSIUM SERPL-SCNC: 4.3 MMOL/L — SIGNIFICANT CHANGE UP (ref 3.5–5)
PROT SERPL-MCNC: 5.7 G/DL — LOW (ref 6–8)
RBC # BLD: 4.1 M/UL — LOW (ref 4.2–5.4)
RBC # FLD: 19.9 % — HIGH (ref 11.5–14.5)
SODIUM SERPL-SCNC: 140 MMOL/L — SIGNIFICANT CHANGE UP (ref 135–146)
T4 FREE SERPL-MCNC: 1.2 NG/DL — SIGNIFICANT CHANGE UP (ref 0.9–1.8)
T4 FREE+ TSH PNL SERPL: 6.66 UIU/ML — HIGH (ref 0.27–4.2)
TIBC SERPL-MCNC: 295 UG/DL — SIGNIFICANT CHANGE UP (ref 220–430)
TRANSFERRIN SERPL-MCNC: 255 MG/DL — SIGNIFICANT CHANGE UP (ref 200–360)
UIBC SERPL-MCNC: 266 UG/DL — SIGNIFICANT CHANGE UP (ref 110–370)
WBC # BLD: 5.76 K/UL — SIGNIFICANT CHANGE UP (ref 4.8–10.8)
WBC # FLD AUTO: 5.76 K/UL — SIGNIFICANT CHANGE UP (ref 4.8–10.8)

## 2024-09-09 PROCEDURE — 99223 1ST HOSP IP/OBS HIGH 75: CPT

## 2024-09-09 PROCEDURE — 71045 X-RAY EXAM CHEST 1 VIEW: CPT | Mod: 26

## 2024-09-09 PROCEDURE — 93306 TTE W/DOPPLER COMPLETE: CPT | Mod: 26

## 2024-09-09 PROCEDURE — 99233 SBSQ HOSP IP/OBS HIGH 50: CPT

## 2024-09-09 PROCEDURE — 93010 ELECTROCARDIOGRAM REPORT: CPT

## 2024-09-09 RX ORDER — FLUCONAZOLE 150 MG/1
200 TABLET ORAL DAILY
Refills: 0 | Status: DISCONTINUED | OUTPATIENT
Start: 2024-09-09 | End: 2024-09-13

## 2024-09-09 RX ORDER — METOPROLOL TARTRATE 100 MG/1
50 TABLET ORAL ONCE
Refills: 0 | Status: COMPLETED | OUTPATIENT
Start: 2024-09-09 | End: 2024-09-09

## 2024-09-09 RX ORDER — ENOXAPARIN SODIUM 100 MG/ML
60 INJECTION SUBCUTANEOUS EVERY 12 HOURS
Refills: 0 | Status: DISCONTINUED | OUTPATIENT
Start: 2024-09-09 | End: 2024-09-12

## 2024-09-09 RX ORDER — IRON SUCROSE 20 MG/ML
100 INJECTION, SOLUTION INTRAVENOUS EVERY 24 HOURS
Refills: 0 | Status: COMPLETED | OUTPATIENT
Start: 2024-09-09 | End: 2024-09-13

## 2024-09-09 RX ORDER — LORAZEPAM 4 MG/ML
0.5 INJECTION INTRAMUSCULAR; INTRAVENOUS ONCE
Refills: 0 | Status: DISCONTINUED | OUTPATIENT
Start: 2024-09-09 | End: 2024-09-09

## 2024-09-09 RX ORDER — DILTIAZEM HYDROCHLORIDE 5 MG/ML
16 INJECTION INTRAVENOUS ONCE
Refills: 0 | Status: COMPLETED | OUTPATIENT
Start: 2024-09-09 | End: 2024-09-09

## 2024-09-09 RX ORDER — METOPROLOL TARTRATE 100 MG/1
2.5 TABLET ORAL ONCE
Refills: 0 | Status: COMPLETED | OUTPATIENT
Start: 2024-09-09 | End: 2024-09-09

## 2024-09-09 RX ADMIN — LORAZEPAM 0.5 MILLIGRAM(S): 4 INJECTION INTRAMUSCULAR; INTRAVENOUS at 21:07

## 2024-09-09 RX ADMIN — APIXABAN 5 MILLIGRAM(S): 5 TABLET, FILM COATED ORAL at 05:56

## 2024-09-09 RX ADMIN — DILTIAZEM HYDROCHLORIDE 16 MILLIGRAM(S): 5 INJECTION INTRAVENOUS at 06:17

## 2024-09-09 RX ADMIN — METOPROLOL TARTRATE 50 MILLIGRAM(S): 100 TABLET ORAL at 05:56

## 2024-09-09 RX ADMIN — IRON SUCROSE 210 MILLIGRAM(S): 20 INJECTION, SOLUTION INTRAVENOUS at 15:37

## 2024-09-09 RX ADMIN — LORAZEPAM 0.5 MILLIGRAM(S): 4 INJECTION INTRAMUSCULAR; INTRAVENOUS at 18:24

## 2024-09-09 RX ADMIN — METOPROLOL TARTRATE 50 MILLIGRAM(S): 100 TABLET ORAL at 17:18

## 2024-09-09 RX ADMIN — METOPROLOL TARTRATE 2.5 MILLIGRAM(S): 100 TABLET ORAL at 18:08

## 2024-09-09 RX ADMIN — ACETAMINOPHEN 650 MILLIGRAM(S): 325 TABLET ORAL at 14:00

## 2024-09-09 RX ADMIN — ACETAMINOPHEN 650 MILLIGRAM(S): 325 TABLET ORAL at 15:00

## 2024-09-09 NOTE — PROGRESS NOTE ADULT - ASSESSMENT
64-year-old female past medical history of anxiety, A-fib on Eliquis and metoprolol s/p multiple cardioversions and ablation (Follows with Dr. Thomas for cardiology), presents to the ED complaining of palpitations with associated shortness of breath and upper abdominal pain that began earlier  in ED:  - EKG : Afib with RVR   - MINNA ( 5/2024): EF 40 to 45%.  - s/p 40 mg IV Lasix in the ED.      ADMITTED for CHF exacerbation secondary to afib w/ rvr, likely precipitated by medication noncompliance      # Acute on chronic HFmrEF likely precipated due to Afib RVR 2/2 medications non compliance:   # Atrial Fibrillation with RVR:   # GB edema  and transaminitis ( From volume overload), Likely congestive hepatopathy  - In the ED ; found to have atrial fibrillation ; s/p Adenosine and Diltiazem .   - Chest Xray: Increased bilateral interstitial opacities. Pulmonary vascular congestion present.   - USG Right upper quadrant: No gallstones. Severe gallbladder wall edema, may be non biliary in origin. Correlate for volume overload. Perihepatic free fluid.  - CT Abdomen and pelvis: Additionally there is nonspecific periportal edema, small volume perihepatic/right upper quadrant ascites, small right pleural effusion and suggestion of possible pulmonary edema.   - All the features suggesting volume overload. - Surgery consult appreciated: No acute intervention for GB findings.   - Pro BNP 5 k ( Baseline 2 K )   - Continue Diuresis ; Lasix 40 mg BID. might switch to PO tmrw  - Continue home medications; Metoprolol tartrate 50 mg BID ; Hold Cardizem  ( Given the findings of Heart failure)   - Repeat TTE.   - Fluid restriction, I&O  - Keep K> 4.0 and Mag > 2.0   - Cardiology consulted ( Dr. Thomas) &EP consulted      #Iron Def Anemia, microcytic  - Hb 11.4 ( baseline ) MCV 77.5 >>9.9 on 9/9  -iron studies noted  - hold Ferrous Sulfate 325 mg po daily & start IV venofer for 5days.  -GI consulted: get colono +EGD    #oral thrush  -started PO diflucan   -HIV testing    #Anxiety  - on Lorazepam 0.5 PO PRN   - pt was educated about behavioral modification     Plan: F/U cardio & EP recs, adjust lasix dose

## 2024-09-09 NOTE — CONSULT NOTE ADULT - ATTENDING COMMENTS
Patient seen and examined with surgery team in Ed crit area and discussed management plans. Patient with known cardiac arrythmia being followed by cardiology Dr bell. Presented for palpitation mainly denies any abd pin now. Abd soft nontender well healed prior mid line scar. Imaging studies reviewed no GB stone + edema Gb wall. Normal WBC.  No need for urgent surgical intervention. Mild elevation LFT. Monitor for LFT after admission. No need for antibiotics now. will follow.
AF/RVR- now in sinus?    Cont Metoprolol, Eliquis  Trend CBC  Hold ELiquis for GI work-up    We discussed potential options of AF ablation/AAD with the patient.  We discussed multiple therapeutic options for the treatment of atrial fibrillation, including undergoing an atrial fibrillation/left atrial antral isolation ablation. The details of the procedure and risks associated with undergoing an atrial fibrillation/BRAN ablation were discussed in detail including, but not limited to, death, myocardial ischemia, stroke, cardiac perforation, pulmonary vein stenosis, diaphragmatic paralysis via phrenic nerve injury, catheter entrapment in the mitral valve or other location, bleeding, infection, deep vein thrombosis, vascular injury, and worsening atrial arrhythmias. We also discussed that there is a low risk of possible esophageal ulceration, atrial esophageal fistula, atrial bronchial fistula, or another complication requiring the need for major surgery to address.    We also discussed that recurrent atrial fibrillation in the first 2-3 months post procedure is a part of the healing process and has no impact on the overall longer- term success of the ablation. To try to reduce the incidence of these events, the plan will be for antiarrhythmic therapy to be restarted post procedure and continued for the first 3 months after ablation.      We discussed that about 20-30% of patients will have recurrence of atrial fibrillation after the immediate post procedure period requiring the possible need for a repeat ablation procedure.  Also discussed was that the 5-year cure rate of the procedure is approximately 60-70% with a higher rate if a repeat procedure is performed.     All questions were answered to their satisfaction and they expressed verbal understanding of the procedure, the benefits, and risks. The patient wishes to think about it and discuss more at OP.
I edited the note

## 2024-09-09 NOTE — CONSULT NOTE ADULT - SUBJECTIVE AND OBJECTIVE BOX
Date of Admission: 24    HISTORY OF PRESENT ILLNESS:    64-year-old female past medical history of anxiety, A-fib on Eliquis and metoprolol s/p multiple cardioversions and ablation (Follows with Dr. Thomas for cardiology), presents to the ED complaining of palpitations with associated shortness of breath and upper abdominal pain that began earlier today.  Patient states she was walking around running errands when the symptoms began.  Patient took an extra dose of her metoprolol, Eliquis, and Benadryl with no relief of symptoms.   She has been non compliant with her medications since 2 months now ; felt that her heart is in normal sinus rhythm so started skipping her home medications.   She follows Dr. Mckinley for EP. She didnot know that she has hx of moderately reduced HF as per her last MINNA.   Patient has not had any recent fever, headache, dizziness, chest pain, nausea, vomiting, or diarrhea.    Of note; The patient has a history of atrial fibrillation, diagnosed 2 years ago, and has undergone multiple treatments including five cardioversion procedures and an ablation. On 2024, the patient underwent pulse field ablation (PFA) performed by Dr. Jac Mckinley at [facility], during which the patient required two cardioversion procedures and was subsequently discharged on Amiodarone therapy. However, two days post-procedure, the patient experienced a recurrence of atrial fibrillation with rapid ventricular response (RVR), necessitating a return to [facility] for another cardioversion. The patient presented to [facility] on May 28, 2024, for additional cardioversion. On 2024, the patient was admitted again for RVR, and a similar episode occurred on July 3, 2024, with atrial flutter and RVR, presenting with a heart rate in the 140s. The patient was treated with 15 mg of Cardizem IV, resulting in a heart rate reduction to the 70s.        In the ED; Vitals: /79 ;  ; RR 18 ; Afebrile; Maintaining saturation on RA.     Labs:   Hb 11.4( at baseline ) ; MCV 77.5; Platelets 326 k   Na 135 K 4.8   BUN/ Creatinine 26/1.1 ( At Baseline)   AST//171    ProBNP 5943 ( baseline 2000s)   Troponin 44      Chest Xray: Increased bilateral interstitial opacities. Pulmonary vascular congestion present.     USG Right upper quadrant: No gallstones. Severe gallbladder wall edema, may be non biliary in origin. Correlate for volume overload.Perihepatic free fluid.    CT Abdomen and Pelvis:   1.Markedly thickened gallbladder wall which is edematous with superimposed pericholecystic fluid. No radiopaque gallstones. Findings are nonspecific and unclear primarily related to gallbladder pathology versus generalized fluid status.  2. Additionally there is nonspecific periportal edema, small volume perihepatic/right upper quadrant ascites, small right pleural effusion and suggestion of possible pulmonary edema    s/p Lasix 40 mg IV once; Adenosine and Cardizem 16 mg IV push.     Admitted for heart failure exacerbation    (08 Sep 2024 12:46)    PAST MEDICAL & SURGICAL HISTORY  Anxiety    Afib  Eliquis    GERD (gastroesophageal reflux disease)    H/O neck surgery        FAMILY HISTORY:  - None    SOCIAL HISTORY:   - Non-smoker    REVIEW OF SYMPTOMS:  CONSTITUTIONAL: No fevers or chills.  EYES: No visual changes, eye pain, or discharge  ENT: No vertigo; No ear pain or change in hearing; No sore throat or difficulty swallowing  NECK: No pain or stiffness  RESPIRATORY: No cough, wheezing, or hemoptysis; No shortness of breath  CARDIOVASCULAR: Palpitations  GASTROINTESTINAL: No abdominal or epigastric pain; No nausea, vomiting, or hematemesis; No diarrhea or constipation; No melena or hematochezia  GENITOURINARY: No dysuria, frequency or hematuria  MUSCULOSKELETAL: No joint pain, no muscle pain  NEUROLOGICAL: No numbness or weakness  SKIN: No itching or rashes    VITALS:  T(C): 36.5 (24 @ 12:27), Max: 36.7 (24 @ 04:00)  HR: 98 (24 @ 12:27) (57 - 173)  BP: 122/86 (24 @ 12:27) (95/70 - 122/86)  RR: 18 (24 @ 12:27) (18 - 18)  SpO2: 99% (24 @ 12:27) (95% - 99%)  Wt(kg): --  Daily     Daily Weight in k (09 Sep 2024 04:00)    PHYSICAL EXAM:  GEN: Not in acute distress  HEENT: EOMI  LUNGS: Dec BS   CARDIOVASCULAR: RRR, S1/S2 present  ABD: Soft, non-tender, non-distended  EXT: No GWEN  SKIN: Warm  NEURO: AAOx3    LABS:	 	                        9.9    5.76  )-----------( 293      ( 09 Sep 2024 05:39 )             31.6         140  |  108  |  16  ----------------------------<  86  4.3   |  22  |  1.1    Ca    8.5      09 Sep 2024 05:39  Mg     2.0         TPro  5.7<L>  /  Alb  3.7  /  TBili  0.7  /  DBili  x   /  AST  42<H>  /  ALT  89<H>  /  AlkPhos  157<H>              Intake and Output:    24 @ 07:01  -  24 @ 07:00  --------------------------------------------------------  IN: 0 mL / OUT: 300 mL / NET: -300 mL    24 @ 07:01  -  24 @ 14:15  --------------------------------------------------------  IN: 340 mL / OUT: 0 mL / NET: 340 mL        CARDIAC MARKERS:  Troponin T, High Sensitivity Result: 37 ng/L (24 @ 16:00)  Troponin T, High Sensitivity Result: 44 ng/L (24 @ 10:00)  Troponin T, High Sensitivity Result: 14 ng/L (24 @ 01:00)  Troponin T, High Sensitivity Result: 18 ng/L (24 @ 14:09)  Troponin T, High Sensitivity Result: 13 ng/L (24 @ 12:10)  Troponin T, High Sensitivity Result: 14 ng/L (24 @ 07:19)  Troponin T, High Sensitivity Result: 12 ng/L (06-20-24 @ 11:00)  Troponin T, High Sensitivity Result: 7 ng/L (24 @ 08:30)  Troponin T, High Sensitivity Result: 10 ng/L (24 @ 07:01)  Troponin T, High Sensitivity Result: 10 ng/L (24 @ 05:39)      TELEMETRY EVENTS:    ECG:    RADIOLOGY:    OTHER:    Echocardiogram:  < from: TTE Echo Complete w/ Contrast w/o Doppler (24 @ 09:24) >   1. Left ventricular ejection fraction, by visual estimation, is 50 to   55%.   2. Normal global left ventricular systolic function.   3. The left ventricular diastolic function could not be assessed in this   study.   4. Small patent foramen ovale / atrial septal defect with predominantly   left-to-right shunting across the interatrial septum visualized on color   flow Doppler.   5. Mildly enlarged left atrium.   6. Mild mitral regurgitation.   7. Mild tricuspid regurgitation.    < end of copied text >    Catheterization:    Stress Test: 	    Home Medications:  Cardizem  mg/24 hours oral capsule, extended release: 1 cap(s) orally once a day (08 Sep 2024 14:27)  Eliquis 5 mg oral tablet: 1 tab(s) orally 2 times a day (08 Sep 2024 14:29)  LORazepam 0.5 mg oral tablet: 1 tab(s) orally once a day (at bedtime) As needed Anxiety (08 Sep 2024 14:29)  metoprolol tartrate 50 mg oral tablet: 1 tab(s) orally 2 times a day (08 Sep 2024 14:27)    MEDICATIONS  (STANDING):  apixaban 5 milliGRAM(s) Oral every 12 hours  fluconAZOLE   Tablet 200 milliGRAM(s) Oral daily  furosemide   Injectable 40 milliGRAM(s) IV Push every 12 hours  iron sucrose IVPB 100 milliGRAM(s) IV Intermittent every 24 hours  metoprolol tartrate 50 milliGRAM(s) Oral two times a day    MEDICATIONS  (PRN):  acetaminophen     Tablet .. 650 milliGRAM(s) Oral every 6 hours PRN Temp greater or equal to 38C (100.4F), Mild Pain (1 - 3)  LORazepam     Tablet 0.5 milliGRAM(s) Oral at bedtime PRN Anxiety  melatonin 3 milliGRAM(s) Oral at bedtime PRN Insomnia

## 2024-09-09 NOTE — CONSULT NOTE ADULT - ASSESSMENT
65 YO F with PMHx of A-fib on Eliquis and metoprolol s/p five cardioversions and one ablation (Follows with Dr. Thomas for cardiology, ablation with Dr. Mckinley at [facility] in April 2024), presents to the ED complaining of palpitations with associated shortness of breath and upper abdominal pain.  Patient's initial EKG in the ED was consistent with SVT and she received Cardizem. Overnight, patient had an episode of atrial flutter with RVR and received more Cardizem.    TTE 9/9/24: EF 50-55%. Small PFO with predominantly left-to-right shunting. Mildly enlarged LA. Mild MR & TR.    IMPRESSION  #Atrial Flutter with RVR; likely secondary to medication non-compliance   - Patient reports that she stopped taking her metoprolol and Eliquis 2 month ago  - [id number]    PLAN  - Tele reviewed. Patient currently in NSR; cannot rule out flutter.  - Patient will need repeat ablation. Patient states that she will think about it.  - Can be discharged on Metoprolol 50mg PO BID and Eliquis 5mg PO BID

## 2024-09-09 NOTE — PROGRESS NOTE ADULT - ASSESSMENT
64-year-old female past medical history of anxiety, A-fib on Eliquis and metoprolol s/p multiple cardioversions and ablation (Follows with Dr. Thomas for cardiology), presents to the ED complaining of palpitations with associated shortness of breath and upper abdominal pain that began earlier today.     Atria flutter/fib on Eliquis  Anxiety disorder.                 PLAN:     ·	Tele reviewed by me  ·	EKG on admission: /min. RAD. Non specific ST, T changes (Interpreted by me) 64-year-old female past medical history of anxiety, A-fib on Eliquis and metoprolol s/p multiple cardioversions and ablation (Follows with Dr. Thomas for cardiology), presents to the ED complaining of palpitations with associated shortness of breath and upper abdominal pain that began earlier today.     Paroxysmal Atria flutter/fib on Eliquis  Anxiety disorder.   Iron deficiency anemia  Acute HFpEF  Oral thrush                PLAN:     ·	Tele reviewed by me. Episodes of rapid A-fib and flutter  ·	EKG on admission: /min. RAD. Non specific ST, T changes (Interpreted by me)  ·	Non compliant with her meds at home  ·	Cont Metoprolol 50 mg p0 q 12h  ·	ECHO  ·	Cont Eliquis 5 mg po q 12h  ·	Cardiology and EP eval  ·	Serum iron is 29 and percent sat is 10. Start her on IV Venofer 200 mg iv daily x 5 doses  ·	Iron deficiency anemia and oral thrush: GI eval  ·	Start her on Diflucan 200 mg po daily for 1-2 weeks.   ·	Check for HIV.   ·	Pro BNP is 5943  ·	CT chest reviewed.  Markedly thickened gallbladder wall which is edematous with superimposed pericholecystic fluid. No radiopaque gallstones. Findings are nonspecific and unclear primarily related to gallbladder pathology versus generalized fluid status.  ·	RUQ us noted. No gallstones. Severe gallbladder wall edema, may be non biliary in origin. Correlate for volume overload.  ·	Surgical eval noted. No evidence of acute cholecystitis.   ·	Elevate LFT's could be likely due to congestive hepatopathy. Follow LFT'S   ·	O/E pt looks euvolemic. Switch her to Lasix 40 mg po daily      Progress Note Handoff    Pending (specify):  Consults_Cardiology, GI________, Tests________, Test Results_______, Other_________  Family discussion:  Disposition: Home___/SNF___/Other________/Unknown at this time________    Hugh Vernon MD  Spectra: [phone]

## 2024-09-09 NOTE — PROGRESS NOTE ADULT - SUBJECTIVE AND OBJECTIVE BOX
STEPHANE CHAN  64y Female    CHIEF COMPLAINT:    Patient is a 64y old  Female who presents with a chief complaint of Palpitation and shortness of Breath (08 Sep 2024 12:46)      INTERVAL HPI/OVERNIGHT EVENTS:    Patient seen and examined.    ROS: All other systems are negative.    Vital Signs:    T(F): 98 (24 @ 04:00), Max: 98 (24 @ 04:00)  HR: 86 (24 @ 06:20) (57 - 173)  BP: 95/70 (24 @ 06:20) (95/70 - 121/72)  RR: 18 (24 @ 06:02) (18 - 18)  SpO2: 95% (24 @ 23:19) (95% - 99%)  I&O's Summary    08 Sep 2024 07:01  -  09 Sep 2024 07:00  --------------------------------------------------------  IN: 0 mL / OUT: 300 mL / NET: -300 mL      Daily     Daily Weight in k (09 Sep 2024 04:00)  CAPILLARY BLOOD GLUCOSE          PHYSICAL EXAM:    GENERAL:  NAD  SKIN: No rashes or lesions  HENT: Atraumatic. Normocephalic. PERRL. Moist membranes.  NECK: Supple, No JVD. No lymphadenopathy.  PULMONARY: CTA B/L. No wheezing. No rales  CVS: Normal S1, S2. Rate and Rhythm are regular. No murmurs.  ABDOMEN/GI: Soft, Nontender, Nondistended; BS present  EXTREMITIES: Peripheral pulses intact. No edema B/L LE.  NEUROLOGIC:  No motor or sensory deficit.  PSYCH: Alert & oriented x 3    Consultant(s) Notes Reviewed:  [x ] YES  [ ] NO  Care Discussed with Consultants/Other Providers [ x] YES  [ ] NO    EKG reviewed  Telemetry reviewed    LABS:                        9.9    5.76  )-----------( 293      ( 09 Sep 2024 05:39 )             31.6         140  |  108  |  16  ----------------------------<  86  4.3   |  22  |  1.1    Ca    8.5      09 Sep 2024 05:39  Mg     2.0         TPro  5.7<L>  /  Alb  3.7  /  TBili  0.7  /  DBili  x   /  AST  42<H>  /  ALT  89<H>  /  AlkPhos  157<H>                RADIOLOGY & ADDITIONAL TESTS:      Imaging or report Personally Reviewed:  [ ] YES  [ ] NO    Medications:  Standing  apixaban 5 milliGRAM(s) Oral every 12 hours  ferrous    sulfate 325 milliGRAM(s) Oral daily  furosemide   Injectable 40 milliGRAM(s) IV Push every 12 hours  metoprolol tartrate 50 milliGRAM(s) Oral two times a day    PRN Meds  acetaminophen     Tablet .. 650 milliGRAM(s) Oral every 6 hours PRN  LORazepam     Tablet 0.5 milliGRAM(s) Oral at bedtime PRN  melatonin 3 milliGRAM(s) Oral at bedtime PRN      Case discussed with resident    Care discussed with pt/family           STEPHANE CHAN  64y Female    CHIEF COMPLAINT:    Patient is a 64y old  Female who presents with a chief complaint of Palpitation and shortness of Breath (08 Sep 2024 12:46)      INTERVAL HPI/OVERNIGHT EVENTS:    Patient seen and examined. Denies any palpitations or sob. Has been non compliant with her meds. C/O oral thrush    ROS: All other systems are negative.    Vital Signs:    T(F): 98 (24 @ 04:00), Max: 98 (24 @ 04:00)  HR: 86 (24 @ 06:20) (57 - 173)  BP: 95/70 (24 @ 06:20) (95/70 - 121/72)  RR: 18 (24 @ 06:02) (18 - 18)  SpO2: 95% (24 @ 23:19) (95% - 99%)  I&O's Summary    08 Sep 2024 07:01  -  09 Sep 2024 07:00  --------------------------------------------------------  IN: 0 mL / OUT: 300 mL / NET: -300 mL      Daily     Daily Weight in k (09 Sep 2024 04:00)  CAPILLARY BLOOD GLUCOSE          PHYSICAL EXAM:    GENERAL:  NAD  SKIN: No rashes or lesions  HENT: Atraumatic. Normocephalic. PERRL. Moist membranes. +ve oral thrush  NECK: Supple, No JVD. No lymphadenopathy.  PULMONARY: CTA B/L. No wheezing. No rales  CVS: Normal S1, S2. Rate and Rhythm are IRIR. No murmurs.  ABDOMEN/GI: Soft, Nontender, Nondistended; BS present  EXTREMITIES: Peripheral pulses intact. No edema B/L LE.  NEUROLOGIC:  No motor or sensory deficit.  PSYCH: Alert & oriented x 3    Consultant(s) Notes Reviewed:  [x ] YES  [ ] NO  Care Discussed with Consultants/Other Providers [ x] YES  [ ] NO    EKG reviewed  Telemetry reviewed    LABS:                        9.9    5.76  )-----------( 293      ( 09 Sep 2024 05:39 )             31.6         140  |  108  |  16  ----------------------------<  86  4.3   |  22  |  1.1    Ca    8.5      09 Sep 2024 05:39  Mg     2.0         TPro  5.7<L>  /  Alb  3.7  /  TBili  0.7  /  DBili  x   /  AST  42<H>  /  ALT  89<H>  /  AlkPhos  157<H>                RADIOLOGY & ADDITIONAL TESTS:    < from: CT Abdomen and Pelvis w/ IV Cont (24 @ 03:00) >  IMPRESSION:  1.  Markedly thickened gallbladder wall which is edematous with   superimposed pericholecystic fluid. No radiopaque gallstones. Findings   are nonspecific and unclear primarily related to gallbladder pathology   versus generalized fluid status.  2.  Additionally there is nonspecific periportal edema, small volume   perihepatic/right upper quadrant ascites, small right pleural effusion   and suggestion of possible pulmonary edema.    < end of copied text >  < from: US Abdomen Upper Quadrant Right (24 @ 08:04) >  IMPRESSION:  No gallstones. Severe gallbladder wall edema, may be non biliary in   origin. Correlate for volume overload.    Perihepatic free fluid.      < end of copied text >    Imaging or report Personally Reviewed:  [x ] YES  [ ] NO    Medications:  Standing  apixaban 5 milliGRAM(s) Oral every 12 hours  ferrous    sulfate 325 milliGRAM(s) Oral daily  furosemide   Injectable 40 milliGRAM(s) IV Push every 12 hours  metoprolol tartrate 50 milliGRAM(s) Oral two times a day    PRN Meds  acetaminophen     Tablet .. 650 milliGRAM(s) Oral every 6 hours PRN  LORazepam     Tablet 0.5 milliGRAM(s) Oral at bedtime PRN  melatonin 3 milliGRAM(s) Oral at bedtime PRN      Case discussed with resident    Care discussed with pt/family

## 2024-09-09 NOTE — CONSULT NOTE ADULT - ASSESSMENT
64-year-old female past medical history of anxiety, A-fib on Eliquis and metoprolol s/p multiple cardioversions and ablation (Follows with Dr. Thomas for cardiology), presented to the ED  on 9/8 complaining of palpitations with associated shortness of breath and upper abdominal pain, found to be in afib with RVR, GI consulted for low Hb/iron deficiency anemia.    #Iron Deficiency Anemia  -Hb today 9.9, on admission 11.4 (baseline 10-11), MCV 77  -Serum iron is 29 and percent sat is 10, ferritin from prior admission in June was 6  -patient was told many years ago she was anemic and was prescribed iron pills at the time however never took them, currently has been 325 po iron daily for the past month  -last colonoscopy and EGD were >10 years ago per patient and she believes colo was unremarkable, EGD demonstrated gastritis   -currently on IV venofer x5 days  -c/w anticoagulation for now  -optimize patient medically and obtain cardiac risk stratification and will likely perform EGD/colonoscopy inpatient once medically optimized, hold eliquis 2 days prior to procedure    64-year-old female past medical history of anxiety, A-fib on Eliquis and metoprolol s/p multiple cardioversions and ablation (Follows with Dr. Thomas for cardiology), presented to the ED  on 9/8 complaining of palpitations with associated shortness of breath and upper abdominal pain, found to be in afib with RVR, GI consulted for low Hb/iron deficiency anemia.    #Iron Deficiency Anemia  -Hb today 9.9, on admission 11.4 (baseline 10-11), MCV 77  -Serum iron is 29 and percent sat is 10, ferritin from prior admission in June was 6  -patient was told many years ago she was anemic and was prescribed iron pills at the time however never took them, currently has been taking 325 po iron daily for the past month because her daughter told her to  -last colonoscopy and EGD were >10 years ago per patient and she believes colo was unremarkable, EGD demonstrated gastritis   -currently on IV venofer x5 days  -c/w anticoagulation for now  -optimize patient medically and obtain cardiac risk stratification and will likely perform EGD/colonoscopy inpatient once medically optimized, hold eliquis 2 days prior to procedure    64-year-old female past medical history of anxiety, A-fib on Eliquis and metoprolol s/p multiple cardioversions and ablation (Follows with Dr. Thomas for cardiology), presented to the ED  on 9/8 complaining of palpitations with associated shortness of breath and upper abdominal pain, found to be in afib with RVR, GI consulted for low Hb/iron deficiency anemia.    #Iron Deficiency Anemia  -Hb today 9.9, on admission 11.4 (baseline 10-11), MCV 77  -Serum iron is 29 and percent sat is 10, ferritin from prior admission in June was 6  -patient was told many years ago she was anemic and was prescribed iron pills at the time however never took them, currently has been taking 325 po iron daily for the past month because her daughter told her to  -last colonoscopy and EGD were >10 years ago per patient and she believes colo was unremarkable, EGD demonstrated gastritis   -currently on IV venofer x5 days    Rec  - Ideally patient will require EGD colonoscopy for evaluation of iron deficiency anemia   - patient will need to be medically optimized prior to endoscopic evaluation and obtain cardiac risk stratification   - Will need to hold eliquis 2 days prior to procedure when patient is optimized   - No GI contraindication at this time to c/w anticoagulation in the absence of overt bleed and stable hemoglobin  - trend CBC, monitor for GI bleed   64-year-old female past medical history of anxiety, A-fib on Eliquis and metoprolol s/p multiple cardioversions and ablation (Follows with Dr. Thomas for cardiology), presented to the ED  on 9/8 complaining of palpitations with associated shortness of breath and upper abdominal pain, found to be in afib with RVR, GI consulted for low Hb/iron deficiency anemia.    #Iron Deficiency Anemia  -Hb today 9.9, on admission 11.4 (baseline 10-11), MCV 77  -Serum iron is 29 and percent sat is 10, ferritin from prior admission in June was 6  -patient was told many years ago she was anemic and was prescribed iron pills at the time however never took them, currently has been taking 325 po iron daily for the past month because her daughter told her to  -last colonoscopy and EGD were >10 years ago per patient and she believes colo was unremarkable, EGD demonstrated gastritis   -currently on IV venofer x5 days    Rec  - Ideally patient will require EGD colonoscopy for evaluation of iron deficiency anemia   - patient will need to be medically optimized prior to endoscopic evaluation and obtain cardiac risk stratification   - Will need to hold Eliquis 2 days prior to procedure when patient is optimized if ok by cardiology   - No GI contraindication at this time to c/w anticoagulation in the absence of overt bleed and stable hemoglobin  - trend CBC, monitor for GI bleed  - plan of care discussed with patient, family at bed side and primary team

## 2024-09-09 NOTE — CONSULT NOTE ADULT - SUBJECTIVE AND OBJECTIVE BOX
GI Consult    GI being consulted for iron deficiency anemia.  Patient well appearing on examination, having normal bowel movements with normal color, no bleeding.  Last colo/egd >10 years ago.      HPI  64-year-old female past medical history of anxiety, A-fib on Eliquis and metoprolol s/p multiple cardioversions and ablation (Follows with Dr. Thomas for cardiology), presents to the ED complaining of palpitations with associated shortness of breath and upper abdominal pain that began earlier today.  Patient states she was walking around running errands when the symptoms began.  Patient took an extra dose of her metoprolol, Eliquis, and Benadryl with no relief of symptoms.   She has been non compliant with her medications since 2 months now ; felt that her heart is in normal sinus rhythm so started skipping her home medications.   She follows Dr. Mckinley for EP. She didnot know that she has hx of moderately reduced HF as per her last MINNA.   Patient has not had any recent fever, headache, dizziness, chest pain, nausea, vomiting, or diarrhea.    Of note; The patient has a history of atrial fibrillation, diagnosed 2 years ago, and has undergone multiple treatments including five cardioversion procedures and an ablation. On April 18, 2024, the patient underwent pulse field ablation (PFA) performed by Dr. Jac Mckinley at [facility], during which the patient required two cardioversion procedures and was subsequently discharged on Amiodarone therapy. However, two days post-procedure, the patient experienced a recurrence of atrial fibrillation with rapid ventricular response (RVR), necessitating a return to [place] for another cardioversion. The patient presented to [facility] on May 28, 2024, for additional cardioversion. On June 20, 2024, the patient was admitted again for RVR, and a similar episode occurred on July 3, 2024, with atrial flutter and RVR, presenting with a heart rate in the 140s. The patient was treated with 15 mg of Cardizem IV, resulting in a heart rate reduction to the 70s.    In the ED; Vitals: /79 ;  ; RR 18 ; Afebrile; Maintaining saturation on RA.     Labs:   Hb 11.4( at baseline ) ; MCV 77.5; Platelets 326 k   Na 135 K 4.8   BUN/ Creatinine 26/1.1 ( At Baseline)   AST//171    ProBNP 5943 ( baseline 2000s)   Troponin 44      Chest Xray: Increased bilateral interstitial opacities. Pulmonary vascular congestion present.     USG Right upper quadrant: No gallstones. Severe gallbladder wall edema, may be non biliary in origin. Correlate for volume overload.Perihepatic free fluid.    CT Abdomen and Pelvis:   1.Markedly thickened gallbladder wall which is edematous with superimposed pericholecystic fluid. No radiopaque gallstones. Findings are nonspecific and unclear primarily related to gallbladder pathology versus generalized fluid status.  2. Additionally there is nonspecific periportal edema, small volume perihepatic/right upper quadrant ascites, small right pleural effusion and suggestion of possible pulmonary edema    s/p Lasix 40 mg IV once; Adenosine and Cardizem 16 mg IV push.     Admitted for heart failure exacerbation    (08 Sep 2024 12:46)      PAST MEDICAL & SURGICAL HISTORY  Anxiety    Afib  Eliquis    GERD (gastroesophageal reflux disease)    H/O neck surgery      ALLERGIES:  Toradol (Rash)    MEDICATIONS:  HOME MEDICATIONS  Cardizem  mg/24 hours oral capsule, extended release: 1 cap(s) orally once a day  Eliquis 5 mg oral tablet: 1 tab(s) orally 2 times a day  ferrous sulfate 325 mg (65 mg elemental iron) oral delayed release tablet: 1 tab(s) orally once a day  LORazepam 0.5 mg oral tablet: 1 tab(s) orally once a day (at bedtime) As needed Anxiety  metoprolol tartrate 50 mg oral tablet: 1 tab(s) orally 2 times a day    STANDING MEDICATIONS  apixaban 5 milliGRAM(s) Oral every 12 hours  furosemide   Injectable 40 milliGRAM(s) IV Push every 12 hours  iron sucrose IVPB 100 milliGRAM(s) IV Intermittent every 24 hours  metoprolol tartrate 50 milliGRAM(s) Oral two times a day    PRN MEDICATIONS  acetaminophen     Tablet .. 650 milliGRAM(s) Oral every 6 hours PRN  LORazepam     Tablet 0.5 milliGRAM(s) Oral at bedtime PRN  melatonin 3 milliGRAM(s) Oral at bedtime PRN    VITALS:   T(C): 36.5 (09-09-24 @ 12:27), Max: 36.7 (09-09-24 @ 04:00)  T(F): 97.7 (09-09-24 @ 12:27), Max: 98 (09-09-24 @ 04:00)  HR: 98 (09-09-24 @ 12:27) (57 - 173)  BP: 122/86 (09-09-24 @ 12:27) (95/70 - 122/86)  BP(mean): 94 (09-08-24 @ 16:17) (94 - 94)  ABP: --  ABP(mean): --  RR: 18 (09-09-24 @ 12:27) (18 - 18)  SpO2: 99% (09-09-24 @ 12:27) (95% - 99%)  LABS:                        9.9    5.76  )-----------( 293      ( 09 Sep 2024 05:39 )             31.6     09-09    140  |  108  |  16  ----------------------------<  86  4.3   |  22  |  1.1    Ca    8.5      09 Sep 2024 05:39  Mg     2.0     09-09    TPro  5.7<L>  /  Alb  3.7  /  TBili  0.7  /  DBili  x   /  AST  42<H>  /  ALT  89<H>  /  AlkPhos  157<H>  09-09      Urinalysis Basic - ( 09 Sep 2024 05:39 )    Color: x / Appearance: x / SG: x / pH: x  Gluc: 86 mg/dL / Ketone: x  / Bili: x / Urobili: x   Blood: x / Protein: x / Nitrite: x   Leuk Esterase: x / RBC: x / WBC x   Sq Epi: x / Non Sq Epi: x / Bacteria: x      I&O's Detail    08 Sep 2024 07:01  -  09 Sep 2024 07:00  --------------------------------------------------------  IN:  Total IN: 0 mL    OUT:    Voided (mL): 300 mL  Total OUT: 300 mL    Total NET: -300 mL      09 Sep 2024 07:01  -  09 Sep 2024 12:46  --------------------------------------------------------  IN:    Oral Fluid: 340 mL  Total IN: 340 mL    OUT:  Total OUT: 0 mL    Total NET: 340 mL                    RADIOLOGY:  EKG  12 Lead ECG:   Ventricular Rate 85 BPM    Atrial Rate 85 BPM    P-R Interval 162 ms    QRS Duration 80 ms    Q-T Interval 366 ms    QTC Calculation(Bazett) 435 ms    P Axis 265 degrees    R Axis 91 degrees    T Axis 35 degrees    Diagnosis Line Atrial flutter  Rightward axis  Abnormal ECG    Confirmed by Miriam Beavers MD ([id number]) on 9/9/2024 10:06:28 AM (09-09-24 @ 07:52)    Physical Exam:  General: no acute distress, sitting in bed, conversive   Head: normocephalic and atraumatic  Neck: supple  Heart: regular rate and rhythm, S1 and S2 normal, no murmurs, rubs or gallops noted on exam  Lungs: clear  Abdomen: Bowel sounds present, non tender on light and deep palpation  Extremities: No edema, no clubbing or cyanosis notes, positive peripheral pulses Gastroenterology Consultation:    Patient is a 64y old  Female who presents with a chief complaint of Palpitation and shortness of Breath (09 Sep 2024 14:31)        Admitted on: 09-08-24      HPI:  64-year-old female past medical history of anxiety, A-fib on Eliquis and metoprolol s/p multiple cardioversions and ablation (Follows with Dr. Thomas for cardiology), presents to the ED complaining of palpitations with associated shortness of breath and upper abdominal pain that began earlier today.  Patient found to be in AFib RVR that was mnaged medically, additionally found to have CHF exacerbation likely 2/2 tachycardia. Denies weight loss or Fm Hx GI malignancy. GI consulted for TERI without evidence of active bleed     Prior EGD: over 10 years ago, reports gastritis     Prior Colonoscopy: over 10 years ago, reports unremarkable       PAST MEDICAL & SURGICAL HISTORY:  Anxiety      Afib  Eliquis      GERD (gastroesophageal reflux disease)      H/O neck surgery            FAMILY HISTORY:      Social History:  Tobacco:denies  Alcohol: denies  Drugs:denies    Home Medications:  Cardizem  mg/24 hours oral capsule, extended release: 1 cap(s) orally once a day (08 Sep 2024 14:27)  Eliquis 5 mg oral tablet: 1 tab(s) orally 2 times a day (08 Sep 2024 14:29)  LORazepam 0.5 mg oral tablet: 1 tab(s) orally once a day (at bedtime) As needed Anxiety (08 Sep 2024 14:29)  metoprolol tartrate 50 mg oral tablet: 1 tab(s) orally 2 times a day (08 Sep 2024 14:27)        MEDICATIONS  (STANDING):  enoxaparin Injectable 60 milliGRAM(s) SubCutaneous every 12 hours  fluconAZOLE   Tablet 200 milliGRAM(s) Oral daily  furosemide   Injectable 40 milliGRAM(s) IV Push every 12 hours  iron sucrose IVPB 100 milliGRAM(s) IV Intermittent every 24 hours  metoprolol tartrate 50 milliGRAM(s) Oral two times a day    MEDICATIONS  (PRN):  acetaminophen     Tablet .. 650 milliGRAM(s) Oral every 6 hours PRN Temp greater or equal to 38C (100.4F), Mild Pain (1 - 3)  LORazepam     Tablet 0.5 milliGRAM(s) Oral at bedtime PRN Anxiety  melatonin 3 milliGRAM(s) Oral at bedtime PRN Insomnia      Allergies  Toradol (Rash)      Review of Systems:   Constitutional:  No Fever, No Chills  ENT/Mouth:  No Hearing Changes,  No Difficulty Swallowing  Eyes:  No Eye Pain, No Vision Changes  Cardiovascular:  see hpi   Respiratory:  No Cough, No Dyspnea  Gastrointestinal:  As described in HPI  Musculoskeletal:  No Joint Swelling, No Back Pain  Skin:  No Skin Lesions, No Jaundice  Neuro:  No Syncope, No Dizziness  Heme/Lymph:  No Bruising, No Bleeding.          Physical Examination:  T(C): 36.5 (09-09-24 @ 12:27), Max: 36.7 (09-09-24 @ 04:00)  HR: 109 (09-09-24 @ 19:28) (57 - 173)  BP: 96/65 (09-09-24 @ 18:31) (95/70 - 126/73)  RR: 18 (09-09-24 @ 18:31) (18 - 18)  SpO2: 99% (09-09-24 @ 18:31) (95% - 99%)      09-08-24 @ 07:01  -  09-09-24 @ 07:00  --------------------------------------------------------  IN: 0 mL / OUT: 300 mL / NET: -300 mL    09-09-24 @ 07:01  -  09-09-24 @ 19:51  --------------------------------------------------------  IN: 580 mL / OUT: 0 mL / NET: 580 mL          GENERAL: AAOx3, no acute distress.  HEAD:  Atraumatic, Normocephalic  EYES: conjunctiva and sclera clear  NECK: Supple, no JVD or thyromegaly  CHEST/LUNG: Clear to auscultation bilaterally; No wheeze, rhonchi, or rales  HEART: Regular rate and rhythm; normal S1, S2, No murmurs.  ABDOMEN: Soft, nontender, nondistended; Bowel sounds present  NEUROLOGY: No asterixis or tremor.   SKIN: Intact, no jaundice        Data:                        9.9    5.76  )-----------( 293      ( 09 Sep 2024 05:39 )             31.6     Hgb Trend:  9.9  09-09-24 @ 05:39  9.8  09-08-24 @ 16:00  11.4  09-08-24 @ 01:37        09-09    140  |  108  |  16  ----------------------------<  86  4.3   |  22  |  1.1    Ca    8.5      09 Sep 2024 05:39  Mg     2.0     09-09    TPro  5.7<L>  /  Alb  3.7  /  TBili  0.7  /  DBili  x   /  AST  42<H>  /  ALT  89<H>  /  AlkPhos  157<H>  09-09    Liver panel trend:  TBili 0.7   /   AST 42   /   ALT 89   /   AlkP 157   /   Tptn 5.7   /   Alb 3.7    /   DBili --      09-09  TBili 0.4   /   AST 63   /      /   AlkP 159   /   Tptn 5.9   /   Alb 3.8    /   DBili --      09-08  TBili <0.2   /      /      /   AlkP 192   /   Tptn 6.5   /   Alb 4.1    /   DBili --      09-08              Radiology:    US Abdomen Upper Quadrant Right:   ACC: [id number] EXAM:  US ABDOMEN RT UPR QUADRANT   ORDERED BY: DENICE MONTES DE OCA     PROCEDURE DATE:  09/08/2024          INTERPRETATION:  CLINICAL INFORMATION: Rule out cholecystitis    COMPARISON: Same-day abdomen pelvis CT    TECHNIQUE: Sonography of the right upper quadrant.    FINDINGS:  Liver: Within normal limits.  Bile ducts: Normal caliber. Common bile duct measures 7 mm, upper limit   of normal for the patient's age.  Gallbladder: No gallstones, negative sonographic Ferguson sign. Severe   gallbladder wall edema.  Pancreas: Visualized portions are within normal limits.  Right kidney: 10.6 cm. No hydronephrosis.  Ascites: Perihepatic free fluid.  IVC: Visualized portions are within normal limits.    IMPRESSION:  No gallstones. Severe gallbladder wall edema, may be non biliary in   origin. Correlate for volume overload.    Perihepatic free fluid.        --- End of Report ---            SALINA HERNANDEZ MD; Attending Radiologist  This document has been electronically signed. Sep  8 2024  8:15AM (09-08-24 @ 08:04)     Gastroenterology Consultation:    Patient is a 64y old  Female who presents with a chief complaint of Palpitation and shortness of Breath (09 Sep 2024 14:31)        Admitted on: 09-08-24      HPI:  64-year-old female past medical history of anxiety, A-fib on Eliquis and metoprolol s/p multiple cardioversions and ablation (Follows with Dr. Thomas for cardiology), presents to the ED complaining of palpitations with associated shortness of breath and upper abdominal pain that began earlier today.  Patient found to be in AFib RVR that was managed medically, additionally found to have CHF exacerbation likely 2/2 tachycardia. Denies weight loss or Fm Hx GI malignancy. GI consulted for TERI without evidence of active bleed     Prior EGD: over 10 years ago, reports gastritis     Prior Colonoscopy: over 10 years ago, reports unremarkable       PAST MEDICAL & SURGICAL HISTORY:  Anxiety      Afib  Eliquis      GERD (gastroesophageal reflux disease)      H/O neck surgery            FAMILY HISTORY: no reported family history of GI malignancy       Social History:  Tobacco:denies  Alcohol: denies  Drugs:denies    Home Medications:  Cardizem  mg/24 hours oral capsule, extended release: 1 cap(s) orally once a day (08 Sep 2024 14:27)  Eliquis 5 mg oral tablet: 1 tab(s) orally 2 times a day (08 Sep 2024 14:29)  LORazepam 0.5 mg oral tablet: 1 tab(s) orally once a day (at bedtime) As needed Anxiety (08 Sep 2024 14:29)  metoprolol tartrate 50 mg oral tablet: 1 tab(s) orally 2 times a day (08 Sep 2024 14:27)        MEDICATIONS  (STANDING):  enoxaparin Injectable 60 milliGRAM(s) SubCutaneous every 12 hours  fluconAZOLE   Tablet 200 milliGRAM(s) Oral daily  furosemide   Injectable 40 milliGRAM(s) IV Push every 12 hours  iron sucrose IVPB 100 milliGRAM(s) IV Intermittent every 24 hours  metoprolol tartrate 50 milliGRAM(s) Oral two times a day    MEDICATIONS  (PRN):  acetaminophen     Tablet .. 650 milliGRAM(s) Oral every 6 hours PRN Temp greater or equal to 38C (100.4F), Mild Pain (1 - 3)  LORazepam     Tablet 0.5 milliGRAM(s) Oral at bedtime PRN Anxiety  melatonin 3 milliGRAM(s) Oral at bedtime PRN Insomnia      Allergies  Toradol (Rash)      Review of Systems:   Constitutional:  No Fever, No Chills  ENT/Mouth:  No Hearing Changes,  No Difficulty Swallowing  Eyes:  No Eye Pain, No Vision Changes  Cardiovascular:  see hpi   Respiratory:  No Cough, No Dyspnea  Gastrointestinal:  As described in HPI  Musculoskeletal:  No Joint Swelling, No Back Pain  Skin:  No Skin Lesions, No Jaundice  Neuro:  No Syncope, No Dizziness  Heme/Lymph:  No Bruising, No Bleeding.          Physical Examination:  T(C): 36.5 (09-09-24 @ 12:27), Max: 36.7 (09-09-24 @ 04:00)  HR: 109 (09-09-24 @ 19:28) (57 - 173)  BP: 96/65 (09-09-24 @ 18:31) (95/70 - 126/73)  RR: 18 (09-09-24 @ 18:31) (18 - 18)  SpO2: 99% (09-09-24 @ 18:31) (95% - 99%)      09-08-24 @ 07:01  -  09-09-24 @ 07:00  --------------------------------------------------------  IN: 0 mL / OUT: 300 mL / NET: -300 mL    09-09-24 @ 07:01  -  09-09-24 @ 19:51  --------------------------------------------------------  IN: 580 mL / OUT: 0 mL / NET: 580 mL          GENERAL: AAOx3, no acute distress.  HEAD:  Atraumatic, Normocephalic  EYES: conjunctiva and sclera clear  NECK: Supple, no JVD or thyromegaly  CHEST/LUNG: Clear to auscultation bilaterally; No wheeze, rhonchi, or rales  HEART: IRRegular rate and rhythm; normal S1, S2, No murmurs.  ABDOMEN: Soft, nontender, nondistended; Bowel sounds present  NEUROLOGY: No asterixis or tremor.   SKIN: Intact, no jaundice        Data:                        9.9    5.76  )-----------( 293      ( 09 Sep 2024 05:39 )             31.6     Hgb Trend:  9.9  09-09-24 @ 05:39  9.8  09-08-24 @ 16:00  11.4  09-08-24 @ 01:37        09-09    140  |  108  |  16  ----------------------------<  86  4.3   |  22  |  1.1    Ca    8.5      09 Sep 2024 05:39  Mg     2.0     09-09    TPro  5.7<L>  /  Alb  3.7  /  TBili  0.7  /  DBili  x   /  AST  42<H>  /  ALT  89<H>  /  AlkPhos  157<H>  09-09    Liver panel trend:  TBili 0.7   /   AST 42   /   ALT 89   /   AlkP 157   /   Tptn 5.7   /   Alb 3.7    /   DBili --      09-09  TBili 0.4   /   AST 63   /      /   AlkP 159   /   Tptn 5.9   /   Alb 3.8    /   DBili --      09-08  TBili <0.2   /      /      /   AlkP 192   /   Tptn 6.5   /   Alb 4.1    /   DBili --      09-08              Radiology:    US Abdomen Upper Quadrant Right:   ACC: [id number] EXAM:  US ABDOMEN RT UPR QUADRANT   ORDERED BY: DENICE MONTES DE OCA     PROCEDURE DATE:  09/08/2024          INTERPRETATION:  CLINICAL INFORMATION: Rule out cholecystitis    COMPARISON: Same-day abdomen pelvis CT    TECHNIQUE: Sonography of the right upper quadrant.    FINDINGS:  Liver: Within normal limits.  Bile ducts: Normal caliber. Common bile duct measures 7 mm, upper limit   of normal for the patient's age.  Gallbladder: No gallstones, negative sonographic Ferguson sign. Severe   gallbladder wall edema.  Pancreas: Visualized portions are within normal limits.  Right kidney: 10.6 cm. No hydronephrosis.  Ascites: Perihepatic free fluid.  IVC: Visualized portions are within normal limits.    IMPRESSION:  No gallstones. Severe gallbladder wall edema, may be non biliary in   origin. Correlate for volume overload.    Perihepatic free fluid.        --- End of Report ---            SALINA HERNANDEZ MD; Attending Radiologist  This document has been electronically signed. Sep  8 2024  8:15AM (09-08-24 @ 08:04)

## 2024-09-09 NOTE — PROGRESS NOTE ADULT - SUBJECTIVE AND OBJECTIVE BOX
SUBJECTIVE / OVERNIGHT EVENTS  Overnight, Pt was tachycardic to 170s, got 16mg cardizem. complains of nausea this am    MEDICATIONS  apixaban 5 milliGRAM(s) Oral every 12 hours  fluconAZOLE   Tablet 200 milliGRAM(s) Oral daily  furosemide   Injectable 40 milliGRAM(s) IV Push every 12 hours  iron sucrose IVPB 100 milliGRAM(s) IV Intermittent every 24 hours  metoprolol tartrate 50 milliGRAM(s) Oral two times a day    acetaminophen     Tablet .. 650 milliGRAM(s) Oral every 6 hours PRN Temp greater or equal to 38C (100.4F), Mild Pain (1 - 3)  LORazepam     Tablet 0.5 milliGRAM(s) Oral at bedtime PRN Anxiety  melatonin 3 milliGRAM(s) Oral at bedtime PRN Insomnia    VITALS /  EXAM    T(C): 36.5 (09-09-24 @ 12:27), Max: 36.7 (09-09-24 @ 04:00)  HR: 98 (09-09-24 @ 12:27) (57 - 173)  BP: 122/86 (09-09-24 @ 12:27) (95/70 - 122/86)  RR: 18 (09-09-24 @ 12:27) (18 - 18)  SpO2: 99% (09-09-24 @ 12:27) (95% - 99%)    GENERAL: NAD, well-developed  CHEST/LUNG: Clear to auscultation bilaterally; No wheezes, rales or rhonchi  HEART: Regular rate and rhythm; No murmurs, rubs, or gallops  ABDOMEN: Soft, Nontender, Nondistended; Bowel sounds present, no masses.  EXTREMITIES:  2+ Peripheral Pulses, No clubbing, cyanosis, or edema    I's & O's     09-08-24 @ 07:01  -  09-09-24 @ 07:00  --------------------------------------------------------  IN:  Total IN: 0 mL    OUT:    Voided (mL): 300 mL  Total OUT: 300 mL    Total NET: -300 mL      09-09-24 @ 07:01  -  09-09-24 @ 14:35  --------------------------------------------------------  IN:    Oral Fluid: 340 mL  Total IN: 340 mL    OUT:  Total OUT: 0 mL    Total NET: 340 mL        LABS             9.9    5.76  )-----------( 293      ( 09-09-24 @ 05:39 )             31.6     140  |  108  |  16  -------------------------<  86   09-09-24 @ 05:39  4.3  |  22  |  1.1    Ca      8.5     09-09-24 @ 05:39  Mg     2.0     09-09-24 @ 05:39    TPro  5.7  /  Alb  3.7  /  TBili  0.7  /  DBili  x   /  AST  42  /  ALT  89  /  AlkPhos  157  /  GGT  x     09-09-24 @ 05:39      Troponin T, High Sensitivity Result: 37 ng/L (09-08-24 @ 16:00)  Troponin T, High Sensitivity Result: 44 ng/L (09-08-24 @ 10:00)      Urinalysis Basic - ( 09 Sep 2024 05:39 )    Color: x / Appearance: x / SG: x / pH: x  Gluc: 86 mg/dL / Ketone: x  / Bili: x / Urobili: x   Blood: x / Protein: x / Nitrite: x   Leuk Esterase: x / RBC: x / WBC x   Sq Epi: x / Non Sq Epi: x / Bacteria: x      MICRO / IMAGING / CARDIOLOGY  Telemetry: Reviewed   EKG: Reviewed    CULTURES    IMAGING  PACS Image:  (09-09-24 @ 05:53)  PACS Image:  (09-08-24 @ 08:04)  PACS Image:  (09-08-24 @ 03:00)  PACS Image:  (09-08-24 @ 02:41)    CARDIOLOGY

## 2024-09-10 LAB
ALBUMIN SERPL ELPH-MCNC: 3.8 G/DL — SIGNIFICANT CHANGE UP (ref 3.5–5.2)
ALP SERPL-CCNC: 151 U/L — HIGH (ref 30–115)
ALT FLD-CCNC: 70 U/L — HIGH (ref 0–41)
ANION GAP SERPL CALC-SCNC: 13 MMOL/L — SIGNIFICANT CHANGE UP (ref 7–14)
AST SERPL-CCNC: 25 U/L — SIGNIFICANT CHANGE UP (ref 0–41)
BASOPHILS # BLD AUTO: 0.04 K/UL — SIGNIFICANT CHANGE UP (ref 0–0.2)
BASOPHILS NFR BLD AUTO: 0.6 % — SIGNIFICANT CHANGE UP (ref 0–1)
BILIRUB SERPL-MCNC: 0.3 MG/DL — SIGNIFICANT CHANGE UP (ref 0.2–1.2)
BUN SERPL-MCNC: 13 MG/DL — SIGNIFICANT CHANGE UP (ref 10–20)
CALCIUM SERPL-MCNC: 8.7 MG/DL — SIGNIFICANT CHANGE UP (ref 8.4–10.4)
CHLORIDE SERPL-SCNC: 108 MMOL/L — SIGNIFICANT CHANGE UP (ref 98–110)
CO2 SERPL-SCNC: 23 MMOL/L — SIGNIFICANT CHANGE UP (ref 17–32)
CREAT SERPL-MCNC: 1.1 MG/DL — SIGNIFICANT CHANGE UP (ref 0.7–1.5)
EGFR: 56 ML/MIN/1.73M2 — LOW
EOSINOPHIL # BLD AUTO: 0.18 K/UL — SIGNIFICANT CHANGE UP (ref 0–0.7)
EOSINOPHIL NFR BLD AUTO: 2.9 % — SIGNIFICANT CHANGE UP (ref 0–8)
GLUCOSE SERPL-MCNC: 90 MG/DL — SIGNIFICANT CHANGE UP (ref 70–99)
HCT VFR BLD CALC: 33.6 % — LOW (ref 37–47)
HGB BLD-MCNC: 10.3 G/DL — LOW (ref 12–16)
HIV 1+2 AB+HIV1 P24 AG SERPL QL IA: SIGNIFICANT CHANGE UP
IMM GRANULOCYTES NFR BLD AUTO: 0.3 % — SIGNIFICANT CHANGE UP (ref 0.1–0.3)
LYMPHOCYTES # BLD AUTO: 1.98 K/UL — SIGNIFICANT CHANGE UP (ref 1.2–3.4)
LYMPHOCYTES # BLD AUTO: 31.9 % — SIGNIFICANT CHANGE UP (ref 20.5–51.1)
MAGNESIUM SERPL-MCNC: 2 MG/DL — SIGNIFICANT CHANGE UP (ref 1.8–2.4)
MCHC RBC-ENTMCNC: 23.8 PG — LOW (ref 27–31)
MCHC RBC-ENTMCNC: 30.7 G/DL — LOW (ref 32–37)
MCV RBC AUTO: 77.8 FL — LOW (ref 81–99)
MONOCYTES # BLD AUTO: 0.56 K/UL — SIGNIFICANT CHANGE UP (ref 0.1–0.6)
MONOCYTES NFR BLD AUTO: 9 % — SIGNIFICANT CHANGE UP (ref 1.7–9.3)
NEUTROPHILS # BLD AUTO: 3.42 K/UL — SIGNIFICANT CHANGE UP (ref 1.4–6.5)
NEUTROPHILS NFR BLD AUTO: 55.3 % — SIGNIFICANT CHANGE UP (ref 42.2–75.2)
NRBC # BLD: 0 /100 WBCS — SIGNIFICANT CHANGE UP (ref 0–0)
PLATELET # BLD AUTO: 307 K/UL — SIGNIFICANT CHANGE UP (ref 130–400)
PMV BLD: 10.9 FL — HIGH (ref 7.4–10.4)
POTASSIUM SERPL-MCNC: 4.3 MMOL/L — SIGNIFICANT CHANGE UP (ref 3.5–5)
POTASSIUM SERPL-SCNC: 4.3 MMOL/L — SIGNIFICANT CHANGE UP (ref 3.5–5)
PROT SERPL-MCNC: 5.8 G/DL — LOW (ref 6–8)
RBC # BLD: 4.32 M/UL — SIGNIFICANT CHANGE UP (ref 4.2–5.4)
RBC # FLD: 20.2 % — HIGH (ref 11.5–14.5)
SODIUM SERPL-SCNC: 144 MMOL/L — SIGNIFICANT CHANGE UP (ref 135–146)
WBC # BLD: 6.2 K/UL — SIGNIFICANT CHANGE UP (ref 4.8–10.8)
WBC # FLD AUTO: 6.2 K/UL — SIGNIFICANT CHANGE UP (ref 4.8–10.8)

## 2024-09-10 PROCEDURE — 99233 SBSQ HOSP IP/OBS HIGH 50: CPT

## 2024-09-10 PROCEDURE — 93010 ELECTROCARDIOGRAM REPORT: CPT

## 2024-09-10 RX ORDER — POLYETHYLENE GLYCOL 3350, SODIUM SULFATE ANHYDROUS, SODIUM BICARBONATE, SODIUM CHLORIDE, POTASSIUM CHLORIDE 236; 22.74; 6.74; 5.86; 2.97 G/4L; G/4L; G/4L; G/4L; G/4L
4000 POWDER, FOR SOLUTION ORAL ONCE
Refills: 0 | Status: COMPLETED | OUTPATIENT
Start: 2024-09-10 | End: 2024-09-10

## 2024-09-10 RX ORDER — METOPROLOL TARTRATE 100 MG/1
2.5 TABLET ORAL ONCE
Refills: 0 | Status: COMPLETED | OUTPATIENT
Start: 2024-09-10 | End: 2024-09-10

## 2024-09-10 RX ORDER — LORAZEPAM 4 MG/ML
0.5 INJECTION INTRAMUSCULAR; INTRAVENOUS ONCE
Refills: 0 | Status: DISCONTINUED | OUTPATIENT
Start: 2024-09-10 | End: 2024-09-10

## 2024-09-10 RX ORDER — DIGOXIN 0.12 MG/1
250 TABLET ORAL EVERY 6 HOURS
Refills: 0 | Status: DISCONTINUED | OUTPATIENT
Start: 2024-09-10 | End: 2024-09-10

## 2024-09-10 RX ORDER — METOPROLOL TARTRATE 100 MG/1
5 TABLET ORAL ONCE
Refills: 0 | Status: COMPLETED | OUTPATIENT
Start: 2024-09-10 | End: 2024-09-10

## 2024-09-10 RX ORDER — DIGOXIN 0.12 MG/1
250 TABLET ORAL EVERY 6 HOURS
Refills: 0 | Status: DISCONTINUED | OUTPATIENT
Start: 2024-09-10 | End: 2024-09-11

## 2024-09-10 RX ORDER — FUROSEMIDE 40 MG
40 TABLET ORAL ONCE
Refills: 0 | Status: COMPLETED | OUTPATIENT
Start: 2024-09-10 | End: 2024-09-10

## 2024-09-10 RX ORDER — METOPROLOL TARTRATE 100 MG/1
2.5 TABLET ORAL ONCE
Refills: 0 | Status: DISCONTINUED | OUTPATIENT
Start: 2024-09-10 | End: 2024-09-10

## 2024-09-10 RX ORDER — DIGOXIN 0.12 MG/1
250 TABLET ORAL ONCE
Refills: 0 | Status: COMPLETED | OUTPATIENT
Start: 2024-09-10 | End: 2024-09-10

## 2024-09-10 RX ORDER — DIGOXIN 0.12 MG/1
250 TABLET ORAL ONCE
Refills: 0 | Status: DISCONTINUED | OUTPATIENT
Start: 2024-09-10 | End: 2024-09-10

## 2024-09-10 RX ADMIN — METOPROLOL TARTRATE 5 MILLIGRAM(S): 100 TABLET ORAL at 14:31

## 2024-09-10 RX ADMIN — LORAZEPAM 0.5 MILLIGRAM(S): 4 INJECTION INTRAMUSCULAR; INTRAVENOUS at 14:12

## 2024-09-10 RX ADMIN — FLUCONAZOLE 200 MILLIGRAM(S): 150 TABLET ORAL at 12:15

## 2024-09-10 RX ADMIN — Medication 40 MILLIGRAM(S): at 12:15

## 2024-09-10 RX ADMIN — LORAZEPAM 0.5 MILLIGRAM(S): 4 INJECTION INTRAMUSCULAR; INTRAVENOUS at 22:50

## 2024-09-10 RX ADMIN — METOPROLOL TARTRATE 5 MILLIGRAM(S): 100 TABLET ORAL at 16:11

## 2024-09-10 RX ADMIN — METOPROLOL TARTRATE 50 MILLIGRAM(S): 100 TABLET ORAL at 17:15

## 2024-09-10 RX ADMIN — IRON SUCROSE 210 MILLIGRAM(S): 20 INJECTION, SOLUTION INTRAVENOUS at 15:05

## 2024-09-10 RX ADMIN — METOPROLOL TARTRATE 50 MILLIGRAM(S): 100 TABLET ORAL at 05:49

## 2024-09-10 RX ADMIN — DIGOXIN 250 MICROGRAM(S): 0.12 TABLET ORAL at 18:52

## 2024-09-10 NOTE — PROGRESS NOTE ADULT - SUBJECTIVE AND OBJECTIVE BOX
SUBJECTIVE / OVERNIGHT EVENTS  Patient slept well overnight. No acute complaints this AM. Patient does not report fevers, chills, CP, SOB, or n/v/d    MEDICATIONS  enoxaparin Injectable 60 milliGRAM(s) SubCutaneous every 12 hours  fluconAZOLE   Tablet 200 milliGRAM(s) Oral daily  iron sucrose IVPB 100 milliGRAM(s) IV Intermittent every 24 hours  metoprolol tartrate 50 milliGRAM(s) Oral two times a day    acetaminophen     Tablet .. 650 milliGRAM(s) Oral every 6 hours PRN Temp greater or equal to 38C (100.4F), Mild Pain (1 - 3)  LORazepam     Tablet 0.5 milliGRAM(s) Oral at bedtime PRN Anxiety  melatonin 3 milliGRAM(s) Oral at bedtime PRN Insomnia    VITALS /  EXAM    T(C): 36.4 (09-10-24 @ 04:51), Max: 36.5 (09-09-24 @ 12:27)  HR: 83 (09-10-24 @ 04:51) (83 - 170)  BP: 101/61 (09-10-24 @ 04:51) (96/65 - 126/73)  RR: 18 (09-10-24 @ 04:51) (18 - 18)  SpO2: 100% (09-10-24 @ 04:51) (99% - 100%)    GENERAL: NAD, well-developed  CHEST/LUNG: Clear to auscultation bilaterally; No wheezes, rales or rhonchi  HEART: Regular rate and rhythm; No murmurs, rubs, or gallops  ABDOMEN: Soft, Nontender, Nondistended; Bowel sounds present, no masses.  EXTREMITIES:  2+ Peripheral Pulses, No clubbing, cyanosis, or edema    I's & O's     09-09-24 @ 07:01  -  09-10-24 @ 07:00  --------------------------------------------------------  IN:    Oral Fluid: 816 mL  Total IN: 816 mL    OUT:    Voided (mL): 970 mL  Total OUT: 970 mL    Total NET: -154 mL        LABS             10.3   6.20  )-----------( 307      ( 09-10-24 @ 05:38 )             33.6     144  |  108  |  13  -------------------------<  90   09-10-24 @ 05:38  4.3  |  23  |  1.1    Ca      8.7     09-10-24 @ 05:38  Mg     2.0     09-10-24 @ 05:38    TPro  5.8  /  Alb  3.8  /  TBili  0.3  /  DBili  x   /  AST  25  /  ALT  70  /  AlkPhos  151  /  GGT  x     09-10-24 @ 05:38      Troponin T, High Sensitivity Result: 37 ng/L (09-08-24 @ 16:00)  Troponin T, High Sensitivity Result: 44 ng/L (09-08-24 @ 10:00)          Urinalysis Basic - ( 10 Sep 2024 05:38 )    Color: x / Appearance: x / SG: x / pH: x  Gluc: 90 mg/dL / Ketone: x  / Bili: x / Urobili: x   Blood: x / Protein: x / Nitrite: x   Leuk Esterase: x / RBC: x / WBC x   Sq Epi: x / Non Sq Epi: x / Bacteria: x      MICRO / IMAGING / CARDIOLOGY  Telemetry: Reviewed   EKG: Reviewed    CULTURES    IMAGING  PACS Image:  (09-09-24 @ 05:53)    CARDIOLOGY

## 2024-09-10 NOTE — PROGRESS NOTE ADULT - ASSESSMENT
64-year-old female past medical history of anxiety, A-fib on Eliquis and metoprolol s/p multiple cardioversions and ablation (Follows with Dr. Thomas for cardiology), presents to the ED complaining of palpitations with associated shortness of breath and upper abdominal pain that began earlier  in ED:  - EKG : Afib with RVR   - MINNA ( 5/2024): EF 40 to 45%.  - s/p 40 mg IV Lasix in the ED.      ADMITTED for CHF exacerbation secondary to afib w/ rvr, likely precipitated by medication noncompliance      # Acute on chronic HFmrEF likely precipated due to Afib RVR 2/2 medications non compliance  # Atrial Fibrillation with RVR:   # GB edema  and transaminitis ( From volume overload), Likely congestive hepatopathy, resolving  - In the ED ; found to have atrial fibrillation ; s/p Adenosine and Diltiazem .   - Chest Xray: Increased bilateral interstitial opacities. Pulmonary vascular congestion present.   - USG Right upper quadrant & CT Abdomen and pelvis noted - All the features suggesting volume overload. - Surgery consult appreciated: No acute intervention for GB findings.   - Pro BNP 5 k ( Baseline 2 K )   - Continue home medications; Metoprolol tartrate 50 mg BID ; Hold Cardizem  ( Given the findings of Heart failure)   - Cardiology consulted ( Dr. Thomas) &EP consulted  EP: - Patient will need repeat ablation. Patient states that she will think about it.Can be discharged on Metoprolol 50mg PO BID and Eliquis 5mg PO BID  -stopped lasix today, pt no longer overloaded    #Iron Def Anemia, microcytic  - Hb 11.4 ( baseline ) MCV 77.5 >>9.9 on 9/9  -iron studies noted  - hold Ferrous Sulfate 325 mg po daily & start IV venofer for 5days.  -GI consulted: colono +EGD tmrw: holding eliquis, now on lovenox    #oral thrush  -started PO diflucan   -HIV -ve    #Anxiety  - on Lorazepam 0.5 PO PRN   - pt was educated about behavioral modification     Plan: egd-colono tmrw

## 2024-09-10 NOTE — PROGRESS NOTE ADULT - ASSESSMENT
AF/RVR  Anemia    - Rate control with metoprolol. Increase as tolerated  - Resume Eliquis when ok with GI  - Trend CBC  - Baseline TSH/LFT  - Patient will benefit from MINNA/Cardioversion after GI work-up is completed to avoid interruption of OAC.  - Start Flecainide 75 mg PO q12hr after cardioversion.  - Patient is ok to proceed with GI work-up. IV beta blocker as needed during the procedure. Avoid hypotension.   - OP f-up in ~ 2 weeks to discuss further management of AF

## 2024-09-10 NOTE — CHART NOTE - NSCHARTNOTEFT_GEN_A_CORE
Patient has Afib with RVR. HR in 160-170s, was given metoprolol IV twice, HR remained in 170s. systolic BP in 90s. (currently 90/70).  Consulted cardiology, started digoxin 0.25mg once then every 6h. Patient has Afib with RVR. HR in 160-170s, was given metoprolol IV twice, HR remained in 170s. systolic BP in 90s. (currently 90/70).  Consulted cardiology, started digoxin 0.25mg once then every 6h.  will hold off on colono/EGD tmrw.

## 2024-09-10 NOTE — PROGRESS NOTE ADULT - SUBJECTIVE AND OBJECTIVE BOX
Patient is a 64y old  Female who presents with a chief complaint of Palpitation and shortness of Breath (10 Sep 2024 08:52)        HPI:    AF/RVR. Some ALDANA. No symptoms at rest        PAST MEDICAL & SURGICAL HISTORY:  Anxiety      Afib  Eliquis      GERD (gastroesophageal reflux disease)      H/O neck surgery                      PREVIOUS DIAGNOSTIC TESTING:      ECHO  FINDINGS:    STRESS  FINDINGS:    CATHETERIZATION  FINDINGS:    ELECTROPHYSIOLOGY STUDY  FINDINGS:    CAROTID ULTRASOUND:  FINDINGS    VENOUS DUPLEX SCAN:  FINDINGS:    CHEST CT PULMONARY ANGIO with IV Contrast:  FINDINGS:    MEDICATIONS  (STANDING):  enoxaparin Injectable 60 milliGRAM(s) SubCutaneous every 12 hours  fluconAZOLE   Tablet 200 milliGRAM(s) Oral daily  iron sucrose IVPB 100 milliGRAM(s) IV Intermittent every 24 hours  metoprolol tartrate 50 milliGRAM(s) Oral two times a day    MEDICATIONS  (PRN):  acetaminophen     Tablet .. 650 milliGRAM(s) Oral every 6 hours PRN Temp greater or equal to 38C (100.4F), Mild Pain (1 - 3)  LORazepam     Tablet 0.5 milliGRAM(s) Oral at bedtime PRN Anxiety  melatonin 3 milliGRAM(s) Oral at bedtime PRN Insomnia      FAMILY HISTORY:      SOCIAL HISTORY:    CIGARETTES:    ALCOHOL:    Past Surgical History:    Allergies:    Toradol (Rash)      REVIEW OF SYSTEMS:    As Above.    Vital Signs Last 24 Hrs  T(C): 36.4 (10 Sep 2024 04:51), Max: 36.5 (09 Sep 2024 12:27)  T(F): 97.6 (10 Sep 2024 04:51), Max: 97.7 (09 Sep 2024 12:27)  HR: 83 (10 Sep 2024 04:51) (83 - 170)  BP: 101/61 (10 Sep 2024 04:51) (96/65 - 126/73)  BP(mean): --  RR: 18 (10 Sep 2024 04:51) (18 - 18)  SpO2: 100% (10 Sep 2024 04:51) (99% - 100%)    Parameters below as of 09 Sep 2024 18:31  Patient On (Oxygen Delivery Method): room air        PHYSICAL EXAM:        GENERAL: In no apparent distress, well nourished, and hydrated.  HEAD:  Atraumatic, Normocephalic  HEART: irregular rate and rhythm; No murmurs, rubs, or gallops.  PULMONARY: Clear to auscultation and perfusion.  No rales, wheezing, or rhonchi bilaterally.  EXTREMITIES:  2+ Peripheral Pulses, No clubbing, cyanosis, or edema  NEUROLOGICAL: Grossly nonfocal      INTERPRETATION OF TELEMETRY:    ECG:    I&O's Detail    09 Sep 2024 07:  -  10 Sep 2024 07:00  --------------------------------------------------------  IN:    Oral Fluid: 816 mL  Total IN: 816 mL    OUT:    Voided (mL): 970 mL  Total OUT: 970 mL    Total NET: -154 mL          LABS:                        10.3   6.20  )-----------( 307      ( 10 Sep 2024 05:38 )             33.6     09-10    144  |  108  |  13  ----------------------------<  90  4.3   |  23  |  1.1    Ca    8.7      10 Sep 2024 05:38  Mg     2.0     09-10    TPro  5.8<L>  /  Alb  3.8  /  TBili  0.3  /  DBili  x   /  AST  25  /  ALT  70<H>  /  AlkPhos  151<H>  09-10          Urinalysis Basic - ( 10 Sep 2024 05:38 )    Color: x / Appearance: x / SG: x / pH: x  Gluc: 90 mg/dL / Ketone: x  / Bili: x / Urobili: x   Blood: x / Protein: x / Nitrite: x   Leuk Esterase: x / RBC: x / WBC x   Sq Epi: x / Non Sq Epi: x / Bacteria: x      BNP  I&O's Detail    09 Sep 2024 07:01  -  10 Sep 2024 07:00  --------------------------------------------------------  IN:    Oral Fluid: 816 mL  Total IN: 816 mL    OUT:    Voided (mL): 970 mL  Total OUT: 970 mL    Total NET: -154 mL        Daily     Daily Weight in k (10 Sep 2024 06:40)    RADIOLOGY & ADDITIONAL STUDIES:

## 2024-09-10 NOTE — PROGRESS NOTE ADULT - SUBJECTIVE AND OBJECTIVE BOX
Gastroenterology progress note:     Patient is a 64y old  Female who presents with a chief complaint of Palpitation and shortness of Breath (09 Sep 2024 14:31)       Admitted on: 09-08-24    We are following the patient for: TERI       Interval History:    No acute events overnight. Tolerating diet. Denies abdominal pain. Passing stool.     PAST MEDICAL & SURGICAL HISTORY:  Anxiety      Afib  Eliquis      GERD (gastroesophageal reflux disease)      H/O neck surgery          MEDICATIONS  (STANDING):  enoxaparin Injectable 60 milliGRAM(s) SubCutaneous every 12 hours  fluconAZOLE   Tablet 200 milliGRAM(s) Oral daily  furosemide   Injectable 40 milliGRAM(s) IV Push every 12 hours  iron sucrose IVPB 100 milliGRAM(s) IV Intermittent every 24 hours  metoprolol tartrate 50 milliGRAM(s) Oral two times a day    MEDICATIONS  (PRN):  acetaminophen     Tablet .. 650 milliGRAM(s) Oral every 6 hours PRN Temp greater or equal to 38C (100.4F), Mild Pain (1 - 3)  LORazepam     Tablet 0.5 milliGRAM(s) Oral at bedtime PRN Anxiety  melatonin 3 milliGRAM(s) Oral at bedtime PRN Insomnia      Allergies  Toradol (Rash)      Review of Systems:   Cardiovascular:  No Chest Pain, No Palpitations  Respiratory:  No Cough, No Dyspnea  Gastrointestinal:  As described in HPI  Skin:  No Skin Lesions, No Jaundice  Neuro:  No Syncope, No Dizziness    Physical Examination:  T(C): 36.4 (09-10-24 @ 04:51), Max: 36.5 (09-09-24 @ 12:27)  HR: 83 (09-10-24 @ 04:51) (83 - 170)  BP: 101/61 (09-10-24 @ 04:51) (96/65 - 126/73)  RR: 18 (09-10-24 @ 04:51) (18 - 18)  SpO2: 100% (09-10-24 @ 04:51) (99% - 100%)      09-09-24 @ 07:01  -  09-10-24 @ 07:00  --------------------------------------------------------  IN: 816 mL / OUT: 970 mL / NET: -154 mL        GENERAL: AAOx3, no acute distress.  HEAD:  Atraumatic, Normocephalic  EYES: conjunctiva and sclera clear  NECK: Supple, no JVD or thyromegaly  CHEST/LUNG: Clear to auscultation bilaterally; No wheeze, rhonchi, or rales  HEART: Regular rate and rhythm; normal S1, S2, No murmurs.  ABDOMEN: Soft, nontender, nondistended; Bowel sounds present  NEUROLOGY: No asterixis or tremor.   SKIN: Intact, no jaundice     Data:                        10.3   6.20  )-----------( 307      ( 10 Sep 2024 05:38 )             33.6     Hgb trend:  10.3  09-10-24 @ 05:38  9.9  09-09-24 @ 05:39  9.8  09-08-24 @ 16:00  11.4  09-08-24 @ 01:37        09-10    144  |  108  |  13  ----------------------------<  90  4.3   |  23  |  1.1    Ca    8.7      10 Sep 2024 05:38  Mg     2.0     09-10    TPro  5.8<L>  /  Alb  3.8  /  TBili  0.3  /  DBili  x   /  AST  25  /  ALT  70<H>  /  AlkPhos  151<H>  09-10    Liver panel trend:  TBili 0.3   /   AST 25   /   ALT 70   /   AlkP 151   /   Tptn 5.8   /   Alb 3.8    /   DBili --      09-10  TBili 0.7   /   AST 42   /   ALT 89   /   AlkP 157   /   Tptn 5.7   /   Alb 3.7    /   DBili --      09-09  TBili 0.4   /   AST 63   /      /   AlkP 159   /   Tptn 5.9   /   Alb 3.8    /   DBili --      09-08  TBili <0.2   /      /      /   AlkP 192   /   Tptn 6.5   /   Alb 4.1    /   DBili --      09-08             Radiology:       Gastroenterology progress note:     Patient is a 64y old  Female who presents with a chief complaint of Palpitation and shortness of Breath (09 Sep 2024 14:31)       Admitted on: 09-08-24    We are following the patient for: TERI       Interval History:    No acute events overnight. Tolerating diet. Denies abdominal pain. Passing stool. No GI bleeding reported     PAST MEDICAL & SURGICAL HISTORY:  Anxiety      Afib  Eliquis      GERD (gastroesophageal reflux disease)      H/O neck surgery          MEDICATIONS  (STANDING):  enoxaparin Injectable 60 milliGRAM(s) SubCutaneous every 12 hours  fluconAZOLE   Tablet 200 milliGRAM(s) Oral daily  furosemide   Injectable 40 milliGRAM(s) IV Push every 12 hours  iron sucrose IVPB 100 milliGRAM(s) IV Intermittent every 24 hours  metoprolol tartrate 50 milliGRAM(s) Oral two times a day    MEDICATIONS  (PRN):  acetaminophen     Tablet .. 650 milliGRAM(s) Oral every 6 hours PRN Temp greater or equal to 38C (100.4F), Mild Pain (1 - 3)  LORazepam     Tablet 0.5 milliGRAM(s) Oral at bedtime PRN Anxiety  melatonin 3 milliGRAM(s) Oral at bedtime PRN Insomnia      Allergies  Toradol (Rash)      Review of Systems:   Cardiovascular:  No Chest Pain, No Palpitations  Respiratory:  No Cough, No Dyspnea  Gastrointestinal:  As described in HPI  Skin:  No Skin Lesions, No Jaundice  Neuro:  No Syncope, No Dizziness    Physical Examination:  T(C): 36.4 (09-10-24 @ 04:51), Max: 36.5 (09-09-24 @ 12:27)  HR: 83 (09-10-24 @ 04:51) (83 - 170)  BP: 101/61 (09-10-24 @ 04:51) (96/65 - 126/73)  RR: 18 (09-10-24 @ 04:51) (18 - 18)  SpO2: 100% (09-10-24 @ 04:51) (99% - 100%)      09-09-24 @ 07:01  -  09-10-24 @ 07:00  --------------------------------------------------------  IN: 816 mL / OUT: 970 mL / NET: -154 mL        GENERAL: AAOx3, no acute distress.  HEAD:  Atraumatic, Normocephalic  EYES: conjunctiva and sclera clear  NECK: Supple, no JVD or thyromegaly  CHEST/LUNG: Clear to auscultation bilaterally; No wheeze, rhonchi, or rales  HEART: IRRegular rate and rhythm; normal S1, S2, No murmurs.  ABDOMEN: Soft, nontender, nondistended; Bowel sounds present  NEUROLOGY: No asterixis or tremor.   SKIN: Intact, no jaundice     Data:                        10.3   6.20  )-----------( 307      ( 10 Sep 2024 05:38 )             33.6     Hgb trend:  10.3  09-10-24 @ 05:38  9.9  09-09-24 @ 05:39  9.8  09-08-24 @ 16:00  11.4  09-08-24 @ 01:37        09-10    144  |  108  |  13  ----------------------------<  90  4.3   |  23  |  1.1    Ca    8.7      10 Sep 2024 05:38  Mg     2.0     09-10    TPro  5.8<L>  /  Alb  3.8  /  TBili  0.3  /  DBili  x   /  AST  25  /  ALT  70<H>  /  AlkPhos  151<H>  09-10    Liver panel trend:  TBili 0.3   /   AST 25   /   ALT 70   /   AlkP 151   /   Tptn 5.8   /   Alb 3.8    /   DBili --      09-10  TBili 0.7   /   AST 42   /   ALT 89   /   AlkP 157   /   Tptn 5.7   /   Alb 3.7    /   DBili --      09-09  TBili 0.4   /   AST 63   /      /   AlkP 159   /   Tptn 5.9   /   Alb 3.8    /   DBili --      09-08  TBili <0.2   /      /      /   AlkP 192   /   Tptn 6.5   /   Alb 4.1    /   DBili --      09-08             Radiology:

## 2024-09-10 NOTE — PROGRESS NOTE ADULT - ASSESSMENT
64-year-old female past medical history of anxiety, A-fib on Eliquis and metoprolol s/p multiple cardioversions and ablation (Follows with Dr. Thomas for cardiology), presented to the ED  on 9/8 complaining of palpitations with associated shortness of breath and upper abdominal pain, found to be in afib with RVR, GI consulted for low Hb/iron deficiency anemia.    #Iron Deficiency Anemia  -Hb today 9.9, on admission 11.4 (baseline 10-11), MCV 77  -Serum iron is 29 and percent sat is 10, ferritin from prior admission in June was 6  -patient was told many years ago she was anemic and was prescribed iron pills at the time however never took them, currently has been taking 325 po iron daily for the past month because her daughter told her to  -last colonoscopy and EGD were >10 years ago per patient and she believes colo was unremarkable, EGD demonstrated gastritis   -currently on IV venofer x5 days    Rec  - Ideally patient will require EGD colonoscopy for evaluation of iron deficiency anemia   - patient will need to be medically optimized prior to endoscopic evaluation and obtain cardiac risk stratification.   - Will tentatively plan for EGD/Colonoscopy tomorrow pending cardiology clearance. Clear liquid diet today and NPO after midnight for procedure   - Golytely @4PM and dulcolax 20mg at 9PM   - Will need to hold Eliquis 2 days prior to procedure when patient is optimized if ok by cardiology   - No GI contraindication at this time to c/w anticoagulation in the absence of overt bleed and stable hemoglobin  - trend CBC, monitor for GI bleed  - plan of care discussed with patient, family at bed side and primary team    64-year-old female past medical history of anxiety, A-fib on Eliquis and metoprolol s/p multiple cardioversions and ablation (Follows with Dr. Thomas for cardiology), presented to the ED  on 9/8 complaining of palpitations with associated shortness of breath and upper abdominal pain, found to be in afib with RVR, GI consulted for low Hb/iron deficiency anemia.    #Iron Deficiency Anemia  -Hb today 9.9, on admission 11.4 (baseline 10-11), MCV 77  -Serum iron is 29 and percent sat is 10, ferritin from prior admission in June was 6  -patient was told many years ago she was anemic and was prescribed iron pills at the time however never took them, currently has been taking 325 po iron daily for the past month because her daughter told her to  -last colonoscopy and EGD were >10 years ago per patient and she believes colo was unremarkable, EGD demonstrated gastritis   -currently on IV venofer x5 days    Rec  - Ideally patient will require EGD colonoscopy for evaluation of iron deficiency anemia   - patient will need to be medically optimized prior to endoscopic evaluation and obtain cardiac risk stratification.   - Will tentatively plan for EGD/Colonoscopy tomorrow pending cardiology risk stratification. Clear liquid diet today and NPO after midnight for procedure   - Golytely @4PM and dulcolax 20mg at 9PM   - Will need to hold Eliquis 2 days prior to procedure when patient is optimized if ok by cardiology   - No GI contraindication at this time to c/w anticoagulation in the absence of overt bleed and stable hemoglobin  - trend CBC, monitor for GI bleed  - plan of care discussed with patient, family at bed side and primary team

## 2024-09-10 NOTE — PROGRESS NOTE ADULT - SUBJECTIVE AND OBJECTIVE BOX
STEPHANE CHAN  64y Female    CHIEF COMPLAINT:    Patient is a 64y old  Female who presents with a chief complaint of Palpitation and shortness of Breath (10 Sep 2024 10:58)      INTERVAL HPI/OVERNIGHT EVENTS:    Patient seen and examined. C/O of on and off palpitations. Denies any melena. No more sob.     ROS: All other systems are negative.    Vital Signs:    T(F): 97.6 (09-10-24 @ 04:51), Max: 97.7 (24 @ 12:27)  HR: 83 (09-10-24 @ 04:51) (83 - 170)  BP: 101/61 (09-10-24 @ 04:51) (96/65 - 126/73)  RR: 18 (09-10-24 @ 04:51) (18 - 18)  SpO2: 100% (09-10-24 @ 04:51) (99% - 100%)  I&O's Summary    09 Sep 2024 07:01  -  10 Sep 2024 07:00  --------------------------------------------------------  IN: 816 mL / OUT: 970 mL / NET: -154 mL      Daily     Daily Weight in k (10 Sep 2024 06:40)  CAPILLARY BLOOD GLUCOSE          PHYSICAL EXAM:    GENERAL:  NAD  SKIN: No rashes or lesions  HENT: Atraumatic. Normocephalic. PERRL. Moist membranes.  NECK: Supple, No JVD. No lymphadenopathy.  PULMONARY: CTA B/L. No wheezing. No rales  CVS: Normal S1, S2. Rate and Rhythm are regular. No murmurs.  ABDOMEN/GI: Soft, Nontender, Nondistended; BS present  EXTREMITIES: Peripheral pulses intact. No edema B/L LE.  NEUROLOGIC:  No motor or sensory deficit.  PSYCH: Alert & oriented x 3    Consultant(s) Notes Reviewed:  [x ] YES  [ ] NO  Care Discussed with Consultants/Other Providers [ x] YES  [ ] NO    EKG reviewed  Telemetry reviewed    LABS:                        10.3   6.20  )-----------( 307      ( 10 Sep 2024 05:38 )             33.6     09-10    144  |  108  |  13  ----------------------------<  90  4.3   |  23  |  1.1    Ca    8.7      10 Sep 2024 05:38  Mg     2.0     09-10    TPro  5.8<L>  /  Alb  3.8  /  TBili  0.3  /  DBili  x   /  AST  25  /  ALT  70<H>  /  AlkPhos  151<H>  09-10              RADIOLOGY & ADDITIONAL TESTS:    < from: TTE Echo Complete w/ Contrast w/o Doppler (24 @ 09:24) >    Summary:   1. Left ventricular ejection fraction, by visualestimation, is 50 to   55%.   2. Normal global left ventricular systolic function.   3. The left ventricular diastolic function could not be assessed in this   study.   4. Small patent foramen ovale / atrial septal defect with predominantly   left-to-right shunting across the interatrial septum visualized on color   flow Doppler.   5. Mildly enlarged left atrium.   6. Mild mitral regurgitation.   7. Mild tricuspid regurgitation.    < end of copied text >  < from: TTE Echo Complete w/ Contrast w/o Doppler (24 @ 09:24) >  Venous: The inferior vena cava was normal sized, with respiratory size   variation less than 50%.    < end of copied text >    Imaging or report Personally Reviewed:  [ ] YES  [ ] NO    Medications:  Standing  enoxaparin Injectable 60 milliGRAM(s) SubCutaneous every 12 hours  fluconAZOLE   Tablet 200 milliGRAM(s) Oral daily  iron sucrose IVPB 100 milliGRAM(s) IV Intermittent every 24 hours  metoprolol tartrate 50 milliGRAM(s) Oral two times a day    PRN Meds  acetaminophen     Tablet .. 650 milliGRAM(s) Oral every 6 hours PRN  LORazepam     Tablet 0.5 milliGRAM(s) Oral at bedtime PRN  melatonin 3 milliGRAM(s) Oral at bedtime PRN      Case discussed with resident    Care discussed with pt/family

## 2024-09-10 NOTE — PROGRESS NOTE ADULT - ASSESSMENT
64-year-old female past medical history of anxiety, A-fib on Eliquis and metoprolol s/p multiple cardioversions and ablation (Follows with Dr. Thomas for cardiology), presents to the ED complaining of palpitations with associated shortness of breath and upper abdominal pain that began earlier today.     Paroxysmal Atrial flutter/fib on Eliquis  Anxiety disorder.   Iron deficiency anemia  Acute HFpEF  Oral thrush                PLAN:     ·	Tele reviewed by me. Episodes of rapid A-fib and flutter  ·	EKG on admission: /min. RAD. Non specific ST, T changes (Interpreted by me)  ·	Non compliant with her meds at home  ·	Cont Metoprolol 50 mg p0 q 12h  ·	ECHO showed EF is 50-55%.   ·	Cont Eliquis 5 mg po q 12h  ·	Cardiology and EP eval  ·	Serum iron is 29, Ferritin is 9 and percent sat is 10. Start her on IV Venofer 200 mg iv daily x 5 doses  ·	Iron deficiency anemia and oral thrush: GI eval  ·	Cont Diflucan 200 mg po daily for 1-2 weeks.   ·	Check for HIV.   ·	Pro BNP is 5943  ·	CT chest reviewed.  Markedly thickened gallbladder wall which is edematous with superimposed pericholecystic fluid. No radiopaque gallstones. Findings are nonspecific and unclear primarily related to gallbladder pathology versus generalized fluid status.  ·	RUQ us noted. No gallstones. Severe gallbladder wall edema, may be non biliary in origin. Correlate for volume overload.  ·	Surgical eval noted. No evidence of acute cholecystitis.   ·	Elevated LFT's could be likely due to congestive hepatopathy. Trending down.   ·	O/E pt looks euvolemic. Give her Lasix 40 mg po x 1 dose.   ·	Care d/w the EP. Will do DCCV after GI w/u is complete  ·	Possible EGD/Colonoscopy in AM      Progress Note Handoff    Pending (specify):  Consults_________, Tests________, Test Results_______, Other_Pending EGD/Colonoscopy and DCCV________  Family discussion:  Disposition: Home___/SNF___/Other________/Unknown at this time________    Hugh Vernon MD  Spectra: [id number]

## 2024-09-11 LAB
ALBUMIN SERPL ELPH-MCNC: 4.1 G/DL — SIGNIFICANT CHANGE UP (ref 3.5–5.2)
ALP SERPL-CCNC: 156 U/L — HIGH (ref 30–115)
ALT FLD-CCNC: 55 U/L — HIGH (ref 0–41)
ANION GAP SERPL CALC-SCNC: 11 MMOL/L — SIGNIFICANT CHANGE UP (ref 7–14)
AST SERPL-CCNC: 18 U/L — SIGNIFICANT CHANGE UP (ref 0–41)
BASOPHILS # BLD AUTO: 0.07 K/UL — SIGNIFICANT CHANGE UP (ref 0–0.2)
BASOPHILS NFR BLD AUTO: 1 % — SIGNIFICANT CHANGE UP (ref 0–1)
BILIRUB SERPL-MCNC: 0.6 MG/DL — SIGNIFICANT CHANGE UP (ref 0.2–1.2)
BUN SERPL-MCNC: 13 MG/DL — SIGNIFICANT CHANGE UP (ref 10–20)
CALCIUM SERPL-MCNC: 9 MG/DL — SIGNIFICANT CHANGE UP (ref 8.4–10.5)
CHLORIDE SERPL-SCNC: 107 MMOL/L — SIGNIFICANT CHANGE UP (ref 98–110)
CO2 SERPL-SCNC: 23 MMOL/L — SIGNIFICANT CHANGE UP (ref 17–32)
CREAT SERPL-MCNC: 0.9 MG/DL — SIGNIFICANT CHANGE UP (ref 0.7–1.5)
EGFR: 71 ML/MIN/1.73M2 — SIGNIFICANT CHANGE UP
EOSINOPHIL # BLD AUTO: 0.2 K/UL — SIGNIFICANT CHANGE UP (ref 0–0.7)
EOSINOPHIL NFR BLD AUTO: 2.9 % — SIGNIFICANT CHANGE UP (ref 0–8)
GLUCOSE SERPL-MCNC: 90 MG/DL — SIGNIFICANT CHANGE UP (ref 70–99)
HCT VFR BLD CALC: 37.2 % — SIGNIFICANT CHANGE UP (ref 37–47)
HGB BLD-MCNC: 11.5 G/DL — LOW (ref 12–16)
IMM GRANULOCYTES NFR BLD AUTO: 0.3 % — SIGNIFICANT CHANGE UP (ref 0.1–0.3)
LYMPHOCYTES # BLD AUTO: 2.49 K/UL — SIGNIFICANT CHANGE UP (ref 1.2–3.4)
LYMPHOCYTES # BLD AUTO: 36.5 % — SIGNIFICANT CHANGE UP (ref 20.5–51.1)
MAGNESIUM SERPL-MCNC: 1.9 MG/DL — SIGNIFICANT CHANGE UP (ref 1.8–2.4)
MCHC RBC-ENTMCNC: 23.8 PG — LOW (ref 27–31)
MCHC RBC-ENTMCNC: 30.9 G/DL — LOW (ref 32–37)
MCV RBC AUTO: 77 FL — LOW (ref 81–99)
MONOCYTES # BLD AUTO: 0.61 K/UL — HIGH (ref 0.1–0.6)
MONOCYTES NFR BLD AUTO: 8.9 % — SIGNIFICANT CHANGE UP (ref 1.7–9.3)
NEUTROPHILS # BLD AUTO: 3.44 K/UL — SIGNIFICANT CHANGE UP (ref 1.4–6.5)
NEUTROPHILS NFR BLD AUTO: 50.4 % — SIGNIFICANT CHANGE UP (ref 42.2–75.2)
NRBC # BLD: 0 /100 WBCS — SIGNIFICANT CHANGE UP (ref 0–0)
PLATELET # BLD AUTO: 351 K/UL — SIGNIFICANT CHANGE UP (ref 130–400)
PMV BLD: 11 FL — HIGH (ref 7.4–10.4)
POTASSIUM SERPL-MCNC: 4.2 MMOL/L — SIGNIFICANT CHANGE UP (ref 3.5–5)
POTASSIUM SERPL-SCNC: 4.2 MMOL/L — SIGNIFICANT CHANGE UP (ref 3.5–5)
PROT SERPL-MCNC: 6.5 G/DL — SIGNIFICANT CHANGE UP (ref 6–8)
RBC # BLD: 4.83 M/UL — SIGNIFICANT CHANGE UP (ref 4.2–5.4)
RBC # FLD: 20.7 % — HIGH (ref 11.5–14.5)
SODIUM SERPL-SCNC: 141 MMOL/L — SIGNIFICANT CHANGE UP (ref 135–146)
WBC # BLD: 6.83 K/UL — SIGNIFICANT CHANGE UP (ref 4.8–10.8)
WBC # FLD AUTO: 6.83 K/UL — SIGNIFICANT CHANGE UP (ref 4.8–10.8)

## 2024-09-11 PROCEDURE — 99233 SBSQ HOSP IP/OBS HIGH 50: CPT

## 2024-09-11 RX ORDER — BACITRACIN 500 UNIT/G
1 OINTMENT (GRAM) TOPICAL DAILY
Refills: 0 | Status: DISCONTINUED | OUTPATIENT
Start: 2024-09-11 | End: 2024-09-13

## 2024-09-11 RX ORDER — POLYETHYLENE GLYCOL 3350, SODIUM SULFATE ANHYDROUS, SODIUM BICARBONATE, SODIUM CHLORIDE, POTASSIUM CHLORIDE 236; 22.74; 6.74; 5.86; 2.97 G/4L; G/4L; G/4L; G/4L; G/4L
4000 POWDER, FOR SOLUTION ORAL ONCE
Refills: 0 | Status: COMPLETED | OUTPATIENT
Start: 2024-09-11 | End: 2024-09-11

## 2024-09-11 RX ORDER — METOPROLOL TARTRATE 100 MG/1
50 TABLET ORAL THREE TIMES A DAY
Refills: 0 | Status: DISCONTINUED | OUTPATIENT
Start: 2024-09-11 | End: 2024-09-13

## 2024-09-11 RX ORDER — DIGOXIN 0.12 MG/1
250 TABLET ORAL DAILY
Refills: 0 | Status: DISCONTINUED | OUTPATIENT
Start: 2024-09-12 | End: 2024-09-12

## 2024-09-11 RX ORDER — DIGOXIN 0.12 MG/1
500 TABLET ORAL ONCE
Refills: 0 | Status: COMPLETED | OUTPATIENT
Start: 2024-09-11 | End: 2024-09-11

## 2024-09-11 RX ORDER — DIGOXIN 0.12 MG/1
250 TABLET ORAL ONCE
Refills: 0 | Status: COMPLETED | OUTPATIENT
Start: 2024-09-11 | End: 2024-09-11

## 2024-09-11 RX ADMIN — METOPROLOL TARTRATE 50 MILLIGRAM(S): 100 TABLET ORAL at 06:23

## 2024-09-11 RX ADMIN — METOPROLOL TARTRATE 50 MILLIGRAM(S): 100 TABLET ORAL at 14:33

## 2024-09-11 RX ADMIN — LORAZEPAM 0.5 MILLIGRAM(S): 4 INJECTION INTRAMUSCULAR; INTRAVENOUS at 21:48

## 2024-09-11 RX ADMIN — Medication 10 MILLIGRAM(S): at 21:48

## 2024-09-11 RX ADMIN — FLUCONAZOLE 200 MILLIGRAM(S): 150 TABLET ORAL at 12:10

## 2024-09-11 RX ADMIN — Medication 1 APPLICATION(S): at 23:24

## 2024-09-11 RX ADMIN — METOPROLOL TARTRATE 50 MILLIGRAM(S): 100 TABLET ORAL at 21:48

## 2024-09-11 RX ADMIN — ACETAMINOPHEN 650 MILLIGRAM(S): 325 TABLET ORAL at 14:33

## 2024-09-11 RX ADMIN — POLYETHYLENE GLYCOL 3350, SODIUM SULFATE ANHYDROUS, SODIUM BICARBONATE, SODIUM CHLORIDE, POTASSIUM CHLORIDE 4000 MILLILITER(S): 236; 22.74; 6.74; 5.86; 2.97 POWDER, FOR SOLUTION ORAL at 16:30

## 2024-09-11 RX ADMIN — IRON SUCROSE 210 MILLIGRAM(S): 20 INJECTION, SOLUTION INTRAVENOUS at 21:48

## 2024-09-11 RX ADMIN — DIGOXIN 250 MICROGRAM(S): 0.12 TABLET ORAL at 17:10

## 2024-09-11 RX ADMIN — DIGOXIN 500 MICROGRAM(S): 0.12 TABLET ORAL at 10:43

## 2024-09-11 RX ADMIN — ACETAMINOPHEN 650 MILLIGRAM(S): 325 TABLET ORAL at 17:02

## 2024-09-11 NOTE — PROGRESS NOTE ADULT - SUBJECTIVE AND OBJECTIVE BOX
SUBJECTIVE / OVERNIGHT EVENTS  Afib with RVR events last night. Pt has no sxs or complaints    MEDICATIONS  bisacodyl Suppository 10 milliGRAM(s) Rectal once  digoxin  Injectable 250 MICROGram(s) IV Push once  enoxaparin Injectable 60 milliGRAM(s) SubCutaneous every 12 hours  fluconAZOLE   Tablet 200 milliGRAM(s) Oral daily  iron sucrose IVPB 100 milliGRAM(s) IV Intermittent every 24 hours  metoprolol tartrate 50 milliGRAM(s) Oral three times a day  polyethylene glycol/electrolyte Solution. 4000 milliLiter(s) Oral once    acetaminophen     Tablet .. 650 milliGRAM(s) Oral every 6 hours PRN Temp greater or equal to 38C (100.4F), Mild Pain (1 - 3)  LORazepam     Tablet 0.5 milliGRAM(s) Oral at bedtime PRN Anxiety  melatonin 3 milliGRAM(s) Oral at bedtime PRN Insomnia    VITALS /  EXAM    T(C): 36.8 (09-11-24 @ 12:18), Max: 37.1 (09-10-24 @ 20:02)  HR: 88 (09-11-24 @ 12:18) (84 - 137)  BP: 121/77 (09-11-24 @ 12:18) (90/70 - 121/77)  RR: 18 (09-11-24 @ 12:18) (18 - 18)  SpO2: 95% (09-11-24 @ 12:18) (95% - 95%)    GENERAL: NAD, well-developed  CHEST/LUNG: Clear to auscultation bilaterally; No wheezes, rales or rhonchi  HEART: Regular rate and rhythm; No murmurs, rubs, or gallops  ABDOMEN: Soft, Nontender, Nondistended; Bowel sounds present, no masses.  EXTREMITIES:  2+ Peripheral Pulses, No clubbing, cyanosis, or edema    I's & O's     09-10-24 @ 07:01  -  09-11-24 @ 07:00  --------------------------------------------------------  IN:    Oral Fluid: 238 mL  Total IN: 238 mL    OUT:    Voided (mL): 600 mL  Total OUT: 600 mL    Total NET: -362 mL        LABS             11.5   6.83  )-----------( 351      ( 09-11-24 @ 06:37 )             37.2     141  |  107  |  13  -------------------------<  90   09-11-24 @ 06:37  4.2  |  23  |  0.9    Ca      9.0     09-11-24 @ 06:37  Mg     1.9     09-11-24 @ 06:37    TPro  6.5  /  Alb  4.1  /  TBili  0.6  /  DBili  x   /  AST  18  /  ALT  55  /  AlkPhos  156  /  GGT  x     09-11-24 @ 06:37      Troponin T, High Sensitivity Result: 37 ng/L (09-08-24 @ 16:00)                Urinalysis Basic - ( 11 Sep 2024 06:37 )    Color: x / Appearance: x / SG: x / pH: x  Gluc: 90 mg/dL / Ketone: x  / Bili: x / Urobili: x   Blood: x / Protein: x / Nitrite: x   Leuk Esterase: x / RBC: x / WBC x   Sq Epi: x / Non Sq Epi: x / Bacteria: x      MICRO / IMAGING / CARDIOLOGY  Telemetry: Reviewed   EKG: Reviewed    CULTURES    IMAGING    CARDIOLOGY

## 2024-09-11 NOTE — PROGRESS NOTE ADULT - SUBJECTIVE AND OBJECTIVE BOX
INTERVAL HPI/OVERNIGHT EVENTS:  Pt is in Aflutter, rate controlled in the 80s    MEDICATIONS  (STANDING):  bisacodyl Suppository 10 milliGRAM(s) Rectal once  digoxin  Injectable 250 MICROGram(s) IV Push once  enoxaparin Injectable 60 milliGRAM(s) SubCutaneous every 12 hours  fluconAZOLE   Tablet 200 milliGRAM(s) Oral daily  iron sucrose IVPB 100 milliGRAM(s) IV Intermittent every 24 hours  metoprolol tartrate 50 milliGRAM(s) Oral three times a day  polyethylene glycol/electrolyte Solution. 4000 milliLiter(s) Oral once    MEDICATIONS  (PRN):  acetaminophen     Tablet .. 650 milliGRAM(s) Oral every 6 hours PRN Temp greater or equal to 38C (100.4F), Mild Pain (1 - 3)  LORazepam     Tablet 0.5 milliGRAM(s) Oral at bedtime PRN Anxiety  melatonin 3 milliGRAM(s) Oral at bedtime PRN Insomnia      Allergies    Toradol (Rash)    Intolerances      REVIEW OF SYSTEMS    12 point ROS    Vital Signs Last 24 Hrs  T(C): 36.6 (11 Sep 2024 04:48), Max: 37.1 (10 Sep 2024 20:02)  T(F): 97.9 (11 Sep 2024 04:48), Max: 98.7 (10 Sep 2024 20:02)  HR: 84 (11 Sep 2024 04:48) (84 - 174)  BP: 111/73 (11 Sep 2024 04:48) (90/70 - 143/95)  BP(mean): --  RR: 18 (10 Sep 2024 14:34) (18 - 18)  SpO2: 99% (10 Sep 2024 14:34) (99% - 99%)    Parameters below as of 10 Sep 2024 14:34  Patient On (Oxygen Delivery Method): room air      09-10-24 @ 07:01  -  09-11-24 @ 07:00  --------------------------------------------------------  IN: 238 mL / OUT: 600 mL / NET: -362 mL      Physical Exam    GENERAL: In no apparent distress, well nourished, and hydrated.  HEART: Regular rate and rhythm; No murmurs, rubs, or gallops.  PULMONARY: Clear to auscultation and perfusion.  No rales, wheezing, or rhonchi bilaterally.  ABDOMEN: Soft, Nontender, Nondistended; Bowel sounds present  EXTREMITIES:  2+ Peripheral Pulses, No clubbing, cyanosis, or edema  NEUROLOGICAL: Grossly nonfocal    LABS:                        11.5   6.83  )-----------( 351      ( 11 Sep 2024 06:37 )             37.2     09-11    141  |  107  |  13  ----------------------------<  90  4.2   |  23  |  0.9    Ca    9.0      11 Sep 2024 06:37  Mg     1.9     09-11    TPro  6.5  /  Alb  4.1  /  TBili  0.6  /  DBili  x   /  AST  18  /  ALT  55<H>  /  AlkPhos  156<H>  09-11      Urinalysis Basic - ( 11 Sep 2024 06:37 )    Color: x / Appearance: x / SG: x / pH: x  Gluc: 90 mg/dL / Ketone: x  / Bili: x / Urobili: x   Blood: x / Protein: x / Nitrite: x   Leuk Esterase: x / RBC: x / WBC x   Sq Epi: x / Non Sq Epi: x / Bacteria: x        RADIOLOGY & ADDITIONAL TESTS:

## 2024-09-11 NOTE — PROGRESS NOTE ADULT - ASSESSMENT
64-year-old female past medical history of anxiety, A-fib on Eliquis and metoprolol s/p multiple cardioversions and ablation (Follows with Dr. Thomas for cardiology), presents to the ED complaining of palpitations with associated shortness of breath and upper abdominal pain that began earlier  in ED:  - EKG : Afib with RVR   - MINNA ( 5/2024): EF 40 to 45%.  - s/p 40 mg IV Lasix in the ED.      ADMITTED for CHF exacerbation secondary to afib w/ rvr, likely precipitated by medication noncompliance  has afib with rvr last night  ·Increase Metoprolol to 50 mg po q 8h  ·Loading with IV Digoxin. From tomorrow Digoxin 0.250 mg po daily  colono tmrw, then EP f/u       # Acute on chronic HFmrEF likely precipated due to Afib RVR 2/2 medications non compliance  # Atrial Fibrillation with RVR:   # GB edema  and transaminitis ( From volume overload), Likely congestive hepatopathy, resolving  - In the ED ; found to have atrial fibrillation ; s/p Adenosine and Diltiazem .   - Chest Xray: Increased bilateral interstitial opacities. Pulmonary vascular congestion present.   - USG Right upper quadrant & CT Abdomen and pelvis noted - All the features suggesting volume overload. - Surgery consult appreciated: No acute intervention for GB findings.   - Pro BNP 5 k ( Baseline 2 K )    -Hold Cardizem  ( Given the findings of Heart failure), -  Metoprolol tartrate 50 mg increased to TID  - Cardiology consulted ( Dr. Thomas) &EP consulted  EP: - Patient will need repeat ablation. Patient states that she will think about it.      #Iron Def Anemia, microcytic  - Hb 11.4 ( baseline ) MCV 77.5 >>9.9 on 9/9  -iron studies noted  - hold Ferrous Sulfate 325 mg po daily & start IV venofer for 5days.  -GI consulted: colono +EGD tmrw: holding eliquis, now on lovenox    #oral thrush  -started PO diflucan   -HIV -ve    #Anxiety  - on Lorazepam 0.5 PO PRN   - pt was educated about behavioral modification     Plan: egd-colono tmrw

## 2024-09-11 NOTE — CONSULT NOTE ADULT - SUBJECTIVE AND OBJECTIVE BOX
Patient is a 64y old  Female who presents with a chief complaint of Palpitation and shortness of Breath (11 Sep 2024 15:13)      REASON FOR CONSULT     HPI:  64-year-old female past medical history of anxiety, A-fib on Eliquis and metoprolol s/p multiple cardioversions and ablation (Follows with Dr. Thomas for cardiology), presents to the ED complaining of palpitations with associated shortness of breath and upper abdominal pain that began earlier today.  Patient states she was walking around running errands when the symptoms began.  Patient took an extra dose of her metoprolol, Eliquis, and Benadryl with no relief of symptoms.   She has been non compliant with her medications since 2 months now ; felt that her heart is in normal sinus rhythm so started skipping her home medications.   She follows Dr. Mckinley for EP. She didnot know that she has hx of moderately reduced HF as per her last MINNA.   Patient has not had any recent fever, headache, dizziness, chest pain, nausea, vomiting, or diarrhea.    Of note; The patient has a history of atrial fibrillation, diagnosed 2 years ago, and has undergone multiple treatments including five cardioversion procedures and an ablation. On April 18, 2024, the patient underwent pulse field ablation (PFA) performed by Dr. Jac Mckinley at [facility], during which the patient required two cardioversion procedures and was subsequently discharged on Amiodarone therapy. However, two days post-procedure, the patient experienced a recurrence of atrial fibrillation with rapid ventricular response (RVR), necessitating a return to [place] for another cardioversion. The patient presented to [facility] on May 28, 2024, for additional cardioversion. On June 20, 2024, the patient was admitted again for RVR, and a similar episode occurred on July 3, 2024, with atrial flutter and RVR, presenting with a heart rate in the 140s. The patient was treated with 15 mg of Cardizem IV, resulting in a heart rate reduction to the 70s.        In the ED; Vitals: /79 ;  ; RR 18 ; Afebrile; Maintaining saturation on RA.     Labs:   Hb 11.4( at baseline ) ; MCV 77.5; Platelets 326 k   Na 135 K 4.8   BUN/ Creatinine 26/1.1 ( At Baseline)   AST//171    ProBNP 5943 ( baseline 2000s)   Troponin 44      Chest Xray: Increased bilateral interstitial opacities. Pulmonary vascular congestion present.     USG Right upper quadrant: No gallstones. Severe gallbladder wall edema, may be non biliary in origin. Correlate for volume overload.Perihepatic free fluid.    CT Abdomen and Pelvis:   1.Markedly thickened gallbladder wall which is edematous with superimposed pericholecystic fluid. No radiopaque gallstones. Findings are nonspecific and unclear primarily related to gallbladder pathology versus generalized fluid status.  2. Additionally there is nonspecific periportal edema, small volume perihepatic/right upper quadrant ascites, small right pleural effusion and suggestion of possible pulmonary edema    s/p Lasix 40 mg IV once; Adenosine and Cardizem 16 mg IV push.     Admitted for heart failure exacerbation    (08 Sep 2024 12:46)      PAST MEDICAL & SURGICAL HISTORY:  Anxiety      Afib  Eliquis      GERD (gastroesophageal reflux disease)      H/O neck surgery              SOCIAL HISTORY:     FAMILY HISTORY:    Toradol (Rash)      MEDICATIONS  (STANDING):  bisacodyl Suppository 10 milliGRAM(s) Rectal once  enoxaparin Injectable 60 milliGRAM(s) SubCutaneous every 12 hours  fluconAZOLE   Tablet 200 milliGRAM(s) Oral daily  iron sucrose IVPB 100 milliGRAM(s) IV Intermittent every 24 hours  metoprolol tartrate 50 milliGRAM(s) Oral three times a day    MEDICATIONS  (PRN):  acetaminophen     Tablet .. 650 milliGRAM(s) Oral every 6 hours PRN Temp greater or equal to 38C (100.4F), Mild Pain (1 - 3)  LORazepam     Tablet 0.5 milliGRAM(s) Oral at bedtime PRN Anxiety  melatonin 3 milliGRAM(s) Oral at bedtime PRN Insomnia      Vital Signs Last 24 Hrs  T(C): 36.8 (11 Sep 2024 12:18), Max: 37.1 (10 Sep 2024 20:02)  T(F): 98.2 (11 Sep 2024 12:18), Max: 98.7 (10 Sep 2024 20:02)  HR: 88 (11 Sep 2024 12:18) (84 - 88)  BP: 121/77 (11 Sep 2024 12:18) (109/76 - 121/77)  BP(mean): --  RR: 18 (11 Sep 2024 12:18) (18 - 18)  SpO2: 95% (11 Sep 2024 12:18) (95% - 95%)    Parameters below as of 11 Sep 2024 12:18  Patient On (Oxygen Delivery Method): room air     I&O's Detail    10 Sep 2024 07:01  -  11 Sep 2024 07:00  --------------------------------------------------------  IN:    Oral Fluid: 238 mL  Total IN: 238 mL    OUT:    Voided (mL): 600 mL  Total OUT: 600 mL    Total NET: -362 mL            LABS:                        11.5   6.83  )-----------( 351      ( 11 Sep 2024 06:37 )             37.2     09-11    141  |  107  |  13  ----------------------------<  90  4.2   |  23  |  0.9    Ca    9.0      11 Sep 2024 06:37  Mg     1.9     09-11    TPro  6.5  /  Alb  4.1  /  TBili  0.6  /  DBili  x   /  AST  18  /  ALT  55<H>  /  AlkPhos  156<H>  09-11          I&O's Summary    10 Sep 2024 07:01  -  11 Sep 2024 07:00  --------------------------------------------------------  IN: 238 mL / OUT: 600 mL / NET: -362 mL         Patient was seen and examined earlier this evening by me on 3C.  EMR reviewed.    History reviewed and clarified.  I personally interviewed her.  She was last seen by Dr. Chinmay Marcus ([facility]) in June 2024.  Option of repeat AFIB ablation was offered and discussed during this encounter.  She had prior ablation at [facility].  She has history of 7 DCCV's so far for her recurrent atrial fibrillation.    She denies any exertional chest pain and shortness of breath.  She is being evaluated for possible EGD/colonoscopy.    Physical Exam:  Not in distress  Alert, oriented x 3  No JVD; irregular rhythm; nl S1S2  Bilateral breath sounds  Abdomen soft  No edema  Moving all extremities      ROS: as stated in HPI.  Functional status >4 METs.  No chest pain at rest or exertion   Labs: noted    < from: TTE Echo Complete w/ Contrast w/o Doppler (09.09.24 @ 09:24) >  Summary:   1. Left ventricular ejection fraction, by visualestimation, is 50 to   55%.   2. Normal global left ventricular systolic function.   3. The left ventricular diastolic function could not be assessed in this   study.   4. Small patent foramen ovale / atrial septal defect with predominantly   left-to-right shunting across the interatrial septum visualized on color   flow Doppler.   5. Mildly enlarged left atrium.   6. Mild mitral regurgitation.   7. Mild tricuspid regurgitation.    < end of copied text >      _______________________________________________________  House Staff Note    Patient is a 64y old  Female who presents with a chief complaint of Palpitation and shortness of Breath (11 Sep 2024 15:13)      REASON FOR CONSULT     HPI:  64-year-old female past medical history of anxiety, A-fib on Eliquis and metoprolol s/p multiple cardioversions and ablation (Follows with Dr. Thomas for cardiology), presents to the ED complaining of palpitations with associated shortness of breath and upper abdominal pain that began earlier today.  Patient states she was walking around running errands when the symptoms began.  Patient took an extra dose of her metoprolol, Eliquis, and Benadryl with no relief of symptoms.   She has been non compliant with her medications since 2 months now ; felt that her heart is in normal sinus rhythm so started skipping her home medications.   She follows Dr. Mckinley for EP. She did not know that she has hx of moderately reduced HF as per her last MINNA.   Patient has not had any recent fever, headache, dizziness, chest pain, nausea, vomiting, or diarrhea.    Of note; The patient has a history of atrial fibrillation, diagnosed 2 years ago, and has undergone multiple treatments including five cardioversion procedures and an ablation. On April 18, 2024, the patient underwent pulse field ablation (PFA) performed by Dr. Jac Mckinley at [facility], during which the patient required two cardioversion procedures and was subsequently discharged on Amiodarone therapy. However, two days post-procedure, the patient experienced a recurrence of atrial fibrillation with rapid ventricular response (RVR), necessitating a return to [place] for another cardioversion. The patient presented to [facility] on May 28, 2024, for additional cardioversion. On June 20, 2024, the patient was admitted again for RVR, and a similar episode occurred on July 3, 2024, with atrial flutter and RVR, presenting with a heart rate in the 140s. The patient was treated with 15 mg of Cardizem IV, resulting in a heart rate reduction to the 70s.        In the ED; Vitals: /79 ;  ; RR 18 ; Afebrile; Maintaining saturation on RA.     Labs:   Hb 11.4( at baseline ) ; MCV 77.5; Platelets 326 k   Na 135 K 4.8   BUN/ Creatinine 26/1.1 ( At Baseline)   AST//171    ProBNP 5943 ( baseline 2000s)   Troponin 44      Chest Xray: Increased bilateral interstitial opacities. Pulmonary vascular congestion present.     USG Right upper quadrant: No gallstones. Severe gallbladder wall edema, may be non biliary in origin. Correlate for volume overload.Perihepatic free fluid.    CT Abdomen and Pelvis:   1.Markedly thickened gallbladder wall which is edematous with superimposed pericholecystic fluid. No radiopaque gallstones. Findings are nonspecific and unclear primarily related to gallbladder pathology versus generalized fluid status.  2. Additionally there is nonspecific periportal edema, small volume perihepatic/right upper quadrant ascites, small right pleural effusion and suggestion of possible pulmonary edema    s/p Lasix 40 mg IV once; Adenosine and Cardizem 16 mg IV push.     Admitted for heart failure exacerbation    (08 Sep 2024 12:46)      PAST MEDICAL & SURGICAL HISTORY:  Anxiety  Afib  Eliquis  GERD (gastroesophageal reflux disease)  H/O neck surgery    SOCIAL HISTORY:     FAMILY HISTORY:    Toradol (Rash)      MEDICATIONS  (STANDING):  bisacodyl Suppository 10 milliGRAM(s) Rectal once  enoxaparin Injectable 60 milliGRAM(s) SubCutaneous every 12 hours  fluconAZOLE   Tablet 200 milliGRAM(s) Oral daily  iron sucrose IVPB 100 milliGRAM(s) IV Intermittent every 24 hours  metoprolol tartrate 50 milliGRAM(s) Oral three times a day    MEDICATIONS  (PRN):  acetaminophen     Tablet .. 650 milliGRAM(s) Oral every 6 hours PRN Temp greater or equal to 38C (100.4F), Mild Pain (1 - 3)  LORazepam     Tablet 0.5 milliGRAM(s) Oral at bedtime PRN Anxiety  melatonin 3 milliGRAM(s) Oral at bedtime PRN Insomnia      Vital Signs Last 24 Hrs  T(C): 36.8 (11 Sep 2024 12:18), Max: 37.1 (10 Sep 2024 20:02)  T(F): 98.2 (11 Sep 2024 12:18), Max: 98.7 (10 Sep 2024 20:02)  HR: 88 (11 Sep 2024 12:18) (84 - 88)  BP: 121/77 (11 Sep 2024 12:18) (109/76 - 121/77)  BP(mean): --  RR: 18 (11 Sep 2024 12:18) (18 - 18)  SpO2: 95% (11 Sep 2024 12:18) (95% - 95%)    Parameters below as of 11 Sep 2024 12:18  Patient On (Oxygen Delivery Method): room air     I&O's Detail    10 Sep 2024 07:01  -  11 Sep 2024 07:00  --------------------------------------------------------  IN:    Oral Fluid: 238 mL  Total IN: 238 mL    OUT:    Voided (mL): 600 mL  Total OUT: 600 mL    Total NET: -362 mL            LABS:                        11.5   6.83  )-----------( 351      ( 11 Sep 2024 06:37 )             37.2     09-11    141  |  107  |  13  ----------------------------<  90  4.2   |  23  |  0.9    Ca    9.0      11 Sep 2024 06:37  Mg     1.9     09-11    TPro  6.5  /  Alb  4.1  /  TBili  0.6  /  DBili  x   /  AST  18  /  ALT  55<H>  /  AlkPhos  156<H>  09-11    I&O's Summary    10 Sep 2024 07:01  -  11 Sep 2024 07:00  --------------------------------------------------------  IN: 238 mL / OUT: 600 mL / NET: -362 mL

## 2024-09-11 NOTE — PROGRESS NOTE ADULT - ASSESSMENT
64-year-old female past medical history of anxiety, A-fib on Eliquis and metoprolol s/p multiple cardioversions and ablation (Follows with Dr. Thomas for cardiology), presented to the ED  on 9/8 complaining of palpitations with associated shortness of breath and upper abdominal pain, found to be in afib with RVR, GI consulted for low Hb/iron deficiency anemia.    #Iron Deficiency Anemia  -Hb today 9.9, on admission 11.4 (baseline 10-11), MCV 77  -Serum iron is 29 and percent sat is 10, ferritin from prior admission in June was 6  -patient was told many years ago she was anemic and was prescribed iron pills at the time however never took them, currently has been taking 325 po iron daily for the past month because her daughter told her to  -last colonoscopy and EGD were >10 years ago per patient and she believes colo was unremarkable, EGD demonstrated gastritis   -currently on IV venofer x5 days    Rec  - Ideally patient will require EGD colonoscopy for evaluation of iron deficiency anemia   - Procedure today was cancelled due to Afib RVR overnight   - EGD/colonoscopy for TERI can be obtained inpatient vs outpatient basis pending cardiology evaluation and optimization  - Will need to hold Eliquis 2 days prior to procedure when patient is optimized if ok by cardiology   - No GI contraindication at this time to c/w anticoagulation in the absence of overt bleed and stable hemoglobin  - trend CBC, monitor for GI bleed  - plan of care discussed with patient, family at bed side and primary team    64-year-old female past medical history of anxiety, A-fib on Eliquis and metoprolol s/p multiple cardioversions and ablation (Follows with Dr. Thomas for cardiology), presented to the ED  on 9/8 complaining of palpitations with associated shortness of breath and upper abdominal pain, found to be in afib with RVR, GI consulted for low Hb/iron deficiency anemia.    #Iron Deficiency Anemia  -Hb today 9.9, on admission 11.4 (baseline 10-11), MCV 77  -Serum iron is 29 and percent sat is 10, ferritin from prior admission in June was 6  -patient was told many years ago she was anemic and was prescribed iron pills at the time however never took them, currently has been taking 325 po iron daily for the past month because her daughter told her to  -last colonoscopy and EGD were >10 years ago per patient and she believes colo was unremarkable, EGD demonstrated gastritis   -currently on IV venofer x5 days    Rec  -  EGD colonoscopy in am if rate is controlled,  for evaluation of iron deficiency anemia , risks and benefits discussed and patient is agreeable   - No GI contraindication at this time to c/w anticoagulation in the absence of overt bleed and stable hemoglobin. Please hold short acting anticoagulation after midnight   - trend CBC, monitor for GI bleed  - plan of care discussed with patient, family at bed side and primary team

## 2024-09-11 NOTE — PROGRESS NOTE ADULT - NS ATTEND AMEND GEN_ALL_CORE FT
Metoprolol  Eliquis if ok with GI  GI w-up prior to MINNA/cardioversion. If no GI w-up planned, MINNA/DCCV  No interruption of OAC for ~ 1 month after DCCV  Discussed risks of MINNA/DCCV including OGTI, CVA. Agreeable  Flecainide after DCCV  OP f-up- patient to make an appointment if interested in following. Pt Deciding.

## 2024-09-11 NOTE — CONSULT NOTE ADULT - ASSESSMENT
1] Pre-op Evaluation for EGD/Colonoscopy   - Patient denies any exertional chest pain.  0 RCRI with functional status >4 METs.  -  She may proceed with the planned GI procedures without further cardiac workup.  She is at low risk for a perioperative cardiac event.    2] Recurrent Atrial Fib  S/P Ablation      S/P DCCV (multiple)  - Off OAC for planned GI procedures  - Echo findings noted.  LVEF 50-55%.  - EP input appreciated    Will follow    Hernandez Enriquez MD (covering for Dr. Froylan Thomas)  [phone] Office  On TEAMS

## 2024-09-11 NOTE — CONSULT NOTE ADULT - REASON FOR ADMISSION
Palpitation and shortness of Breath

## 2024-09-11 NOTE — PROGRESS NOTE ADULT - ASSESSMENT
64-year-old female past medical history of anxiety, A-fib on Eliquis and metoprolol s/p multiple cardioversions and ablation (Follows with Dr. Thomas for cardiology), presents to the ED complaining of palpitations with associated shortness of breath and upper abdominal pain that began earlier today.     Paroxysmal Atrial flutter/fib on Eliquis  Anxiety disorder.   Iron deficiency anemia  Acute HFpEF  Oral thrush                PLAN:     ·	Tele reviewed by me. Episodes of rapid A-fib and flutter  ·	EKG on admission: /min. RAD. Non specific ST, T changes (Interpreted by me)  ·	Non compliant with her meds at home  ·	Increase Metoprolol to 50 mg po q 8h  ·	Loading with IV Digoxin. From tomorrow Digoxin 0.25  ·	ECHO showed EF is 50-55%.   ·	Cont Eliquis 5 mg po q 12h  ·	Cardiology and EP eval  ·	Serum iron is 29, Ferritin is 9 and percent sat is 10. Start her on IV Venofer 200 mg iv daily x 5 doses  ·	Iron deficiency anemia and oral thrush: GI eval  ·	Cont Diflucan 200 mg po daily for 1-2 weeks.   ·	Check for HIV.   ·	Pro BNP is 5943  ·	CT chest reviewed.  Markedly thickened gallbladder wall which is edematous with superimposed pericholecystic fluid. No radiopaque gallstones. Findings are nonspecific and unclear primarily related to gallbladder pathology versus generalized fluid status.  ·	RUQ us noted. No gallstones. Severe gallbladder wall edema, may be non biliary in origin. Correlate for volume overload.  ·	Surgical eval noted. No evidence of acute cholecystitis.   ·	Elevated LFT's could be likely due to congestive hepatopathy. Trending down.   ·	O/E pt looks euvolemic. Give her Lasix 40 mg po x 1 dose.   ·	Care d/w the EP. Will do DCCV after GI w/u is complete  ·	Possible EGD/Colonoscopy in AM      Progress Note Handoff    Pending (specify):  Consults_________, Tests________, Test Results_______, Other_Pending EGD/Colonoscopy and DCCV________  Family discussion:  Disposition: Home___/SNF___/Other________/Unknown at this time________    Hugh Vernon MD  Spectra: [id number] 64-year-old female past medical history of anxiety, A-fib on Eliquis and metoprolol s/p multiple cardioversions and ablation (Follows with Dr. Thomas for cardiology), presents to the ED complaining of palpitations with associated shortness of breath and upper abdominal pain that began earlier today.     Paroxysmal Atrial flutter/fib on Eliquis  Anxiety disorder.   Iron deficiency anemia  Acute HFpEF  Oral thrush                PLAN:     ·	Tele reviewed by me. Episodes of rapid A-fib and flutter  ·	EKG on admission: /min. RAD. Non specific ST, T changes (Interpreted by me)  ·	Non compliant with her meds at home  ·	Increase Metoprolol to 50 mg po q 8h  ·	Loading with IV Digoxin. From tomorrow Digoxin 0.250 mg po daily  ·	TSH is 6.6. Free T4 is normal  ·	ECHO showed EF is 50-55%.   ·	Cont to hold Eliquis for EGD/Colonoscopy in AM. Hold morning dose of Lovenox.   ·	Cardiology eval   ·	Serum iron is 29, Ferritin is 9 and percent sat is 10. Start her on IV Venofer 200 mg iv daily x 5 doses  ·	Iron deficiency anemia and oral thrush: Care d/w the GI. EGD/Colonoscopy in AM  ·	Cont Diflucan 200 mg po daily for 1-2 weeks.   ·	HIV is non reactive  ·	Pro BNP is 5943  ·	CT chest reviewed.  Markedly thickened gallbladder wall which is edematous with superimposed pericholecystic fluid. No radiopaque gallstones. Findings are nonspecific and unclear primarily related to gallbladder pathology versus generalized fluid status.  ·	RUQ us noted. No gallstones. Severe gallbladder wall edema, may be non biliary in origin. Correlate for volume overload.  ·	Surgical eval noted. No evidence of acute cholecystitis.   ·	Elevated LFT's could be likely due to congestive hepatopathy. Trending down.   ·	Care d/w the EP. Will do DCCV after GI w/u is complete      Progress Note Handoff    Pending (specify):  Consults_________, Tests________, Test Results_______, Other_Pending EGD/Colonoscopy and DCCV________  Family discussion:  Disposition: Home___/SNF___/Other________/Unknown at this time________    Hugh Vernon MD  Spectra: [phone]

## 2024-09-11 NOTE — PROGRESS NOTE ADULT - SUBJECTIVE AND OBJECTIVE BOX
STEPHANE CHAN  64y Female    CHIEF COMPLAINT:    Patient is a 64y old  Female who presents with a chief complaint of Palpitation and shortness of Breath (11 Sep 2024 11:58)      INTERVAL HPI/OVERNIGHT EVENTS:    Patient seen and examined. Events noted. Since yesterday having on and off episodes of rapid atrial flutter. No sob.     ROS: All other systems are negative.    Vital Signs:    T(F): 98.2 (24 @ 12:18), Max: 98.7 (09-10-24 @ 20:02)  HR: 88 (24 @ 12:18) (84 - 142)  BP: 121/77 (24 @ 12:18) (90/70 - 121/77)  RR: 18 (24 @ 12:18) (18 - 18)  SpO2: 95% (24 @ 12:18) (95% - 99%)  I&O's Summary    10 Sep 2024 07:01  -  11 Sep 2024 07:00  --------------------------------------------------------  IN: 238 mL / OUT: 600 mL / NET: -362 mL      Daily     Daily Weight in k.3 (11 Sep 2024 10:00)  CAPILLARY BLOOD GLUCOSE          PHYSICAL EXAM:    GENERAL:  NAD  SKIN: No rashes or lesions  HENT: Atraumatic. Normocephalic. PERRL. Moist membranes.  NECK: Supple, No JVD. No lymphadenopathy.  PULMONARY: CTA B/L. No wheezing. No rales  CVS: Normal S1, S2. Rate and Rhythm are IRIR. No murmurs.  ABDOMEN/GI: Soft, Nontender, Nondistended; BS present  EXTREMITIES: Peripheral pulses intact. No edema B/L LE.  NEUROLOGIC:  No motor or sensory deficit.  PSYCH: Alert & oriented x 3    Consultant(s) Notes Reviewed:  [x ] YES  [ ] NO  Care Discussed with Consultants/Other Providers [ x] YES  [ ] NO    EKG reviewed  Telemetry reviewed    LABS:                        11.5   6.83  )-----------( 351      ( 11 Sep 2024 06:37 )             37.2         141  |  107  |  13  ----------------------------<  90  4.2   |  23  |  0.9    Ca    9.0      11 Sep 2024 06:37  Mg     1.9         TPro  6.5  /  Alb  4.1  /  TBili  0.6  /  DBili  x   /  AST  18  /  ALT  55<H>  /  AlkPhos  156<H>                RADIOLOGY & ADDITIONAL TESTS:      Imaging or report Personally Reviewed:  [ ] YES  [ ] NO    Medications:  Standing  bisacodyl Suppository 10 milliGRAM(s) Rectal once  digoxin  Injectable 250 MICROGram(s) IV Push once  enoxaparin Injectable 60 milliGRAM(s) SubCutaneous every 12 hours  fluconAZOLE   Tablet 200 milliGRAM(s) Oral daily  iron sucrose IVPB 100 milliGRAM(s) IV Intermittent every 24 hours  metoprolol tartrate 50 milliGRAM(s) Oral three times a day  polyethylene glycol/electrolyte Solution. 4000 milliLiter(s) Oral once    PRN Meds  acetaminophen     Tablet .. 650 milliGRAM(s) Oral every 6 hours PRN  LORazepam     Tablet 0.5 milliGRAM(s) Oral at bedtime PRN  melatonin 3 milliGRAM(s) Oral at bedtime PRN      Case discussed with resident    Care discussed with pt/family

## 2024-09-11 NOTE — PROGRESS NOTE ADULT - ASSESSMENT
AF/RVR  Aflutter  Anemia    Plan:  - Rate control with metoprolol. Increase as tolerated  - Resume Eliquis when ok with GI  - Trend CBC  - Baseline TSH/LFT  - Patient will benefit from MINNA/Cardioversion after GI work-up is completed to avoid interruption of OAC.  - Start Flecainide 75 mg PO q12hr after cardioversion.  - Patient is ok to proceed with GI work-up. IV beta blocker as needed during the procedure. Avoid hypotension.

## 2024-09-11 NOTE — PROGRESS NOTE ADULT - SUBJECTIVE AND OBJECTIVE BOX
Gastroenterology progress note:     Patient is a 64y old  Female who presents with a chief complaint of Palpitation and shortness of Breath (10 Sep 2024 11:54)       Admitted on: 09-08-24    We are following the patient for: TERI        Interval History:    Patient noted to be in Afib RVR, consulted cardiology and started on digoxin. Procedure cancelled pending further cardiology evaluation.       PAST MEDICAL & SURGICAL HISTORY:  Anxiety      Afib  Eliquis      GERD (gastroesophageal reflux disease)      H/O neck surgery          MEDICATIONS  (STANDING):  digoxin  Injectable 250 MICROGram(s) IV Push every 6 hours  enoxaparin Injectable 60 milliGRAM(s) SubCutaneous every 12 hours  fluconAZOLE   Tablet 200 milliGRAM(s) Oral daily  iron sucrose IVPB 100 milliGRAM(s) IV Intermittent every 24 hours  metoprolol tartrate 50 milliGRAM(s) Oral three times a day    MEDICATIONS  (PRN):  acetaminophen     Tablet .. 650 milliGRAM(s) Oral every 6 hours PRN Temp greater or equal to 38C (100.4F), Mild Pain (1 - 3)  LORazepam     Tablet 0.5 milliGRAM(s) Oral at bedtime PRN Anxiety  melatonin 3 milliGRAM(s) Oral at bedtime PRN Insomnia      Allergies  Toradol (Rash)      Review of Systems:   Cardiovascular:  No Chest Pain, No Palpitations  Respiratory:  No Cough, No Dyspnea  Gastrointestinal:  As described in HPI  Skin:  No Skin Lesions, No Jaundice  Neuro:  No Syncope, No Dizziness    Physical Examination:  T(C): 36.6 (09-11-24 @ 04:48), Max: 37.1 (09-10-24 @ 20:02)  HR: 84 (09-11-24 @ 04:48) (84 - 174)  BP: 111/73 (09-11-24 @ 04:48) (90/70 - 143/95)  RR: 18 (09-10-24 @ 14:34) (18 - 18)  SpO2: 99% (09-10-24 @ 14:34) (99% - 99%)      09-10-24 @ 07:01  -  09-11-24 @ 07:00  --------------------------------------------------------  IN: 238 mL / OUT: 600 mL / NET: -362 mL        GENERAL: AAOx3, no acute distress.  HEAD:  Atraumatic, Normocephalic  EYES: conjunctiva and sclera clear  NECK: Supple, no JVD or thyromegaly  CHEST/LUNG: Clear to auscultation bilaterally; No wheeze, rhonchi, or rales  HEART: Regular rate and rhythm; normal S1, S2, No murmurs.  ABDOMEN: Soft, nontender, nondistended; Bowel sounds present  NEUROLOGY: No asterixis or tremor.   SKIN: Intact, no jaundice     Data:                        11.5   6.83  )-----------( 351      ( 11 Sep 2024 06:37 )             37.2     Hgb trend:  11.5  09-11-24 @ 06:37  10.3  09-10-24 @ 05:38  9.9  09-09-24 @ 05:39  9.8  09-08-24 @ 16:00        09-11    141  |  107  |  13  ----------------------------<  90  4.2   |  23  |  0.9    Ca    9.0      11 Sep 2024 06:37  Mg     1.9     09-11    TPro  6.5  /  Alb  4.1  /  TBili  0.6  /  DBili  x   /  AST  18  /  ALT  55<H>  /  AlkPhos  156<H>  09-11    Liver panel trend:  TBili 0.6   /   AST 18   /   ALT 55   /   AlkP 156   /   Tptn 6.5   /   Alb 4.1    /   DBili --      09-11  TBili 0.3   /   AST 25   /   ALT 70   /   AlkP 151   /   Tptn 5.8   /   Alb 3.8    /   DBili --      09-10  TBili 0.7   /   AST 42   /   ALT 89   /   AlkP 157   /   Tptn 5.7   /   Alb 3.7    /   DBili --      09-09  TBili 0.4   /   AST 63   /      /   AlkP 159   /   Tptn 5.9   /   Alb 3.8    /   DBili --      09-08  TBili <0.2   /      /      /   AlkP 192   /   Tptn 6.5   /   Alb 4.1    /   DBili --      09-08             Radiology:       Gastroenterology progress note:     Patient is a 64y old  Female who presents with a chief complaint of Palpitation and shortness of Breath (10 Sep 2024 11:54)       Admitted on: 09-08-24    We are following the patient for: TERI        Interval History:    Patient noted to be in Afib RVR, consulted cardiology and started on digoxin. Procedure cancelled pending further cardiology optimization and rate control.       PAST MEDICAL & SURGICAL HISTORY:  Anxiety      Afib  Eliquis      GERD (gastroesophageal reflux disease)      H/O neck surgery          MEDICATIONS  (STANDING):  digoxin  Injectable 250 MICROGram(s) IV Push every 6 hours  enoxaparin Injectable 60 milliGRAM(s) SubCutaneous every 12 hours  fluconAZOLE   Tablet 200 milliGRAM(s) Oral daily  iron sucrose IVPB 100 milliGRAM(s) IV Intermittent every 24 hours  metoprolol tartrate 50 milliGRAM(s) Oral three times a day    MEDICATIONS  (PRN):  acetaminophen     Tablet .. 650 milliGRAM(s) Oral every 6 hours PRN Temp greater or equal to 38C (100.4F), Mild Pain (1 - 3)  LORazepam     Tablet 0.5 milliGRAM(s) Oral at bedtime PRN Anxiety  melatonin 3 milliGRAM(s) Oral at bedtime PRN Insomnia      Allergies  Toradol (Rash)      Review of Systems:   Cardiovascular:  No Chest Pain, No Palpitations  Respiratory:  No Cough, No Dyspnea  Gastrointestinal:  As described in HPI  Skin:  No Skin Lesions, No Jaundice  Neuro:  No Syncope, No Dizziness    Physical Examination:  T(C): 36.6 (09-11-24 @ 04:48), Max: 37.1 (09-10-24 @ 20:02)  HR: 84 (09-11-24 @ 04:48) (84 - 174)  BP: 111/73 (09-11-24 @ 04:48) (90/70 - 143/95)  RR: 18 (09-10-24 @ 14:34) (18 - 18)  SpO2: 99% (09-10-24 @ 14:34) (99% - 99%)      09-10-24 @ 07:01  -  09-11-24 @ 07:00  --------------------------------------------------------  IN: 238 mL / OUT: 600 mL / NET: -362 mL        GENERAL: AAOx3, no acute distress.  HEAD:  Atraumatic, Normocephalic  EYES: conjunctiva and sclera clear  NECK: Supple, no JVD or thyromegaly  CHEST/LUNG: Clear to auscultation bilaterally; No wheeze, rhonchi, or rales  HEART: Regular rate and rhythm; normal S1, S2, No murmurs.  ABDOMEN: Soft, nontender, nondistended; Bowel sounds present  NEUROLOGY: No asterixis or tremor.   SKIN: Intact, no jaundice     Data:                        11.5   6.83  )-----------( 351      ( 11 Sep 2024 06:37 )             37.2     Hgb trend:  11.5  09-11-24 @ 06:37  10.3  09-10-24 @ 05:38  9.9  09-09-24 @ 05:39  9.8  09-08-24 @ 16:00        09-11    141  |  107  |  13  ----------------------------<  90  4.2   |  23  |  0.9    Ca    9.0      11 Sep 2024 06:37  Mg     1.9     09-11    TPro  6.5  /  Alb  4.1  /  TBili  0.6  /  DBili  x   /  AST  18  /  ALT  55<H>  /  AlkPhos  156<H>  09-11    Liver panel trend:  TBili 0.6   /   AST 18   /   ALT 55   /   AlkP 156   /   Tptn 6.5   /   Alb 4.1    /   DBili --      09-11  TBili 0.3   /   AST 25   /   ALT 70   /   AlkP 151   /   Tptn 5.8   /   Alb 3.8    /   DBili --      09-10  TBili 0.7   /   AST 42   /   ALT 89   /   AlkP 157   /   Tptn 5.7   /   Alb 3.7    /   DBili --      09-09  TBili 0.4   /   AST 63   /      /   AlkP 159   /   Tptn 5.9   /   Alb 3.8    /   DBili --      09-08  TBili <0.2   /      /      /   AlkP 192   /   Tptn 6.5   /   Alb 4.1    /   DBili --      09-08             Radiology:

## 2024-09-12 ENCOUNTER — TRANSCRIPTION ENCOUNTER (OUTPATIENT)
Age: 64
End: 2024-09-12

## 2024-09-12 ENCOUNTER — RESULT REVIEW (OUTPATIENT)
Age: 64
End: 2024-09-12

## 2024-09-12 LAB
ALBUMIN SERPL ELPH-MCNC: 4 G/DL — SIGNIFICANT CHANGE UP (ref 3.5–5.2)
ALP SERPL-CCNC: 162 U/L — HIGH (ref 30–115)
ALT FLD-CCNC: 44 U/L — HIGH (ref 0–41)
ANION GAP SERPL CALC-SCNC: 15 MMOL/L — HIGH (ref 7–14)
AST SERPL-CCNC: 19 U/L — SIGNIFICANT CHANGE UP (ref 0–41)
BASOPHILS # BLD AUTO: 0.04 K/UL — SIGNIFICANT CHANGE UP (ref 0–0.2)
BASOPHILS NFR BLD AUTO: 0.4 % — SIGNIFICANT CHANGE UP (ref 0–1)
BILIRUB SERPL-MCNC: 0.5 MG/DL — SIGNIFICANT CHANGE UP (ref 0.2–1.2)
BUN SERPL-MCNC: 7 MG/DL — LOW (ref 10–20)
CALCIUM SERPL-MCNC: 9.4 MG/DL — SIGNIFICANT CHANGE UP (ref 8.4–10.5)
CHLORIDE SERPL-SCNC: 104 MMOL/L — SIGNIFICANT CHANGE UP (ref 98–110)
CO2 SERPL-SCNC: 22 MMOL/L — SIGNIFICANT CHANGE UP (ref 17–32)
CREAT SERPL-MCNC: 1 MG/DL — SIGNIFICANT CHANGE UP (ref 0.7–1.5)
DIGOXIN SERPL-MCNC: 1.6 NG/ML — SIGNIFICANT CHANGE UP (ref 0.8–2)
EGFR: 63 ML/MIN/1.73M2 — SIGNIFICANT CHANGE UP
EOSINOPHIL # BLD AUTO: 0.17 K/UL — SIGNIFICANT CHANGE UP (ref 0–0.7)
EOSINOPHIL NFR BLD AUTO: 1.7 % — SIGNIFICANT CHANGE UP (ref 0–8)
GLUCOSE SERPL-MCNC: 93 MG/DL — SIGNIFICANT CHANGE UP (ref 70–99)
HCT VFR BLD CALC: 38.2 % — SIGNIFICANT CHANGE UP (ref 37–47)
HGB BLD-MCNC: 11.9 G/DL — LOW (ref 12–16)
IMM GRANULOCYTES NFR BLD AUTO: 0.3 % — SIGNIFICANT CHANGE UP (ref 0.1–0.3)
LYMPHOCYTES # BLD AUTO: 1.73 K/UL — SIGNIFICANT CHANGE UP (ref 1.2–3.4)
LYMPHOCYTES # BLD AUTO: 17.2 % — LOW (ref 20.5–51.1)
MAGNESIUM SERPL-MCNC: 1.8 MG/DL — SIGNIFICANT CHANGE UP (ref 1.8–2.4)
MCHC RBC-ENTMCNC: 24.1 PG — LOW (ref 27–31)
MCHC RBC-ENTMCNC: 31.2 G/DL — LOW (ref 32–37)
MCV RBC AUTO: 77.5 FL — LOW (ref 81–99)
MONOCYTES # BLD AUTO: 0.96 K/UL — HIGH (ref 0.1–0.6)
MONOCYTES NFR BLD AUTO: 9.6 % — HIGH (ref 1.7–9.3)
NEUTROPHILS # BLD AUTO: 7.1 K/UL — HIGH (ref 1.4–6.5)
NEUTROPHILS NFR BLD AUTO: 70.8 % — SIGNIFICANT CHANGE UP (ref 42.2–75.2)
NRBC # BLD: 0 /100 WBCS — SIGNIFICANT CHANGE UP (ref 0–0)
PLATELET # BLD AUTO: 363 K/UL — SIGNIFICANT CHANGE UP (ref 130–400)
PMV BLD: 11.9 FL — HIGH (ref 7.4–10.4)
POTASSIUM SERPL-MCNC: 4.8 MMOL/L — SIGNIFICANT CHANGE UP (ref 3.5–5)
POTASSIUM SERPL-SCNC: 4.8 MMOL/L — SIGNIFICANT CHANGE UP (ref 3.5–5)
PROT SERPL-MCNC: 6.5 G/DL — SIGNIFICANT CHANGE UP (ref 6–8)
RBC # BLD: 4.93 M/UL — SIGNIFICANT CHANGE UP (ref 4.2–5.4)
RBC # FLD: 20.6 % — HIGH (ref 11.5–14.5)
SODIUM SERPL-SCNC: 141 MMOL/L — SIGNIFICANT CHANGE UP (ref 135–146)
WBC # BLD: 10.03 K/UL — SIGNIFICANT CHANGE UP (ref 4.8–10.8)
WBC # FLD AUTO: 10.03 K/UL — SIGNIFICANT CHANGE UP (ref 4.8–10.8)

## 2024-09-12 PROCEDURE — 43239 EGD BIOPSY SINGLE/MULTIPLE: CPT | Mod: XS

## 2024-09-12 PROCEDURE — 45378 DIAGNOSTIC COLONOSCOPY: CPT

## 2024-09-12 PROCEDURE — 88312 SPECIAL STAINS GROUP 1: CPT | Mod: 26

## 2024-09-12 PROCEDURE — 88305 TISSUE EXAM BY PATHOLOGIST: CPT | Mod: 26

## 2024-09-12 PROCEDURE — 93971 EXTREMITY STUDY: CPT | Mod: 26,RT

## 2024-09-12 PROCEDURE — 99233 SBSQ HOSP IP/OBS HIGH 50: CPT

## 2024-09-12 RX ORDER — DIGOXIN 0.12 MG/1
250 TABLET ORAL DAILY
Refills: 0 | Status: DISCONTINUED | OUTPATIENT
Start: 2024-09-13 | End: 2024-09-13

## 2024-09-12 RX ADMIN — METOPROLOL TARTRATE 50 MILLIGRAM(S): 100 TABLET ORAL at 06:03

## 2024-09-12 RX ADMIN — METOPROLOL TARTRATE 50 MILLIGRAM(S): 100 TABLET ORAL at 21:24

## 2024-09-12 RX ADMIN — METOPROLOL TARTRATE 50 MILLIGRAM(S): 100 TABLET ORAL at 13:26

## 2024-09-12 RX ADMIN — Medication 1 APPLICATION(S): at 12:05

## 2024-09-12 RX ADMIN — DIGOXIN 250 MICROGRAM(S): 0.12 TABLET ORAL at 06:04

## 2024-09-12 RX ADMIN — IRON SUCROSE 210 MILLIGRAM(S): 20 INJECTION, SOLUTION INTRAVENOUS at 14:06

## 2024-09-12 RX ADMIN — Medication 25 GRAM(S): at 11:55

## 2024-09-12 RX ADMIN — FLUCONAZOLE 200 MILLIGRAM(S): 150 TABLET ORAL at 12:05

## 2024-09-12 RX ADMIN — LORAZEPAM 0.5 MILLIGRAM(S): 4 INJECTION INTRAMUSCULAR; INTRAVENOUS at 21:24

## 2024-09-12 NOTE — PROGRESS NOTE ADULT - SUBJECTIVE AND OBJECTIVE BOX
STEPHANE CHAN  64y Female    CHIEF COMPLAINT:    Patient is a 64y old  Female who presents with a chief complaint of Palpitation and shortness of Breath (12 Sep 2024 11:44)      INTERVAL HPI/OVERNIGHT EVENTS:    Patient seen and examined. No palpitations. No sob. Pt is scheduled for EGD/Colonoscopy today    ROS: All other systems are negative.    Vital Signs:    T(F): 98.5 (24 @ 08:24), Max: 98.5 (24 @ 08:24)  HR: 87 (24 @ 08:24) (85 - 88)  BP: 125/84 (24 @ 08:24) (117/79 - 149/91)  RR: 18 (24 @ 08:24) (18 - 18)  SpO2: 98% (24 @ 08:24) (95% - 98%)  I&O's Summary    11 Sep 2024 07:01  -  12 Sep 2024 07:00  --------------------------------------------------------  IN: 458 mL / OUT: 0 mL / NET: 458 mL      Daily Height in cm: 162.2 (12 Sep 2024 08:24)    Daily Weight in k.2 (12 Sep 2024 04:57)  CAPILLARY BLOOD GLUCOSE          PHYSICAL EXAM:    GENERAL:  NAD  SKIN: No rashes or lesions  HENT: Atraumatic. Normocephalic. PERRL. Moist membranes.  NECK: Supple, No JVD. No lymphadenopathy.  PULMONARY: CTA B/L. No wheezing. No rales  CVS: Normal S1, S2. Rate and Rhythm are IRIR. No murmurs.  ABDOMEN/GI: Soft, Nontender, Nondistended; BS present  EXTREMITIES: Peripheral pulses intact. No edema B/L LE. R forearm likely thrombophlebitis.   NEUROLOGIC:  No motor or sensory deficit.  PSYCH: Alert & oriented x 3    Consultant(s) Notes Reviewed:  [x ] YES  [ ] NO  Care Discussed with Consultants/Other Providers [ x] YES  [ ] NO    EKG reviewed  Telemetry reviewed    LABS:                        11.9   10.03 )-----------( 363      ( 12 Sep 2024 06:13 )             38.2         141  |  104  |  7<L>  ----------------------------<  93  4.8   |  22  |  1.0    Ca    9.4      12 Sep 2024 06:13  Mg     1.8         TPro  6.5  /  Alb  4.0  /  TBili  0.5  /  DBili  x   /  AST  19  /  ALT  44<H>  /  AlkPhos  162<H>                RADIOLOGY & ADDITIONAL TESTS:      Imaging or report Personally Reviewed:  [ ] YES  [ ] NO    Medications:  Standing  bacitracin   Ointment 1 Application(s) Topical daily  fluconAZOLE   Tablet 200 milliGRAM(s) Oral daily  iron sucrose IVPB 100 milliGRAM(s) IV Intermittent every 24 hours  magnesium sulfate  IVPB 2 Gram(s) IV Intermittent once  metoprolol tartrate 50 milliGRAM(s) Oral three times a day    PRN Meds  acetaminophen     Tablet .. 650 milliGRAM(s) Oral every 6 hours PRN  LORazepam     Tablet 0.5 milliGRAM(s) Oral at bedtime PRN  melatonin 3 milliGRAM(s) Oral at bedtime PRN      Case discussed with resident    Care discussed with pt/family

## 2024-09-12 NOTE — CHART NOTE - NSCHARTNOTEFT_GEN_A_CORE
PACU ANESTHESIA ADMISSION NOTE      Procedure:   Post op diagnosis:      ____  Intubated  TV:______       Rate: ______      FiO2: ______    __x__  Patent Airway    _x___  Full return of protective reflexes    ___x_  Full recovery from anesthesia / back to baseline status    Vitals:  T(F): 97.3 (09-12-24 @ 18:18), Max: 36.9 (09-12-24 @ 04:37)  HR: 84 (09-12-24 @ 18:18) (85 - 92)  BP: 111/68 (09-12-24 @ 18:18) (116/78 - 149/91)  RR: 18 (09-12-24 @ 18:18) (18 - 18)  SpO2: 97% (09-12-24 @ 18:18) (98% - 100%)    Mental Status:  __x__ Awake   ____x_ Alert   _____ Drowsy   _____ Sedated    Nausea/Vomiting:  __x__ NO  ______Yes,   See Post - Op Orders          Pain Scale (0-10):  ___5__    Treatment: ____ None    ___x_ See Post - Op/PCA Orders    Post - Operative Fluids:   ____ Oral   __x__ See Post - Op Orders    Plan: Discharge:   ____Home       __x___Floor     _____Critical Care    _____  Other:_________________    Comments: Transferred care to PACU RN; discharge to floor when criteria met.

## 2024-09-12 NOTE — PROGRESS NOTE ADULT - ASSESSMENT
64-year-old female past medical history of anxiety, A-fib on Eliquis and metoprolol s/p multiple cardioversions and ablation (Follows with Dr. Thomas for cardiology), presents to the ED complaining of palpitations with associated shortness of breath and upper abdominal pain that began earlier  in ED:  - EKG : Afib with RVR   - MINNA ( 5/2024): EF 40 to 45%.  - s/p 40 mg IV Lasix in the ED.      ADMITTED for CHF exacerbation secondary to afib w/ rvr, likely precipitated by medication noncompliance  ·Increase Metoprolol to 50 mg po q 8h  yest loaded with IV Digoxin. From today Digoxin 0.250 mg po daily>> will get dig level  EGD/colono today , then EP f/u       # Acute on chronic HFmrEF likely precipated due to Afib RVR 2/2 medications non compliance  # Atrial Fibrillation with RVR:   # GB edema  and transaminitis ( From volume overload), Likely congestive hepatopathy, resolving  - In the ED ; found to have atrial fibrillation ; s/p Adenosine and Diltiazem .   - Chest Xray: Increased bilateral interstitial opacities. Pulmonary vascular congestion present.   - USG Right upper quadrant & CT Abdomen and pelvis noted - All the features suggesting volume overload. - Surgery consult appreciated: No acute intervention for GB findings.   - Pro BNP 5 k ( Baseline 2 K )    -Hold Cardizem  ( Given the findings of Heart failure), -  Metoprolol tartrate 50 mg increased to TID, started digoxin  - Cardiology consulted ( Dr. Thomas) &EP consulted  EP: - Patient will need repeat ablation. Patient states that she will think about it.      #Iron Def Anemia, microcytic  - Hb 11.4 ( baseline ) MCV 77.5 >>9.9 on 9/9  -iron studies noted  - hold Ferrous Sulfate 325 mg po daily & start IV venofer for 5days.  -GI consulted: colono +EGD : holding eliquis    #oral thrush  -started PO diflucan   -HIV -ve    #Anxiety  - on Lorazepam 0.5 PO PRN   - pt was educated about behavioral modification     Plan: egd-colono today

## 2024-09-12 NOTE — PROGRESS NOTE ADULT - ASSESSMENT
64-year-old female past medical history of anxiety, A-fib on Eliquis and metoprolol s/p multiple cardioversions and ablation (Follows with Dr. Thomas for cardiology), presents to the ED complaining of palpitations with associated shortness of breath and upper abdominal pain that began earlier today.     Paroxysmal Atrial flutter/fib on Eliquis  Anxiety disorder.   Iron deficiency anemia  Acute HFpEF  Oral thrush                PLAN:     ·	Tele reviewed by me. A-fib/flutter rate controlled  ·	EKG on admission: /min. RAD. Non specific ST, T changes (Interpreted by me)  ·	Non compliant with her meds at home  ·	Cont Metoprolol to 50 mg po q 8h  ·	S/P loading dose of Digoxin. Cont Digoxin 0.250 mg po daily  ·	TSH is 6.6. Free T4 is normal  ·	ECHO showed EF is 50-55%.   ·	Cont to hold Eliquis for EGD/Colonoscopy today. Hold morning dose of Lovenox.   ·	Cardiology eval noted.   ·	Serum iron is 29, Ferritin is 9 and percent sat is 10. Start her on IV Venofer 200 mg iv daily x 5 doses  ·	Iron deficiency anemia and oral thrush: Care d/w the GI. EGD/Colonoscopy in AM  ·	Cont Diflucan 200 mg po daily for 1-2 weeks.   ·	HIV is non reactive  ·	CT chest reviewed.  Markedly thickened gallbladder wall which is edematous with superimposed pericholecystic fluid. No radiopaque gallstones. Findings are nonspecific and unclear primarily related to gallbladder pathology versus generalized fluid status.  ·	RUQ us noted. No gallstones. Severe gallbladder wall edema, may be non biliary in origin. Correlate for volume overload.  ·	Surgical eval noted. No evidence of acute cholecystitis.   ·	Elevated LFT's on admission. Trending down.   ·	Care d/w the EP. Will do DCCV after GI w/u is complete  ·	R forearm thrombophlebitis: Venous doppler of RUE. Warm compresses.       Progress Note Handoff    Pending (specify):  Consults_________, Tests________, Test Results_______, Other_EGD/Colonoscopy today_______  Family discussion:  Disposition: Home___/SNF___/Other________/Unknown at this time________    Hugh Vernon MD  Spectra: [id number]

## 2024-09-12 NOTE — PHARMACOTHERAPY INTERVENTION NOTE - COMMENTS
Patient started on digoxin load for rapid a fib, planning for cardioversion. Received 500 mcg IV x 1 dose on 9/11 and has received 3 doses of 250 mcg (IV on 9/10 and 9/11; PO on 9/12). Can obtain digoxin level after 3 to 5 days of therapy. Can obtain tomorrow AM before digoxin dose or at least 6-8 hours after the dose was given. Goal for a fib: 0.8 to 2 ng/mL.

## 2024-09-12 NOTE — PROGRESS NOTE ADULT - SUBJECTIVE AND OBJECTIVE BOX
SUBJECTIVE / OVERNIGHT EVENTS  Patient slept well overnight. No acute complaints this AM. Patient does not report fevers, chills, CP, SOB, or n/v/d. on tele: tachycardia events: 150s-160s    MEDICATIONS  bacitracin   Ointment 1 Application(s) Topical daily  fluconAZOLE   Tablet 200 milliGRAM(s) Oral daily  iron sucrose IVPB 100 milliGRAM(s) IV Intermittent every 24 hours  magnesium sulfate  IVPB 2 Gram(s) IV Intermittent once  metoprolol tartrate 50 milliGRAM(s) Oral three times a day    acetaminophen     Tablet .. 650 milliGRAM(s) Oral every 6 hours PRN Temp greater or equal to 38C (100.4F), Mild Pain (1 - 3)  LORazepam     Tablet 0.5 milliGRAM(s) Oral at bedtime PRN Anxiety  melatonin 3 milliGRAM(s) Oral at bedtime PRN Insomnia    VITALS /  EXAM    T(C): 36.9 (09-12-24 @ 08:24), Max: 36.9 (09-12-24 @ 04:37)  HR: 87 (09-12-24 @ 08:24) (85 - 88)  BP: 125/84 (09-12-24 @ 08:24) (117/79 - 149/91)  RR: 18 (09-12-24 @ 08:24) (18 - 18)  SpO2: 98% (09-12-24 @ 08:24) (95% - 98%)    GENERAL: NAD, well-developed  CHEST/LUNG: Clear to auscultation bilaterally; No wheezes, rales or rhonchi  HEART: Regular rate and rhythm; No murmurs, rubs, or gallops  ABDOMEN: Soft, Nontender, Nondistended; Bowel sounds present, no masses.  EXTREMITIES:  2+ Peripheral Pulses, No clubbing, cyanosis, or edema    I's & O's     09-11-24 @ 07:01  -  09-12-24 @ 07:00  --------------------------------------------------------  IN:    Oral Fluid: 458 mL  Total IN: 458 mL    OUT:  Total OUT: 0 mL    Total NET: 458 mL        LABS             11.9   10.03 )-----------( 363      ( 09-12-24 @ 06:13 )             38.2     141  |  104  |  7   -------------------------<  93   09-12-24 @ 06:13  4.8  |  22  |  1.0    Ca      9.4     09-12-24 @ 06:13  Mg     1.8     09-12-24 @ 06:13    TPro  6.5  /  Alb  4.0  /  TBili  0.5  /  DBili  x   /  AST  19  /  ALT  44  /  AlkPhos  162  /  GGT  x     09-12-24 @ 06:13      Urinalysis Basic - ( 12 Sep 2024 06:13 )    Color: x / Appearance: x / SG: x / pH: x  Gluc: 93 mg/dL / Ketone: x  / Bili: x / Urobili: x   Blood: x / Protein: x / Nitrite: x   Leuk Esterase: x / RBC: x / WBC x   Sq Epi: x / Non Sq Epi: x / Bacteria: x      MICRO / IMAGING / CARDIOLOGY  Telemetry: Reviewed   EKG: Reviewed    CULTURES    IMAGING    CARDIOLOGY

## 2024-09-13 ENCOUNTER — TRANSCRIPTION ENCOUNTER (OUTPATIENT)
Age: 64
End: 2024-09-13

## 2024-09-13 ENCOUNTER — RESULT REVIEW (OUTPATIENT)
Age: 64
End: 2024-09-13

## 2024-09-13 VITALS — DIASTOLIC BLOOD PRESSURE: 72 MMHG | RESPIRATION RATE: 18 BRPM | SYSTOLIC BLOOD PRESSURE: 108 MMHG | HEART RATE: 69 BPM

## 2024-09-13 LAB
ALBUMIN SERPL ELPH-MCNC: 4.1 G/DL — SIGNIFICANT CHANGE UP (ref 3.5–5.2)
ALP SERPL-CCNC: 158 U/L — HIGH (ref 30–115)
ALT FLD-CCNC: 33 U/L — SIGNIFICANT CHANGE UP (ref 0–41)
ANION GAP SERPL CALC-SCNC: 10 MMOL/L — SIGNIFICANT CHANGE UP (ref 7–14)
AST SERPL-CCNC: 14 U/L — SIGNIFICANT CHANGE UP (ref 0–41)
BASOPHILS # BLD AUTO: 0.04 K/UL — SIGNIFICANT CHANGE UP (ref 0–0.2)
BASOPHILS NFR BLD AUTO: 0.5 % — SIGNIFICANT CHANGE UP (ref 0–1)
BILIRUB SERPL-MCNC: 0.6 MG/DL — SIGNIFICANT CHANGE UP (ref 0.2–1.2)
BUN SERPL-MCNC: 8 MG/DL — LOW (ref 10–20)
CALCIUM SERPL-MCNC: 9.2 MG/DL — SIGNIFICANT CHANGE UP (ref 8.4–10.4)
CHLORIDE SERPL-SCNC: 106 MMOL/L — SIGNIFICANT CHANGE UP (ref 98–110)
CO2 SERPL-SCNC: 20 MMOL/L — SIGNIFICANT CHANGE UP (ref 17–32)
CREAT SERPL-MCNC: 1.1 MG/DL — SIGNIFICANT CHANGE UP (ref 0.7–1.5)
EGFR: 56 ML/MIN/1.73M2 — LOW
EOSINOPHIL # BLD AUTO: 0.1 K/UL — SIGNIFICANT CHANGE UP (ref 0–0.7)
EOSINOPHIL NFR BLD AUTO: 1.2 % — SIGNIFICANT CHANGE UP (ref 0–8)
GLUCOSE SERPL-MCNC: 87 MG/DL — SIGNIFICANT CHANGE UP (ref 70–99)
HCT VFR BLD CALC: 39.5 % — SIGNIFICANT CHANGE UP (ref 37–47)
HGB BLD-MCNC: 12.3 G/DL — SIGNIFICANT CHANGE UP (ref 12–16)
IMM GRANULOCYTES NFR BLD AUTO: 0.3 % — SIGNIFICANT CHANGE UP (ref 0.1–0.3)
LYMPHOCYTES # BLD AUTO: 1.81 K/UL — SIGNIFICANT CHANGE UP (ref 1.2–3.4)
LYMPHOCYTES # BLD AUTO: 20.9 % — SIGNIFICANT CHANGE UP (ref 20.5–51.1)
MAGNESIUM SERPL-MCNC: 2.4 MG/DL — SIGNIFICANT CHANGE UP (ref 1.8–2.4)
MCHC RBC-ENTMCNC: 23.9 PG — LOW (ref 27–31)
MCHC RBC-ENTMCNC: 31.1 G/DL — LOW (ref 32–37)
MCV RBC AUTO: 76.8 FL — LOW (ref 81–99)
MONOCYTES # BLD AUTO: 0.96 K/UL — HIGH (ref 0.1–0.6)
MONOCYTES NFR BLD AUTO: 11.1 % — HIGH (ref 1.7–9.3)
NEUTROPHILS # BLD AUTO: 5.71 K/UL — SIGNIFICANT CHANGE UP (ref 1.4–6.5)
NEUTROPHILS NFR BLD AUTO: 66 % — SIGNIFICANT CHANGE UP (ref 42.2–75.2)
NRBC # BLD: 0 /100 WBCS — SIGNIFICANT CHANGE UP (ref 0–0)
PLATELET # BLD AUTO: 332 K/UL — SIGNIFICANT CHANGE UP (ref 130–400)
PMV BLD: 11.5 FL — HIGH (ref 7.4–10.4)
POTASSIUM SERPL-MCNC: 4.9 MMOL/L — SIGNIFICANT CHANGE UP (ref 3.5–5)
POTASSIUM SERPL-SCNC: 4.9 MMOL/L — SIGNIFICANT CHANGE UP (ref 3.5–5)
PROT SERPL-MCNC: 6.7 G/DL — SIGNIFICANT CHANGE UP (ref 6–8)
RBC # BLD: 5.14 M/UL — SIGNIFICANT CHANGE UP (ref 4.2–5.4)
RBC # FLD: 21.1 % — HIGH (ref 11.5–14.5)
SODIUM SERPL-SCNC: 136 MMOL/L — SIGNIFICANT CHANGE UP (ref 135–146)
WBC # BLD: 8.65 K/UL — SIGNIFICANT CHANGE UP (ref 4.8–10.8)
WBC # FLD AUTO: 8.65 K/UL — SIGNIFICANT CHANGE UP (ref 4.8–10.8)

## 2024-09-13 PROCEDURE — 99233 SBSQ HOSP IP/OBS HIGH 50: CPT

## 2024-09-13 PROCEDURE — 99239 HOSP IP/OBS DSCHRG MGMT >30: CPT

## 2024-09-13 PROCEDURE — 93325 DOPPLER ECHO COLOR FLOW MAPG: CPT | Mod: 26

## 2024-09-13 PROCEDURE — 93312 ECHO TRANSESOPHAGEAL: CPT | Mod: 26,XU

## 2024-09-13 PROCEDURE — 93320 DOPPLER ECHO COMPLETE: CPT | Mod: 26

## 2024-09-13 RX ORDER — ENOXAPARIN SODIUM 100 MG/ML
60 INJECTION SUBCUTANEOUS ONCE
Refills: 0 | Status: COMPLETED | OUTPATIENT
Start: 2024-09-13 | End: 2024-09-13

## 2024-09-13 RX ORDER — DIGOXIN 0.12 MG/1
1 TABLET ORAL
Qty: 30 | Refills: 3
Start: 2024-09-13 | End: 2025-01-10

## 2024-09-13 RX ORDER — FLUCONAZOLE 150 MG/1
1 TABLET ORAL
Qty: 10 | Refills: 0
Start: 2024-09-13 | End: 2024-09-22

## 2024-09-13 RX ORDER — DIGOXIN 0.12 MG/1
1 TABLET ORAL
Qty: 0 | Refills: 0 | DISCHARGE
Start: 2024-09-13

## 2024-09-13 RX ORDER — DILTIAZEM HYDROCHLORIDE 5 MG/ML
1 INJECTION INTRAVENOUS
Refills: 0 | DISCHARGE

## 2024-09-13 RX ORDER — ENOXAPARIN SODIUM 100 MG/ML
60 INJECTION SUBCUTANEOUS ONCE
Refills: 0 | Status: DISCONTINUED | OUTPATIENT
Start: 2024-09-13 | End: 2024-09-13

## 2024-09-13 RX ORDER — METOPROLOL TARTRATE 100 MG/1
3 TABLET ORAL
Qty: 90 | Refills: 3
Start: 2024-09-13 | End: 2025-01-10

## 2024-09-13 RX ADMIN — METOPROLOL TARTRATE 50 MILLIGRAM(S): 100 TABLET ORAL at 13:45

## 2024-09-13 RX ADMIN — Medication 1 APPLICATION(S): at 12:28

## 2024-09-13 RX ADMIN — IRON SUCROSE 210 MILLIGRAM(S): 20 INJECTION, SOLUTION INTRAVENOUS at 17:51

## 2024-09-13 RX ADMIN — FLUCONAZOLE 200 MILLIGRAM(S): 150 TABLET ORAL at 12:28

## 2024-09-13 RX ADMIN — METOPROLOL TARTRATE 50 MILLIGRAM(S): 100 TABLET ORAL at 06:07

## 2024-09-13 RX ADMIN — DIGOXIN 250 MICROGRAM(S): 0.12 TABLET ORAL at 06:07

## 2024-09-13 RX ADMIN — ENOXAPARIN SODIUM 60 MILLIGRAM(S): 100 INJECTION SUBCUTANEOUS at 15:25

## 2024-09-13 NOTE — PROGRESS NOTE ADULT - ASSESSMENT
64-year-old female past medical history of anxiety, A-fib on Eliquis and metoprolol s/p multiple cardioversions and ablation (Follows with Dr. Thomas for cardiology), presents to the ED complaining of palpitations with associated shortness of breath and upper abdominal pain that began earlier today.     Paroxysmal Atrial flutter/fib on Eliquis  Anxiety disorder.   Iron deficiency anemia  Acute HFpEF  Oral thrush                PLAN:     ·	Tele reviewed by me. A-fib/flutter rate controlled  ·	EKG on admission: /min. RAD. Non specific ST, T changes (Interpreted by me)  ·	Non compliant with her meds at home  ·	Cont Metoprolol to 50 mg po q 8h  ·	TSH is 6.6. Free T4 is normal  ·	ECHO showed EF is 50-55%.   ·	Pt is scheduled for DCCV today. Will d/c her home after the procedure.   ·	Restart Eliquis 5 mg po q 12h  ·	Serum iron is 29, Ferritin is 9 and percent sat is 10. Start her on IV Venofer 200 mg iv daily x 5 doses  ·	Cont Diflucan 200 mg po daily for 1-2 weeks.   ·	HIV is non reactive  ·	CT chest reviewed.  Markedly thickened gallbladder wall which is edematous with superimposed pericholecystic fluid. No radiopaque gallstones. Findings are nonspecific and unclear primarily related to gallbladder pathology versus generalized fluid status.  ·	RUQ us noted. No gallstones. Severe gallbladder wall edema, may be non biliary in origin. Correlate for volume overload.  ·	Surgical eval noted. No evidence of acute cholecystitis.   ·	Elevated LFT's on admission. Trending down.   ·	R forearm thrombophlebitis: Venous doppler of RUE. Warm compresses.   ·	S/P EGD/Colonoscopy. EGD showed Scalloped mucosa with blunting of duodenal villi in the duodenum concerning for Celiac's disease (Biopsy). Recommended to check serum anti tissue transglutaminase A and total IgA.  ·	Colonoscopy: Internal and external hemorrhoids. Poor prep in RT colon limiting visualization. Recommend CT colonography as outpatient. Start Anusol suppositories  ·	Dig level is 1.6      * Med rec reviewed. Plan of care d/w the pt. Time spent 42 minutes.

## 2024-09-13 NOTE — DISCHARGE NOTE NURSING/CASE MANAGEMENT/SOCIAL WORK - PATIENT PORTAL LINK FT
You can access the FollowMyHealth Patient Portal offered by [facility] by registering at the following website: [email or web address]. By joining [facility]’s FollowMyHealth portal, you will also be able to view your health information using other applications (apps) compatible with our system.

## 2024-09-13 NOTE — PROGRESS NOTE ADULT - REASON FOR ADMISSION
Palpitation and shortness of Breath

## 2024-09-13 NOTE — PROGRESS NOTE ADULT - ASSESSMENT
64-year-old female past medical history of anxiety, A-fib on Eliquis and metoprolol s/p multiple cardioversions and ablation (Follows with Dr. Thomas for cardiology), presented to the ED  on 9/8 complaining of palpitations with associated shortness of breath and upper abdominal pain, found to be in afib with RVR, GI consulted for low Hb/iron deficiency anemia.    #Iron Deficiency Anemia  Blunting of duodenal villi concerning for Celiac's  Internal and external hemorrhoids  -Hb today 9.9, on admission 11.4 (baseline 10-11), MCV 77  -Serum iron is 29 and percent sat is 10, ferritin from prior admission in June was 6  -patient was told many years ago she was anemic and was prescribed iron pills at the time however never took them, currently has been taking 325 po iron daily for the past month because her daughter told her to  -last colonoscopy and EGD were >10 years ago per patient and she believes colo was unremarkable, EGD demonstrated gastritis   -currently on IV venofer x5 days  EGD: A venous bled was noted in the lower third esophagus just above the GE junction. Erythema in the stomach compatible with non-erosive gastritis. (Biopsy). Scalloped mucosa with blunting of duodenal villi in the duodenum concerning for Celiac's disease (Biopsy).  Colonoscopy: Internal and external hemorrhoids. Poor prep in RT colon limiting visualization. Cecum seen from distance. Tortious and dilated colon. The colon was otherwise unremarkable.    Rec  Await pathology results  Can advance diet as tolerated  Check serum anti tissue transglutaminase A and total IgA  May resume all necessary medications  Start Anusol suppositories  Recommend CT colonography as outpatient  Follow up with our [facility] located at [address] [place] [address]. Phone Number: [phone]           64-year-old female past medical history of anxiety, A-fib on Eliquis and metoprolol s/p multiple cardioversions and ablation (Follows with Dr. Thomas for cardiology), presented to the ED  on 9/8 complaining of palpitations with associated shortness of breath and upper abdominal pain, found to be in afib with RVR, GI consulted for low Hb/iron deficiency anemia.    #Iron Deficiency Anemia  Blunting of duodenal villi concerning for Celiac's  Internal and external hemorrhoids  -Hb today 9.9, on admission 11.4 (baseline 10-11), MCV 77  -Serum iron is 29 and percent sat is 10, ferritin from prior admission in June was 6  -patient was told many years ago she was anemic and was prescribed iron pills at the time however never took them, currently has been taking 325 po iron daily for the past month because her daughter told her to  -last colonoscopy and EGD were >10 years ago per patient and she believes colo was unremarkable, EGD demonstrated gastritis   -currently on IV venofer x5 days  EGD: A venous bled was noted in the lower third esophagus just above the GE junction. Erythema in the stomach compatible with non-erosive gastritis. (Biopsy). Scalloped mucosa with blunting of duodenal villi in the duodenum concerning for Celiac's disease (Biopsy).  Colonoscopy: Internal and external hemorrhoids. Poor prep in RT colon limiting visualization. Cecum seen from distance. Tortious and dilated colon. The colon was otherwise unremarkable.    Rec  Await pathology results  Can advance diet as tolerated  Check serum anti tissue transglutaminase A and total IgA  May resume all necessary medications  Start Anusol suppositories  Recommend CT colonography as outpatient, patient aware if plan   Follow up with our [facility] located at [address] [place] [address]. Phone Number: [phone]

## 2024-09-13 NOTE — PROGRESS NOTE ADULT - ATTENDING COMMENTS
I edited the note
Patient being evaluated for iron deficiency anemia with no overt GI bleeding. Will benefit from EGD /colon for further evaluation. Procedure rescheduled for uncontrolled heart rate. EP evaluated the patient and can proceed with EGD/ Colon from there stand point. Accordingly, will schedule for EGD/Colon in am if rate remains controlled and patient is uneventful. Rest of recommendations as above
Patient being evaluated for anemia. No evidence of GI bleeding. HGB stable. Patient initially planned for EGD/ Colonoscopy tomorrow, but developed AFIB with RVR, rate not controlled yet. Will postpone endoscopic evaluation awaiting cardiac optimization and control of HR. Patient can be on short acting anticoagulation if deemed necessary. Plan of care communicated to the primary team

## 2024-09-13 NOTE — DISCHARGE NOTE PROVIDER - CARE PROVIDER_API CALL
Afua Holguin  Internal Medicine  [address] [place] [address]  Phone: [phone]  Fax: [phone]  Follow Up Time: 2 weeks    Sergio Ventura  Gastroenterology  [address] [place] [address]  Phone: [phone]  Fax: [phone]  Follow Up Time: 2 weeks    Astrid Farr  Cardiovascular Disease  [address] [place] [address]  Phone: [phone]  Fax: [phone]  Follow Up Time: 2 weeks

## 2024-09-13 NOTE — DISCHARGE NOTE PROVIDER - PROVIDER TOKENS
PROVIDER:[TOKEN:[[id number]],FOLLOWUP:[2 weeks]],PROVIDER:[TOKEN:[[id number]],FOLLOWUP:[2 weeks]],PROVIDER:[TOKEN:[[id number]],FOLLOWUP:[2 weeks]]

## 2024-09-13 NOTE — PROGRESS NOTE ADULT - PROVIDER SPECIALTY LIST ADULT
Electrophysiology
Hospitalist
Internal Medicine
Gastroenterology
Hospitalist
Hospitalist
Internal Medicine
Internal Medicine
Electrophysiology
Hospitalist
Hospitalist
Internal Medicine

## 2024-09-13 NOTE — CHART NOTE - NSCHARTNOTEFT_GEN_A_CORE
Pre-procedure Assessment:    Procedure: MINNA and possible cardioversion   Indication: A fib with RVR   Chart reviewed.      64-year-old female past medical history of anxiety, A-fib on Eliquis and metoprolol s/p multiple cardioversions and ablation presented for palpitations she was in a fib with RVR   She was also anemic with stable Hb.   GI performed EGD with no ulcer or active bleeding or major esophageal disease. She has a lower esophagus venous bled.   Discussed with GI, no contraindications to pass a MINNA probe. And no contraindications for AC. It's still being held by primary team     No contraindication to proceed with MINNA and cardioversion.

## 2024-09-13 NOTE — DISCHARGE NOTE PROVIDER - NSDCMRMEDTOKEN_GEN_ALL_CORE_FT
Cardizem  mg/24 hours oral capsule, extended release: 1 cap(s) orally once a day  Eliquis 5 mg oral tablet: 1 tab(s) orally 2 times a day  ferrous sulfate 325 mg (65 mg elemental iron) oral delayed release tablet: 1 tab(s) orally once a day  LORazepam 0.5 mg oral tablet: 1 tab(s) orally once a day (at bedtime) As needed Anxiety  metoprolol tartrate 50 mg oral tablet: 1 tab(s) orally 2 times a day   digoxin 250 mcg (0.25 mg) oral tablet: 1 tab(s) orally once a day  Eliquis 5 mg oral tablet: 1 tab(s) orally 2 times a day  ferrous sulfate 325 mg (65 mg elemental iron) oral delayed release tablet: 1 tab(s) orally once a day  fluconazole 200 mg oral tablet: 1 tab(s) orally once a day  LORazepam 0.5 mg oral tablet: 1 tab(s) orally once a day (at bedtime) As needed Anxiety  Metoprolol Succinate ER 25 mg oral tablet, extended release: 3 tab(s) orally once a day

## 2024-09-13 NOTE — CHART NOTE - NSCHARTNOTEFT_GEN_A_CORE
INDICATION:  - A fib     IMPRESSION:  - No BRAN thrombus. Successful cardioversion to NSR   - Normal LV function     PROCEDURE: Transesophageal echocardiogram    Primary Physician: Dr. Farr.   Assistant: Dr. Leigh    ANESTHESIA TYPE:   - As documented by anesthesia     CONDITION:  [  ] Good    ESTIMATED BLOOD LOSS: None    COMPLICATIONS: None    FINDINGS:    After risks and benefits of procedures were explained, informed consent was obtained and placed in chart. Refer to Anesthesia note for sedation details.  The MINNA probe was passed into the esophagus without difficulty.  Transesophageal images were obtained.  The MINNA probe was removed without difficulty and examined.  There was no evidence for bleeding.  The patient tolerated the procedure well without any immediate MINNA-related complications.      Preliminary Findings:  LA: Enlarged.   BRAN: Left atrial appendage was clear of clot and smoke.  LV: LVEF was estimated at 55%.   MV: Mild MR, no evidence of MS.   AV: No evidence of AI, normal opening.   RA: Normal.   RV: Normal.  TV: Mild TR.   PV: no PI.   IAS: Small PFO and ASD with left to right shunts.  Pericardial Effusion: None  Aorta: There was mild simple atheroma seen in the thoracic aorta.     Patient successfully converted to sinus rhythm with synchronized  200J of direct current cardioversion.    Recommendations   - Resume Eliquis starting tonight

## 2024-09-13 NOTE — PROGRESS NOTE ADULT - SUBJECTIVE AND OBJECTIVE BOX
STEPHANE CHAN  64y Female    CHIEF COMPLAINT:    Patient is a 64y old  Female who presents with a chief complaint of Palpitation and shortness of Breath (13 Sep 2024 10:12)      INTERVAL HPI/OVERNIGHT EVENTS:    Patient seen and examined.    ROS: All other systems are negative.    Vital Signs:    T(F): 98 (09-13-24 @ 13:27), Max: 98.4 (09-13-24 @ 05:21)  HR: 91 (09-13-24 @ 13:27) (76 - 91)  BP: 113/77 (09-13-24 @ 13:27) (110/74 - 129/82)  RR: 18 (09-13-24 @ 13:27) (17 - 18)  SpO2: 99% (09-13-24 @ 13:27) (97% - 100%)  I&O's Summary    Daily     Daily   CAPILLARY BLOOD GLUCOSE          PHYSICAL EXAM:    GENERAL:  NAD  SKIN: No rashes or lesions  HENT: Atraumatic. Normocephalic. PERRL. Moist membranes.  NECK: Supple, No JVD. No lymphadenopathy.  PULMONARY: CTA B/L. No wheezing. No rales  CVS: Normal S1, S2. Rate and Rhythm are regular. No murmurs.  ABDOMEN/GI: Soft, Nontender, Nondistended; BS present  EXTREMITIES: Peripheral pulses intact. No edema B/L LE.  NEUROLOGIC:  No motor or sensory deficit.  PSYCH: Alert & oriented x 3    Consultant(s) Notes Reviewed:  [x ] YES  [ ] NO  Care Discussed with Consultants/Other Providers [ x] YES  [ ] NO    EKG reviewed  Telemetry reviewed    LABS:                        12.3   8.65  )-----------( 332      ( 13 Sep 2024 06:05 )             39.5     09-13    136  |  106  |  8<L>  ----------------------------<  87  4.9   |  20  |  1.1    Ca    9.2      13 Sep 2024 06:05  Mg     2.4     09-13    TPro  6.7  /  Alb  4.1  /  TBili  0.6  /  DBili  x   /  AST  14  /  ALT  33  /  AlkPhos  158<H>  09-13              RADIOLOGY & ADDITIONAL TESTS:      Imaging or report Personally Reviewed:  [ ] YES  [ ] NO    Medications:  Standing  bacitracin   Ointment 1 Application(s) Topical daily  digoxin     Tablet 250 MICROGram(s) Oral daily  enoxaparin Injectable 60 milliGRAM(s) SubCutaneous once  fluconAZOLE   Tablet 200 milliGRAM(s) Oral daily  iron sucrose IVPB 100 milliGRAM(s) IV Intermittent every 24 hours  metoprolol tartrate 50 milliGRAM(s) Oral three times a day    PRN Meds  acetaminophen     Tablet .. 650 milliGRAM(s) Oral every 6 hours PRN  LORazepam     Tablet 0.5 milliGRAM(s) Oral at bedtime PRN  melatonin 3 milliGRAM(s) Oral at bedtime PRN      Case discussed with resident    Care discussed with pt/family

## 2024-09-13 NOTE — DISCHARGE NOTE PROVIDER - NSDCCPCAREPLAN_GEN_ALL_CORE_FT
PRINCIPAL DISCHARGE DIAGNOSIS  Diagnosis: Atrial fibrillation with RVR  Assessment and Plan of Treatment: .You were evaluated in the hospital for your palpitations. You were given treatment for your rapid hea  After discharge, you will need to:   - Follow up with your primary care doctor within 1-2 weeks  - Follow up with   - Take all the medications you were discharged with, unless otherwise instructed by your healthcare provider(s).   Please follow up with your providers by calling them to make an appointment so that you can see them in 1-2weeks; bring your paperwork from this hospital stay to that visit. You can access your visit information by signing up for an account for the patient portal at [email or web address]   Seek immediate medical attention if you develop fevers, chills, chest pain, shortness of breath, nausea and vomiting, abdominal pain, passing out, weakness or numbness or tingling on one side of your body, or any other concerning signs or symptoms.        SECONDARY DISCHARGE DIAGNOSES  Diagnosis: Pleural effusion  Assessment and Plan of Treatment:     Diagnosis: Shortness of breath  Assessment and Plan of Treatment:     Diagnosis: Abdominal pain  Assessment and Plan of Treatment:      PRINCIPAL DISCHARGE DIAGNOSIS  Diagnosis: Atrial fibrillation with RVR  Assessment and Plan of Treatment: .You were evaluated in the hospital for your palpitations. You were given treatment for your rapid heart rate. You underwent cardioversion on your last day of hospital stay. You can resume the eliquis tonight.   You did EGD & colonosvopy procedures, you will follow up with GI for your biopsy results  After discharge, you will need to:   - Follow up with your primary care doctor within 1-2 weeks  - Follow up with Gi & EP drs.  - Take all the medications you were discharged with, unless otherwise instructed by your healthcare provider(s).   Please follow up with your providers by calling them to make an appointment so that you can see them in 1-2weeks; bring your paperwork from this hospital stay to that visit. You can access your visit information by signing up for an account for the patient portal at [email or web address]   Seek immediate medical attention if you develop fevers, chills, chest pain, shortness of breath, nausea and vomiting, abdominal pain, passing out, weakness or numbness or tingling on one side of your body, or any other concerning signs or symptoms.        SECONDARY DISCHARGE DIAGNOSES  Diagnosis: Pleural effusion  Assessment and Plan of Treatment:     Diagnosis: Shortness of breath  Assessment and Plan of Treatment:     Diagnosis: Abdominal pain  Assessment and Plan of Treatment:

## 2024-09-13 NOTE — PROGRESS NOTE ADULT - SUBJECTIVE AND OBJECTIVE BOX
Gastroenterology progress note:     Patient is a 64y old  Female who presents with a chief complaint of Palpitation and shortness of Breath (12 Sep 2024 11:50)       Admitted on: 09-08-24    We are following the patient for: anemia       Interval History:    No acute events overnight. sp EGD/colonsocopy. No bowel movements overnight. Denies abdominal pain. Tolerating liquid diet.       PAST MEDICAL & SURGICAL HISTORY:  Anxiety      Afib  Eliquis      GERD (gastroesophageal reflux disease)      H/O neck surgery          MEDICATIONS  (STANDING):  bacitracin   Ointment 1 Application(s) Topical daily  digoxin     Tablet 250 MICROGram(s) Oral daily  fluconAZOLE   Tablet 200 milliGRAM(s) Oral daily  iron sucrose IVPB 100 milliGRAM(s) IV Intermittent every 24 hours  metoprolol tartrate 50 milliGRAM(s) Oral three times a day    MEDICATIONS  (PRN):  acetaminophen     Tablet .. 650 milliGRAM(s) Oral every 6 hours PRN Temp greater or equal to 38C (100.4F), Mild Pain (1 - 3)  LORazepam     Tablet 0.5 milliGRAM(s) Oral at bedtime PRN Anxiety  melatonin 3 milliGRAM(s) Oral at bedtime PRN Insomnia      Allergies  Toradol (Rash)      Review of Systems:   Cardiovascular:  No Chest Pain, No Palpitations  Respiratory:  No Cough, No Dyspnea  Gastrointestinal:  As described in HPI  Skin:  No Skin Lesions, No Jaundice  Neuro:  No Syncope, No Dizziness    Physical Examination:  T(C): 36.9 (09-13-24 @ 05:21), Max: 36.9 (09-12-24 @ 12:00)  HR: 86 (09-13-24 @ 05:21) (76 - 92)  BP: 110/74 (09-13-24 @ 05:21) (110/74 - 129/82)  RR: 18 (09-12-24 @ 18:35) (17 - 18)  SpO2: 99% (09-12-24 @ 20:00) (97% - 100%)  Weight (kg): 55.2 (09-12-24 @ 14:54)      GENERAL: AAOx3, no acute distress.  HEAD:  Atraumatic, Normocephalic  EYES: conjunctiva and sclera clear  NECK: Supple, no JVD or thyromegaly  CHEST/LUNG: Clear to auscultation bilaterally; No wheeze, rhonchi, or rales  HEART: Regular rate and rhythm; normal S1, S2, No murmurs.  ABDOMEN: Soft, nontender, nondistended; Bowel sounds present  NEUROLOGY: No asterixis or tremor.   SKIN: Intact, no jaundice     Data:                        12.3   8.65  )-----------( 332      ( 13 Sep 2024 06:05 )             39.5     Hgb trend:  12.3  09-13-24 @ 06:05  11.9  09-12-24 @ 06:13  11.5  09-11-24 @ 06:37        09-13    136  |  106  |  8<L>  ----------------------------<  87  4.9   |  20  |  1.1    Ca    9.2      13 Sep 2024 06:05  Mg     2.4     09-13    TPro  6.7  /  Alb  4.1  /  TBili  0.6  /  DBili  x   /  AST  14  /  ALT  33  /  AlkPhos  158<H>  09-13    Liver panel trend:  TBili 0.6   /   AST 14   /   ALT 33   /   AlkP 158   /   Tptn 6.7   /   Alb 4.1    /   DBili --      09-13  TBili 0.5   /   AST 19   /   ALT 44   /   AlkP 162   /   Tptn 6.5   /   Alb 4.0    /   DBili --      09-12  TBili 0.6   /   AST 18   /   ALT 55   /   AlkP 156   /   Tptn 6.5   /   Alb 4.1    /   DBili --      09-11  TBili 0.3   /   AST 25   /   ALT 70   /   AlkP 151   /   Tptn 5.8   /   Alb 3.8    /   DBili --      09-10  TBili 0.7   /   AST 42   /   ALT 89   /   AlkP 157   /   Tptn 5.7   /   Alb 3.7    /   DBili --      09-09  TBili 0.4   /   AST 63   /      /   AlkP 159   /   Tptn 5.9   /   Alb 3.8    /   DBili --      09-08  TBili <0.2   /      /      /   AlkP 192   /   Tptn 6.5   /   Alb 4.1    /   DBili --      09-08             Radiology:       Gastroenterology progress note:     Patient is a 64y old  Female who presents with a chief complaint of Palpitation and shortness of Breath (12 Sep 2024 11:50)       Admitted on: 09-08-24    We are following the patient for: anemia       Interval History:    No acute events overnight. sp EGD/colonsocopy. No bowel movements overnight. Denies abdominal pain. Tolerating liquid diet.  No GI bleeding       PAST MEDICAL & SURGICAL HISTORY:  Anxiety      Afib  Eliquis      GERD (gastroesophageal reflux disease)      H/O neck surgery          MEDICATIONS  (STANDING):  bacitracin   Ointment 1 Application(s) Topical daily  digoxin     Tablet 250 MICROGram(s) Oral daily  fluconAZOLE   Tablet 200 milliGRAM(s) Oral daily  iron sucrose IVPB 100 milliGRAM(s) IV Intermittent every 24 hours  metoprolol tartrate 50 milliGRAM(s) Oral three times a day    MEDICATIONS  (PRN):  acetaminophen     Tablet .. 650 milliGRAM(s) Oral every 6 hours PRN Temp greater or equal to 38C (100.4F), Mild Pain (1 - 3)  LORazepam     Tablet 0.5 milliGRAM(s) Oral at bedtime PRN Anxiety  melatonin 3 milliGRAM(s) Oral at bedtime PRN Insomnia      Allergies  Toradol (Rash)      Review of Systems:   Cardiovascular:  No Chest Pain, No Palpitations  Respiratory:  No Cough, No Dyspnea  Gastrointestinal:  As described in HPI  Skin:  No Skin Lesions, No Jaundice  Neuro:  No Syncope, No Dizziness    Physical Examination:  T(C): 36.9 (09-13-24 @ 05:21), Max: 36.9 (09-12-24 @ 12:00)  HR: 86 (09-13-24 @ 05:21) (76 - 92)  BP: 110/74 (09-13-24 @ 05:21) (110/74 - 129/82)  RR: 18 (09-12-24 @ 18:35) (17 - 18)  SpO2: 99% (09-12-24 @ 20:00) (97% - 100%)  Weight (kg): 55.2 (09-12-24 @ 14:54)      GENERAL: AAOx3, no acute distress.  HEAD:  Atraumatic, Normocephalic  EYES: conjunctiva and sclera clear  NECK: Supple, no JVD or thyromegaly  CHEST/LUNG: Clear to auscultation bilaterally; No wheeze, rhonchi, or rales  HEART: Regular rate and rhythm; normal S1, S2, No murmurs.  ABDOMEN: Soft, nontender, nondistended; Bowel sounds present  NEUROLOGY: No asterixis or tremor.   SKIN: Intact, no jaundice     Data:                        12.3   8.65  )-----------( 332      ( 13 Sep 2024 06:05 )             39.5     Hgb trend:  12.3  09-13-24 @ 06:05  11.9  09-12-24 @ 06:13  11.5  09-11-24 @ 06:37        09-13    136  |  106  |  8<L>  ----------------------------<  87  4.9   |  20  |  1.1    Ca    9.2      13 Sep 2024 06:05  Mg     2.4     09-13    TPro  6.7  /  Alb  4.1  /  TBili  0.6  /  DBili  x   /  AST  14  /  ALT  33  /  AlkPhos  158<H>  09-13    Liver panel trend:  TBili 0.6   /   AST 14   /   ALT 33   /   AlkP 158   /   Tptn 6.7   /   Alb 4.1    /   DBili --      09-13  TBili 0.5   /   AST 19   /   ALT 44   /   AlkP 162   /   Tptn 6.5   /   Alb 4.0    /   DBili --      09-12  TBili 0.6   /   AST 18   /   ALT 55   /   AlkP 156   /   Tptn 6.5   /   Alb 4.1    /   DBili --      09-11  TBili 0.3   /   AST 25   /   ALT 70   /   AlkP 151   /   Tptn 5.8   /   Alb 3.8    /   DBili --      09-10  TBili 0.7   /   AST 42   /   ALT 89   /   AlkP 157   /   Tptn 5.7   /   Alb 3.7    /   DBili --      09-09  TBili 0.4   /   AST 63   /      /   AlkP 159   /   Tptn 5.9   /   Alb 3.8    /   DBili --      09-08  TBili <0.2   /      /      /   AlkP 192   /   Tptn 6.5   /   Alb 4.1    /   DBili --      09-08             Radiology:

## 2024-09-13 NOTE — DISCHARGE NOTE NURSING/CASE MANAGEMENT/SOCIAL WORK - NSDCPEFALRISK_GEN_ALL_CORE
For information on Fall & Injury Prevention, visit: [email or web address] OR  [email or web address] OR  https://www.cdc.gov/steadi/patient.html

## 2024-09-13 NOTE — DISCHARGE NOTE PROVIDER - HOSPITAL COURSE
64-year-old female past medical history of anxiety, A-fib on Eliquis and metoprolol s/p multiple cardioversions and ablation (Follows with Dr. Thomas for cardiology), presents to the ED complaining of palpitations with associated shortness of breath and upper abdominal pain that began earlier today.  Patient states she was walking around running errands when the symptoms began.  Patient took an extra dose of her metoprolol, Eliquis, and Benadryl with no relief of symptoms.   She has been non compliant with her medications since 2 months now ; felt that her heart is in normal sinus rhythm so started skipping her home medications.   She follows Dr. Mckinley for EP. She didnot know that she has hx of moderately reduced HF as per her last MINNA.   Patient has not had any recent fever, headache, dizziness, chest pain, nausea, vomiting, or diarrhea.    Of note; The patient has a history of atrial fibrillation, diagnosed 2 years ago, and has undergone multiple treatments including five cardioversion procedures and an ablation. On April 18, 2024, the patient underwent pulse field ablation (PFA) performed by Dr. Jac Mckinley at [facility], during which the patient required two cardioversion procedures and was subsequently discharged on Amiodarone therapy. However, two days post-procedure, the patient experienced a recurrence of atrial fibrillation with rapid ventricular response (RVR), necessitating a return to [place] for another cardioversion. The patient presented to [facility] on May 28, 2024, for additional cardioversion. On June 20, 2024, the patient was admitted again for RVR, and a similar episode occurred on July 3, 2024, with atrial flutter and RVR, presenting with a heart rate in the 140s. The patient was treated with 15 mg of Cardizem IV, resulting in a heart rate reduction to the 70s.    In the ED; Vitals: /79 ;  ; RR 18 ; Afebrile; Maintaining saturation on RA.     Labs:   Hb 11.4( at baseline ) ; MCV 77.5; Platelets 326 k   Na 135 K 4.8   BUN/ Creatinine 26/1.1 ( At Baseline)   AST//171    ProBNP 5943 ( baseline 2000s)   Troponin 44  - In the ED ; found to have atrial fibrillation ; s/p Adenosine and Diltiazem .   - Chest Xray: Increased bilateral interstitial opacities. Pulmonary vascular congestion present.   - USG Right upper quadrant & CT Abdomen and pelvis noted - All the features suggesting volume overload. - Surgery consult appreciated: No acute intervention for GB findings.     ADMITTED for CHF exacerbation secondary to afib w/ rvr, likely precipitated by medication noncompliance. Started on IV lasix, edema & congestion resolved, lasix stopped.  Ep consulted, cardizem held in s/o heart failure, Increased Metoprolol to 50 mg po q 8h, Pt was still tachy with RVR, Loaded with IV Digoxin then started Digoxin 0.250 mg po daily>>cardioversion on 9/13  - found to have TERI, underwent EGD & colono  EGD:  Erythema in the stomach compatible with non-erosive gastritis. (Biopsy).  Scalloped mucosa with blunting of duodenal villi in the duodenum. (Biopsy).  Colonoscopy Impressions:  Internal and external hemorrhoids.  Poor prep in RT colon limiting visualization. Cecum seen from distance.  Tortious and dilated colon.  Recommend CT colonography as outpatient  Check serum anti tissue transglutaminase A and total IgA  F/U as outpt & f/U bx results & ab level results    Plan for dc after CV done, planning with Dr Vernon.       #Iron Def Anemia, microcytic  - Hb 11.4 ( baseline ) MCV 77.5 >>9.9 on 9/9  -iron studies noted  - hold Ferrous Sulfate 325 mg po daily & start IV venofer for 5days.  -GI consulted: colono +EGD : F/U for biopsy results    #oral thrush  -started PO diflucan   -HIV -ve    #Anxiety  - on Lorazepam 0.5 PO PRN   - pt was educated about behavioral modification 64-year-old female past medical history of anxiety, A-fib on Eliquis and metoprolol s/p multiple cardioversions and ablation (Follows with Dr. Thomas for cardiology), presents to the ED complaining of palpitations with associated shortness of breath and upper abdominal pain that began earlier today.  Patient states she was walking around running errands when the symptoms began.  Patient took an extra dose of her metoprolol, Eliquis, and Benadryl with no relief of symptoms.   She has been non compliant with her medications since 2 months now ; felt that her heart is in normal sinus rhythm so started skipping her home medications.   She follows Dr. Mckinley for EP. She didnot know that she has hx of moderately reduced HF as per her last MINNA.   Patient has not had any recent fever, headache, dizziness, chest pain, nausea, vomiting, or diarrhea.    Of note; The patient has a history of atrial fibrillation, diagnosed 2 years ago, and has undergone multiple treatments including five cardioversion procedures and an ablation. On April 18, 2024, the patient underwent pulse field ablation (PFA) performed by Dr. Jac Mckinley at [facility], during which the patient required two cardioversion procedures and was subsequently discharged on Amiodarone therapy. However, two days post-procedure, the patient experienced a recurrence of atrial fibrillation with rapid ventricular response (RVR), necessitating a return to [place] for another cardioversion. The patient presented to [facility] on May 28, 2024, for additional cardioversion. On June 20, 2024, the patient was admitted again for RVR, and a similar episode occurred on July 3, 2024, with atrial flutter and RVR, presenting with a heart rate in the 140s. The patient was treated with 15 mg of Cardizem IV, resulting in a heart rate reduction to the 70s.    In the ED; Vitals: /79 ;  ; RR 18 ; Afebrile; Maintaining saturation on RA.     Labs:   Hb 11.4( at baseline ) ; MCV 77.5; Platelets 326 k   Na 135 K 4.8   BUN/ Creatinine 26/1.1 ( At Baseline)   AST//171    ProBNP 5943 ( baseline 2000s)   Troponin 44  - In the ED ; found to have atrial fibrillation ; s/p Adenosine and Diltiazem .   - Chest Xray: Increased bilateral interstitial opacities. Pulmonary vascular congestion present.   - USG Right upper quadrant & CT Abdomen and pelvis noted - All the features suggesting volume overload. - Surgery consult appreciated: No acute intervention for GB findings.     ADMITTED for CHF exacerbation secondary to afib w/ rvr, likely precipitated by medication noncompliance. Started on IV lasix, edema & congestion resolved, lasix stopped.  Ep consulted, cardizem held in s/o heart failure, Increased Metoprolol to 50 mg po q 8h, Pt was still tachy with RVR, Loaded with IV Digoxin then started Digoxin 0.250 mg po daily>>cardioversion on 9/13  - found to have TERI, underwent EGD & colono  EGD:  Erythema in the stomach compatible with non-erosive gastritis. (Biopsy).  Scalloped mucosa with blunting of duodenal villi in the duodenum. (Biopsy).  Colonoscopy Impressions:  Internal and external hemorrhoids.  Poor prep in RT colon limiting visualization. Cecum seen from distance.  Tortious and dilated colon.  Recommend CT colonography as outpatient  Check serum anti tissue transglutaminase A and total IgA  F/U as outpt & f/U bx results & ab level results    Plan for dc after CV done: back to [facility], planning with Dr Vernon.       #Iron Def Anemia, microcytic  - Hb 11.4 ( baseline ) MCV 77.5 >>9.9 on 9/9  -iron studies noted  - hold Ferrous Sulfate 325 mg po daily & start IV venofer for 5days.  -GI consulted: colono +EGD : F/U for biopsy results    #oral thrush  -started PO diflucan   -HIV -ve    #Anxiety  - on Lorazepam 0.5 PO PRN   - pt was educated about behavioral modification

## 2024-09-14 LAB
IGA FLD-MCNC: 156 MG/DL — SIGNIFICANT CHANGE UP (ref 84–499)
TTG IGA SER-ACNC: 1.3 U/ML — SIGNIFICANT CHANGE UP
TTG IGA SER-ACNC: NEGATIVE — SIGNIFICANT CHANGE UP
TTG IGG SER IA-ACNC: NEGATIVE — SIGNIFICANT CHANGE UP
TTG IGG SER-ACNC: 1.2 U/ML — SIGNIFICANT CHANGE UP

## 2024-09-16 ENCOUNTER — NON-APPOINTMENT (OUTPATIENT)
Age: 64
End: 2024-09-16

## 2024-09-18 LAB — DIGITOXIN SERPL-MCNC: <5 NG/ML — LOW (ref 10–25)

## 2024-09-23 DIAGNOSIS — Z88.8 ALLERGY STATUS TO OTHER DRUGS, MEDICAMENTS AND BIOLOGICAL SUBSTANCES: ICD-10-CM

## 2024-09-23 DIAGNOSIS — I50.23 ACUTE ON CHRONIC SYSTOLIC (CONGESTIVE) HEART FAILURE: ICD-10-CM
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
## 2024-09-23 DIAGNOSIS — K22.89 OTHER SPECIFIED DISEASE OF ESOPHAGUS: ICD-10-CM

## 2024-09-23 DIAGNOSIS — R74.01 ELEVATION OF LEVELS OF LIVER TRANSAMINASE LEVELS: ICD-10-CM

## 2024-09-23 DIAGNOSIS — T45.516A UNDERDOSING OF ANTICOAGULANTS, INITIAL ENCOUNTER: ICD-10-CM

## 2024-09-23 DIAGNOSIS — D50.9 IRON DEFICIENCY ANEMIA, UNSPECIFIED: ICD-10-CM

## 2024-09-23 DIAGNOSIS — Z91.148 PATIENT'S OTHER NONCOMPLIANCE WITH MEDICATION REGIMEN FOR OTHER REASON: ICD-10-CM

## 2024-09-23 DIAGNOSIS — Q21.10 ATRIAL SEPTAL DEFECT, UNSPECIFIED: ICD-10-CM

## 2024-09-23 DIAGNOSIS — K64.8 OTHER HEMORRHOIDS: ICD-10-CM

## 2024-09-23 DIAGNOSIS — F41.9 ANXIETY DISORDER, UNSPECIFIED: ICD-10-CM

## 2024-09-23 DIAGNOSIS — Q21.12 PATENT FORAMEN OVALE: ICD-10-CM

## 2024-09-23 DIAGNOSIS — Q43.8 OTHER SPECIFIED CONGENITAL MALFORMATIONS OF INTESTINE: ICD-10-CM

## 2024-09-23 DIAGNOSIS — K64.4 RESIDUAL HEMORRHOIDAL SKIN TAGS: ICD-10-CM

## 2024-09-23 DIAGNOSIS — T44.7X6A UNDERDOSING OF BETA-ADRENORECEPTOR ANTAGONISTS, INITIAL ENCOUNTER: ICD-10-CM

## 2024-09-23 DIAGNOSIS — R18.8 OTHER ASCITES: ICD-10-CM

## 2024-09-23 DIAGNOSIS — I48.0 PAROXYSMAL ATRIAL FIBRILLATION: ICD-10-CM

## 2024-09-23 DIAGNOSIS — Z79.01 LONG TERM (CURRENT) USE OF ANTICOAGULANTS: ICD-10-CM

## 2024-09-23 DIAGNOSIS — K82.8 OTHER SPECIFIED DISEASES OF GALLBLADDER: ICD-10-CM

## 2024-09-23 DIAGNOSIS — B37.0 CANDIDAL STOMATITIS: ICD-10-CM

## 2024-09-23 DIAGNOSIS — K21.9 GASTRO-ESOPHAGEAL REFLUX DISEASE WITHOUT ESOPHAGITIS: ICD-10-CM

## 2024-09-23 DIAGNOSIS — K59.39 OTHER MEGACOLON: ICD-10-CM

## 2024-09-23 DIAGNOSIS — T45.4X6A UNDERDOSING OF IRON AND ITS COMPOUNDS, INITIAL ENCOUNTER: ICD-10-CM

## 2024-09-23 DIAGNOSIS — I48.92 UNSPECIFIED ATRIAL FLUTTER: ICD-10-CM

## 2024-10-11 ENCOUNTER — OUTPATIENT (OUTPATIENT)
Dept: OUTPATIENT SERVICES | Facility: HOSPITAL | Age: 64
LOS: 1 days | End: 2024-10-11

## 2024-10-11 ENCOUNTER — APPOINTMENT (OUTPATIENT)
Dept: GASTROENTEROLOGY | Facility: CLINIC | Age: 64
End: 2024-10-11

## 2024-10-11 ENCOUNTER — NON-APPOINTMENT (OUTPATIENT)
Age: 64
End: 2024-10-11

## 2024-10-11 VITALS
DIASTOLIC BLOOD PRESSURE: 70 MMHG | OXYGEN SATURATION: 100 % | HEIGHT: 64 IN | WEIGHT: 130 LBS | SYSTOLIC BLOOD PRESSURE: 105 MMHG | BODY MASS INDEX: 22.2 KG/M2 | TEMPERATURE: 97.1 F | HEART RATE: 56 BPM

## 2024-10-11 DIAGNOSIS — K90.0 CELIAC DISEASE: ICD-10-CM

## 2024-10-11 DIAGNOSIS — Z00.00 ENCOUNTER FOR GENERAL ADULT MEDICAL EXAMINATION WITHOUT ABNORMAL FINDINGS: ICD-10-CM

## 2024-10-11 PROCEDURE — 99202 OFFICE O/P NEW SF 15 MIN: CPT

## 2024-10-11 RX ORDER — SODIUM SULFATE, MAGNESIUM SULFATE, AND POTASSIUM CHLORIDE 17.75; 2.7; 2.25 G/1; G/1; G/1
1479-225-188 TABLET ORAL DAILY
Qty: 24 | Refills: 0 | Status: ACTIVE | COMMUNITY
Start: 2024-10-11 | End: 1900-01-01

## 2024-10-17 NOTE — PRE-ANESTHESIA EVALUATION ADULT - WEIGHT IN LBS
[facility]  [facility]  [address] [place] [id number]  [phone]    Note to the patient: The 21st Century Cures Act makes medical notes like these available to patients in the interest of transparency. Please be aware that this is a medical document, written in medical language, and intended for doctor-to-doctor communication. Medical documents are intended to carry relevant information and the clinical opinion of the author in a brief format. For this reason, medical notes may seem blunt or direct, and may contain abbreviations or verbiage that are unfamiliar.    Referring Physician: Kei Pal DO    Chief Complaint: New Patient (Digestive issues)  [Language/: English]    HPI:  Ms. Faby Ray is a pleasant 23 year old female with PMH of anxiety/depression, POTS, EDS, PTSD, ARFID, autism spectrum disorder, endometriosis s/p laparoscopy for resection 8/2024, who presents to GI clinic for consultation.    Consultation 10/17/2024  - For 8 years, reports bowel habits cycle between constipation and diarrhea  - Reports intolerances to certain foods. Especially cruciferous vegetables for example. Has issues with tomato or pepper. Reports getting diarrhea, sometimes vomiting  - Reports stomach pain and cramping  - Reports dairy makes her constipated  - For periods of time wouldn't eat  - After the laparoscopy for endometriosis, diarrhea has improved some, and has less abdominal pain/cramping  - Nausea  - Has tried fiber supplements before -- tried Metamucil and didn't notice a difference (was taking everyday for a month or two -- 1 capful daily)  - Recently started famotidine and pantoprazole -- has helped with some reflux symptoms  - Abdominal pain frequently improves after a BM  - Not frequently having dysphagia    Bowel habits: When constipated can go 5 days without a BM, when having diarrhea can have up to 8 a day. Diarrhea seems a 
bit more often than constipation. Still seeing some blood when wiping    ROS: 10 systems were reviewed and were negative except as per HPI.    Past Medical/Surgical History:   Past Medical History:   Diagnosis Date    ADHD (attention deficit hyperactivity disorder), combined type 08/01/2019    sees therapist weekly x 2 years-- starting EMDR    Autism spectrum disorder (CMD)     Dr Moon De Jesus, NP - psychiatrist -- [facility]    Avoidant-restrictive food intake disorder (ARFID) 03/02/2024    Diagnosed after Autism diagnosis-- recommended to eating disorder dietitians 3/2024    Breast disorder     Chronic pain of multiple joints 12/11/2023    Depression     Developmental dysplasia of hip (CMD) 10/17/2013    Dysmenorrhea     Endometriosis of rectovaginal septum without involvement of vagina     Facet arthropathy of spine     Failed moderate sedation during procedure     Pt not responsive to local or MAC anesthersia    ARCADIO (generalized anxiety disorder) 08/01/2019    Generalized hyperhidrosis 03/24/2020    History of dysmenorrhea     Hypermobility syndrome 12/11/2023    Generalized hypermobility of multiple joints with joint pain    IUD (intrauterine device) in place     Menorrhagia with regular cycle     Mild intermittent asthma without complication (CMD) 03/24/2010    Orthostatic hypotension     worse after COVID-- had long term symptoms of dizziness upon standing since teenager years    Pap smear for cervical cancer screening 03/07/2023    NILM, normal cytology ([facility] gyne)    Pericardial effusion (CMD)     PONV (postoperative nausea and vomiting)     PTSD (post-traumatic stress disorder)     will be starting EMDR    Raynaud's phenomenon     bilateral hands       Past Surgical History:   Procedure Laterality Date    Adenoidectomy  2005    Colonoscopy N/A 2020    hemorrhoids    Lumbar epidural injection N/A     multiple lumbar epidural spinal injections, ganglion nerve block    Oral surgery procedure  
    Tonsillectomy  2005    Vulva surgery N/A 2017    Labiaplasty -- painful excess tissue       Social History:  Social History     Tobacco Use    Smoking status: Never    Smokeless tobacco: Never   Vaping Use    Vaping status: never used   Substance Use Topics    Alcohol use: Not Currently     Comment: AUDIT-C=0    Drug use: Yes     Frequency: 5.0 times per week     Types: Marijuana     Comment: medical marijuana card, drinks /smoking         Family History:   Family History   Problem Relation Age of Onset    Patient is unaware of any medical problems Mother     Patient is unaware of any medical problems Brother     Other Maternal Aunt         endometriosis    Osteoporosis Maternal Grandmother 77    Hyperlipidemia Maternal Grandmother     Hyperlipidemia Maternal Grandfather     Hypertension Maternal Grandfather     Diabetes Maternal Grandfather 66        type 2    Cancer Maternal Grandfather 85        Anal    Diabetes Paternal Grandfather     Rheumatoid Arthritis Neg Hx     Systemic Lupus Erythematosus Neg Hx      Family history of colon cancer: Denies  Family history of gastric cancer: Denies  Family history of esophageal cancer: Denies    Allergies:   ALLERGIES:   Allergen Reactions    Amoxicillin RASH, SWELLING and HIVES     Swelling and rash    Dust Mite Extract HIVES    Guinea Pig   (Animal) Other (See Comments)     Breathing hard    Misoprostol DIZZINESS and DIARRHEA     LOC, bradycardia, dehydration    Mold   (Environmental) HIVES and SHORTNESS OF BREATH    Molds & Smuts HIVES    Penicillins SWELLING, Other (See Comments) and RASH     Swelling and rash    Silver Sulfadiazine SWELLING    Unknown HIVES    Dust Other (See Comments)     Breathing problems    Lactose   (Food Or Med) GI UPSET and CONSTIPATION    Bell Peppers   (Food) RASH and DIARRHEA     Mouth sores    Tomato   (Food Or Med) RASH and DIARRHEA     Mouth sores    Ziprasidone VOMITING    Cat Dander Other (See Comments)     Breathing hard    Dander 
121.6
Other (See Comments)     Breathing hard    Grass RASH    Trees HIVES       Home Meds:   Current Outpatient Medications   Medication Sig Dispense Refill    phenazopyridine (PYRIDIUM) 100 MG tablet Take 1 tablet by mouth 3 times daily as needed for Pain. 6 tablet 0    montelukast (SINGULAIR) 10 MG tablet TAKE 1 TABLET BY MOUTH EVERY NIGHT 90 tablet 3    eszopiclone (LUNESTA) 2 MG Tab TAKE 1 TABLET BY MOUTH EVERY NIGHT AS NEEDED FOR INSOMNIA      DULoxetine HCl 40 MG Cap DR Particles       hydrOXYzine (VISTARIL) 25 MG capsule [None received]      cyclobenzaprine (FLEXERIL) 10 MG tablet TAKE 1 TABLET BY MOUTH THREE TIMES DAILY AS NEEDED FOR MUSCLE SPASMS 90 tablet 0    LORazepam (ATIVAN) 1 MG tablet Take 1 mg by mouth 2 times daily as needed.      ibuprofen (MOTRIN) 600 MG tablet Take 1 tablet by mouth every 6 hours as needed for Pain. 30 tablet 0    Doxylamine Succinate, Sleep, (UNISOM PO) Take by mouth nightly.      COMPRESSION STOCKING 30-40 GRADED COMPRESSION GARMENT THIGH HIGH 30-40 MMHG, Wear daily. Diagnosis: POTS 4 each 3    famotidine (PEPCID) 40 MG tablet Take 1 tablet by mouth daily (with dinner). (Patient taking differently: Take 40 mg by mouth nightly.) 90 tablet 0    ondansetron (ZOFRAN ODT) 4 MG disintegrating tablet Place 1 tablet onto the tongue every 8 hours as needed for Nausea. 90 tablet 1    pantoprazole (PROTONIX) 40 MG tablet Take 1 tablet by mouth daily (before breakfast). FOR STOMACH ACID 90 tablet 0    thiamine (VITAMIN B1) 100 MG tablet Take 1 tablet by mouth daily. ALLITHIAMINE [Thiamine Tetrahydrofurfuryl Disulfide] - available online/OTC - TMR      Multiple Vitamins-Minerals (Multivitamin & Mineral) Liquid Take 1 Dose by mouth daily. RECOMMEND: ATHLETIC GREENS LIQUID MULTIVITAMIN/ PROBIOTIC/ ADAPTOGEN BLEND - OTC/ONLINE      Zinc Glycinate 20 MG Cap Take 25 mg by mouth daily. RECOMMEND ZINC BISGLYCINATE 25 MG DAILY - OTC/ONLINE (Patient taking differently: Take 25 mg by mouth nightly. 
RECOMMEND ZINC BISGLYCINATE 25 MG DAILY - OTC/ONLINE)      naproxen (NAPROSYN) 500 MG tablet Take 1 tablet by mouth 2 times daily as needed for Pain. 60 tablet 3    Amphetamine ER (Adzenys XR-ODT) 3.1 MG Tablet Extended Release Dispersible Take 3.1 mg by mouth.      methylPREDNISolone (MEDROL, ROQUE,) 4 MG tablet Take 1 tablet by mouth as directed. follow package directions 21 tablet 0    S-Adenosylmethionine 400 MG Tab Take 400 mg by mouth daily. FOR MOOD AND COGNITION - Available over the counter or online      Spacer/Aero-Holding Chambers (OptiChamber Georgina-Lg Mask) Device USE WITH HFA INHALER AS DIRECTED      beclomethasone diprop HFA (QVAR REDIHALER) 80 MCG/ACT inhaler Inhale 1 puff into the lungs in the morning and 1 puff in the evening. (Patient not taking: Reported on 10/8/2024) 1 each 11    Spacer/Aero-Hold Chamber Mask Misc Please provide spacer/aerochamber to use with HFA inhaler 1 each 1    loratadine (CLARITIN) 10 MG tablet Take 10 mg by mouth daily.      Cholecalciferol (vitamin D) 50 mcg (2,000 units) capsule Take 50 mcg by mouth.      albuterol 108 (90 Base) MCG/ACT inhaler Inhale 2 puffs into the lungs every 4 hours as needed for Wheezing or Shortness of Breath. 1 each 11     No current facility-administered medications for this visit.       Objective   VITALS:  There were no vitals filed for this visit.    Exam:  General: Non-toxic appearing, in no acute distress  HEENT: No scleral icterus, moist mucous membranes  Lungs: Comfortably breathing on room air  Abdomen: Soft, mild epigastric and lower abdominal tenderness, non-distended, no rebound or guarding  Extremities: Warm and well-perfused. No edema  Skin: Warm, dry, no visible rashes  Neuro: Alert and oriented. No focal deficits. CN 2-12 grossly intact  Psych: Appropriate affect, recall intact    Labs:  Lab Results   Component Value Date/Time    WBC 7.2 08/07/2024 01:58 PM    WBC 7.4 03/15/2024 11:48 AM    WBC 7.2 12/11/2023 11:26 AM    WBC 7.1 
12/16/2017 12:33 PM    WBC 7.8 10/13/2017 04:07 PM    HGB 11.9 (L) 08/07/2024 01:58 PM    HGB 13.7 03/15/2024 11:48 AM    HGB 13.5 12/11/2023 11:26 AM    HGB 12.4 12/16/2017 12:33 PM    HGB 12.2 10/13/2017 04:07 PM    HCT 36.7 08/07/2024 01:58 PM    HCT 40.6 03/15/2024 11:48 AM    HCT 41.8 12/11/2023 11:26 AM     08/07/2024 01:58 PM     03/15/2024 11:48 AM     12/11/2023 11:26 AM     12/16/2017 12:33 PM     10/13/2017 04:07 PM     Lab Results   Component Value Date/Time    CO2 27 08/07/2024 01:58 PM    CO2 19 (L) 03/15/2024 11:49 AM    CO2 21 12/11/2023 11:26 AM    CO2 27 12/16/2017 12:33 PM    CO2 27 10/13/2017 04:07 PM    BUN 12 08/07/2024 01:58 PM    BUN 8 03/15/2024 11:49 AM    BUN 6 12/11/2023 11:26 AM    BUN 10 12/16/2017 12:33 PM    BUN 11 10/13/2017 04:07 PM    CREATININE 0.85 08/07/2024 01:58 PM    CREATININE 0.80 03/15/2024 11:49 AM    CREATININE 0.68 12/11/2023 11:26 AM    CREATININE 0.73 12/16/2017 12:33 PM    CREATININE 0.79 10/13/2017 04:07 PM    GLUCOSE 71 08/07/2024 01:58 PM    GLUCOSE 88 12/16/2017 12:33 PM    CALCIUM 9.4 08/07/2024 01:58 PM    CALCIUM 10.2 03/15/2024 11:49 AM    CALCIUM 9.5 12/11/2023 11:26 AM     Lab Results   Component Value Date/Time    AST 13 08/07/2024 01:58 PM    AST 22 03/15/2024 11:49 AM    AST 16 12/11/2023 11:26 AM       Labs were reviewed.     IMAGING/PROCEDURES:    EGD:      Colonoscopy:      Other:      Imaging and procedures were reviewed.       Assessment & Plan    Ms. Faby Ray is a pleasant 23 year old female with PMH of anxiety/depression, POTS, EDS, PTSD, ARFID, autism spectrum disorder, endometriosis s/p laparoscopy for resection 8/2024, who presents to GI clinic for consultation.    # Abdominal pain  # Nausea  # Dyspepsia  # Chronic diarrhea  # Fluctuating bowel habits, diarrhea and constipation (chronic diarrhea somewhat more predominant)  # Low IgA level  - Colonoscopy 2021 (for rectal bleeding): found 
hemorrhoids, otherwise normal  - CT A/P 12/2023 (in ED) found some fluid in the pelvis thought to possibly be due to adnexal cystic rupture, otherwise rather unremarkable   - Celiac screening 3/2021 - IgG and IgA negative, but IgA level low at 19  - TSH normal 12/2023  - Some upper GI symptoms have improved on pantoprazole  - Symptoms are suggestive of IBS but recommend some additional evaluation prior to making this diagnosis  PLAN:  - EGD w/ MAC. The benefits and risks of endoscopy were discussed with the patient and they wish to proceed  - Hold pantoprazole for 14 days before EGD  - Check other immunoglobulins to see if others low, or just IgA  - Check fecal calprotectin, and giardia (via GI parasite PCR panel) -- especially important given her IgA deficiency  - If EGD and biopsies and test results above are normal, then will provide more information on IBS and initial management (would start with dietary changes)  - Can resume pantoprazole after EGD    It was a pleasure seeing Faby in clinic today. I recommend they return to clinic in 4 months.    The plan of care was discussed with the patient who expressed understanding and agreement. The patient was provided the opportunity to ask questions, and all questions were answered to the best of my ability.        Galdino Altman MD  Gastroenterology/Hepatology  
121.6

## 2024-10-23 ENCOUNTER — EMERGENCY (EMERGENCY)
Facility: HOSPITAL | Age: 64
LOS: 0 days | Discharge: HOME | End: 2024-10-24
Attending: STUDENT IN AN ORGANIZED HEALTH CARE EDUCATION/TRAINING PROGRAM
Payer: COMMERCIAL

## 2024-10-23 VITALS
OXYGEN SATURATION: 100 % | HEART RATE: 162 BPM | TEMPERATURE: 97 F | SYSTOLIC BLOOD PRESSURE: 73 MMHG | DIASTOLIC BLOOD PRESSURE: 61 MMHG | RESPIRATION RATE: 18 BRPM | HEIGHT: 64 IN | WEIGHT: 134.92 LBS

## 2024-10-23 VITALS
DIASTOLIC BLOOD PRESSURE: 68 MMHG | RESPIRATION RATE: 18 BRPM | HEART RATE: 83 BPM | OXYGEN SATURATION: 100 % | SYSTOLIC BLOOD PRESSURE: 100 MMHG

## 2024-10-23 DIAGNOSIS — I47.10 SUPRAVENTRICULAR TACHYCARDIA, UNSPECIFIED: ICD-10-CM

## 2024-10-23 DIAGNOSIS — Z88.8 ALLERGY STATUS TO OTHER DRUGS, MEDICAMENTS AND BIOLOGICAL SUBSTANCES: ICD-10-CM

## 2024-10-23 DIAGNOSIS — R00.0 TACHYCARDIA, UNSPECIFIED: ICD-10-CM

## 2024-10-23 DIAGNOSIS — Z98.890 OTHER SPECIFIED POSTPROCEDURAL STATES: Chronic | ICD-10-CM

## 2024-10-23 DIAGNOSIS — I48.91 UNSPECIFIED ATRIAL FIBRILLATION: ICD-10-CM

## 2024-10-23 DIAGNOSIS — Z79.01 LONG TERM (CURRENT) USE OF ANTICOAGULANTS: ICD-10-CM

## 2024-10-23 DIAGNOSIS — R00.2 PALPITATIONS: ICD-10-CM

## 2024-10-23 LAB
BASOPHILS # BLD AUTO: 0.07 K/UL — SIGNIFICANT CHANGE UP (ref 0–0.2)
BASOPHILS NFR BLD AUTO: 0.8 % — SIGNIFICANT CHANGE UP (ref 0–1)
EOSINOPHIL # BLD AUTO: 0.08 K/UL — SIGNIFICANT CHANGE UP (ref 0–0.7)
EOSINOPHIL NFR BLD AUTO: 0.9 % — SIGNIFICANT CHANGE UP (ref 0–8)
HCT VFR BLD CALC: 39.3 % — SIGNIFICANT CHANGE UP (ref 37–47)
HGB BLD-MCNC: 12.5 G/DL — SIGNIFICANT CHANGE UP (ref 12–16)
IMM GRANULOCYTES NFR BLD AUTO: 0.6 % — HIGH (ref 0.1–0.3)
LYMPHOCYTES # BLD AUTO: 2.51 K/UL — SIGNIFICANT CHANGE UP (ref 1.2–3.4)
LYMPHOCYTES # BLD AUTO: 28.7 % — SIGNIFICANT CHANGE UP (ref 20.5–51.1)
MCHC RBC-ENTMCNC: 27.1 PG — SIGNIFICANT CHANGE UP (ref 27–31)
MCHC RBC-ENTMCNC: 31.8 G/DL — LOW (ref 32–37)
MCV RBC AUTO: 85.1 FL — SIGNIFICANT CHANGE UP (ref 81–99)
MONOCYTES # BLD AUTO: 0.75 K/UL — HIGH (ref 0.1–0.6)
MONOCYTES NFR BLD AUTO: 8.6 % — SIGNIFICANT CHANGE UP (ref 1.7–9.3)
NEUTROPHILS # BLD AUTO: 5.28 K/UL — SIGNIFICANT CHANGE UP (ref 1.4–6.5)
NEUTROPHILS NFR BLD AUTO: 60.4 % — SIGNIFICANT CHANGE UP (ref 42.2–75.2)
NRBC # BLD: 0 /100 WBCS — SIGNIFICANT CHANGE UP (ref 0–0)
PLATELET # BLD AUTO: 259 K/UL — SIGNIFICANT CHANGE UP (ref 130–400)
PMV BLD: 11.7 FL — HIGH (ref 7.4–10.4)
RBC # BLD: 4.62 M/UL — SIGNIFICANT CHANGE UP (ref 4.2–5.4)
RBC # FLD: 21.5 % — HIGH (ref 11.5–14.5)
WBC # BLD: 8.74 K/UL — SIGNIFICANT CHANGE UP (ref 4.8–10.8)
WBC # FLD AUTO: 8.74 K/UL — SIGNIFICANT CHANGE UP (ref 4.8–10.8)

## 2024-10-23 PROCEDURE — 36415 COLL VENOUS BLD VENIPUNCTURE: CPT

## 2024-10-23 PROCEDURE — 96374 THER/PROPH/DIAG INJ IV PUSH: CPT

## 2024-10-23 PROCEDURE — 93005 ELECTROCARDIOGRAM TRACING: CPT

## 2024-10-23 PROCEDURE — 99291 CRITICAL CARE FIRST HOUR: CPT | Mod: 25

## 2024-10-23 PROCEDURE — 93010 ELECTROCARDIOGRAM REPORT: CPT

## 2024-10-23 PROCEDURE — 85025 COMPLETE CBC W/AUTO DIFF WBC: CPT

## 2024-10-23 PROCEDURE — 80053 COMPREHEN METABOLIC PANEL: CPT

## 2024-10-23 PROCEDURE — 99291 CRITICAL CARE FIRST HOUR: CPT

## 2024-10-23 RX ORDER — SODIUM CHLORIDE IRRIG SOLUTION 0.9 %
1000 SOLUTION, IRRIGATION IRRIGATION ONCE
Refills: 0 | Status: DISCONTINUED | OUTPATIENT
Start: 2024-10-23 | End: 2024-10-24

## 2024-10-23 RX ORDER — DILTIAZEM HCL 300 MG
15 CAPSULE, EXTENDED RELEASE 24HR ORAL ONCE
Refills: 0 | Status: COMPLETED | OUTPATIENT
Start: 2024-10-23 | End: 2024-10-23

## 2024-10-23 RX ADMIN — Medication 15 MILLIGRAM(S): at 23:42

## 2024-10-23 NOTE — ED ADULT TRIAGE NOTE - CHIEF COMPLAINT QUOTE
My heart was racing and my diaphragm has been hurting me for two days - patient  Patient took extra metoprolol

## 2024-10-24 ENCOUNTER — EMERGENCY (EMERGENCY)
Facility: HOSPITAL | Age: 64
LOS: 0 days | Discharge: HOME | End: 2024-10-25
Attending: STUDENT IN AN ORGANIZED HEALTH CARE EDUCATION/TRAINING PROGRAM
Payer: COMMERCIAL

## 2024-10-24 VITALS
HEART RATE: 165 BPM | SYSTOLIC BLOOD PRESSURE: 87 MMHG | DIASTOLIC BLOOD PRESSURE: 59 MMHG | WEIGHT: 134.92 LBS | OXYGEN SATURATION: 100 % | TEMPERATURE: 95 F | HEIGHT: 64 IN | RESPIRATION RATE: 18 BRPM

## 2024-10-24 DIAGNOSIS — Z79.01 LONG TERM (CURRENT) USE OF ANTICOAGULANTS: ICD-10-CM

## 2024-10-24 DIAGNOSIS — Z98.890 OTHER SPECIFIED POSTPROCEDURAL STATES: Chronic | ICD-10-CM

## 2024-10-24 DIAGNOSIS — F41.9 ANXIETY DISORDER, UNSPECIFIED: ICD-10-CM

## 2024-10-24 DIAGNOSIS — R00.2 PALPITATIONS: ICD-10-CM

## 2024-10-24 DIAGNOSIS — I48.91 UNSPECIFIED ATRIAL FIBRILLATION: ICD-10-CM

## 2024-10-24 DIAGNOSIS — H10.029 OTHER MUCOPURULENT CONJUNCTIVITIS, UNSPECIFIED EYE: ICD-10-CM

## 2024-10-24 DIAGNOSIS — Z98.890 OTHER SPECIFIED POSTPROCEDURAL STATES: ICD-10-CM

## 2024-10-24 DIAGNOSIS — I47.10 SUPRAVENTRICULAR TACHYCARDIA, UNSPECIFIED: ICD-10-CM

## 2024-10-24 LAB
ALBUMIN SERPL ELPH-MCNC: 4 G/DL — SIGNIFICANT CHANGE UP (ref 3.5–5.2)
ALP SERPL-CCNC: 219 U/L — HIGH (ref 30–115)
ALT FLD-CCNC: 272 U/L — HIGH (ref 0–41)
ANION GAP SERPL CALC-SCNC: 15 MMOL/L — HIGH (ref 7–14)
AST SERPL-CCNC: 254 U/L — HIGH (ref 0–41)
BILIRUB SERPL-MCNC: 0.3 MG/DL — SIGNIFICANT CHANGE UP (ref 0.2–1.2)
BUN SERPL-MCNC: 42 MG/DL — HIGH (ref 10–20)
CALCIUM SERPL-MCNC: 8.8 MG/DL — SIGNIFICANT CHANGE UP (ref 8.4–10.5)
CHLORIDE SERPL-SCNC: 106 MMOL/L — SIGNIFICANT CHANGE UP (ref 98–110)
CO2 SERPL-SCNC: 17 MMOL/L — SIGNIFICANT CHANGE UP (ref 17–32)
CREAT SERPL-MCNC: 1.5 MG/DL — SIGNIFICANT CHANGE UP (ref 0.7–1.5)
EGFR: 39 ML/MIN/1.73M2 — LOW
GLUCOSE SERPL-MCNC: 173 MG/DL — HIGH (ref 70–99)
POTASSIUM SERPL-MCNC: 5.5 MMOL/L — HIGH (ref 3.5–5)
POTASSIUM SERPL-SCNC: 5.5 MMOL/L — HIGH (ref 3.5–5)
PROT SERPL-MCNC: 6.3 G/DL — SIGNIFICANT CHANGE UP (ref 6–8)
SODIUM SERPL-SCNC: 138 MMOL/L — SIGNIFICANT CHANGE UP (ref 135–146)

## 2024-10-24 PROCEDURE — 84100 ASSAY OF PHOSPHORUS: CPT

## 2024-10-24 PROCEDURE — 80053 COMPREHEN METABOLIC PANEL: CPT

## 2024-10-24 PROCEDURE — 84484 ASSAY OF TROPONIN QUANT: CPT

## 2024-10-24 PROCEDURE — 83735 ASSAY OF MAGNESIUM: CPT

## 2024-10-24 PROCEDURE — 85025 COMPLETE CBC W/AUTO DIFF WBC: CPT

## 2024-10-24 PROCEDURE — 93005 ELECTROCARDIOGRAM TRACING: CPT

## 2024-10-24 PROCEDURE — 99291 CRITICAL CARE FIRST HOUR: CPT

## 2024-10-24 PROCEDURE — 96374 THER/PROPH/DIAG INJ IV PUSH: CPT

## 2024-10-24 PROCEDURE — 93010 ELECTROCARDIOGRAM REPORT: CPT

## 2024-10-24 PROCEDURE — 99291 CRITICAL CARE FIRST HOUR: CPT | Mod: 25

## 2024-10-24 PROCEDURE — 36415 COLL VENOUS BLD VENIPUNCTURE: CPT

## 2024-10-24 RX ORDER — DILTIAZEM HCL 300 MG
15 CAPSULE, EXTENDED RELEASE 24HR ORAL ONCE
Refills: 0 | Status: COMPLETED | OUTPATIENT
Start: 2024-10-24 | End: 2024-10-24

## 2024-10-24 RX ADMIN — Medication 15 MILLIGRAM(S): at 23:09

## 2024-10-24 NOTE — ED ADULT TRIAGE NOTE - CHIEF COMPLAINT QUOTE
pt reports having palpitation,  pt states she was here yesterday was given iv Cardizem and was later discharged but palpitations are back.

## 2024-10-24 NOTE — ED PROVIDER NOTE - CARE PROVIDER_API CALL
Froylan Thomas  Cardiology  [address], [address]  [place] [address]  Phone: [phone]  Fax: [phone]  Follow Up Time: Urgent

## 2024-10-24 NOTE — ED PROVIDER NOTE - NSFOLLOWUPINSTRUCTIONS_ED_ALL_ED_FT
Supraventricular Tachycardia    Supraventricular tachycardia (SVT) is a type of abnormal heart rhythm that causes your heart to beat very quickly. A normal heart rate is 60 - 100 beats per minute. During an episode of SVT, your heart rate may be 150 - 250 beats per minute. This can make you feel light-headed and short of breath. Although SVT is usually harmless, when SVT happens often or it lasts for long periods, it can lead to heart weakness and failure. SVT can be triggered by stress, smoking, alcohol, caffeine, or stimulant drugs. Treatment may involve certain maneuvers, medicines, or electric shock (cardioversion).     SEEK IMMEDIATE MEDICAL CARE IF YOU HAVE ANY OF THE FOLLOWING SYMPTOMS: chest pain, shortness of breath, dizziness/lightheadedness, or fainting.

## 2024-10-24 NOTE — ED PROVIDER NOTE - PHYSICAL EXAMINATION
PHYSICAL EXAM: I have reviewed current vital signs.  GENERAL: NAD, well-nourished; well-developed.  HEAD:  Normocephalic, atraumatic.  EYES: EOMI, PERRL, conjunctiva and sclera clear.  ENT: MMM, no erythema/exudates.  CHEST/LUNG: Clear to auscultation bilaterally; no wheezes, rales, or rhonchi.  HEART: Tachycardia.   ABDOMEN: Soft, nontender, nondistended.  EXTREMITIES:  2+ peripheral pulses; no clubbing, cyanosis, or edema.  PSYCH: Cooperative, appropriate, normal mood and affect.  NEUROLOGY: A&O x 3. No focal neurological deficits.   SKIN: Warm and dry.

## 2024-10-24 NOTE — ED PROVIDER NOTE - PATIENT PORTAL LINK FT
You can access the FollowMyHealth Patient Portal offered by [facility] by registering at the following website: [email or web address]. By joining [facility]’s FollowMyHealth portal, you will also be able to view your health information using other applications (apps) compatible with our system.

## 2024-10-24 NOTE — ED PROVIDER NOTE - OBJECTIVE STATEMENT
64-year-old female with past medical history of anxiety, A-fib on Eliquis and metoprolol, prior ablation, follows with Dr. Thomas, presents to the ED complaining of palpitations for 2 days.  Patient states that she has been compliant with her medications.  Patient is not complaining of any chest pain.  Patient has not had any recent fever, headache, difficulty breathing, vomiting, or diarrhea.

## 2024-10-24 NOTE — ED PROVIDER NOTE - CLINICAL SUMMARY MEDICAL DECISION MAKING FREE TEXT BOX
Discussed with patient regarding abnormal LFTs.  Has had them in the past from recent admission.  Offered admission at this time although no significant intervention was needed from last admission in terms of surgical needs.  Patient has no symptoms at this time.  Feels better after administration of diltiazem will take her metoprolol as directed this time as she was not taking it before.  Shared decision making was performed and she would like to follow-up with cardiology outpatient instead of being admitted at this time.  Return precautions explained to patient. Discussed with patient regarding abnormal LFTs.  Has had them in the past from recent admission.  Offered admission at this time although no significant intervention was needed from last admission in terms of surgical needs.  Patient has no symptoms at this time.  Feels better after administration of diltiazem will take her metoprolol as directed this time as she was not taking it before.  Shared decision making was performed and she would like to follow-up with cardiology outpatient instead of being admitted at this time.  Return precautions explained to patient.    Attending Statement: I have personally provided the amount of critical care time documented below excluding time spent on separate procedures.     Critical Care Time Spent (min) Must be 30 or more minutes to qualify: 35.

## 2024-10-25 VITALS
RESPIRATION RATE: 19 BRPM | DIASTOLIC BLOOD PRESSURE: 62 MMHG | OXYGEN SATURATION: 100 % | SYSTOLIC BLOOD PRESSURE: 91 MMHG | HEART RATE: 88 BPM

## 2024-10-25 VITALS
TEMPERATURE: 98 F | DIASTOLIC BLOOD PRESSURE: 61 MMHG | RESPIRATION RATE: 16 BRPM | HEIGHT: 64 IN | HEART RATE: 163 BPM | WEIGHT: 125 LBS | SYSTOLIC BLOOD PRESSURE: 81 MMHG | OXYGEN SATURATION: 100 %

## 2024-10-25 VITALS
RESPIRATION RATE: 16 BRPM | SYSTOLIC BLOOD PRESSURE: 121 MMHG | DIASTOLIC BLOOD PRESSURE: 79 MMHG | OXYGEN SATURATION: 99 % | HEART RATE: 81 BPM

## 2024-10-25 DIAGNOSIS — R79.89 OTHER SPECIFIED ABNORMAL FINDINGS OF BLOOD CHEMISTRY: ICD-10-CM

## 2024-10-25 DIAGNOSIS — R00.2 PALPITATIONS: ICD-10-CM

## 2024-10-25 DIAGNOSIS — I48.91 UNSPECIFIED ATRIAL FIBRILLATION: ICD-10-CM

## 2024-10-25 DIAGNOSIS — Z79.01 LONG TERM (CURRENT) USE OF ANTICOAGULANTS: ICD-10-CM

## 2024-10-25 DIAGNOSIS — Z98.890 OTHER SPECIFIED POSTPROCEDURAL STATES: Chronic | ICD-10-CM

## 2024-10-25 DIAGNOSIS — R00.0 TACHYCARDIA, UNSPECIFIED: ICD-10-CM

## 2024-10-25 LAB
ALBUMIN SERPL ELPH-MCNC: 4.1 G/DL — SIGNIFICANT CHANGE UP (ref 3.5–5.2)
ALBUMIN SERPL ELPH-MCNC: 4.2 G/DL — SIGNIFICANT CHANGE UP (ref 3.5–5.2)
ALP SERPL-CCNC: 204 U/L — HIGH (ref 30–115)
ALP SERPL-CCNC: 281 U/L — HIGH (ref 30–115)
ALT FLD-CCNC: 202 U/L — HIGH (ref 0–41)
ALT FLD-CCNC: 238 U/L — HIGH (ref 0–41)
ANION GAP SERPL CALC-SCNC: 13 MMOL/L — SIGNIFICANT CHANGE UP (ref 7–14)
ANION GAP SERPL CALC-SCNC: 16 MMOL/L — HIGH (ref 7–14)
APTT BLD: 35.7 SEC — SIGNIFICANT CHANGE UP (ref 27–39.2)
AST SERPL-CCNC: 188 U/L — HIGH (ref 0–41)
AST SERPL-CCNC: 95 U/L — HIGH (ref 0–41)
BASOPHILS # BLD AUTO: 0.05 K/UL — SIGNIFICANT CHANGE UP (ref 0–0.2)
BASOPHILS # BLD AUTO: 0.05 K/UL — SIGNIFICANT CHANGE UP (ref 0–0.2)
BASOPHILS NFR BLD AUTO: 0.4 % — SIGNIFICANT CHANGE UP (ref 0–1)
BASOPHILS NFR BLD AUTO: 0.5 % — SIGNIFICANT CHANGE UP (ref 0–1)
BILIRUB SERPL-MCNC: 0.4 MG/DL — SIGNIFICANT CHANGE UP (ref 0.2–1.2)
BILIRUB SERPL-MCNC: 0.4 MG/DL — SIGNIFICANT CHANGE UP (ref 0.2–1.2)
BUN SERPL-MCNC: 39 MG/DL — HIGH (ref 10–20)
BUN SERPL-MCNC: 42 MG/DL — HIGH (ref 10–20)
CALCIUM SERPL-MCNC: 8.8 MG/DL — SIGNIFICANT CHANGE UP (ref 8.4–10.5)
CALCIUM SERPL-MCNC: 9 MG/DL — SIGNIFICANT CHANGE UP (ref 8.4–10.5)
CHLORIDE SERPL-SCNC: 103 MMOL/L — SIGNIFICANT CHANGE UP (ref 98–110)
CHLORIDE SERPL-SCNC: 106 MMOL/L — SIGNIFICANT CHANGE UP (ref 98–110)
CO2 SERPL-SCNC: 18 MMOL/L — SIGNIFICANT CHANGE UP (ref 17–32)
CO2 SERPL-SCNC: 19 MMOL/L — SIGNIFICANT CHANGE UP (ref 17–32)
CREAT SERPL-MCNC: 1.3 MG/DL — SIGNIFICANT CHANGE UP (ref 0.7–1.5)
CREAT SERPL-MCNC: 1.6 MG/DL — HIGH (ref 0.7–1.5)
EGFR: 36 ML/MIN/1.73M2 — LOW
EGFR: 46 ML/MIN/1.73M2 — LOW
EOSINOPHIL # BLD AUTO: 0.11 K/UL — SIGNIFICANT CHANGE UP (ref 0–0.7)
EOSINOPHIL # BLD AUTO: 0.11 K/UL — SIGNIFICANT CHANGE UP (ref 0–0.7)
EOSINOPHIL NFR BLD AUTO: 1 % — SIGNIFICANT CHANGE UP (ref 0–8)
EOSINOPHIL NFR BLD AUTO: 1.2 % — SIGNIFICANT CHANGE UP (ref 0–8)
GLUCOSE SERPL-MCNC: 106 MG/DL — HIGH (ref 70–99)
GLUCOSE SERPL-MCNC: 92 MG/DL — SIGNIFICANT CHANGE UP (ref 70–99)
HCT VFR BLD CALC: 38.8 % — SIGNIFICANT CHANGE UP (ref 37–47)
HCT VFR BLD CALC: 40.4 % — SIGNIFICANT CHANGE UP (ref 37–47)
HGB BLD-MCNC: 12.2 G/DL — SIGNIFICANT CHANGE UP (ref 12–16)
HGB BLD-MCNC: 12.9 G/DL — SIGNIFICANT CHANGE UP (ref 12–16)
IMM GRANULOCYTES NFR BLD AUTO: 0.4 % — HIGH (ref 0.1–0.3)
IMM GRANULOCYTES NFR BLD AUTO: 0.4 % — HIGH (ref 0.1–0.3)
INR BLD: 1.55 RATIO — HIGH (ref 0.65–1.3)
LYMPHOCYTES # BLD AUTO: 2.84 K/UL — SIGNIFICANT CHANGE UP (ref 1.2–3.4)
LYMPHOCYTES # BLD AUTO: 25.3 % — SIGNIFICANT CHANGE UP (ref 20.5–51.1)
LYMPHOCYTES # BLD AUTO: 3.05 K/UL — SIGNIFICANT CHANGE UP (ref 1.2–3.4)
LYMPHOCYTES # BLD AUTO: 32.4 % — SIGNIFICANT CHANGE UP (ref 20.5–51.1)
MAGNESIUM SERPL-MCNC: 2 MG/DL — SIGNIFICANT CHANGE UP (ref 1.8–2.4)
MAGNESIUM SERPL-MCNC: 2.2 MG/DL — SIGNIFICANT CHANGE UP (ref 1.8–2.4)
MCHC RBC-ENTMCNC: 26.6 PG — LOW (ref 27–31)
MCHC RBC-ENTMCNC: 26.8 PG — LOW (ref 27–31)
MCHC RBC-ENTMCNC: 31.4 G/DL — LOW (ref 32–37)
MCHC RBC-ENTMCNC: 31.9 G/DL — LOW (ref 32–37)
MCV RBC AUTO: 83.8 FL — SIGNIFICANT CHANGE UP (ref 81–99)
MCV RBC AUTO: 84.5 FL — SIGNIFICANT CHANGE UP (ref 81–99)
MONOCYTES # BLD AUTO: 0.76 K/UL — HIGH (ref 0.1–0.6)
MONOCYTES # BLD AUTO: 0.89 K/UL — HIGH (ref 0.1–0.6)
MONOCYTES NFR BLD AUTO: 7.9 % — SIGNIFICANT CHANGE UP (ref 1.7–9.3)
MONOCYTES NFR BLD AUTO: 8.1 % — SIGNIFICANT CHANGE UP (ref 1.7–9.3)
NEUTROPHILS # BLD AUTO: 5.41 K/UL — SIGNIFICANT CHANGE UP (ref 1.4–6.5)
NEUTROPHILS # BLD AUTO: 7.3 K/UL — HIGH (ref 1.4–6.5)
NEUTROPHILS NFR BLD AUTO: 57.4 % — SIGNIFICANT CHANGE UP (ref 42.2–75.2)
NEUTROPHILS NFR BLD AUTO: 65 % — SIGNIFICANT CHANGE UP (ref 42.2–75.2)
NRBC # BLD: 0 /100 WBCS — SIGNIFICANT CHANGE UP (ref 0–0)
NRBC # BLD: 0 /100 WBCS — SIGNIFICANT CHANGE UP (ref 0–0)
NT-PROBNP SERPL-SCNC: 7633 PG/ML — HIGH (ref 0–300)
PHOSPHATE SERPL-MCNC: 5.3 MG/DL — HIGH (ref 2.1–4.9)
PHOSPHATE SERPL-MCNC: 5.9 MG/DL — HIGH (ref 2.1–4.9)
PLATELET # BLD AUTO: 259 K/UL — SIGNIFICANT CHANGE UP (ref 130–400)
PLATELET # BLD AUTO: 283 K/UL — SIGNIFICANT CHANGE UP (ref 130–400)
PMV BLD: 12.1 FL — HIGH (ref 7.4–10.4)
PMV BLD: 12.5 FL — HIGH (ref 7.4–10.4)
POTASSIUM SERPL-MCNC: 5.3 MMOL/L — HIGH (ref 3.5–5)
POTASSIUM SERPL-MCNC: 5.4 MMOL/L — HIGH (ref 3.5–5)
POTASSIUM SERPL-SCNC: 5.3 MMOL/L — HIGH (ref 3.5–5)
POTASSIUM SERPL-SCNC: 5.4 MMOL/L — HIGH (ref 3.5–5)
PROT SERPL-MCNC: 6.5 G/DL — SIGNIFICANT CHANGE UP (ref 6–8)
PROT SERPL-MCNC: 6.6 G/DL — SIGNIFICANT CHANGE UP (ref 6–8)
PROTHROM AB SERPL-ACNC: 17.7 SEC — HIGH (ref 9.95–12.87)
RBC # BLD: 4.59 M/UL — SIGNIFICANT CHANGE UP (ref 4.2–5.4)
RBC # BLD: 4.82 M/UL — SIGNIFICANT CHANGE UP (ref 4.2–5.4)
RBC # FLD: 21.5 % — HIGH (ref 11.5–14.5)
RBC # FLD: 21.6 % — HIGH (ref 11.5–14.5)
SODIUM SERPL-SCNC: 135 MMOL/L — SIGNIFICANT CHANGE UP (ref 135–146)
SODIUM SERPL-SCNC: 140 MMOL/L — SIGNIFICANT CHANGE UP (ref 135–146)
TROPONIN T, HIGH SENSITIVITY RESULT: 43 NG/L — HIGH (ref 6–13)
TROPONIN T, HIGH SENSITIVITY RESULT: 56 NG/L — CRITICAL HIGH (ref 6–13)
WBC # BLD: 11.24 K/UL — HIGH (ref 4.8–10.8)
WBC # BLD: 9.42 K/UL — SIGNIFICANT CHANGE UP (ref 4.8–10.8)
WBC # FLD AUTO: 11.24 K/UL — HIGH (ref 4.8–10.8)
WBC # FLD AUTO: 9.42 K/UL — SIGNIFICANT CHANGE UP (ref 4.8–10.8)

## 2024-10-25 PROCEDURE — 83735 ASSAY OF MAGNESIUM: CPT

## 2024-10-25 PROCEDURE — 80053 COMPREHEN METABOLIC PANEL: CPT

## 2024-10-25 PROCEDURE — 84484 ASSAY OF TROPONIN QUANT: CPT

## 2024-10-25 PROCEDURE — 99291 CRITICAL CARE FIRST HOUR: CPT

## 2024-10-25 PROCEDURE — 83880 ASSAY OF NATRIURETIC PEPTIDE: CPT

## 2024-10-25 PROCEDURE — 85610 PROTHROMBIN TIME: CPT

## 2024-10-25 PROCEDURE — 71045 X-RAY EXAM CHEST 1 VIEW: CPT | Mod: 26

## 2024-10-25 PROCEDURE — 84100 ASSAY OF PHOSPHORUS: CPT

## 2024-10-25 PROCEDURE — 99285 EMERGENCY DEPT VISIT HI MDM: CPT | Mod: 25

## 2024-10-25 PROCEDURE — 93005 ELECTROCARDIOGRAM TRACING: CPT

## 2024-10-25 PROCEDURE — 36415 COLL VENOUS BLD VENIPUNCTURE: CPT

## 2024-10-25 PROCEDURE — 85730 THROMBOPLASTIN TIME PARTIAL: CPT

## 2024-10-25 PROCEDURE — 96374 THER/PROPH/DIAG INJ IV PUSH: CPT

## 2024-10-25 PROCEDURE — 85025 COMPLETE CBC W/AUTO DIFF WBC: CPT

## 2024-10-25 PROCEDURE — 71045 X-RAY EXAM CHEST 1 VIEW: CPT

## 2024-10-25 PROCEDURE — 93010 ELECTROCARDIOGRAM REPORT: CPT | Mod: 76

## 2024-10-25 RX ORDER — DILTIAZEM HCL 300 MG
60 CAPSULE, EXTENDED RELEASE 24HR ORAL ONCE
Refills: 0 | Status: COMPLETED | OUTPATIENT
Start: 2024-10-25 | End: 2024-10-25

## 2024-10-25 RX ORDER — SODIUM CHLORIDE 0.9 % (FLUSH) 0.9 %
1000 SYRINGE (ML) INJECTION ONCE
Refills: 0 | Status: COMPLETED | OUTPATIENT
Start: 2024-10-25 | End: 2024-10-25

## 2024-10-25 RX ORDER — DILTIAZEM HCL 300 MG
14 CAPSULE, EXTENDED RELEASE 24HR ORAL ONCE
Refills: 0 | Status: COMPLETED | OUTPATIENT
Start: 2024-10-25 | End: 2024-10-25

## 2024-10-25 RX ORDER — DILTIAZEM HCL 300 MG
1 CAPSULE, EXTENDED RELEASE 24HR ORAL
Qty: 30 | Refills: 0
Start: 2024-10-25 | End: 2024-11-23

## 2024-10-25 RX ADMIN — Medication 2000 MILLILITER(S): at 18:07

## 2024-10-25 RX ADMIN — Medication 60 MILLIGRAM(S): at 21:07

## 2024-10-25 RX ADMIN — Medication 14 MILLIGRAM(S): at 18:07

## 2024-10-25 NOTE — ED PROVIDER NOTE - PROGRESS NOTE DETAILS
NATALIA-- immediately after diltiazem administered, pt's HR improved from 160s to 80s. Pt reports feeling much better NATALIA-- discussed with Dr. Thomas, who recommends if no concern for infection, pt can be discharged with new diltiazem prescription for 120mg extended release.     Pt signed out to Dr. Sierra pending labs and reeval

## 2024-10-25 NOTE — ED PROVIDER NOTE - NSFOLLOWUPINSTRUCTIONS_ED_ALL_ED_FT
GI  Our Emergency Department Referral Coordinators will be reaching out to you in the next 24-48 hours from 9:00am to 5:00pm with a follow up appointment. Please expect a phone call from the hospital in that time frame. If you do not receive a call or if you have any questions or concerns, you can reach them at   [phone]     FOLLOW UP WITH YOUR DOCTOR THIS WEEK FOR REEVALUATION.     RETURN TO ED IMMEDIATELY WITH ANY WORSENING SYMPTOMS, CHEST PAIN, SHORTNESS OF BREATH, ABDOMINAL PAIN, FEVERS, WEAKNESS, DIZZINESS, PERSISTENT OR SEVERE HEADACHE OR ANY OTHER CONCERNS.     Supraventricular Tachycardia    WHAT YOU NEED TO KNOW:    Supraventricular tachycardia (SVT) is a condition that causes your heart to beat much faster than it should. SVT is a type of abnormal heart rhythm, called an arrhythmia, that starts in the upper part of your heart. It may last a few seconds or hours to several days.    DISCHARGE INSTRUCTIONS:  Call 911 for the following:  You have any of the following signs of a heart attack:  Squeezing, pressure, or pain in your chest  You may also have any of the following:  Discomfort or pain in your back, neck, jaw, stomach, or arm  Shortness of breath  Nausea or vomiting  Lightheadedness or a sudden cold sweat  Seek care immediately if:  You have dizziness, lightheadedness, or feel faint.  You have sudden numbness or weakness in your arms or legs.  Contact your healthcare provider if:  Your symptoms get worse, or you have new symptoms.  You have swelling in your ankles or feet.  You have questions or concerns about your condition or care.  Medicines:  Medicines can help control your heart rate and rhythm. You may need more than one medicine to treat your symptoms.  Take your medicine as directed. Contact your healthcare provider if you think your medicine is not helping or if you have side effects. Tell him or her if you are allergic to any medicine. Keep a list of the medicines, vitamins, and herbs you take. Include the amounts, and when and why you take them. Bring the list or the pill bottles to follow-up visits. Carry your medicine list with you in case of an emergency.  How to manage or prevent SVT:  Perform vagal maneuvers as directed when you have symptoms of SVT. Lie down flat and bear down like you are having a bowel movement. Do this for 10 to 30 seconds.  Do not drink caffeine or alcohol. These can increase your risk for SVT.  Keep a record of your symptoms. Write down what you ate or what you were doing before an episode of SVT. Also write down anything you did to make the SVT stop. Bring your record to follow up visits with your healthcare provider.  Eat heart-healthy foods. These include fruits, vegetables, whole-grain breads, low-fat dairy products, beans, lean meats, and fish. Replace butter and margarine with heart-healthy oils such as olive oil and canola oil.  Exercise regularly and maintain a healthy weight. Ask about the best exercise plan for you. Ask your healthcare provider how much you should weigh. Ask him to help you create a weight loss plan if you are overweight.  Do not smoke. Nicotine and other chemicals in cigarettes and cigars can cause heart and lung damage. Ask your healthcare provider for information if you currently smoke and need help to quit. E-cigarettes or smokeless tobacco still contain nicotine. Talk to your healthcare provider before you use these products.  Follow up with your healthcare provider as directed:  Write down your questions so you remember to ask them during your visits.

## 2024-10-25 NOTE — ED PROVIDER NOTE - OBJECTIVE STATEMENT
64yoF PMHx A-fib on Eliquis and metoprolol, prior ablation followed by Dr. Thomas, presenting back to the ED for palpitations, with HR in 160s found in triage. Pt was here twice before in the past 2 days for similar presentation. 64yoF PMHx A-fib on Eliquis and metoprolol, prior ablation followed by Dr. Thomas, presenting back to the ED for palpitations, with HR in 160s found in triage. Pt was here twice before in the past 2 days for similar presentation. Patient denies any recent fever or falls, chest pain, sob, nausea, vomiting, diarrhea, abdominal pain

## 2024-10-25 NOTE — ED PROVIDER NOTE - PATIENT PORTAL LINK FT
You can access the FollowMyHealth Patient Portal offered by [facility] by registering at the following website: [email or web address]. By joining [employer]’s FollowMyHealth portal, you will also be able to view your health information using other applications (apps) compatible with our system.

## 2024-10-25 NOTE — ED PROVIDER NOTE - CLINICAL SUMMARY MEDICAL DECISION MAKING FREE TEXT BOX
64-year-old female past medical history anxiety A-fib on Eliquis prior ablation follows with Dr. Hubbard presents with SVT denies any chest pain, presented yesterday with similar episode improved after Cardizem palpitations without any chest pain or shortness of breath    Well appearing, NAD, non toxic. NCAT PERRLA EOMI neck supple non tender normal wob cta bl SVT abdomen s nt nd no rebound no guarding [id number] neuro non focal    Patient improved after 1 dose of Cardizem patient admits to not taking any of her antiarrhythmics     Independent interpretation by Ifeanyi sousa DO: SVT    Labs reviewed discussed with patient transaminitis patient will follow-up outpatient patient will continue her prescribed medications follow-up cardiology troponin similar to previous troponins

## 2024-10-25 NOTE — ED PROVIDER NOTE - NSFOLLOWUPINSTRUCTIONS_ED_ALL_ED_FT
Our Emergency Department Referral Coordinators will be reaching out to you in the next 24-48 hours from 9:00am to 5:00pm (Monday to Friday) with a follow up appointment. Please expect a phone call from the hospital in that time frame. If you do not receive a call or if you have any questions or concerns, you can reach them at [phone] FOLLOW UP WITH YOUR CARDIOLOGIST THIS WEEK FOR REEVALUATION.     RETURN TO ED IMMEDIATELY WITH ANY WORSENING SYMPTOMS, CHEST PAIN, SHORTNESS OF BREATH, ABDOMINAL PAIN, FEVERS, WEAKNESS, DIZZINESS, PERSISTENT OR SEVERE HEADACHE OR ANY OTHER CONCERNS.    You were noted to have elevated liver function tests on the lab work done today.  These labs have been slowly increasing over the past few visits.  Bring these results to your primary care doctor to discuss further testing.    Paroxysmal supraventricular tachycardia (PSVT) is a type of abnormal heart rhythm. It causes your heart to beat very quickly and then suddenly stop beating so quickly. A normal heart rate is 60?100 beats per minute. During an episode of PSVT, your heart rate may be 150?250 beats per minute. This can make you feel light-headed and short of breath. An episode of PSVT can be frightening. It is usually not dangerous.    The heart has four chambers. All chambers need to work together for the heart to beat effectively. A normal heartbeat usually starts in the right upper chamber of the heart (atrium) when an area (sinoatrial node) puts out an electrical signal that spreads to the other chambers. People with PSVT may have abnormal electrical pathways, or they may have other areas in the upper chambers that send out electrical signals. The result is a very rapid heartbeat.     When your heart beats very quickly, it does not have time to fill completely with blood. When PSVT happens often or it lasts for long periods, it can lead to heart weakness and failure. Most people with PSVT do not have any other heart disease.    CAUSES  Abnormal electrical activity in the heart causes PSVT. It is not known why some people get PSVT and others do not.    RISK FACTORS  You may be more likely to have PSVT if:    You are 20?30 years old.  You are a woman.    Other factors that may increase your chances of an attack include:    Stress.  Being tired.  Smoking.  Stimulant drugs.  Alcoholic drinks.  Caffeine.  Pregnancy.    SIGNS AND SYMPTOMS  A mild episode of PSVT may cause no symptoms. If you do have signs and symptoms, they may include:    A pounding heart.  Feeling of skipped heartbeats (palpitations).  Weakness.  Shortness of breath.  Tightness or pain in your chest.  Light-headedness.  Anxiety.  Dizziness.  Sweating.  Nausea.  A fainting spell.    DIAGNOSIS  Your health care provider may suspect PSVT if you have symptoms that come and go. The health care provider will do a physical exam. If you are having an episode during the exam, the health care provider may be able to diagnose PSVT by listening to your heart and feeling your pulse. Tests may also be done, including:    An electrical study of your heart (electrocardiogram, or ECG).  A test in which you wear a portable ECG monitor all day (Holter monitor) or for several days (event monitor).   A test that involves taking an image of your heart using sound waves (echocardiogram) to rule out other causes of a fast heart rate.    TREATMENT  You may not need treatment if episodes of PSVT do not happen often or if they do not cause symptoms. If PSVT episodes do cause symptoms, your health care provider may first suggest trying a self-treatment called vagus nerve stimulation. The vagus nerve extends down from the brain. It regulates certain body functions. Stimulating this nerve can slow down the heart. Your health care provider can teach you ways to do this. You may need to try a few ways to find what works best for you. Options include:    Holding your breath and pushing, as though you are having a bowel movement.  Massaging an area on one side of your neck below your jaw.  Bending forward with your head between your legs.  Bending forward with your head between your legs and coughing.  Massaging your eyeballs with your eyes closed.    If vagus nerve stimulation does not work, other treatment options include:    Medicines to prevent an attack.  Being treated in the hospital with medicine or electric shock to stop an attack (cardioversion). This treatment can include:  Getting medicine through an IV line.  Having a small electric shock delivered to your heart. You will be given medicine to make you sleep through this procedure.  If you have frequent episodes with symptoms, you may need a procedure to get rid of the faulty areas of your heart (radiofrequency ablation) and end the episodes of PSVT. In this procedure:  A long, thin tube (catheter) is passed through one of your veins into your heart.  Energy directed through the catheter eliminates the areas of your heart that are causing abnormal electric stimulation.    HOME CARE INSTRUCTIONS  Take medicines only as directed by your health care provider.  Do not use caffeine in any form if caffeine triggers episodes of PSVT. Otherwise, consume caffeine in moderation. This means no more than a few cups of coffee or the equivalent each day.  Do not drink alcohol if alcohol triggers episodes of PSVT. Otherwise, limit alcohol intake to no more than 1 drink per day for nonpregnant women and 2 drinks per day for men. One drink equals 12 ounces of beer, 5 ounces of wine, or 1½ ounces of hard liquor.  Do not use any tobacco products, including cigarettes, chewing tobacco, or electronic cigarettes. If you need help quitting, ask your health care provider.  Try to get at least 7 hours of sleep each night.  Find healthy ways to manage stress.  Perform vagus nerve stimulation as directed by your health care provider.  Maintain a healthy weight.  Get some exercise on most days. Ask your health care provider to suggest some good activities for you.    SEEK MEDICAL CARE IF:  You are having episodes of PSVT more often, or they are lasting longer.  Vagus nerve stimulation is no longer helping.  You have new symptoms during an episode.    SEEK IMMEDIATE MEDICAL CARE IF:  You have chest pain or trouble breathing.  You have an episode of PSVT that has lasted longer than 20 minutes.  You have passed out from an episode of PSVT.    These symptoms may represent a serious problem that is an emergency. Do not wait to see if the symptoms will go away. Get medical help right away. Call your local emergency services (911 in the [place] Do not drive yourself to the hospital.    ADDITIONAL NOTES AND INSTRUCTIONS    Please follow up with your Primary MD in 24-48 hr.  Seek immediate medical care for any new/worsening signs or symptoms.

## 2024-10-25 NOTE — ED PROVIDER NOTE - OBJECTIVE STATEMENT
64-year-old female past medical history of anxiety, A-fib on Eliquis and metoprolol, prior ablation followed by Dr. Thomas presenting to the ED for rapid heart rate.  Patient states prior to yesterday she was not taking her metoprolol.  Patient presented yesterday with similar complaints and was given Cardizem with resolution.  Patient is endorsing feeling palpitations.  Patient states she otherwise feels fine. No nausea, vomiting, fevers, chills, chest pain, shortness of breath.

## 2024-10-25 NOTE — ED PROVIDER NOTE - CLINICAL SUMMARY MEDICAL DECISION MAKING FREE TEXT BOX
Patient past medical history A-fib on Eliquis metoprolol previous in the ED for palpitations heart rate in the 160s patient with a flutter noted on EKG improved after Cardizem discussed case with Dr. Perdomo who recommend follow-up outpatient and started on Cardizem    Independently interpretted by Ifeanyi Power DO:  XR interpretation: No cardiopulmonary disease,   EKG interpretation: no MACY, NSR    Pt feeling improved, tolerating PO, abdomen soft, non-tender, non-distended, no rebound, no guarding.    Labs reviewed patient with elevated LFTs not new from before and hemolyzed    Troponin downtrending however most likely elevated secondary to a flutter     patient admitted to taking a half her dose of the metoprolol, discussed with patient the option of admission vs f/u outpatient    Appropriate medications for patient's presenting complaints were ordered and effects were reassessed.   Patient's external records were reviewed.     Escalation to admission and/or observation was considered.  Patient feels much better and is comfortable with discharge.  Appropriate follow-up was arranged.

## 2024-10-25 NOTE — ED PROVIDER NOTE - CARE PROVIDER_API CALL
Froylan Thomas  Cardiology  [address], [address]  [place] [address]  Phone: [phone]  Fax: [phone]  Established Patient  Follow Up Time: 4-6 Days

## 2024-10-25 NOTE — ED PROVIDER NOTE - PATIENT PORTAL LINK FT
You can access the FollowMyHealth Patient Portal offered by [facility] by registering at the following website: [email or web address]. By joining [employer]’s FollowMyHealth portal, you will also be able to view your health information using other applications (apps) compatible with our system.

## 2024-10-25 NOTE — ED PROVIDER NOTE - PHYSICAL EXAMINATION
CONSTITUTIONAL: well-appearing, in NAD  SKIN: Warm dry, normal skin turgor  HEAD: NCAT  EYES: EOMI, PERRLA, no scleral icterus, conjunctiva pink  ENT: normal pharynx with no erythema or exudates  NECK: Supple; non tender. Full ROM.  CARD: tachycardic   RESP: clear to ausculation b/l. No crackles or wheezing.  ABD: soft, non-tender, non-distended, no rebound or guarding.  EXT: Full ROM, no bony tenderness, no pedal edema, no calf tenderness  NEURO: normal motor. normal sensory. Normal gait.  PSYCH: Cooperative, appropriate.

## 2024-10-25 NOTE — ED PROVIDER NOTE - PHYSICAL EXAMINATION
CONSTITUTIONAL: NAD  SKIN: Warm, dry  HEAD: NCAT  EYES: Clear conjunctiva   ENT: MMM  NECK: Supple  CARD: tachycardic RR, S1, S2; no M/R/G  RESP: Normal respiratory effort, CTAB  ABD: Soft, nontender. No rebound, rigidity, or guarding  EXT: Pulses palpable distally  NEURO: Grossly intact. Awake, alert, moving all extremities, no facial asymmetry.

## 2024-12-12 ENCOUNTER — NON-APPOINTMENT (OUTPATIENT)
Age: 64
End: 2024-12-12

## 2025-04-04 ENCOUNTER — APPOINTMENT (OUTPATIENT)
Dept: GASTROENTEROLOGY | Facility: CLINIC | Age: 65
End: 2025-04-04

## 2025-05-19 ENCOUNTER — OUTPATIENT (OUTPATIENT)
Dept: OUTPATIENT SERVICES | Facility: HOSPITAL | Age: 65
LOS: 1 days | End: 2025-05-19
Payer: MEDICARE

## 2025-05-19 DIAGNOSIS — Z12.31 ENCOUNTER FOR SCREENING MAMMOGRAM FOR MALIGNANT NEOPLASM OF BREAST: ICD-10-CM

## 2025-05-19 DIAGNOSIS — Z98.890 OTHER SPECIFIED POSTPROCEDURAL STATES: Chronic | ICD-10-CM

## 2025-05-19 PROCEDURE — 77067 SCR MAMMO BI INCL CAD: CPT

## 2025-05-19 PROCEDURE — 77067 SCR MAMMO BI INCL CAD: CPT | Mod: 26

## 2025-05-19 PROCEDURE — 77063 BREAST TOMOSYNTHESIS BI: CPT | Mod: 26

## 2025-05-19 PROCEDURE — 77063 BREAST TOMOSYNTHESIS BI: CPT

## 2025-05-20 DIAGNOSIS — Z12.31 ENCOUNTER FOR SCREENING MAMMOGRAM FOR MALIGNANT NEOPLASM OF BREAST: ICD-10-CM

## 2025-05-24 NOTE — DISCHARGE NOTE PROVIDER - ATTENDING ATTESTATION STATEMENT
I have personally seen and examined the patient. I have collaborated with and supervised the
abnormal lab result

## 2025-05-28 NOTE — ED ADULT NURSE NOTE - COVID-19 RESULT DATE/TIME
Atrial fibrillation, unspecified type    Benign prostatic hyperplasia without lower urinary tract symptoms    Chronic congestive heart failure, unspecified congestive heart failure type    Gastroesophageal reflux disease, esophagitis presence not specified    HTN (hypertension)    IDDM (insulin dependent diabetes mellitus)    Syncope and collapse  5/4/2018  Type 2 myocardial infarction  12/17/2017  UTI (urinary tract infection)  7/12/2017  
Statement Selected
27-May-2024 17:50

## 2025-05-30 ENCOUNTER — OUTPATIENT (OUTPATIENT)
Dept: OUTPATIENT SERVICES | Facility: HOSPITAL | Age: 65
LOS: 1 days | End: 2025-05-30
Payer: MEDICARE

## 2025-05-30 DIAGNOSIS — R92.8 OTHER ABNORMAL AND INCONCLUSIVE FINDINGS ON DIAGNOSTIC IMAGING OF BREAST: ICD-10-CM

## 2025-05-30 DIAGNOSIS — Z98.890 OTHER SPECIFIED POSTPROCEDURAL STATES: Chronic | ICD-10-CM

## 2025-05-30 PROCEDURE — G0279: CPT

## 2025-05-30 PROCEDURE — G0279: CPT | Mod: 26

## 2025-05-30 PROCEDURE — 77066 DX MAMMO INCL CAD BI: CPT

## 2025-05-30 PROCEDURE — 77066 DX MAMMO INCL CAD BI: CPT | Mod: 26

## 2025-05-30 PROCEDURE — 76641 ULTRASOUND BREAST COMPLETE: CPT | Mod: 50

## 2025-05-30 PROCEDURE — 76641 ULTRASOUND BREAST COMPLETE: CPT | Mod: 26,50

## 2025-05-31 DIAGNOSIS — R92.8 OTHER ABNORMAL AND INCONCLUSIVE FINDINGS ON DIAGNOSTIC IMAGING OF BREAST: ICD-10-CM

## 2025-06-04 NOTE — ED ADULT NURSE NOTE - NSSUHOSCREENINGYN_ED_ALL_ED
Continue close cardiology follow-up.  He does seem to be stable today.  He seems to be in sinus rhythm.  Orders:    Comprehensive metabolic panel; Future    CBC and differential; Future     Yes - the patient is able to be screened

## 2025-07-16 NOTE — ED PROVIDER NOTE - IV ALTEPLASE INCLUSION HIDDEN
I have personally reviewed the OARRS report.  I have considered the risks of abuse, dependence, addiction and diversion.  I believe that it is clinically appropriate for patient to be prescribed this medication.     
Patient requesting a refill on Zolpidem 5 mg going to [facility] in [place].    Last OV: 06/02/2025  Next OV: 10/06/2025    Contact: [phone]  
show